# Patient Record
Sex: MALE | Race: BLACK OR AFRICAN AMERICAN | Employment: UNEMPLOYED
[De-identification: names, ages, dates, MRNs, and addresses within clinical notes are randomized per-mention and may not be internally consistent; named-entity substitution may affect disease eponyms.]

---

## 2017-01-06 DIAGNOSIS — Z76.0 MEDICATION REFILL: ICD-10-CM

## 2017-01-09 RX ORDER — DOCUSATE SODIUM 100 MG/1
CAPSULE ORAL
Qty: 60 CAPSULE | Refills: 0 | Status: SHIPPED | OUTPATIENT
Start: 2017-01-09 | End: 2017-01-27

## 2017-01-09 RX ORDER — DULOXETIN HYDROCHLORIDE 20 MG/1
CAPSULE, DELAYED RELEASE ORAL
Qty: 30 CAPSULE | Refills: 0 | Status: SHIPPED | OUTPATIENT
Start: 2017-01-09 | End: 2017-05-25 | Stop reason: SDUPTHER

## 2017-01-30 ENCOUNTER — TELEPHONE (OUTPATIENT)
Dept: FAMILY MEDICINE CLINIC | Facility: CLINIC | Age: 62
End: 2017-01-30

## 2017-01-31 ENCOUNTER — CARE COORDINATION (OUTPATIENT)
Dept: CARE COORDINATION | Facility: CLINIC | Age: 62
End: 2017-01-31

## 2017-02-22 ENCOUNTER — APPOINTMENT (OUTPATIENT)
Dept: CT IMAGING | Age: 62
DRG: 981 | End: 2017-02-22
Payer: MEDICARE

## 2017-02-22 ENCOUNTER — APPOINTMENT (OUTPATIENT)
Dept: GENERAL RADIOLOGY | Age: 62
DRG: 981 | End: 2017-02-22
Payer: MEDICARE

## 2017-02-22 ENCOUNTER — HOSPITAL ENCOUNTER (INPATIENT)
Age: 62
LOS: 6 days | Discharge: NURSING, REHAB OR LONG-TERM CARE | DRG: 981 | End: 2017-03-01
Attending: EMERGENCY MEDICINE | Admitting: FAMILY MEDICINE
Payer: MEDICARE

## 2017-02-22 DIAGNOSIS — I26.99 OTHER ACUTE PULMONARY EMBOLISM WITHOUT ACUTE COR PULMONALE (HCC): Primary | ICD-10-CM

## 2017-02-22 LAB
ABSOLUTE EOS #: 0.1 K/UL (ref 0–0.4)
ABSOLUTE LYMPH #: 1.2 K/UL (ref 1–4.8)
ABSOLUTE MONO #: 1.1 K/UL (ref 0.1–1.2)
ANION GAP SERPL CALCULATED.3IONS-SCNC: 10 MMOL/L (ref 9–17)
BASOPHILS # BLD: 0 % (ref 0–2)
BASOPHILS ABSOLUTE: 0 K/UL (ref 0–0.2)
BNP INTERPRETATION: ABNORMAL
BUN BLDV-MCNC: 18 MG/DL (ref 8–23)
BUN/CREAT BLD: ABNORMAL (ref 9–20)
CALCIUM SERPL-MCNC: 8.6 MG/DL (ref 8.6–10.4)
CHLORIDE BLD-SCNC: 105 MMOL/L (ref 98–107)
CO2: 27 MMOL/L (ref 20–31)
CREAT SERPL-MCNC: 0.92 MG/DL (ref 0.7–1.2)
D-DIMER QUANTITATIVE: 1.27 MG/L FEU
DIFFERENTIAL TYPE: ABNORMAL
EOSINOPHILS RELATIVE PERCENT: 1 % (ref 1–4)
GFR AFRICAN AMERICAN: >60 ML/MIN
GFR NON-AFRICAN AMERICAN: >60 ML/MIN
GFR SERPL CREATININE-BSD FRML MDRD: ABNORMAL ML/MIN/{1.73_M2}
GFR SERPL CREATININE-BSD FRML MDRD: ABNORMAL ML/MIN/{1.73_M2}
GLUCOSE BLD-MCNC: 114 MG/DL (ref 70–99)
HCT VFR BLD CALC: 42.4 % (ref 41–53)
HEMOGLOBIN: 14.1 G/DL (ref 13.5–17.5)
LYMPHOCYTES # BLD: 13 % (ref 24–44)
MCH RBC QN AUTO: 33.6 PG (ref 26–34)
MCHC RBC AUTO-ENTMCNC: 33.1 G/DL (ref 31–37)
MCV RBC AUTO: 101.5 FL (ref 80–100)
MONOCYTES # BLD: 11 % (ref 2–11)
PDW BLD-RTO: 16.4 % (ref 12.5–15.4)
PLATELET # BLD: 185 K/UL (ref 140–450)
PLATELET ESTIMATE: ABNORMAL
PMV BLD AUTO: 9.9 FL (ref 6–12)
POC TROPONIN I: 0.05 NG/ML (ref 0–0.1)
POC TROPONIN INTERP: NORMAL
POTASSIUM SERPL-SCNC: 5 MMOL/L (ref 3.7–5.3)
PRO-BNP: 4845 PG/ML
RBC # BLD: 4.18 M/UL (ref 4.5–5.9)
RBC # BLD: ABNORMAL 10*6/UL
SEG NEUTROPHILS: 75 % (ref 36–66)
SEGMENTED NEUTROPHILS ABSOLUTE COUNT: 6.9 K/UL (ref 1.8–7.7)
SODIUM BLD-SCNC: 142 MMOL/L (ref 135–144)
WBC # BLD: 9.4 K/UL (ref 3.5–11)
WBC # BLD: ABNORMAL 10*3/UL

## 2017-02-22 PROCEDURE — 84484 ASSAY OF TROPONIN QUANT: CPT

## 2017-02-22 PROCEDURE — 71260 CT THORAX DX C+: CPT

## 2017-02-22 PROCEDURE — 83880 ASSAY OF NATRIURETIC PEPTIDE: CPT

## 2017-02-22 PROCEDURE — 96372 THER/PROPH/DIAG INJ SC/IM: CPT

## 2017-02-22 PROCEDURE — 93005 ELECTROCARDIOGRAM TRACING: CPT

## 2017-02-22 PROCEDURE — 85025 COMPLETE CBC W/AUTO DIFF WBC: CPT

## 2017-02-22 PROCEDURE — 71010 XR CHEST PORTABLE: CPT

## 2017-02-22 PROCEDURE — 85379 FIBRIN DEGRADATION QUANT: CPT

## 2017-02-22 PROCEDURE — 99285 EMERGENCY DEPT VISIT HI MDM: CPT

## 2017-02-22 PROCEDURE — 80048 BASIC METABOLIC PNL TOTAL CA: CPT

## 2017-02-22 PROCEDURE — 6360000004 HC RX CONTRAST MEDICATION: Performed by: EMERGENCY MEDICINE

## 2017-02-22 PROCEDURE — 6370000000 HC RX 637 (ALT 250 FOR IP): Performed by: EMERGENCY MEDICINE

## 2017-02-22 RX ORDER — ASPIRIN 81 MG/1
324 TABLET, CHEWABLE ORAL ONCE
Status: COMPLETED | OUTPATIENT
Start: 2017-02-22 | End: 2017-02-22

## 2017-02-22 RX ORDER — NITROGLYCERIN 0.4 MG/1
0.4 TABLET SUBLINGUAL EVERY 5 MIN PRN
Status: DISCONTINUED | OUTPATIENT
Start: 2017-02-22 | End: 2017-03-01 | Stop reason: HOSPADM

## 2017-02-22 RX ADMIN — ASPIRIN 81 MG 324 MG: 81 TABLET ORAL at 21:50

## 2017-02-22 RX ADMIN — NITROGLYCERIN 0.4 MG: 0.4 TABLET SUBLINGUAL at 21:52

## 2017-02-22 RX ADMIN — IOHEXOL 85 ML: 350 INJECTION, SOLUTION INTRAVENOUS at 23:09

## 2017-02-22 ASSESSMENT — PAIN SCALES - GENERAL: PAINLEVEL_OUTOF10: 3

## 2017-02-23 ENCOUNTER — APPOINTMENT (OUTPATIENT)
Dept: CT IMAGING | Age: 62
DRG: 981 | End: 2017-02-23
Payer: MEDICARE

## 2017-02-23 PROBLEM — I26.99 ACUTE PULMONARY EMBOLISM (HCC): Status: ACTIVE | Noted: 2017-02-23

## 2017-02-23 LAB
LV EF: 23 %
LVEF MODALITY: NORMAL
POC TROPONIN I: 0.07 NG/ML (ref 0–0.1)
POC TROPONIN INTERP: NORMAL
TROPONIN INTERP: NORMAL
TROPONIN T: <0.03 NG/ML
TSH SERPL DL<=0.05 MIU/L-ACNC: 0.6 MIU/L (ref 0.3–5)

## 2017-02-23 PROCEDURE — 6360000002 HC RX W HCPCS: Performed by: FAMILY MEDICINE

## 2017-02-23 PROCEDURE — 36415 COLL VENOUS BLD VENIPUNCTURE: CPT

## 2017-02-23 PROCEDURE — 84484 ASSAY OF TROPONIN QUANT: CPT

## 2017-02-23 PROCEDURE — 93306 TTE W/DOPPLER COMPLETE: CPT

## 2017-02-23 PROCEDURE — 6370000000 HC RX 637 (ALT 250 FOR IP): Performed by: FAMILY MEDICINE

## 2017-02-23 PROCEDURE — 2580000003 HC RX 258: Performed by: FAMILY MEDICINE

## 2017-02-23 PROCEDURE — 74177 CT ABD & PELVIS W/CONTRAST: CPT

## 2017-02-23 PROCEDURE — 6360000002 HC RX W HCPCS: Performed by: EMERGENCY MEDICINE

## 2017-02-23 PROCEDURE — 93970 EXTREMITY STUDY: CPT

## 2017-02-23 PROCEDURE — 84443 ASSAY THYROID STIM HORMONE: CPT

## 2017-02-23 PROCEDURE — 99223 1ST HOSP IP/OBS HIGH 75: CPT | Performed by: INTERNAL MEDICINE

## 2017-02-23 PROCEDURE — 6360000004 HC RX CONTRAST MEDICATION: Performed by: FAMILY MEDICINE

## 2017-02-23 PROCEDURE — 2500000003 HC RX 250 WO HCPCS

## 2017-02-23 PROCEDURE — 2060000002 HC BURN ICU INTERMEDIATE R&B

## 2017-02-23 PROCEDURE — 99221 1ST HOSP IP/OBS SF/LOW 40: CPT | Performed by: FAMILY MEDICINE

## 2017-02-23 PROCEDURE — 6370000000 HC RX 637 (ALT 250 FOR IP): Performed by: EMERGENCY MEDICINE

## 2017-02-23 RX ORDER — FUROSEMIDE 40 MG/1
40 TABLET ORAL DAILY
Status: DISCONTINUED | OUTPATIENT
Start: 2017-02-23 | End: 2017-03-01 | Stop reason: HOSPADM

## 2017-02-23 RX ORDER — OXYCODONE HYDROCHLORIDE AND ACETAMINOPHEN 5; 325 MG/1; MG/1
2 TABLET ORAL EVERY 4 HOURS PRN
Status: DISCONTINUED | OUTPATIENT
Start: 2017-02-23 | End: 2017-02-23

## 2017-02-23 RX ORDER — LORAZEPAM 0.5 MG/1
0.5 TABLET ORAL NIGHTLY PRN
Status: DISCONTINUED | OUTPATIENT
Start: 2017-02-23 | End: 2017-03-01 | Stop reason: HOSPADM

## 2017-02-23 RX ORDER — FUROSEMIDE 10 MG/ML
20 INJECTION INTRAMUSCULAR; INTRAVENOUS ONCE
Status: COMPLETED | OUTPATIENT
Start: 2017-02-23 | End: 2017-02-23

## 2017-02-23 RX ORDER — CARVEDILOL 12.5 MG/1
12.5 TABLET ORAL 2 TIMES DAILY
Status: DISCONTINUED | OUTPATIENT
Start: 2017-02-23 | End: 2017-02-24

## 2017-02-23 RX ORDER — GUAIFENESIN 600 MG/1
600 TABLET, EXTENDED RELEASE ORAL 2 TIMES DAILY PRN
Status: DISCONTINUED | OUTPATIENT
Start: 2017-02-23 | End: 2017-03-01 | Stop reason: HOSPADM

## 2017-02-23 RX ORDER — CLONIDINE HYDROCHLORIDE 0.2 MG/1
0.2 TABLET ORAL 2 TIMES DAILY
Status: DISCONTINUED | OUTPATIENT
Start: 2017-02-23 | End: 2017-02-23

## 2017-02-23 RX ORDER — PREGABALIN 50 MG/1
50 CAPSULE ORAL 2 TIMES DAILY
Status: DISCONTINUED | OUTPATIENT
Start: 2017-02-23 | End: 2017-03-01 | Stop reason: HOSPADM

## 2017-02-23 RX ORDER — ATORVASTATIN CALCIUM 20 MG/1
20 TABLET, FILM COATED ORAL DAILY
Status: DISCONTINUED | OUTPATIENT
Start: 2017-02-23 | End: 2017-03-01 | Stop reason: HOSPADM

## 2017-02-23 RX ORDER — DOCUSATE SODIUM 100 MG/1
100 CAPSULE, LIQUID FILLED ORAL 2 TIMES DAILY PRN
Status: DISCONTINUED | OUTPATIENT
Start: 2017-02-23 | End: 2017-03-01 | Stop reason: HOSPADM

## 2017-02-23 RX ORDER — FAMOTIDINE 20 MG/1
20 TABLET, FILM COATED ORAL 2 TIMES DAILY
Status: DISCONTINUED | OUTPATIENT
Start: 2017-02-23 | End: 2017-03-01 | Stop reason: HOSPADM

## 2017-02-23 RX ORDER — OXYCODONE HYDROCHLORIDE AND ACETAMINOPHEN 5; 325 MG/1; MG/1
1 TABLET ORAL EVERY 4 HOURS PRN
Status: DISCONTINUED | OUTPATIENT
Start: 2017-02-23 | End: 2017-02-23

## 2017-02-23 RX ORDER — SODIUM CHLORIDE 0.9 % (FLUSH) 0.9 %
10 SYRINGE (ML) INJECTION PRN
Status: DISCONTINUED | OUTPATIENT
Start: 2017-02-23 | End: 2017-03-01 | Stop reason: HOSPADM

## 2017-02-23 RX ORDER — SPIRONOLACTONE 25 MG/1
25 TABLET ORAL DAILY
Status: DISCONTINUED | OUTPATIENT
Start: 2017-02-23 | End: 2017-02-23

## 2017-02-23 RX ORDER — LISINOPRIL 20 MG/1
40 TABLET ORAL DAILY
Status: DISCONTINUED | OUTPATIENT
Start: 2017-02-23 | End: 2017-02-23

## 2017-02-23 RX ORDER — SERTRALINE HYDROCHLORIDE 25 MG/1
25 TABLET, FILM COATED ORAL DAILY
Status: DISCONTINUED | OUTPATIENT
Start: 2017-02-23 | End: 2017-02-23

## 2017-02-23 RX ORDER — LORAZEPAM 0.5 MG/1
0.5 TABLET ORAL NIGHTLY PRN
Status: DISCONTINUED | OUTPATIENT
Start: 2017-02-24 | End: 2017-02-23

## 2017-02-23 RX ORDER — SODIUM CHLORIDE 0.9 % (FLUSH) 0.9 %
10 SYRINGE (ML) INJECTION EVERY 12 HOURS SCHEDULED
Status: DISCONTINUED | OUTPATIENT
Start: 2017-02-23 | End: 2017-03-01 | Stop reason: HOSPADM

## 2017-02-23 RX ORDER — DULOXETIN HYDROCHLORIDE 20 MG/1
20 CAPSULE, DELAYED RELEASE ORAL DAILY
Status: DISCONTINUED | OUTPATIENT
Start: 2017-02-23 | End: 2017-03-01 | Stop reason: HOSPADM

## 2017-02-23 RX ORDER — GABAPENTIN 400 MG/1
400 CAPSULE ORAL 3 TIMES DAILY
Status: DISCONTINUED | OUTPATIENT
Start: 2017-02-23 | End: 2017-02-23

## 2017-02-23 RX ADMIN — PREGABALIN 50 MG: 50 CAPSULE ORAL at 21:08

## 2017-02-23 RX ADMIN — Medication 10 ML: at 07:55

## 2017-02-23 RX ADMIN — GUAIFENESIN 600 MG: 600 TABLET, EXTENDED RELEASE ORAL at 21:10

## 2017-02-23 RX ADMIN — CARVEDILOL 12.5 MG: 12.5 TABLET, FILM COATED ORAL at 09:47

## 2017-02-23 RX ADMIN — GUAIFENESIN 600 MG: 600 TABLET, EXTENDED RELEASE ORAL at 16:37

## 2017-02-23 RX ADMIN — LISINOPRIL 40 MG: 20 TABLET ORAL at 09:47

## 2017-02-23 RX ADMIN — SPIRONOLACTONE 25 MG: 25 TABLET ORAL at 09:47

## 2017-02-23 RX ADMIN — FUROSEMIDE 20 MG: 10 INJECTION, SOLUTION INTRAMUSCULAR; INTRAVENOUS at 18:35

## 2017-02-23 RX ADMIN — ENOXAPARIN SODIUM 120 MG: 120 INJECTION SUBCUTANEOUS at 00:11

## 2017-02-23 RX ADMIN — IOHEXOL 130 ML: 350 INJECTION, SOLUTION INTRAVENOUS at 13:30

## 2017-02-23 RX ADMIN — METOPROLOL TARTRATE 5 MG: 5 INJECTION INTRAVENOUS at 05:20

## 2017-02-23 RX ADMIN — ATORVASTATIN CALCIUM 20 MG: 20 TABLET, FILM COATED ORAL at 09:47

## 2017-02-23 RX ADMIN — FAMOTIDINE 20 MG: 20 TABLET, FILM COATED ORAL at 21:08

## 2017-02-23 RX ADMIN — NITROGLYCERIN 0.4 MG: 0.4 TABLET SUBLINGUAL at 17:57

## 2017-02-23 RX ADMIN — FAMOTIDINE 20 MG: 20 TABLET, FILM COATED ORAL at 14:06

## 2017-02-23 RX ADMIN — CARVEDILOL 12.5 MG: 12.5 TABLET, FILM COATED ORAL at 21:09

## 2017-02-23 RX ADMIN — DULOXETINE 20 MG: 20 CAPSULE, DELAYED RELEASE ORAL at 14:06

## 2017-02-23 RX ADMIN — ENOXAPARIN SODIUM 120 MG: 120 INJECTION SUBCUTANEOUS at 14:06

## 2017-02-23 RX ADMIN — PREGABALIN 50 MG: 50 CAPSULE ORAL at 14:06

## 2017-02-23 RX ADMIN — Medication 10 ML: at 21:08

## 2017-02-23 RX ADMIN — FUROSEMIDE 40 MG: 40 TABLET ORAL at 09:47

## 2017-02-23 RX ADMIN — BENZOCAINE AND MENTHOL 1 LOZENGE: 15; 3.6 LOZENGE ORAL at 16:37

## 2017-02-23 ASSESSMENT — ENCOUNTER SYMPTOMS
NAUSEA: 1
HEMOPTYSIS: 0
DIARRHEA: 0
CHEST TIGHTNESS: 1
ABDOMINAL PAIN: 0
SHORTNESS OF BREATH: 1
VOMITING: 1
COUGH: 1
CONSTIPATION: 0
SPUTUM PRODUCTION: 0
BACK PAIN: 0
COLOR CHANGE: 0
BLOOD IN STOOL: 0
COUGH: 0

## 2017-02-23 ASSESSMENT — PAIN SCALES - GENERAL
PAINLEVEL_OUTOF10: 0

## 2017-02-24 LAB
ABSOLUTE EOS #: 0.2 K/UL (ref 0–0.4)
ABSOLUTE LYMPH #: 1.2 K/UL (ref 1–4.8)
ABSOLUTE MONO #: 1 K/UL (ref 0.1–1.2)
ANION GAP SERPL CALCULATED.3IONS-SCNC: 10 MMOL/L (ref 9–17)
BASOPHILS # BLD: 0 % (ref 0–2)
BASOPHILS ABSOLUTE: 0 K/UL (ref 0–0.2)
BUN BLDV-MCNC: 19 MG/DL (ref 8–23)
BUN/CREAT BLD: ABNORMAL (ref 9–20)
CALCIUM SERPL-MCNC: 8.4 MG/DL (ref 8.6–10.4)
CHLORIDE BLD-SCNC: 103 MMOL/L (ref 98–107)
CO2: 27 MMOL/L (ref 20–31)
CREAT SERPL-MCNC: 0.85 MG/DL (ref 0.7–1.2)
DIFFERENTIAL TYPE: ABNORMAL
EOSINOPHILS RELATIVE PERCENT: 2 % (ref 1–4)
GFR AFRICAN AMERICAN: >60 ML/MIN
GFR NON-AFRICAN AMERICAN: >60 ML/MIN
GFR SERPL CREATININE-BSD FRML MDRD: ABNORMAL ML/MIN/{1.73_M2}
GFR SERPL CREATININE-BSD FRML MDRD: ABNORMAL ML/MIN/{1.73_M2}
GLUCOSE BLD-MCNC: 110 MG/DL (ref 70–99)
HCT VFR BLD CALC: 41.2 % (ref 41–53)
HEMOGLOBIN: 13.5 G/DL (ref 13.5–17.5)
LYMPHOCYTES # BLD: 14 % (ref 24–44)
MCH RBC QN AUTO: 33.5 PG (ref 26–34)
MCHC RBC AUTO-ENTMCNC: 32.9 G/DL (ref 31–37)
MCV RBC AUTO: 101.9 FL (ref 80–100)
MONOCYTES # BLD: 11 % (ref 2–11)
PDW BLD-RTO: 15.9 % (ref 12.5–15.4)
PLATELET # BLD: 170 K/UL (ref 140–450)
PLATELET ESTIMATE: ABNORMAL
PMV BLD AUTO: 9.6 FL (ref 6–12)
POTASSIUM SERPL-SCNC: 4.5 MMOL/L (ref 3.7–5.3)
RBC # BLD: 4.04 M/UL (ref 4.5–5.9)
RBC # BLD: ABNORMAL 10*6/UL
SEG NEUTROPHILS: 73 % (ref 36–66)
SEGMENTED NEUTROPHILS ABSOLUTE COUNT: 6.6 K/UL (ref 1.8–7.7)
SODIUM BLD-SCNC: 140 MMOL/L (ref 135–144)
VITAMIN D 25-HYDROXY: 11.4 NG/ML (ref 30–100)
WBC # BLD: 8.9 K/UL (ref 3.5–11)
WBC # BLD: ABNORMAL 10*3/UL

## 2017-02-24 PROCEDURE — 80048 BASIC METABOLIC PNL TOTAL CA: CPT

## 2017-02-24 PROCEDURE — 82306 VITAMIN D 25 HYDROXY: CPT

## 2017-02-24 PROCEDURE — 6370000000 HC RX 637 (ALT 250 FOR IP): Performed by: FAMILY MEDICINE

## 2017-02-24 PROCEDURE — 36415 COLL VENOUS BLD VENIPUNCTURE: CPT

## 2017-02-24 PROCEDURE — G8988 SELF CARE GOAL STATUS: HCPCS | Performed by: OCCUPATIONAL THERAPIST

## 2017-02-24 PROCEDURE — 99232 SBSQ HOSP IP/OBS MODERATE 35: CPT | Performed by: FAMILY MEDICINE

## 2017-02-24 PROCEDURE — 6360000002 HC RX W HCPCS: Performed by: FAMILY MEDICINE

## 2017-02-24 PROCEDURE — 2060000002 HC BURN ICU INTERMEDIATE R&B

## 2017-02-24 PROCEDURE — 97165 OT EVAL LOW COMPLEX 30 MIN: CPT | Performed by: OCCUPATIONAL THERAPIST

## 2017-02-24 PROCEDURE — 97535 SELF CARE MNGMENT TRAINING: CPT | Performed by: OCCUPATIONAL THERAPIST

## 2017-02-24 PROCEDURE — 2580000003 HC RX 258: Performed by: FAMILY MEDICINE

## 2017-02-24 PROCEDURE — G8987 SELF CARE CURRENT STATUS: HCPCS | Performed by: OCCUPATIONAL THERAPIST

## 2017-02-24 PROCEDURE — 99232 SBSQ HOSP IP/OBS MODERATE 35: CPT | Performed by: INTERNAL MEDICINE

## 2017-02-24 PROCEDURE — 99222 1ST HOSP IP/OBS MODERATE 55: CPT | Performed by: INTERNAL MEDICINE

## 2017-02-24 PROCEDURE — 85025 COMPLETE CBC W/AUTO DIFF WBC: CPT

## 2017-02-24 RX ORDER — LISINOPRIL 10 MG/1
10 TABLET ORAL DAILY
Status: DISCONTINUED | OUTPATIENT
Start: 2017-02-24 | End: 2017-03-01 | Stop reason: HOSPADM

## 2017-02-24 RX ORDER — CARVEDILOL 12.5 MG/1
12.5 TABLET ORAL 2 TIMES DAILY
Status: DISCONTINUED | OUTPATIENT
Start: 2017-02-24 | End: 2017-03-01

## 2017-02-24 RX ORDER — LISINOPRIL 20 MG/1
20 TABLET ORAL DAILY
Status: DISCONTINUED | OUTPATIENT
Start: 2017-02-24 | End: 2017-02-24

## 2017-02-24 RX ORDER — CALCIUM CARBONATE 200(500)MG
1000 TABLET,CHEWABLE ORAL EVERY 4 HOURS PRN
Status: DISCONTINUED | OUTPATIENT
Start: 2017-02-24 | End: 2017-03-01 | Stop reason: HOSPADM

## 2017-02-24 RX ORDER — CARVEDILOL 25 MG/1
25 TABLET ORAL 2 TIMES DAILY
Status: DISCONTINUED | OUTPATIENT
Start: 2017-02-24 | End: 2017-02-24

## 2017-02-24 RX ADMIN — GUAIFENESIN 600 MG: 600 TABLET, EXTENDED RELEASE ORAL at 20:23

## 2017-02-24 RX ADMIN — Medication 1000 MG: at 03:01

## 2017-02-24 RX ADMIN — CARVEDILOL 12.5 MG: 12.5 TABLET, FILM COATED ORAL at 09:02

## 2017-02-24 RX ADMIN — FAMOTIDINE 20 MG: 20 TABLET, FILM COATED ORAL at 12:07

## 2017-02-24 RX ADMIN — ATORVASTATIN CALCIUM 20 MG: 20 TABLET, FILM COATED ORAL at 12:07

## 2017-02-24 RX ADMIN — LISINOPRIL 10 MG: 10 TABLET ORAL at 20:23

## 2017-02-24 RX ADMIN — PREGABALIN 50 MG: 50 CAPSULE ORAL at 20:24

## 2017-02-24 RX ADMIN — LORAZEPAM 0.5 MG: 0.5 TABLET ORAL at 23:10

## 2017-02-24 RX ADMIN — LORAZEPAM 0.5 MG: 0.5 TABLET ORAL at 00:01

## 2017-02-24 RX ADMIN — Medication 10 ML: at 09:02

## 2017-02-24 RX ADMIN — ENOXAPARIN SODIUM 120 MG: 120 INJECTION SUBCUTANEOUS at 23:10

## 2017-02-24 RX ADMIN — Medication 1000 MG: at 23:10

## 2017-02-24 RX ADMIN — Medication 10 ML: at 20:24

## 2017-02-24 RX ADMIN — FUROSEMIDE 40 MG: 40 TABLET ORAL at 09:02

## 2017-02-24 RX ADMIN — PREGABALIN 50 MG: 50 CAPSULE ORAL at 12:07

## 2017-02-24 RX ADMIN — DULOXETINE 20 MG: 20 CAPSULE, DELAYED RELEASE ORAL at 12:07

## 2017-02-24 RX ADMIN — CARVEDILOL 12.5 MG: 12.5 TABLET, FILM COATED ORAL at 20:23

## 2017-02-24 RX ADMIN — FAMOTIDINE 20 MG: 20 TABLET, FILM COATED ORAL at 20:23

## 2017-02-24 RX ADMIN — BENZOCAINE AND MENTHOL 1 LOZENGE: 15; 3.6 LOZENGE ORAL at 20:26

## 2017-02-24 RX ADMIN — ENOXAPARIN SODIUM 120 MG: 120 INJECTION SUBCUTANEOUS at 12:07

## 2017-02-24 ASSESSMENT — PAIN SCALES - GENERAL
PAINLEVEL_OUTOF10: 0
PAINLEVEL_OUTOF10: 1
PAINLEVEL_OUTOF10: 0

## 2017-02-25 PROBLEM — F32.9 MAJOR DEPRESSIVE DISORDER WITH CURRENT ACTIVE EPISODE: Status: ACTIVE | Noted: 2017-02-25

## 2017-02-25 LAB
ABSOLUTE EOS #: 0.1 K/UL (ref 0–0.4)
ABSOLUTE LYMPH #: 0.9 K/UL (ref 1–4.8)
ABSOLUTE MONO #: 0.9 K/UL (ref 0.1–1.2)
ANION GAP SERPL CALCULATED.3IONS-SCNC: 9 MMOL/L (ref 9–17)
BASOPHILS # BLD: 0 % (ref 0–2)
BASOPHILS ABSOLUTE: 0 K/UL (ref 0–0.2)
BUN BLDV-MCNC: 21 MG/DL (ref 8–23)
BUN/CREAT BLD: ABNORMAL (ref 9–20)
CALCIUM SERPL-MCNC: 8.6 MG/DL (ref 8.6–10.4)
CHLORIDE BLD-SCNC: 102 MMOL/L (ref 98–107)
CO2: 31 MMOL/L (ref 20–31)
CREAT SERPL-MCNC: 0.88 MG/DL (ref 0.7–1.2)
DIFFERENTIAL TYPE: ABNORMAL
EOSINOPHILS RELATIVE PERCENT: 1 % (ref 1–4)
GFR AFRICAN AMERICAN: >60 ML/MIN
GFR NON-AFRICAN AMERICAN: >60 ML/MIN
GFR SERPL CREATININE-BSD FRML MDRD: ABNORMAL ML/MIN/{1.73_M2}
GFR SERPL CREATININE-BSD FRML MDRD: ABNORMAL ML/MIN/{1.73_M2}
GLUCOSE BLD-MCNC: 103 MG/DL (ref 70–99)
HCT VFR BLD CALC: 40.1 % (ref 41–53)
HEMOGLOBIN: 13.1 G/DL (ref 13.5–17.5)
LYMPHOCYTES # BLD: 10 % (ref 24–44)
MCH RBC QN AUTO: 33.4 PG (ref 26–34)
MCHC RBC AUTO-ENTMCNC: 32.6 G/DL (ref 31–37)
MCV RBC AUTO: 102.7 FL (ref 80–100)
MONOCYTES # BLD: 10 % (ref 2–11)
PDW BLD-RTO: 15.6 % (ref 12.5–15.4)
PLATELET # BLD: 161 K/UL (ref 140–450)
PLATELET ESTIMATE: ABNORMAL
PMV BLD AUTO: 9.4 FL (ref 6–12)
POTASSIUM SERPL-SCNC: 4.3 MMOL/L (ref 3.7–5.3)
RBC # BLD: 3.9 M/UL (ref 4.5–5.9)
RBC # BLD: ABNORMAL 10*6/UL
SEG NEUTROPHILS: 79 % (ref 36–66)
SEGMENTED NEUTROPHILS ABSOLUTE COUNT: 6.7 K/UL (ref 1.8–7.7)
SODIUM BLD-SCNC: 142 MMOL/L (ref 135–144)
WBC # BLD: 8.5 K/UL (ref 3.5–11)
WBC # BLD: ABNORMAL 10*3/UL

## 2017-02-25 PROCEDURE — 6370000000 HC RX 637 (ALT 250 FOR IP): Performed by: FAMILY MEDICINE

## 2017-02-25 PROCEDURE — 85025 COMPLETE CBC W/AUTO DIFF WBC: CPT

## 2017-02-25 PROCEDURE — 2060000002 HC BURN ICU INTERMEDIATE R&B

## 2017-02-25 PROCEDURE — 80048 BASIC METABOLIC PNL TOTAL CA: CPT

## 2017-02-25 PROCEDURE — 99232 SBSQ HOSP IP/OBS MODERATE 35: CPT | Performed by: INTERNAL MEDICINE

## 2017-02-25 PROCEDURE — 36415 COLL VENOUS BLD VENIPUNCTURE: CPT

## 2017-02-25 PROCEDURE — 6360000002 HC RX W HCPCS: Performed by: FAMILY MEDICINE

## 2017-02-25 PROCEDURE — 2580000003 HC RX 258: Performed by: FAMILY MEDICINE

## 2017-02-25 RX ORDER — QUETIAPINE FUMARATE 25 MG/1
25 TABLET, FILM COATED ORAL 2 TIMES DAILY
Status: DISCONTINUED | OUTPATIENT
Start: 2017-02-25 | End: 2017-03-01 | Stop reason: HOSPADM

## 2017-02-25 RX ADMIN — DULOXETINE 20 MG: 20 CAPSULE, DELAYED RELEASE ORAL at 08:07

## 2017-02-25 RX ADMIN — FAMOTIDINE 20 MG: 20 TABLET, FILM COATED ORAL at 08:07

## 2017-02-25 RX ADMIN — QUETIAPINE FUMARATE 25 MG: 25 TABLET ORAL at 10:51

## 2017-02-25 RX ADMIN — FAMOTIDINE 20 MG: 20 TABLET, FILM COATED ORAL at 20:37

## 2017-02-25 RX ADMIN — ATORVASTATIN CALCIUM 20 MG: 20 TABLET, FILM COATED ORAL at 08:07

## 2017-02-25 RX ADMIN — ENOXAPARIN SODIUM 120 MG: 120 INJECTION SUBCUTANEOUS at 12:40

## 2017-02-25 RX ADMIN — CARVEDILOL 12.5 MG: 12.5 TABLET, FILM COATED ORAL at 08:07

## 2017-02-25 RX ADMIN — Medication 10 ML: at 20:37

## 2017-02-25 RX ADMIN — LISINOPRIL 10 MG: 10 TABLET ORAL at 08:07

## 2017-02-25 RX ADMIN — PREGABALIN 50 MG: 50 CAPSULE ORAL at 08:07

## 2017-02-25 RX ADMIN — FUROSEMIDE 40 MG: 40 TABLET ORAL at 08:07

## 2017-02-25 RX ADMIN — CARVEDILOL 12.5 MG: 12.5 TABLET, FILM COATED ORAL at 20:37

## 2017-02-25 RX ADMIN — Medication 10 ML: at 08:07

## 2017-02-25 RX ADMIN — QUETIAPINE FUMARATE 25 MG: 25 TABLET ORAL at 20:37

## 2017-02-25 RX ADMIN — PREGABALIN 50 MG: 50 CAPSULE ORAL at 20:37

## 2017-02-25 RX ADMIN — VITAMIN D, TAB 1000IU (100/BT) 1000 UNITS: 25 TAB at 10:51

## 2017-02-25 ASSESSMENT — PAIN SCALES - GENERAL
PAINLEVEL_OUTOF10: 0
PAINLEVEL_OUTOF10: 0

## 2017-02-26 LAB
ABSOLUTE EOS #: 0.1 K/UL (ref 0–0.4)
ABSOLUTE LYMPH #: 1.1 K/UL (ref 1–4.8)
ABSOLUTE MONO #: 0.8 K/UL (ref 0.1–1.2)
ANION GAP SERPL CALCULATED.3IONS-SCNC: 7 MMOL/L (ref 9–17)
BASOPHILS # BLD: 0 % (ref 0–2)
BASOPHILS ABSOLUTE: 0 K/UL (ref 0–0.2)
BUN BLDV-MCNC: 20 MG/DL (ref 8–23)
BUN/CREAT BLD: ABNORMAL (ref 9–20)
CALCIUM SERPL-MCNC: 8.5 MG/DL (ref 8.6–10.4)
CHLORIDE BLD-SCNC: 97 MMOL/L (ref 98–107)
CO2: 30 MMOL/L (ref 20–31)
CREAT SERPL-MCNC: 0.82 MG/DL (ref 0.7–1.2)
DIFFERENTIAL TYPE: ABNORMAL
EOSINOPHILS RELATIVE PERCENT: 2 % (ref 1–4)
GFR AFRICAN AMERICAN: >60 ML/MIN
GFR NON-AFRICAN AMERICAN: >60 ML/MIN
GFR SERPL CREATININE-BSD FRML MDRD: ABNORMAL ML/MIN/{1.73_M2}
GFR SERPL CREATININE-BSD FRML MDRD: ABNORMAL ML/MIN/{1.73_M2}
GLUCOSE BLD-MCNC: 89 MG/DL (ref 70–99)
HCT VFR BLD CALC: 40.4 % (ref 41–53)
HEMOGLOBIN: 13.1 G/DL (ref 13.5–17.5)
LYMPHOCYTES # BLD: 15 % (ref 24–44)
MCH RBC QN AUTO: 33.5 PG (ref 26–34)
MCHC RBC AUTO-ENTMCNC: 32.5 G/DL (ref 31–37)
MCV RBC AUTO: 103 FL (ref 80–100)
MONOCYTES # BLD: 12 % (ref 2–11)
PDW BLD-RTO: 16 % (ref 12.5–15.4)
PLATELET # BLD: 171 K/UL (ref 140–450)
PLATELET ESTIMATE: ABNORMAL
PMV BLD AUTO: 9.5 FL (ref 6–12)
POTASSIUM SERPL-SCNC: 4.8 MMOL/L (ref 3.7–5.3)
RBC # BLD: 3.92 M/UL (ref 4.5–5.9)
RBC # BLD: ABNORMAL 10*6/UL
SEG NEUTROPHILS: 71 % (ref 36–66)
SEGMENTED NEUTROPHILS ABSOLUTE COUNT: 4.9 K/UL (ref 1.8–7.7)
SODIUM BLD-SCNC: 134 MMOL/L (ref 135–144)
WBC # BLD: 6.9 K/UL (ref 3.5–11)
WBC # BLD: ABNORMAL 10*3/UL

## 2017-02-26 PROCEDURE — 85025 COMPLETE CBC W/AUTO DIFF WBC: CPT

## 2017-02-26 PROCEDURE — 36415 COLL VENOUS BLD VENIPUNCTURE: CPT

## 2017-02-26 PROCEDURE — 2060000002 HC BURN ICU INTERMEDIATE R&B

## 2017-02-26 PROCEDURE — 99232 SBSQ HOSP IP/OBS MODERATE 35: CPT | Performed by: FAMILY MEDICINE

## 2017-02-26 PROCEDURE — 6370000000 HC RX 637 (ALT 250 FOR IP): Performed by: FAMILY MEDICINE

## 2017-02-26 PROCEDURE — 2580000003 HC RX 258: Performed by: FAMILY MEDICINE

## 2017-02-26 PROCEDURE — 6360000002 HC RX W HCPCS: Performed by: FAMILY MEDICINE

## 2017-02-26 PROCEDURE — 99232 SBSQ HOSP IP/OBS MODERATE 35: CPT | Performed by: INTERNAL MEDICINE

## 2017-02-26 PROCEDURE — 80048 BASIC METABOLIC PNL TOTAL CA: CPT

## 2017-02-26 RX ADMIN — DULOXETINE 20 MG: 20 CAPSULE, DELAYED RELEASE ORAL at 08:00

## 2017-02-26 RX ADMIN — VITAMIN D, TAB 1000IU (100/BT) 1000 UNITS: 25 TAB at 08:00

## 2017-02-26 RX ADMIN — QUETIAPINE FUMARATE 25 MG: 25 TABLET ORAL at 20:56

## 2017-02-26 RX ADMIN — ENOXAPARIN SODIUM 120 MG: 120 INJECTION SUBCUTANEOUS at 12:33

## 2017-02-26 RX ADMIN — ENOXAPARIN SODIUM 120 MG: 120 INJECTION SUBCUTANEOUS at 00:03

## 2017-02-26 RX ADMIN — Medication 10 ML: at 08:01

## 2017-02-26 RX ADMIN — LISINOPRIL 10 MG: 10 TABLET ORAL at 08:00

## 2017-02-26 RX ADMIN — FUROSEMIDE 40 MG: 40 TABLET ORAL at 08:01

## 2017-02-26 RX ADMIN — Medication 10 ML: at 20:57

## 2017-02-26 RX ADMIN — FAMOTIDINE 20 MG: 20 TABLET, FILM COATED ORAL at 08:01

## 2017-02-26 RX ADMIN — QUETIAPINE FUMARATE 25 MG: 25 TABLET ORAL at 08:00

## 2017-02-26 RX ADMIN — PREGABALIN 50 MG: 50 CAPSULE ORAL at 20:56

## 2017-02-26 RX ADMIN — ATORVASTATIN CALCIUM 20 MG: 20 TABLET, FILM COATED ORAL at 08:00

## 2017-02-26 RX ADMIN — CARVEDILOL 12.5 MG: 12.5 TABLET, FILM COATED ORAL at 20:56

## 2017-02-26 RX ADMIN — FAMOTIDINE 20 MG: 20 TABLET, FILM COATED ORAL at 20:56

## 2017-02-26 RX ADMIN — CARVEDILOL 12.5 MG: 12.5 TABLET, FILM COATED ORAL at 08:01

## 2017-02-26 RX ADMIN — PREGABALIN 50 MG: 50 CAPSULE ORAL at 08:00

## 2017-02-26 ASSESSMENT — PAIN SCALES - GENERAL
PAINLEVEL_OUTOF10: 0
PAINLEVEL_OUTOF10: 0

## 2017-02-27 ENCOUNTER — APPOINTMENT (OUTPATIENT)
Dept: GENERAL RADIOLOGY | Age: 62
DRG: 981 | End: 2017-02-27
Payer: MEDICARE

## 2017-02-27 LAB
ABSOLUTE EOS #: 0.2 K/UL (ref 0–0.4)
ABSOLUTE LYMPH #: 1 K/UL (ref 1–4.8)
ABSOLUTE MONO #: 0.9 K/UL (ref 0.1–1.2)
ANION GAP SERPL CALCULATED.3IONS-SCNC: 9 MMOL/L (ref 9–17)
BASOPHILS # BLD: 0 % (ref 0–2)
BASOPHILS ABSOLUTE: 0 K/UL (ref 0–0.2)
BUN BLDV-MCNC: 16 MG/DL (ref 8–23)
BUN/CREAT BLD: ABNORMAL (ref 9–20)
CALCIUM SERPL-MCNC: 8.5 MG/DL (ref 8.6–10.4)
CHLORIDE BLD-SCNC: 102 MMOL/L (ref 98–107)
CO2: 31 MMOL/L (ref 20–31)
CREAT SERPL-MCNC: 0.88 MG/DL (ref 0.7–1.2)
DIFFERENTIAL TYPE: ABNORMAL
EOSINOPHILS RELATIVE PERCENT: 3 % (ref 1–4)
GFR AFRICAN AMERICAN: >60 ML/MIN
GFR NON-AFRICAN AMERICAN: >60 ML/MIN
GFR SERPL CREATININE-BSD FRML MDRD: ABNORMAL ML/MIN/{1.73_M2}
GFR SERPL CREATININE-BSD FRML MDRD: ABNORMAL ML/MIN/{1.73_M2}
GLUCOSE BLD-MCNC: 96 MG/DL (ref 70–99)
HCT VFR BLD CALC: 40.2 % (ref 41–53)
HEMOGLOBIN: 13.2 G/DL (ref 13.5–17.5)
LYMPHOCYTES # BLD: 13 % (ref 24–44)
MCH RBC QN AUTO: 33.5 PG (ref 26–34)
MCHC RBC AUTO-ENTMCNC: 32.8 G/DL (ref 31–37)
MCV RBC AUTO: 101.9 FL (ref 80–100)
MONOCYTES # BLD: 12 % (ref 2–11)
PDW BLD-RTO: 15.7 % (ref 12.5–15.4)
PLATELET # BLD: 161 K/UL (ref 140–450)
PLATELET ESTIMATE: ABNORMAL
PMV BLD AUTO: 9.4 FL (ref 6–12)
POTASSIUM SERPL-SCNC: 4.4 MMOL/L (ref 3.7–5.3)
RBC # BLD: 3.95 M/UL (ref 4.5–5.9)
RBC # BLD: ABNORMAL 10*6/UL
SEG NEUTROPHILS: 72 % (ref 36–66)
SEGMENTED NEUTROPHILS ABSOLUTE COUNT: 5.5 K/UL (ref 1.8–7.7)
SODIUM BLD-SCNC: 142 MMOL/L (ref 135–144)
WBC # BLD: 7.6 K/UL (ref 3.5–11)
WBC # BLD: ABNORMAL 10*3/UL

## 2017-02-27 PROCEDURE — 2580000003 HC RX 258: Performed by: FAMILY MEDICINE

## 2017-02-27 PROCEDURE — 71010 XR CHEST PORTABLE: CPT

## 2017-02-27 PROCEDURE — 02HK3KZ INSERTION OF DEFIBRILLATOR LEAD INTO RIGHT VENTRICLE, PERCUTANEOUS APPROACH: ICD-10-PCS | Performed by: INTERNAL MEDICINE

## 2017-02-27 PROCEDURE — C1769 GUIDE WIRE: HCPCS

## 2017-02-27 PROCEDURE — 6370000000 HC RX 637 (ALT 250 FOR IP): Performed by: FAMILY MEDICINE

## 2017-02-27 PROCEDURE — 99232 SBSQ HOSP IP/OBS MODERATE 35: CPT | Performed by: FAMILY MEDICINE

## 2017-02-27 PROCEDURE — C1882 AICD, OTHER THAN SING/DUAL: HCPCS

## 2017-02-27 PROCEDURE — 80048 BASIC METABOLIC PNL TOTAL CA: CPT

## 2017-02-27 PROCEDURE — 85025 COMPLETE CBC W/AUTO DIFF WBC: CPT

## 2017-02-27 PROCEDURE — 6360000002 HC RX W HCPCS: Performed by: FAMILY MEDICINE

## 2017-02-27 PROCEDURE — 36415 COLL VENOUS BLD VENIPUNCTURE: CPT

## 2017-02-27 PROCEDURE — C1777 LEAD, AICD, ENDO SINGLE COIL: HCPCS

## 2017-02-27 PROCEDURE — C1894 INTRO/SHEATH, NON-LASER: HCPCS

## 2017-02-27 PROCEDURE — 6360000002 HC RX W HCPCS

## 2017-02-27 PROCEDURE — 2500000003 HC RX 250 WO HCPCS: Performed by: FAMILY MEDICINE

## 2017-02-27 PROCEDURE — 33249 INSJ/RPLCMT DEFIB W/LEAD(S): CPT | Performed by: INTERNAL MEDICINE

## 2017-02-27 PROCEDURE — 0JH638Z INSERTION OF DEFIBRILLATOR GENERATOR INTO CHEST SUBCUTANEOUS TISSUE AND FASCIA, PERCUTANEOUS APPROACH: ICD-10-PCS | Performed by: INTERNAL MEDICINE

## 2017-02-27 PROCEDURE — 2060000000 HC ICU INTERMEDIATE R&B

## 2017-02-27 RX ORDER — LORAZEPAM 2 MG/ML
0.25 INJECTION INTRAMUSCULAR ONCE
Status: COMPLETED | OUTPATIENT
Start: 2017-02-27 | End: 2017-02-27

## 2017-02-27 RX ORDER — SODIUM CHLORIDE 0.9 % (FLUSH) 0.9 %
10 SYRINGE (ML) INJECTION EVERY 12 HOURS SCHEDULED
Status: DISCONTINUED | OUTPATIENT
Start: 2017-02-27 | End: 2017-03-01 | Stop reason: HOSPADM

## 2017-02-27 RX ORDER — SODIUM CHLORIDE 0.9 % (FLUSH) 0.9 %
10 SYRINGE (ML) INJECTION PRN
Status: DISCONTINUED | OUTPATIENT
Start: 2017-02-27 | End: 2017-03-01 | Stop reason: HOSPADM

## 2017-02-27 RX ADMIN — Medication 10 ML: at 09:00

## 2017-02-27 RX ADMIN — METOPROLOL TARTRATE 5 MG: 5 INJECTION INTRAVENOUS at 18:05

## 2017-02-27 RX ADMIN — CARVEDILOL 12.5 MG: 12.5 TABLET, FILM COATED ORAL at 10:07

## 2017-02-27 RX ADMIN — SODIUM CHLORIDE: 9 INJECTION, SOLUTION INTRAVENOUS at 14:45

## 2017-02-27 RX ADMIN — FAMOTIDINE 20 MG: 20 TABLET, FILM COATED ORAL at 10:08

## 2017-02-27 RX ADMIN — LISINOPRIL 10 MG: 10 TABLET ORAL at 10:07

## 2017-02-27 RX ADMIN — FUROSEMIDE 40 MG: 40 TABLET ORAL at 10:07

## 2017-02-27 RX ADMIN — LORAZEPAM 0.25 MG: 2 INJECTION INTRAMUSCULAR; INTRAVENOUS at 18:04

## 2017-02-27 ASSESSMENT — PAIN SCALES - GENERAL: PAINLEVEL_OUTOF10: 0

## 2017-02-28 ENCOUNTER — APPOINTMENT (OUTPATIENT)
Dept: CARDIAC CATH/INVASIVE PROCEDURES | Age: 62
DRG: 981 | End: 2017-02-28
Payer: MEDICARE

## 2017-02-28 LAB
ABSOLUTE EOS #: 0 K/UL (ref 0–0.4)
ABSOLUTE LYMPH #: 1.13 K/UL (ref 1–4.8)
ABSOLUTE MONO #: 0.75 K/UL (ref 0.1–0.8)
ANION GAP SERPL CALCULATED.3IONS-SCNC: 12 MMOL/L (ref 9–17)
BASOPHILS # BLD: 0 % (ref 0–2)
BASOPHILS ABSOLUTE: 0 K/UL (ref 0–0.2)
BUN BLDV-MCNC: 16 MG/DL (ref 8–23)
BUN/CREAT BLD: NORMAL (ref 9–20)
CALCIUM SERPL-MCNC: 8.7 MG/DL (ref 8.6–10.4)
CHLORIDE BLD-SCNC: 101 MMOL/L (ref 98–107)
CO2: 31 MMOL/L (ref 20–31)
CREAT SERPL-MCNC: 0.83 MG/DL (ref 0.7–1.2)
DIFFERENTIAL TYPE: ABNORMAL
EOSINOPHILS RELATIVE PERCENT: 0 % (ref 1–4)
GFR AFRICAN AMERICAN: >60 ML/MIN
GFR NON-AFRICAN AMERICAN: >60 ML/MIN
GFR SERPL CREATININE-BSD FRML MDRD: NORMAL ML/MIN/{1.73_M2}
GFR SERPL CREATININE-BSD FRML MDRD: NORMAL ML/MIN/{1.73_M2}
GLUCOSE BLD-MCNC: 76 MG/DL (ref 70–99)
HCT VFR BLD CALC: 41.3 % (ref 41–53)
HEMOGLOBIN: 13.6 G/DL (ref 13.5–17.5)
LYMPHOCYTES # BLD: 9 % (ref 24–44)
MCH RBC QN AUTO: 33.4 PG (ref 26–34)
MCHC RBC AUTO-ENTMCNC: 32.9 G/DL (ref 31–37)
MCV RBC AUTO: 101.4 FL (ref 80–100)
MONOCYTES # BLD: 6 % (ref 1–7)
MORPHOLOGY: ABNORMAL
PARTIAL THROMBOPLASTIN TIME: 25.6 SEC (ref 21.3–31.3)
PDW BLD-RTO: 15.6 % (ref 12.5–15.4)
PLATELET # BLD: 167 K/UL (ref 140–450)
PLATELET ESTIMATE: ABNORMAL
PMV BLD AUTO: 10.2 FL (ref 6–12)
POTASSIUM SERPL-SCNC: 4.3 MMOL/L (ref 3.7–5.3)
RBC # BLD: 4.08 M/UL (ref 4.5–5.9)
RBC # BLD: ABNORMAL 10*6/UL
SEG NEUTROPHILS: 85 % (ref 36–66)
SEGMENTED NEUTROPHILS ABSOLUTE COUNT: 10.62 K/UL (ref 1.8–7.7)
SODIUM BLD-SCNC: 144 MMOL/L (ref 135–144)
WBC # BLD: 12.5 K/UL (ref 3.5–11)
WBC # BLD: ABNORMAL 10*3/UL

## 2017-02-28 PROCEDURE — 6360000002 HC RX W HCPCS

## 2017-02-28 PROCEDURE — 6370000000 HC RX 637 (ALT 250 FOR IP): Performed by: FAMILY MEDICINE

## 2017-02-28 PROCEDURE — 36415 COLL VENOUS BLD VENIPUNCTURE: CPT

## 2017-02-28 PROCEDURE — 85730 THROMBOPLASTIN TIME PARTIAL: CPT

## 2017-02-28 PROCEDURE — 10140 I&D HMTMA SEROMA/FLUID COLLJ: CPT | Performed by: INTERNAL MEDICINE

## 2017-02-28 PROCEDURE — 2060000000 HC ICU INTERMEDIATE R&B

## 2017-02-28 PROCEDURE — 2580000003 HC RX 258

## 2017-02-28 PROCEDURE — 85025 COMPLETE CBC W/AUTO DIFF WBC: CPT

## 2017-02-28 PROCEDURE — 33215 REPOSITION PACING-DEFIB LEAD: CPT | Performed by: INTERNAL MEDICINE

## 2017-02-28 PROCEDURE — 80048 BASIC METABOLIC PNL TOTAL CA: CPT

## 2017-02-28 PROCEDURE — 2500000003 HC RX 250 WO HCPCS

## 2017-02-28 PROCEDURE — 76937 US GUIDE VASCULAR ACCESS: CPT

## 2017-02-28 PROCEDURE — 2580000003 HC RX 258: Performed by: INTERNAL MEDICINE

## 2017-02-28 PROCEDURE — 99231 SBSQ HOSP IP/OBS SF/LOW 25: CPT | Performed by: INTERNAL MEDICINE

## 2017-02-28 PROCEDURE — 6360000002 HC RX W HCPCS: Performed by: NURSE PRACTITIONER

## 2017-02-28 RX ORDER — HYDROCODONE BITARTRATE AND ACETAMINOPHEN 5; 325 MG/1; MG/1
1 TABLET ORAL EVERY 6 HOURS PRN
Status: DISCONTINUED | OUTPATIENT
Start: 2017-02-28 | End: 2017-03-01 | Stop reason: HOSPADM

## 2017-02-28 RX ORDER — HEPARIN SODIUM 10000 [USP'U]/100ML
18 INJECTION, SOLUTION INTRAVENOUS CONTINUOUS
Status: DISCONTINUED | OUTPATIENT
Start: 2017-02-28 | End: 2017-02-28 | Stop reason: SDUPTHER

## 2017-02-28 RX ORDER — HEPARIN SODIUM 1000 [USP'U]/ML
40 INJECTION, SOLUTION INTRAVENOUS; SUBCUTANEOUS PRN
Status: DISCONTINUED | OUTPATIENT
Start: 2017-02-28 | End: 2017-03-01

## 2017-02-28 RX ORDER — HEPARIN SODIUM 10000 [USP'U]/100ML
18 INJECTION, SOLUTION INTRAVENOUS CONTINUOUS
Status: DISCONTINUED | OUTPATIENT
Start: 2017-02-28 | End: 2017-03-01

## 2017-02-28 RX ORDER — HEPARIN SODIUM 1000 [USP'U]/ML
80 INJECTION, SOLUTION INTRAVENOUS; SUBCUTANEOUS PRN
Status: DISCONTINUED | OUTPATIENT
Start: 2017-02-28 | End: 2017-03-01

## 2017-02-28 RX ORDER — TRAMADOL HYDROCHLORIDE 50 MG/1
50 TABLET ORAL EVERY 6 HOURS PRN
Status: DISCONTINUED | OUTPATIENT
Start: 2017-02-28 | End: 2017-02-28

## 2017-02-28 RX ADMIN — LISINOPRIL 10 MG: 10 TABLET ORAL at 08:39

## 2017-02-28 RX ADMIN — PREGABALIN 50 MG: 50 CAPSULE ORAL at 20:22

## 2017-02-28 RX ADMIN — CARVEDILOL 12.5 MG: 12.5 TABLET, FILM COATED ORAL at 20:22

## 2017-02-28 RX ADMIN — CARVEDILOL 12.5 MG: 12.5 TABLET, FILM COATED ORAL at 00:02

## 2017-02-28 RX ADMIN — FUROSEMIDE 40 MG: 40 TABLET ORAL at 08:39

## 2017-02-28 RX ADMIN — ATORVASTATIN CALCIUM 20 MG: 20 TABLET, FILM COATED ORAL at 20:22

## 2017-02-28 RX ADMIN — FAMOTIDINE 20 MG: 20 TABLET, FILM COATED ORAL at 20:22

## 2017-02-28 RX ADMIN — HYDROCODONE BITARTRATE AND ACETAMINOPHEN 1 TABLET: 5; 325 TABLET ORAL at 20:23

## 2017-02-28 RX ADMIN — FAMOTIDINE 20 MG: 20 TABLET, FILM COATED ORAL at 08:40

## 2017-02-28 RX ADMIN — QUETIAPINE FUMARATE 25 MG: 25 TABLET ORAL at 20:22

## 2017-02-28 RX ADMIN — PREGABALIN 50 MG: 50 CAPSULE ORAL at 00:02

## 2017-02-28 RX ADMIN — VITAMIN D, TAB 1000IU (100/BT) 1000 UNITS: 25 TAB at 08:39

## 2017-02-28 RX ADMIN — DULOXETINE 20 MG: 20 CAPSULE, DELAYED RELEASE ORAL at 08:41

## 2017-02-28 RX ADMIN — HYDROCODONE BITARTRATE AND ACETAMINOPHEN 1 TABLET: 5; 325 TABLET ORAL at 08:38

## 2017-02-28 RX ADMIN — QUETIAPINE FUMARATE 25 MG: 25 TABLET ORAL at 08:39

## 2017-02-28 RX ADMIN — HEPARIN SODIUM AND DEXTROSE 18 UNITS/KG/HR: 10000; 5 INJECTION INTRAVENOUS at 13:15

## 2017-02-28 RX ADMIN — CARVEDILOL 12.5 MG: 12.5 TABLET, FILM COATED ORAL at 08:39

## 2017-02-28 RX ADMIN — PREGABALIN 50 MG: 50 CAPSULE ORAL at 08:39

## 2017-02-28 ASSESSMENT — PAIN DESCRIPTION - ORIENTATION: ORIENTATION: LEFT;UPPER

## 2017-02-28 ASSESSMENT — PAIN SCALES - GENERAL
PAINLEVEL_OUTOF10: 0
PAINLEVEL_OUTOF10: 9
PAINLEVEL_OUTOF10: 0
PAINLEVEL_OUTOF10: 8

## 2017-02-28 ASSESSMENT — PAIN DESCRIPTION - PAIN TYPE: TYPE: SURGICAL PAIN

## 2017-02-28 ASSESSMENT — PAIN DESCRIPTION - LOCATION: LOCATION: CHEST

## 2017-03-01 ENCOUNTER — CARE COORDINATOR VISIT (OUTPATIENT)
Dept: CASE MANAGEMENT | Age: 62
End: 2017-03-01

## 2017-03-01 ENCOUNTER — APPOINTMENT (OUTPATIENT)
Dept: GENERAL RADIOLOGY | Age: 62
DRG: 981 | End: 2017-03-01
Payer: MEDICARE

## 2017-03-01 VITALS
HEART RATE: 98 BPM | TEMPERATURE: 98 F | HEIGHT: 69 IN | SYSTOLIC BLOOD PRESSURE: 132 MMHG | BODY MASS INDEX: 38.04 KG/M2 | WEIGHT: 256.84 LBS | RESPIRATION RATE: 18 BRPM | DIASTOLIC BLOOD PRESSURE: 96 MMHG | OXYGEN SATURATION: 99 %

## 2017-03-01 LAB
ABSOLUTE EOS #: 0 K/UL (ref 0–0.4)
ABSOLUTE LYMPH #: 2.34 K/UL (ref 1–4.8)
ABSOLUTE MONO #: 2.34 K/UL (ref 0.1–0.8)
ANION GAP SERPL CALCULATED.3IONS-SCNC: 12 MMOL/L (ref 9–17)
BASOPHILS # BLD: 0 % (ref 0–2)
BASOPHILS ABSOLUTE: 0 K/UL (ref 0–0.2)
BUN BLDV-MCNC: 13 MG/DL (ref 8–23)
BUN/CREAT BLD: ABNORMAL (ref 9–20)
CALCIUM SERPL-MCNC: 8.5 MG/DL (ref 8.6–10.4)
CHLORIDE BLD-SCNC: 100 MMOL/L (ref 98–107)
CO2: 27 MMOL/L (ref 20–31)
CREAT SERPL-MCNC: 0.77 MG/DL (ref 0.7–1.2)
DIFFERENTIAL TYPE: ABNORMAL
EOSINOPHILS RELATIVE PERCENT: 0 % (ref 1–4)
GFR AFRICAN AMERICAN: >60 ML/MIN
GFR NON-AFRICAN AMERICAN: >60 ML/MIN
GFR SERPL CREATININE-BSD FRML MDRD: ABNORMAL ML/MIN/{1.73_M2}
GFR SERPL CREATININE-BSD FRML MDRD: ABNORMAL ML/MIN/{1.73_M2}
GLUCOSE BLD-MCNC: 103 MG/DL (ref 70–99)
HCT VFR BLD CALC: 40 % (ref 41–53)
HEMOGLOBIN: 13.3 G/DL (ref 13.5–17.5)
LYMPHOCYTES # BLD: 13 % (ref 24–44)
MCH RBC QN AUTO: 33.5 PG (ref 26–34)
MCHC RBC AUTO-ENTMCNC: 33.3 G/DL (ref 31–37)
MCV RBC AUTO: 100.5 FL (ref 80–100)
MONOCYTES # BLD: 13 % (ref 1–7)
MORPHOLOGY: ABNORMAL
PARTIAL THROMBOPLASTIN TIME: 79.5 SEC (ref 21.3–31.3)
PARTIAL THROMBOPLASTIN TIME: 89.4 SEC (ref 21.3–31.3)
PDW BLD-RTO: 15.4 % (ref 12.5–15.4)
PLATELET # BLD: 154 K/UL (ref 140–450)
PLATELET ESTIMATE: ABNORMAL
PMV BLD AUTO: 9.8 FL (ref 6–12)
POTASSIUM SERPL-SCNC: 4 MMOL/L (ref 3.7–5.3)
RBC # BLD: 3.98 M/UL (ref 4.5–5.9)
RBC # BLD: ABNORMAL 10*6/UL
SEG NEUTROPHILS: 74 % (ref 36–66)
SEGMENTED NEUTROPHILS ABSOLUTE COUNT: 13.32 K/UL (ref 1.8–7.7)
SODIUM BLD-SCNC: 139 MMOL/L (ref 135–144)
WBC # BLD: 18 K/UL (ref 3.5–11)
WBC # BLD: ABNORMAL 10*3/UL

## 2017-03-01 PROCEDURE — 6370000000 HC RX 637 (ALT 250 FOR IP): Performed by: FAMILY MEDICINE

## 2017-03-01 PROCEDURE — 99233 SBSQ HOSP IP/OBS HIGH 50: CPT | Performed by: INTERNAL MEDICINE

## 2017-03-01 PROCEDURE — 85025 COMPLETE CBC W/AUTO DIFF WBC: CPT

## 2017-03-01 PROCEDURE — 71020 XR CHEST STANDARD TWO VW: CPT

## 2017-03-01 PROCEDURE — 80048 BASIC METABOLIC PNL TOTAL CA: CPT

## 2017-03-01 PROCEDURE — 99239 HOSP IP/OBS DSCHRG MGMT >30: CPT | Performed by: FAMILY MEDICINE

## 2017-03-01 PROCEDURE — 36415 COLL VENOUS BLD VENIPUNCTURE: CPT

## 2017-03-01 PROCEDURE — 85730 THROMBOPLASTIN TIME PARTIAL: CPT

## 2017-03-01 RX ORDER — LISINOPRIL 10 MG/1
10 TABLET ORAL DAILY
Qty: 30 TABLET | Refills: 3 | Status: SHIPPED | OUTPATIENT
Start: 2017-03-01 | End: 2018-03-26

## 2017-03-01 RX ORDER — NITROGLYCERIN 0.4 MG/1
TABLET SUBLINGUAL
Qty: 25 TABLET | Refills: 3 | Status: SHIPPED | OUTPATIENT
Start: 2017-03-01

## 2017-03-01 RX ORDER — DABIGATRAN ETEXILATE 150 MG/1
150 CAPSULE, COATED PELLETS ORAL 2 TIMES DAILY
Status: DISCONTINUED | OUTPATIENT
Start: 2017-03-01 | End: 2017-03-01 | Stop reason: HOSPADM

## 2017-03-01 RX ORDER — CARVEDILOL 25 MG/1
25 TABLET ORAL 2 TIMES DAILY
Qty: 60 TABLET | Refills: 3 | Status: ON HOLD | OUTPATIENT
Start: 2017-03-01 | End: 2017-05-06 | Stop reason: HOSPADM

## 2017-03-01 RX ORDER — HYDROCODONE BITARTRATE AND ACETAMINOPHEN 5; 325 MG/1; MG/1
1 TABLET ORAL EVERY 6 HOURS PRN
Qty: 15 TABLET | Refills: 0 | Status: SHIPPED | OUTPATIENT
Start: 2017-03-01 | End: 2017-03-08

## 2017-03-01 RX ORDER — QUETIAPINE FUMARATE 25 MG/1
25 TABLET, FILM COATED ORAL 2 TIMES DAILY
Qty: 60 TABLET | Refills: 0 | Status: SHIPPED | OUTPATIENT
Start: 2017-03-01 | End: 2017-05-10 | Stop reason: SDUPTHER

## 2017-03-01 RX ORDER — CARVEDILOL 25 MG/1
25 TABLET ORAL 2 TIMES DAILY
Status: DISCONTINUED | OUTPATIENT
Start: 2017-03-01 | End: 2017-03-01 | Stop reason: HOSPADM

## 2017-03-01 RX ADMIN — DABIGATRAN ETEXILATE MESYLATE 150 MG: 150 CAPSULE ORAL at 16:01

## 2017-03-01 RX ADMIN — LISINOPRIL 10 MG: 10 TABLET ORAL at 09:07

## 2017-03-01 RX ADMIN — DULOXETINE 20 MG: 20 CAPSULE, DELAYED RELEASE ORAL at 09:07

## 2017-03-01 RX ADMIN — CARVEDILOL 12.5 MG: 12.5 TABLET, FILM COATED ORAL at 09:07

## 2017-03-01 RX ADMIN — FAMOTIDINE 20 MG: 20 TABLET, FILM COATED ORAL at 09:06

## 2017-03-01 RX ADMIN — PREGABALIN 50 MG: 50 CAPSULE ORAL at 09:07

## 2017-03-01 RX ADMIN — FUROSEMIDE 40 MG: 40 TABLET ORAL at 09:07

## 2017-03-01 RX ADMIN — LORAZEPAM 0.5 MG: 0.5 TABLET ORAL at 01:39

## 2017-03-01 RX ADMIN — QUETIAPINE FUMARATE 25 MG: 25 TABLET ORAL at 09:08

## 2017-03-01 RX ADMIN — HYDROCODONE BITARTRATE AND ACETAMINOPHEN 1 TABLET: 5; 325 TABLET ORAL at 09:24

## 2017-03-01 RX ADMIN — HYDROCODONE BITARTRATE AND ACETAMINOPHEN 1 TABLET: 5; 325 TABLET ORAL at 03:23

## 2017-03-01 RX ADMIN — VITAMIN D, TAB 1000IU (100/BT) 1000 UNITS: 25 TAB at 09:07

## 2017-03-01 ASSESSMENT — PAIN SCALES - GENERAL
PAINLEVEL_OUTOF10: 4
PAINLEVEL_OUTOF10: 8

## 2017-03-02 LAB
EKG ATRIAL RATE: 120 BPM
EKG Q-T INTERVAL: 350 MS
EKG QRS DURATION: 146 MS
EKG QTC CALCULATION (BAZETT): 494 MS
EKG R AXIS: -93 DEGREES
EKG T AXIS: -16 DEGREES
EKG VENTRICULAR RATE: 120 BPM

## 2017-03-14 ENCOUNTER — CARE COORDINATION (OUTPATIENT)
Dept: CASE MANAGEMENT | Age: 62
End: 2017-03-14

## 2017-03-17 ENCOUNTER — CARE COORDINATION (OUTPATIENT)
Dept: CASE MANAGEMENT | Age: 62
End: 2017-03-17

## 2017-03-23 ENCOUNTER — CARE COORDINATION (OUTPATIENT)
Dept: CASE MANAGEMENT | Age: 62
End: 2017-03-23

## 2017-03-24 ENCOUNTER — CARE COORDINATION (OUTPATIENT)
Dept: CARE COORDINATION | Age: 62
End: 2017-03-24

## 2017-03-28 ENCOUNTER — CARE COORDINATION (OUTPATIENT)
Dept: CASE MANAGEMENT | Age: 62
End: 2017-03-28

## 2017-04-04 ENCOUNTER — CARE COORDINATION (OUTPATIENT)
Dept: CASE MANAGEMENT | Age: 62
End: 2017-04-04

## 2017-04-10 ENCOUNTER — HOSPITAL ENCOUNTER (OUTPATIENT)
Age: 62
Setting detail: SPECIMEN
Discharge: HOME | End: 2017-04-10
Payer: MEDICARE

## 2017-04-10 LAB
ANION GAP SERPL CALCULATED.3IONS-SCNC: 10 MMOL/L (ref 9–17)
BUN BLDV-MCNC: 14 MG/DL (ref 8–23)
BUN/CREAT BLD: 15 (ref 9–20)
CALCIUM SERPL-MCNC: 9.3 MG/DL (ref 8.6–10.4)
CHLORIDE BLD-SCNC: 104 MMOL/L (ref 98–107)
CO2: 27 MMOL/L (ref 20–31)
CREAT SERPL-MCNC: 0.96 MG/DL (ref 0.7–1.2)
GFR AFRICAN AMERICAN: >60 ML/MIN
GFR NON-AFRICAN AMERICAN: >60 ML/MIN
GFR SERPL CREATININE-BSD FRML MDRD: NORMAL ML/MIN/{1.73_M2}
GFR SERPL CREATININE-BSD FRML MDRD: NORMAL ML/MIN/{1.73_M2}
GLUCOSE BLD-MCNC: 87 MG/DL (ref 70–99)
HCT VFR BLD CALC: 44.9 % (ref 41–53)
HEMOGLOBIN: 14.6 G/DL (ref 13.5–17.5)
MCH RBC QN AUTO: 32.6 PG (ref 26–34)
MCHC RBC AUTO-ENTMCNC: 32.4 G/DL (ref 31–37)
MCV RBC AUTO: 100.7 FL (ref 80–100)
PDW BLD-RTO: 14.3 % (ref 11.5–14.5)
PLATELET # BLD: 132 K/UL (ref 130–400)
PMV BLD AUTO: 10.6 FL (ref 6–12)
POTASSIUM SERPL-SCNC: 4.1 MMOL/L (ref 3.7–5.3)
RBC # BLD: 4.46 M/UL (ref 4.5–5.9)
SODIUM BLD-SCNC: 141 MMOL/L (ref 135–144)
WBC # BLD: 7.4 K/UL (ref 3.5–11)

## 2017-04-10 PROCEDURE — 85027 COMPLETE CBC AUTOMATED: CPT

## 2017-04-10 PROCEDURE — 80048 BASIC METABOLIC PNL TOTAL CA: CPT

## 2017-04-10 PROCEDURE — 36415 COLL VENOUS BLD VENIPUNCTURE: CPT

## 2017-04-10 PROCEDURE — P9603 ONE-WAY ALLOW PRORATED MILES: HCPCS

## 2017-04-11 ENCOUNTER — CARE COORDINATION (OUTPATIENT)
Dept: CASE MANAGEMENT | Age: 62
End: 2017-04-11

## 2017-04-14 ENCOUNTER — CARE COORDINATION (OUTPATIENT)
Dept: CASE MANAGEMENT | Age: 62
End: 2017-04-14

## 2017-04-18 ENCOUNTER — CARE COORDINATION (OUTPATIENT)
Dept: CASE MANAGEMENT | Age: 62
End: 2017-04-18

## 2017-04-19 ENCOUNTER — TELEPHONE (OUTPATIENT)
Dept: ADMINISTRATIVE | Age: 62
End: 2017-04-19

## 2017-04-25 ENCOUNTER — CARE COORDINATION (OUTPATIENT)
Dept: CASE MANAGEMENT | Age: 62
End: 2017-04-25

## 2017-04-28 ENCOUNTER — CARE COORDINATION (OUTPATIENT)
Dept: CASE MANAGEMENT | Age: 62
End: 2017-04-28

## 2017-05-02 ENCOUNTER — CARE COORDINATION (OUTPATIENT)
Dept: CASE MANAGEMENT | Age: 62
End: 2017-05-02

## 2017-05-03 ENCOUNTER — CARE COORDINATION (OUTPATIENT)
Dept: CASE MANAGEMENT | Age: 62
End: 2017-05-03

## 2017-05-03 ENCOUNTER — EPISODE CHANGES (OUTPATIENT)
Dept: CASE MANAGEMENT | Age: 62
End: 2017-05-03

## 2017-05-04 ENCOUNTER — CARE COORDINATION (OUTPATIENT)
Dept: CASE MANAGEMENT | Age: 62
End: 2017-05-04

## 2017-05-05 ENCOUNTER — HOSPITAL ENCOUNTER (INPATIENT)
Age: 62
LOS: 1 days | Discharge: HOME HEALTH | DRG: 309 | End: 2017-05-06
Attending: EMERGENCY MEDICINE | Admitting: FAMILY MEDICINE
Payer: MEDICARE

## 2017-05-05 ENCOUNTER — APPOINTMENT (OUTPATIENT)
Dept: GENERAL RADIOLOGY | Age: 62
DRG: 309 | End: 2017-05-05
Payer: MEDICARE

## 2017-05-05 DIAGNOSIS — I48.20 CHRONIC ATRIAL FIBRILLATION (HCC): Primary | ICD-10-CM

## 2017-05-05 PROBLEM — I10 ESSENTIAL HYPERTENSION: Status: ACTIVE | Noted: 2017-05-05

## 2017-05-05 PROBLEM — G62.9 NEUROPATHY: Status: ACTIVE | Noted: 2017-05-05

## 2017-05-05 PROBLEM — Z45.02 AICD DISCHARGE: Status: ACTIVE | Noted: 2017-05-05

## 2017-05-05 PROBLEM — I50.22 CHRONIC SYSTOLIC CONGESTIVE HEART FAILURE (HCC): Status: ACTIVE | Noted: 2017-05-05

## 2017-05-05 PROBLEM — I48.91 A-FIB (HCC): Status: ACTIVE | Noted: 2017-05-05

## 2017-05-05 PROBLEM — Z86.711 HISTORY OF PULMONARY EMBOLISM: Status: ACTIVE | Noted: 2017-05-05

## 2017-05-05 LAB
ABSOLUTE EOS #: 0.4 K/UL (ref 0–0.4)
ABSOLUTE LYMPH #: 1.1 K/UL (ref 1–4.8)
ABSOLUTE MONO #: 0.8 K/UL (ref 0.1–1.2)
ANION GAP SERPL CALCULATED.3IONS-SCNC: 14 MMOL/L (ref 9–17)
BASOPHILS # BLD: 1 %
BASOPHILS ABSOLUTE: 0 K/UL (ref 0–0.2)
BNP INTERPRETATION: ABNORMAL
BUN BLDV-MCNC: 14 MG/DL (ref 8–23)
BUN/CREAT BLD: ABNORMAL (ref 9–20)
CALCIUM SERPL-MCNC: 9 MG/DL (ref 8.6–10.4)
CHLORIDE BLD-SCNC: 107 MMOL/L (ref 98–107)
CO2: 24 MMOL/L (ref 20–31)
CREAT SERPL-MCNC: 0.79 MG/DL (ref 0.7–1.2)
DIFFERENTIAL TYPE: NORMAL
EOSINOPHILS RELATIVE PERCENT: 6 %
ESTIMATED AVERAGE GLUCOSE: 128 MG/DL
GFR AFRICAN AMERICAN: >60 ML/MIN
GFR NON-AFRICAN AMERICAN: >60 ML/MIN
GFR SERPL CREATININE-BSD FRML MDRD: ABNORMAL ML/MIN/{1.73_M2}
GFR SERPL CREATININE-BSD FRML MDRD: ABNORMAL ML/MIN/{1.73_M2}
GLUCOSE BLD-MCNC: 106 MG/DL (ref 70–99)
HBA1C MFR BLD: 6.1 % (ref 4–6)
HCT VFR BLD CALC: 41 % (ref 41–53)
HCT VFR BLD CALC: 44.5 % (ref 41–53)
HEMOGLOBIN: 13.4 G/DL (ref 13.5–17.5)
HEMOGLOBIN: 14.8 G/DL (ref 13.5–17.5)
LYMPHOCYTES # BLD: 16 %
MAGNESIUM: 1.8 MG/DL (ref 1.6–2.6)
MCH RBC QN AUTO: 32.3 PG (ref 26–34)
MCH RBC QN AUTO: 32.5 PG (ref 26–34)
MCHC RBC AUTO-ENTMCNC: 32.8 G/DL (ref 31–37)
MCHC RBC AUTO-ENTMCNC: 33.3 G/DL (ref 31–37)
MCV RBC AUTO: 97.6 FL (ref 80–100)
MCV RBC AUTO: 98.5 FL (ref 80–100)
MONOCYTES # BLD: 12 %
PARTIAL THROMBOPLASTIN TIME: 27.7 SEC (ref 21.3–31.3)
PARTIAL THROMBOPLASTIN TIME: 80.4 SEC (ref 21.3–31.3)
PDW BLD-RTO: 14.9 % (ref 12.5–15.4)
PDW BLD-RTO: 15.1 % (ref 12.5–15.4)
PHOSPHORUS: 2.1 MG/DL (ref 2.5–4.5)
PLATELET # BLD: 123 K/UL (ref 140–450)
PLATELET # BLD: 145 K/UL (ref 140–450)
PLATELET ESTIMATE: NORMAL
PMV BLD AUTO: 10.8 FL (ref 6–12)
PMV BLD AUTO: 11 FL (ref 6–12)
POC TROPONIN I: 0.01 NG/ML (ref 0–0.1)
POC TROPONIN I: 0.09 NG/ML (ref 0–0.1)
POC TROPONIN INTERP: NORMAL
POC TROPONIN INTERP: NORMAL
POTASSIUM SERPL-SCNC: 3.7 MMOL/L (ref 3.7–5.3)
PRO-BNP: 2052 PG/ML
RBC # BLD: 4.17 M/UL (ref 4.5–5.9)
RBC # BLD: 4.56 M/UL (ref 4.5–5.9)
RBC # BLD: NORMAL 10*6/UL
SEG NEUTROPHILS: 65 %
SEGMENTED NEUTROPHILS ABSOLUTE COUNT: 4.4 K/UL (ref 1.8–7.7)
SODIUM BLD-SCNC: 145 MMOL/L (ref 135–144)
TROPONIN INTERP: NORMAL
TROPONIN T: <0.03 NG/ML
TSH SERPL DL<=0.05 MIU/L-ACNC: 0.81 MIU/L (ref 0.3–5)
WBC # BLD: 6.7 K/UL (ref 3.5–11)
WBC # BLD: 7.4 K/UL (ref 3.5–11)
WBC # BLD: NORMAL 10*3/UL

## 2017-05-05 PROCEDURE — 94762 N-INVAS EAR/PLS OXIMTRY CONT: CPT

## 2017-05-05 PROCEDURE — 6360000002 HC RX W HCPCS: Performed by: INTERNAL MEDICINE

## 2017-05-05 PROCEDURE — 6370000000 HC RX 637 (ALT 250 FOR IP): Performed by: INTERNAL MEDICINE

## 2017-05-05 PROCEDURE — 2580000003 HC RX 258: Performed by: EMERGENCY MEDICINE

## 2017-05-05 PROCEDURE — 83735 ASSAY OF MAGNESIUM: CPT

## 2017-05-05 PROCEDURE — 36415 COLL VENOUS BLD VENIPUNCTURE: CPT

## 2017-05-05 PROCEDURE — 85025 COMPLETE CBC W/AUTO DIFF WBC: CPT

## 2017-05-05 PROCEDURE — 71020 XR CHEST STANDARD TWO VW: CPT

## 2017-05-05 PROCEDURE — 2580000003 HC RX 258: Performed by: INTERNAL MEDICINE

## 2017-05-05 PROCEDURE — 83036 HEMOGLOBIN GLYCOSYLATED A1C: CPT

## 2017-05-05 PROCEDURE — S0028 INJECTION, FAMOTIDINE, 20 MG: HCPCS | Performed by: FAMILY MEDICINE

## 2017-05-05 PROCEDURE — 6370000000 HC RX 637 (ALT 250 FOR IP): Performed by: FAMILY MEDICINE

## 2017-05-05 PROCEDURE — 83880 ASSAY OF NATRIURETIC PEPTIDE: CPT

## 2017-05-05 PROCEDURE — 96365 THER/PROPH/DIAG IV INF INIT: CPT

## 2017-05-05 PROCEDURE — 85730 THROMBOPLASTIN TIME PARTIAL: CPT

## 2017-05-05 PROCEDURE — 96376 TX/PRO/DX INJ SAME DRUG ADON: CPT

## 2017-05-05 PROCEDURE — 99223 1ST HOSP IP/OBS HIGH 75: CPT | Performed by: FAMILY MEDICINE

## 2017-05-05 PROCEDURE — 2060000000 HC ICU INTERMEDIATE R&B

## 2017-05-05 PROCEDURE — 99285 EMERGENCY DEPT VISIT HI MDM: CPT

## 2017-05-05 PROCEDURE — 84100 ASSAY OF PHOSPHORUS: CPT

## 2017-05-05 PROCEDURE — 80048 BASIC METABOLIC PNL TOTAL CA: CPT

## 2017-05-05 PROCEDURE — 85027 COMPLETE CBC AUTOMATED: CPT

## 2017-05-05 PROCEDURE — 2580000003 HC RX 258: Performed by: FAMILY MEDICINE

## 2017-05-05 PROCEDURE — 84484 ASSAY OF TROPONIN QUANT: CPT

## 2017-05-05 PROCEDURE — 2500000003 HC RX 250 WO HCPCS: Performed by: EMERGENCY MEDICINE

## 2017-05-05 PROCEDURE — 84443 ASSAY THYROID STIM HORMONE: CPT

## 2017-05-05 PROCEDURE — 93005 ELECTROCARDIOGRAM TRACING: CPT

## 2017-05-05 PROCEDURE — 2500000003 HC RX 250 WO HCPCS: Performed by: FAMILY MEDICINE

## 2017-05-05 RX ORDER — DIGOXIN 0.25 MG/ML
250 INJECTION INTRAMUSCULAR; INTRAVENOUS
Status: COMPLETED | OUTPATIENT
Start: 2017-05-05 | End: 2017-05-05

## 2017-05-05 RX ORDER — CARVEDILOL 25 MG/1
25 TABLET ORAL 2 TIMES DAILY
Status: DISCONTINUED | OUTPATIENT
Start: 2017-05-05 | End: 2017-05-05

## 2017-05-05 RX ORDER — DULOXETIN HYDROCHLORIDE 20 MG/1
20 CAPSULE, DELAYED RELEASE ORAL DAILY
Status: DISCONTINUED | OUTPATIENT
Start: 2017-05-05 | End: 2017-05-06 | Stop reason: HOSPADM

## 2017-05-05 RX ORDER — ASPIRIN 81 MG/1
81 TABLET, CHEWABLE ORAL DAILY
Status: DISCONTINUED | OUTPATIENT
Start: 2017-05-05 | End: 2017-05-06 | Stop reason: HOSPADM

## 2017-05-05 RX ORDER — ONDANSETRON 2 MG/ML
4 INJECTION INTRAMUSCULAR; INTRAVENOUS EVERY 6 HOURS PRN
Status: DISCONTINUED | OUTPATIENT
Start: 2017-05-05 | End: 2017-05-06 | Stop reason: HOSPADM

## 2017-05-05 RX ORDER — SODIUM CHLORIDE 0.9 % (FLUSH) 0.9 %
10 SYRINGE (ML) INJECTION PRN
Status: DISCONTINUED | OUTPATIENT
Start: 2017-05-05 | End: 2017-05-06 | Stop reason: HOSPADM

## 2017-05-05 RX ORDER — DABIGATRAN ETEXILATE 150 MG/1
150 CAPSULE, COATED PELLETS ORAL 2 TIMES DAILY
Status: DISCONTINUED | OUTPATIENT
Start: 2017-05-05 | End: 2017-05-05

## 2017-05-05 RX ORDER — HEPARIN SODIUM 1000 [USP'U]/ML
80 INJECTION, SOLUTION INTRAVENOUS; SUBCUTANEOUS ONCE
Status: DISCONTINUED | OUTPATIENT
Start: 2017-05-05 | End: 2017-05-05

## 2017-05-05 RX ORDER — HEPARIN SODIUM 1000 [USP'U]/ML
40 INJECTION, SOLUTION INTRAVENOUS; SUBCUTANEOUS PRN
Status: DISCONTINUED | OUTPATIENT
Start: 2017-05-05 | End: 2017-05-05

## 2017-05-05 RX ORDER — DIGOXIN 250 MCG
250 TABLET ORAL DAILY
Status: DISCONTINUED | OUTPATIENT
Start: 2017-05-06 | End: 2017-05-06 | Stop reason: HOSPADM

## 2017-05-05 RX ORDER — LISINOPRIL 10 MG/1
10 TABLET ORAL DAILY
Status: DISCONTINUED | OUTPATIENT
Start: 2017-05-05 | End: 2017-05-06 | Stop reason: HOSPADM

## 2017-05-05 RX ORDER — SODIUM CHLORIDE 0.9 % (FLUSH) 0.9 %
10 SYRINGE (ML) INJECTION EVERY 12 HOURS SCHEDULED
Status: DISCONTINUED | OUTPATIENT
Start: 2017-05-05 | End: 2017-05-06 | Stop reason: HOSPADM

## 2017-05-05 RX ORDER — SODIUM CHLORIDE 450 MG/100ML
INJECTION, SOLUTION INTRAVENOUS CONTINUOUS
Status: DISCONTINUED | OUTPATIENT
Start: 2017-05-05 | End: 2017-05-06 | Stop reason: HOSPADM

## 2017-05-05 RX ORDER — HEPARIN SODIUM 1000 [USP'U]/ML
80 INJECTION, SOLUTION INTRAVENOUS; SUBCUTANEOUS PRN
Status: DISCONTINUED | OUTPATIENT
Start: 2017-05-05 | End: 2017-05-05

## 2017-05-05 RX ORDER — DABIGATRAN ETEXILATE 150 MG/1
150 CAPSULE, COATED PELLETS ORAL 2 TIMES DAILY
Status: DISCONTINUED | OUTPATIENT
Start: 2017-05-05 | End: 2017-05-06 | Stop reason: HOSPADM

## 2017-05-05 RX ORDER — QUETIAPINE FUMARATE 25 MG/1
25 TABLET, FILM COATED ORAL 2 TIMES DAILY
Status: DISCONTINUED | OUTPATIENT
Start: 2017-05-05 | End: 2017-05-06 | Stop reason: HOSPADM

## 2017-05-05 RX ORDER — NITROGLYCERIN 0.4 MG/1
0.4 TABLET SUBLINGUAL EVERY 5 MIN PRN
Status: DISCONTINUED | OUTPATIENT
Start: 2017-05-05 | End: 2017-05-06 | Stop reason: HOSPADM

## 2017-05-05 RX ORDER — METOPROLOL TARTRATE 5 MG/5ML
5 INJECTION INTRAVENOUS EVERY 6 HOURS PRN
Status: DISCONTINUED | OUTPATIENT
Start: 2017-05-05 | End: 2017-05-06 | Stop reason: HOSPADM

## 2017-05-05 RX ORDER — ATORVASTATIN CALCIUM 20 MG/1
20 TABLET, FILM COATED ORAL NIGHTLY
Status: DISCONTINUED | OUTPATIENT
Start: 2017-05-05 | End: 2017-05-06

## 2017-05-05 RX ORDER — HEPARIN SODIUM 10000 [USP'U]/100ML
18 INJECTION, SOLUTION INTRAVENOUS CONTINUOUS
Status: DISCONTINUED | OUTPATIENT
Start: 2017-05-05 | End: 2017-05-05

## 2017-05-05 RX ORDER — METOPROLOL SUCCINATE 50 MG/1
50 TABLET, EXTENDED RELEASE ORAL DAILY
Status: DISCONTINUED | OUTPATIENT
Start: 2017-05-05 | End: 2017-05-06 | Stop reason: HOSPADM

## 2017-05-05 RX ORDER — DILTIAZEM HYDROCHLORIDE 5 MG/ML
10 INJECTION INTRAVENOUS ONCE
Status: COMPLETED | OUTPATIENT
Start: 2017-05-05 | End: 2017-05-05

## 2017-05-05 RX ORDER — SPIRONOLACTONE 25 MG/1
25 TABLET ORAL DAILY
Status: CANCELLED | OUTPATIENT
Start: 2017-05-05

## 2017-05-05 RX ORDER — GABAPENTIN 400 MG/1
400 CAPSULE ORAL 2 TIMES DAILY
Status: DISCONTINUED | OUTPATIENT
Start: 2017-05-05 | End: 2017-05-06 | Stop reason: HOSPADM

## 2017-05-05 RX ORDER — ACETAMINOPHEN 325 MG/1
650 TABLET ORAL EVERY 4 HOURS PRN
Status: DISCONTINUED | OUTPATIENT
Start: 2017-05-05 | End: 2017-05-06 | Stop reason: HOSPADM

## 2017-05-05 RX ADMIN — DEXTROSE 1 MG/MIN: 5 SOLUTION INTRAVENOUS at 10:08

## 2017-05-05 RX ADMIN — GABAPENTIN 400 MG: 400 CAPSULE ORAL at 20:26

## 2017-05-05 RX ADMIN — ATORVASTATIN CALCIUM 20 MG: 20 TABLET, FILM COATED ORAL at 20:26

## 2017-05-05 RX ADMIN — DIGOXIN 250 MCG: 0.25 INJECTION INTRAMUSCULAR; INTRAVENOUS at 16:18

## 2017-05-05 RX ADMIN — DIGOXIN 250 MCG: 0.25 INJECTION INTRAMUSCULAR; INTRAVENOUS at 20:27

## 2017-05-05 RX ADMIN — FAMOTIDINE 20 MG: 10 INJECTION, SOLUTION INTRAVENOUS at 13:44

## 2017-05-05 RX ADMIN — DILTIAZEM HYDROCHLORIDE 10 MG: 5 INJECTION INTRAVENOUS at 08:42

## 2017-05-05 RX ADMIN — DABIGATRAN ETEXILATE MESYLATE 150 MG: 150 CAPSULE ORAL at 20:27

## 2017-05-05 RX ADMIN — LISINOPRIL 10 MG: 10 TABLET ORAL at 13:48

## 2017-05-05 RX ADMIN — DIGOXIN 250 MCG: 0.25 INJECTION INTRAMUSCULAR; INTRAVENOUS at 18:08

## 2017-05-05 RX ADMIN — DABIGATRAN ETEXILATE MESYLATE 150 MG: 150 CAPSULE ORAL at 13:43

## 2017-05-05 RX ADMIN — QUETIAPINE FUMARATE 25 MG: 25 TABLET ORAL at 20:27

## 2017-05-05 RX ADMIN — ASPIRIN 81 MG 81 MG: 81 TABLET ORAL at 13:01

## 2017-05-05 RX ADMIN — DIGOXIN 250 MCG: 0.25 INJECTION INTRAMUSCULAR; INTRAVENOUS at 13:47

## 2017-05-05 RX ADMIN — METOPROLOL SUCCINATE 50 MG: 50 TABLET, EXTENDED RELEASE ORAL at 13:01

## 2017-05-05 RX ADMIN — DILTIAZEM HYDROCHLORIDE 5 MG/HR: 5 INJECTION INTRAVENOUS at 09:05

## 2017-05-05 RX ADMIN — GABAPENTIN 400 MG: 400 CAPSULE ORAL at 13:48

## 2017-05-05 RX ADMIN — AMIODARONE HYDROCHLORIDE 150 MG: 50 INJECTION, SOLUTION INTRAVENOUS at 09:50

## 2017-05-05 RX ADMIN — SODIUM CHLORIDE: 4.5 INJECTION, SOLUTION INTRAVENOUS at 11:36

## 2017-05-05 RX ADMIN — DULOXETINE 20 MG: 20 CAPSULE, DELAYED RELEASE ORAL at 13:45

## 2017-05-05 RX ADMIN — QUETIAPINE FUMARATE 25 MG: 25 TABLET ORAL at 13:49

## 2017-05-05 RX ADMIN — FAMOTIDINE 20 MG: 10 INJECTION, SOLUTION INTRAVENOUS at 20:33

## 2017-05-05 RX ADMIN — Medication 10 ML: at 20:36

## 2017-05-05 ASSESSMENT — ENCOUNTER SYMPTOMS
ABDOMINAL PAIN: 0
NAUSEA: 0
SHORTNESS OF BREATH: 1
DIARRHEA: 0
PHOTOPHOBIA: 0
SINUS PRESSURE: 0
RHINORRHEA: 0
STRIDOR: 0
COUGH: 0
CONSTIPATION: 0
VOMITING: 0
WHEEZING: 0

## 2017-05-06 VITALS
HEART RATE: 77 BPM | DIASTOLIC BLOOD PRESSURE: 87 MMHG | SYSTOLIC BLOOD PRESSURE: 148 MMHG | RESPIRATION RATE: 15 BRPM | TEMPERATURE: 98.1 F | WEIGHT: 246.4 LBS | OXYGEN SATURATION: 97 % | BODY MASS INDEX: 36.5 KG/M2 | HEIGHT: 69 IN

## 2017-05-06 LAB
ABSOLUTE EOS #: 0.4 K/UL (ref 0–0.4)
ABSOLUTE LYMPH #: 1.3 K/UL (ref 1–4.8)
ABSOLUTE MONO #: 0.9 K/UL (ref 0.1–1.2)
ANION GAP SERPL CALCULATED.3IONS-SCNC: 12 MMOL/L (ref 9–17)
BASOPHILS # BLD: 0 %
BASOPHILS ABSOLUTE: 0 K/UL (ref 0–0.2)
BUN BLDV-MCNC: 15 MG/DL (ref 8–23)
BUN/CREAT BLD: ABNORMAL (ref 9–20)
CALCIUM SERPL-MCNC: 8.5 MG/DL (ref 8.6–10.4)
CHLORIDE BLD-SCNC: 105 MMOL/L (ref 98–107)
CO2: 25 MMOL/L (ref 20–31)
CREAT SERPL-MCNC: 0.82 MG/DL (ref 0.7–1.2)
DIFFERENTIAL TYPE: ABNORMAL
EOSINOPHILS RELATIVE PERCENT: 4 %
GFR AFRICAN AMERICAN: >60 ML/MIN
GFR NON-AFRICAN AMERICAN: >60 ML/MIN
GFR SERPL CREATININE-BSD FRML MDRD: ABNORMAL ML/MIN/{1.73_M2}
GFR SERPL CREATININE-BSD FRML MDRD: ABNORMAL ML/MIN/{1.73_M2}
GLUCOSE BLD-MCNC: 90 MG/DL (ref 70–99)
HCT VFR BLD CALC: 41.6 % (ref 41–53)
HEMOGLOBIN: 13.5 G/DL (ref 13.5–17.5)
LYMPHOCYTES # BLD: 16 %
MCH RBC QN AUTO: 32.3 PG (ref 26–34)
MCHC RBC AUTO-ENTMCNC: 32.4 G/DL (ref 31–37)
MCV RBC AUTO: 99.9 FL (ref 80–100)
MONOCYTES # BLD: 11 %
PARTIAL THROMBOPLASTIN TIME: 24.7 SEC (ref 21.3–31.3)
PARTIAL THROMBOPLASTIN TIME: 26 SEC (ref 21.3–31.3)
PARTIAL THROMBOPLASTIN TIME: 27.3 SEC (ref 21.3–31.3)
PDW BLD-RTO: 14.6 % (ref 12.5–15.4)
PLATELET # BLD: 120 K/UL (ref 140–450)
PLATELET ESTIMATE: ABNORMAL
PMV BLD AUTO: 10.9 FL (ref 6–12)
POTASSIUM SERPL-SCNC: 3.7 MMOL/L (ref 3.7–5.3)
RBC # BLD: 4.16 M/UL (ref 4.5–5.9)
RBC # BLD: ABNORMAL 10*6/UL
SEG NEUTROPHILS: 69 %
SEGMENTED NEUTROPHILS ABSOLUTE COUNT: 5.7 K/UL (ref 1.8–7.7)
SODIUM BLD-SCNC: 142 MMOL/L (ref 135–144)
WBC # BLD: 8.4 K/UL (ref 3.5–11)
WBC # BLD: ABNORMAL 10*3/UL

## 2017-05-06 PROCEDURE — G8978 MOBILITY CURRENT STATUS: HCPCS

## 2017-05-06 PROCEDURE — 36415 COLL VENOUS BLD VENIPUNCTURE: CPT

## 2017-05-06 PROCEDURE — 6370000000 HC RX 637 (ALT 250 FOR IP): Performed by: FAMILY MEDICINE

## 2017-05-06 PROCEDURE — S0028 INJECTION, FAMOTIDINE, 20 MG: HCPCS | Performed by: FAMILY MEDICINE

## 2017-05-06 PROCEDURE — 85730 THROMBOPLASTIN TIME PARTIAL: CPT

## 2017-05-06 PROCEDURE — 2500000003 HC RX 250 WO HCPCS: Performed by: FAMILY MEDICINE

## 2017-05-06 PROCEDURE — 99239 HOSP IP/OBS DSCHRG MGMT >30: CPT | Performed by: FAMILY MEDICINE

## 2017-05-06 PROCEDURE — 97530 THERAPEUTIC ACTIVITIES: CPT

## 2017-05-06 PROCEDURE — 97162 PT EVAL MOD COMPLEX 30 MIN: CPT

## 2017-05-06 PROCEDURE — 6370000000 HC RX 637 (ALT 250 FOR IP): Performed by: INTERNAL MEDICINE

## 2017-05-06 PROCEDURE — 94762 N-INVAS EAR/PLS OXIMTRY CONT: CPT

## 2017-05-06 PROCEDURE — G8987 SELF CARE CURRENT STATUS: HCPCS

## 2017-05-06 PROCEDURE — 85025 COMPLETE CBC W/AUTO DIFF WBC: CPT

## 2017-05-06 PROCEDURE — G8979 MOBILITY GOAL STATUS: HCPCS

## 2017-05-06 PROCEDURE — G8988 SELF CARE GOAL STATUS: HCPCS

## 2017-05-06 PROCEDURE — 80048 BASIC METABOLIC PNL TOTAL CA: CPT

## 2017-05-06 PROCEDURE — 2580000003 HC RX 258: Performed by: FAMILY MEDICINE

## 2017-05-06 PROCEDURE — 97166 OT EVAL MOD COMPLEX 45 MIN: CPT

## 2017-05-06 RX ORDER — METOPROLOL SUCCINATE 50 MG/1
50 TABLET, EXTENDED RELEASE ORAL DAILY
Qty: 30 TABLET | Refills: 3 | Status: ON HOLD | OUTPATIENT
Start: 2017-05-06 | End: 2017-06-08 | Stop reason: HOSPADM

## 2017-05-06 RX ORDER — ATORVASTATIN CALCIUM 40 MG/1
40 TABLET, FILM COATED ORAL NIGHTLY
Status: DISCONTINUED | OUTPATIENT
Start: 2017-05-06 | End: 2017-05-06 | Stop reason: HOSPADM

## 2017-05-06 RX ORDER — DIGOXIN 250 MCG
250 TABLET ORAL DAILY
Qty: 30 TABLET | Refills: 3 | Status: SHIPPED | OUTPATIENT
Start: 2017-05-06 | End: 2017-09-11 | Stop reason: SDUPTHER

## 2017-05-06 RX ORDER — ATORVASTATIN CALCIUM 40 MG/1
40 TABLET, FILM COATED ORAL NIGHTLY
Qty: 30 TABLET | Refills: 3 | Status: SHIPPED | OUTPATIENT
Start: 2017-05-06 | End: 2017-09-11 | Stop reason: SDUPTHER

## 2017-05-06 RX ADMIN — LISINOPRIL 10 MG: 10 TABLET ORAL at 08:08

## 2017-05-06 RX ADMIN — ASPIRIN 81 MG 81 MG: 81 TABLET ORAL at 08:09

## 2017-05-06 RX ADMIN — QUETIAPINE FUMARATE 25 MG: 25 TABLET ORAL at 08:08

## 2017-05-06 RX ADMIN — DULOXETINE 20 MG: 20 CAPSULE, DELAYED RELEASE ORAL at 08:09

## 2017-05-06 RX ADMIN — DABIGATRAN ETEXILATE MESYLATE 150 MG: 150 CAPSULE ORAL at 08:09

## 2017-05-06 RX ADMIN — SODIUM CHLORIDE: 4.5 INJECTION, SOLUTION INTRAVENOUS at 02:00

## 2017-05-06 RX ADMIN — FAMOTIDINE 20 MG: 10 INJECTION, SOLUTION INTRAVENOUS at 08:09

## 2017-05-06 RX ADMIN — DIGOXIN 250 MCG: 0.25 TABLET ORAL at 08:09

## 2017-05-06 RX ADMIN — METOPROLOL SUCCINATE 50 MG: 50 TABLET, EXTENDED RELEASE ORAL at 08:08

## 2017-05-06 RX ADMIN — GABAPENTIN 400 MG: 400 CAPSULE ORAL at 08:08

## 2017-05-06 RX ADMIN — ACETAMINOPHEN 650 MG: 325 TABLET ORAL at 02:15

## 2017-05-06 ASSESSMENT — PAIN SCALES - GENERAL: PAINLEVEL_OUTOF10: 4

## 2017-05-07 ENCOUNTER — CARE COORDINATION (OUTPATIENT)
Dept: CASE MANAGEMENT | Age: 62
End: 2017-05-07

## 2017-05-08 ENCOUNTER — CARE COORDINATION (OUTPATIENT)
Dept: FAMILY MEDICINE CLINIC | Facility: CLINIC | Age: 62
End: 2017-05-08

## 2017-05-08 ENCOUNTER — CARE COORDINATION (OUTPATIENT)
Dept: CASE MANAGEMENT | Age: 62
End: 2017-05-08

## 2017-05-08 VITALS
DIASTOLIC BLOOD PRESSURE: 106 MMHG | RESPIRATION RATE: 18 BRPM | HEART RATE: 72 BPM | SYSTOLIC BLOOD PRESSURE: 190 MMHG | OXYGEN SATURATION: 96 %

## 2017-05-08 LAB
EKG ATRIAL RATE: 102 BPM
EKG Q-T INTERVAL: 378 MS
EKG QRS DURATION: 156 MS
EKG QTC CALCULATION (BAZETT): 531 MS
EKG R AXIS: -69 DEGREES
EKG T AXIS: -26 DEGREES
EKG VENTRICULAR RATE: 119 BPM

## 2017-05-10 ENCOUNTER — OFFICE VISIT (OUTPATIENT)
Dept: FAMILY MEDICINE CLINIC | Age: 62
End: 2017-05-10
Payer: MEDICARE

## 2017-05-10 VITALS
WEIGHT: 243 LBS | BODY MASS INDEX: 35.88 KG/M2 | SYSTOLIC BLOOD PRESSURE: 146 MMHG | DIASTOLIC BLOOD PRESSURE: 90 MMHG | TEMPERATURE: 96.2 F | HEART RATE: 80 BPM

## 2017-05-10 DIAGNOSIS — M79.2 PERIPHERAL NEUROPATHIC PAIN: ICD-10-CM

## 2017-05-10 DIAGNOSIS — Z45.02 AICD DISCHARGE: ICD-10-CM

## 2017-05-10 DIAGNOSIS — I50.22 CHRONIC SYSTOLIC HEART FAILURE (HCC): ICD-10-CM

## 2017-05-10 DIAGNOSIS — I50.43 ACUTE ON CHRONIC COMBINED SYSTOLIC AND DIASTOLIC CONGESTIVE HEART FAILURE (HCC): ICD-10-CM

## 2017-05-10 DIAGNOSIS — I48.20 CHRONIC ATRIAL FIBRILLATION (HCC): Primary | ICD-10-CM

## 2017-05-10 PROCEDURE — 99213 OFFICE O/P EST LOW 20 MIN: CPT

## 2017-05-10 PROCEDURE — 99496 TRANSJ CARE MGMT HIGH F2F 7D: CPT | Performed by: HOSPITALIST

## 2017-05-10 RX ORDER — SENNA PLUS 8.6 MG/1
TABLET ORAL
Qty: 30 TABLET | Refills: 11 | Status: SHIPPED | OUTPATIENT
Start: 2017-05-10 | End: 2018-04-20 | Stop reason: SDUPTHER

## 2017-05-10 RX ORDER — QUETIAPINE FUMARATE 25 MG/1
25 TABLET, FILM COATED ORAL 2 TIMES DAILY
Qty: 60 TABLET | Refills: 3 | Status: SHIPPED | OUTPATIENT
Start: 2017-05-10 | End: 2017-09-11 | Stop reason: SDUPTHER

## 2017-05-10 RX ORDER — ASPIRIN 81 MG/1
TABLET, CHEWABLE ORAL
Qty: 30 TABLET | Refills: 11 | Status: SHIPPED | OUTPATIENT
Start: 2017-05-10 | End: 2018-04-20 | Stop reason: SDUPTHER

## 2017-05-11 ENCOUNTER — CARE COORDINATION (OUTPATIENT)
Dept: FAMILY MEDICINE CLINIC | Facility: CLINIC | Age: 62
End: 2017-05-11

## 2017-05-11 VITALS — SYSTOLIC BLOOD PRESSURE: 137 MMHG | RESPIRATION RATE: 18 BRPM | HEART RATE: 76 BPM | DIASTOLIC BLOOD PRESSURE: 94 MMHG

## 2017-05-16 ENCOUNTER — CARE COORDINATION (OUTPATIENT)
Dept: CASE MANAGEMENT | Age: 62
End: 2017-05-16

## 2017-05-24 ENCOUNTER — TELEPHONE (OUTPATIENT)
Dept: FAMILY MEDICINE CLINIC | Age: 62
End: 2017-05-24

## 2017-05-25 ENCOUNTER — TELEPHONE (OUTPATIENT)
Dept: FAMILY MEDICINE CLINIC | Age: 62
End: 2017-05-25

## 2017-05-26 RX ORDER — DULOXETIN HYDROCHLORIDE 20 MG/1
CAPSULE, DELAYED RELEASE ORAL
Qty: 30 CAPSULE | Refills: 0 | Status: SHIPPED | OUTPATIENT
Start: 2017-05-26 | End: 2017-07-17 | Stop reason: SDUPTHER

## 2017-05-26 RX ORDER — GABAPENTIN 400 MG/1
CAPSULE ORAL
Qty: 180 CAPSULE | Refills: 0 | Status: SHIPPED | OUTPATIENT
Start: 2017-05-26 | End: 2017-07-17 | Stop reason: SDUPTHER

## 2017-05-26 RX ORDER — FUROSEMIDE 40 MG/1
TABLET ORAL
Qty: 30 TABLET | Refills: 0 | Status: SHIPPED | OUTPATIENT
Start: 2017-05-26 | End: 2017-07-17 | Stop reason: SDUPTHER

## 2017-06-04 ENCOUNTER — APPOINTMENT (OUTPATIENT)
Dept: GENERAL RADIOLOGY | Age: 62
DRG: 309 | End: 2017-06-04
Payer: MEDICARE

## 2017-06-04 ENCOUNTER — HOSPITAL ENCOUNTER (INPATIENT)
Age: 62
LOS: 3 days | Discharge: NURSING, REHAB OR LONG-TERM CARE | DRG: 309 | End: 2017-06-08
Attending: EMERGENCY MEDICINE | Admitting: EMERGENCY MEDICINE
Payer: MEDICARE

## 2017-06-04 DIAGNOSIS — Z45.02 AICD DISCHARGE: Primary | ICD-10-CM

## 2017-06-04 LAB
ABSOLUTE EOS #: 0.2 K/UL (ref 0–0.4)
ABSOLUTE LYMPH #: 1.8 K/UL (ref 1–4.8)
ABSOLUTE MONO #: 1 K/UL (ref 0.1–1.2)
ANION GAP SERPL CALCULATED.3IONS-SCNC: 16 MMOL/L (ref 9–17)
BASOPHILS # BLD: 0 %
BASOPHILS ABSOLUTE: 0 K/UL (ref 0–0.2)
BNP INTERPRETATION: ABNORMAL
BUN BLDV-MCNC: 11 MG/DL (ref 8–23)
BUN/CREAT BLD: ABNORMAL (ref 9–20)
CALCIUM SERPL-MCNC: 9.6 MG/DL (ref 8.6–10.4)
CHLORIDE BLD-SCNC: 103 MMOL/L (ref 98–107)
CO2: 24 MMOL/L (ref 20–31)
CREAT SERPL-MCNC: 0.84 MG/DL (ref 0.7–1.2)
DIFFERENTIAL TYPE: ABNORMAL
DIGOXIN DATE LAST DOSE: ABNORMAL
DIGOXIN DOSE AMOUNT: ABNORMAL
DIGOXIN DOSE TIME: ABNORMAL
DIGOXIN LEVEL: 0.4 NG/ML (ref 0.5–2)
EOSINOPHILS RELATIVE PERCENT: 2 %
GFR AFRICAN AMERICAN: >60 ML/MIN
GFR NON-AFRICAN AMERICAN: >60 ML/MIN
GFR SERPL CREATININE-BSD FRML MDRD: ABNORMAL ML/MIN/{1.73_M2}
GFR SERPL CREATININE-BSD FRML MDRD: ABNORMAL ML/MIN/{1.73_M2}
GLUCOSE BLD-MCNC: 137 MG/DL (ref 70–99)
HCT VFR BLD CALC: 47.8 % (ref 41–53)
HEMOGLOBIN: 15.6 G/DL (ref 13.5–17.5)
LYMPHOCYTES # BLD: 22 %
MAGNESIUM: 1.9 MG/DL (ref 1.6–2.6)
MCH RBC QN AUTO: 32.1 PG (ref 26–34)
MCHC RBC AUTO-ENTMCNC: 32.6 G/DL (ref 31–37)
MCV RBC AUTO: 98.2 FL (ref 80–100)
MONOCYTES # BLD: 13 %
PDW BLD-RTO: 15.7 % (ref 12.5–15.4)
PHOSPHORUS: 2.4 MG/DL (ref 2.5–4.5)
PLATELET # BLD: 133 K/UL (ref 140–450)
PLATELET ESTIMATE: ABNORMAL
PMV BLD AUTO: 11.1 FL (ref 6–12)
POC TROPONIN I: 0.01 NG/ML (ref 0–0.1)
POC TROPONIN I: 0.04 NG/ML (ref 0–0.1)
POC TROPONIN INTERP: NORMAL
POC TROPONIN INTERP: NORMAL
POTASSIUM SERPL-SCNC: 3.6 MMOL/L (ref 3.7–5.3)
PRO-BNP: 2801 PG/ML
RBC # BLD: 4.86 M/UL (ref 4.5–5.9)
RBC # BLD: ABNORMAL 10*6/UL
SEG NEUTROPHILS: 63 %
SEGMENTED NEUTROPHILS ABSOLUTE COUNT: 5.1 K/UL (ref 1.8–7.7)
SODIUM BLD-SCNC: 143 MMOL/L (ref 135–144)
WBC # BLD: 8.1 K/UL (ref 3.5–11)
WBC # BLD: ABNORMAL 10*3/UL

## 2017-06-04 PROCEDURE — 6370000000 HC RX 637 (ALT 250 FOR IP): Performed by: EMERGENCY MEDICINE

## 2017-06-04 PROCEDURE — 80162 ASSAY OF DIGOXIN TOTAL: CPT

## 2017-06-04 PROCEDURE — 2580000003 HC RX 258: Performed by: EMERGENCY MEDICINE

## 2017-06-04 PROCEDURE — 6360000002 HC RX W HCPCS: Performed by: INTERNAL MEDICINE

## 2017-06-04 PROCEDURE — 85025 COMPLETE CBC W/AUTO DIFF WBC: CPT

## 2017-06-04 PROCEDURE — 99285 EMERGENCY DEPT VISIT HI MDM: CPT

## 2017-06-04 PROCEDURE — G0378 HOSPITAL OBSERVATION PER HR: HCPCS

## 2017-06-04 PROCEDURE — 83735 ASSAY OF MAGNESIUM: CPT

## 2017-06-04 PROCEDURE — 84100 ASSAY OF PHOSPHORUS: CPT

## 2017-06-04 PROCEDURE — 71020 XR CHEST STANDARD TWO VW: CPT

## 2017-06-04 PROCEDURE — 93005 ELECTROCARDIOGRAM TRACING: CPT

## 2017-06-04 PROCEDURE — 84484 ASSAY OF TROPONIN QUANT: CPT

## 2017-06-04 PROCEDURE — 83880 ASSAY OF NATRIURETIC PEPTIDE: CPT

## 2017-06-04 PROCEDURE — 6370000000 HC RX 637 (ALT 250 FOR IP): Performed by: INTERNAL MEDICINE

## 2017-06-04 PROCEDURE — 80048 BASIC METABOLIC PNL TOTAL CA: CPT

## 2017-06-04 RX ORDER — PANTOPRAZOLE SODIUM 40 MG/1
40 TABLET, DELAYED RELEASE ORAL
Status: DISCONTINUED | OUTPATIENT
Start: 2017-06-04 | End: 2017-06-08 | Stop reason: HOSPADM

## 2017-06-04 RX ORDER — QUETIAPINE FUMARATE 25 MG/1
25 TABLET, FILM COATED ORAL 2 TIMES DAILY
Status: DISCONTINUED | OUTPATIENT
Start: 2017-06-04 | End: 2017-06-08 | Stop reason: HOSPADM

## 2017-06-04 RX ORDER — LABETALOL HYDROCHLORIDE 5 MG/ML
10 INJECTION, SOLUTION INTRAVENOUS ONCE
Status: DISCONTINUED | OUTPATIENT
Start: 2017-06-04 | End: 2017-06-04

## 2017-06-04 RX ORDER — METOPROLOL SUCCINATE 50 MG/1
50 TABLET, EXTENDED RELEASE ORAL DAILY
Status: DISCONTINUED | OUTPATIENT
Start: 2017-06-04 | End: 2017-06-07

## 2017-06-04 RX ORDER — POTASSIUM CHLORIDE 20 MEQ/1
20 TABLET, EXTENDED RELEASE ORAL ONCE
Status: COMPLETED | OUTPATIENT
Start: 2017-06-04 | End: 2017-06-04

## 2017-06-04 RX ORDER — GABAPENTIN 400 MG/1
800 CAPSULE ORAL 3 TIMES DAILY
Status: DISCONTINUED | OUTPATIENT
Start: 2017-06-04 | End: 2017-06-08 | Stop reason: HOSPADM

## 2017-06-04 RX ORDER — MAGNESIUM SULFATE 1 G/100ML
1 INJECTION INTRAVENOUS ONCE
Status: COMPLETED | OUTPATIENT
Start: 2017-06-04 | End: 2017-06-04

## 2017-06-04 RX ORDER — OMEPRAZOLE 20 MG/1
20 CAPSULE, DELAYED RELEASE ORAL DAILY
Status: DISCONTINUED | OUTPATIENT
Start: 2017-06-04 | End: 2017-06-04 | Stop reason: CLARIF

## 2017-06-04 RX ORDER — FUROSEMIDE 40 MG/1
40 TABLET ORAL DAILY
Status: DISCONTINUED | OUTPATIENT
Start: 2017-06-04 | End: 2017-06-08 | Stop reason: HOSPADM

## 2017-06-04 RX ORDER — SODIUM CHLORIDE 0.9 % (FLUSH) 0.9 %
10 SYRINGE (ML) INJECTION PRN
Status: DISCONTINUED | OUTPATIENT
Start: 2017-06-04 | End: 2017-06-08 | Stop reason: HOSPADM

## 2017-06-04 RX ORDER — DIGOXIN 250 MCG
250 TABLET ORAL DAILY
Status: DISCONTINUED | OUTPATIENT
Start: 2017-06-04 | End: 2017-06-08 | Stop reason: HOSPADM

## 2017-06-04 RX ORDER — DABIGATRAN ETEXILATE 150 MG/1
150 CAPSULE, COATED PELLETS ORAL 2 TIMES DAILY
Status: DISCONTINUED | OUTPATIENT
Start: 2017-06-04 | End: 2017-06-08 | Stop reason: HOSPADM

## 2017-06-04 RX ORDER — LISINOPRIL 10 MG/1
10 TABLET ORAL DAILY
Status: DISCONTINUED | OUTPATIENT
Start: 2017-06-04 | End: 2017-06-08 | Stop reason: HOSPADM

## 2017-06-04 RX ORDER — ASPIRIN 81 MG/1
81 TABLET, CHEWABLE ORAL DAILY
Status: DISCONTINUED | OUTPATIENT
Start: 2017-06-04 | End: 2017-06-08 | Stop reason: HOSPADM

## 2017-06-04 RX ORDER — SODIUM CHLORIDE 0.9 % (FLUSH) 0.9 %
10 SYRINGE (ML) INJECTION EVERY 12 HOURS SCHEDULED
Status: DISCONTINUED | OUTPATIENT
Start: 2017-06-04 | End: 2017-06-08 | Stop reason: HOSPADM

## 2017-06-04 RX ORDER — SPIRONOLACTONE 25 MG/1
25 TABLET ORAL DAILY
Status: DISCONTINUED | OUTPATIENT
Start: 2017-06-04 | End: 2017-06-08 | Stop reason: HOSPADM

## 2017-06-04 RX ORDER — DULOXETIN HYDROCHLORIDE 20 MG/1
20 CAPSULE, DELAYED RELEASE ORAL DAILY
Status: DISCONTINUED | OUTPATIENT
Start: 2017-06-04 | End: 2017-06-08 | Stop reason: HOSPADM

## 2017-06-04 RX ORDER — ATORVASTATIN CALCIUM 40 MG/1
40 TABLET, FILM COATED ORAL NIGHTLY
Status: DISCONTINUED | OUTPATIENT
Start: 2017-06-04 | End: 2017-06-08 | Stop reason: HOSPADM

## 2017-06-04 RX ORDER — ACETAMINOPHEN 325 MG/1
650 TABLET ORAL EVERY 4 HOURS PRN
Status: DISCONTINUED | OUTPATIENT
Start: 2017-06-04 | End: 2017-06-08 | Stop reason: HOSPADM

## 2017-06-04 RX ADMIN — Medication 10 ML: at 20:09

## 2017-06-04 RX ADMIN — ATORVASTATIN CALCIUM 40 MG: 40 TABLET, FILM COATED ORAL at 20:09

## 2017-06-04 RX ADMIN — POTASSIUM CHLORIDE 20 MEQ: 20 TABLET, EXTENDED RELEASE ORAL at 17:10

## 2017-06-04 RX ADMIN — DABIGATRAN ETEXILATE MESYLATE 150 MG: 150 CAPSULE ORAL at 20:09

## 2017-06-04 RX ADMIN — ACETAMINOPHEN 650 MG: 325 TABLET ORAL at 19:27

## 2017-06-04 RX ADMIN — GABAPENTIN 800 MG: 400 CAPSULE ORAL at 23:14

## 2017-06-04 RX ADMIN — MAGNESIUM SULFATE IN DEXTROSE 1 G: 10 INJECTION, SOLUTION INTRAVENOUS at 17:10

## 2017-06-04 RX ADMIN — QUETIAPINE FUMARATE 25 MG: 25 TABLET ORAL at 20:09

## 2017-06-04 RX ADMIN — GABAPENTIN 800 MG: 400 CAPSULE ORAL at 17:17

## 2017-06-04 ASSESSMENT — ENCOUNTER SYMPTOMS
SORE THROAT: 0
NAUSEA: 0
ABDOMINAL PAIN: 0
CHEST TIGHTNESS: 0
SHORTNESS OF BREATH: 0
VOMITING: 0

## 2017-06-04 ASSESSMENT — PAIN DESCRIPTION - ORIENTATION: ORIENTATION: RIGHT;LEFT

## 2017-06-04 ASSESSMENT — PAIN DESCRIPTION - LOCATION: LOCATION: HAND

## 2017-06-04 ASSESSMENT — PAIN SCALES - GENERAL
PAINLEVEL_OUTOF10: 2
PAINLEVEL_OUTOF10: 1

## 2017-06-04 ASSESSMENT — PAIN DESCRIPTION - PAIN TYPE: TYPE: NEUROPATHIC PAIN

## 2017-06-05 LAB
DIGOXIN DATE LAST DOSE: NORMAL
DIGOXIN DOSE AMOUNT: NORMAL
DIGOXIN DOSE TIME: NORMAL
DIGOXIN LEVEL: 0.7 NG/ML (ref 0.5–2)

## 2017-06-05 PROCEDURE — 1200000000 HC SEMI PRIVATE

## 2017-06-05 PROCEDURE — 80162 ASSAY OF DIGOXIN TOTAL: CPT

## 2017-06-05 PROCEDURE — 6370000000 HC RX 637 (ALT 250 FOR IP): Performed by: EMERGENCY MEDICINE

## 2017-06-05 PROCEDURE — 6370000000 HC RX 637 (ALT 250 FOR IP): Performed by: INTERNAL MEDICINE

## 2017-06-05 PROCEDURE — 36415 COLL VENOUS BLD VENIPUNCTURE: CPT

## 2017-06-05 RX ORDER — AMIODARONE HYDROCHLORIDE 200 MG/1
400 TABLET ORAL 2 TIMES DAILY
Status: DISCONTINUED | OUTPATIENT
Start: 2017-06-05 | End: 2017-06-08 | Stop reason: HOSPADM

## 2017-06-05 RX ORDER — AMIODARONE HYDROCHLORIDE 200 MG/1
200 TABLET ORAL 2 TIMES DAILY
Status: DISCONTINUED | OUTPATIENT
Start: 2017-06-05 | End: 2017-06-05

## 2017-06-05 RX ADMIN — DABIGATRAN ETEXILATE MESYLATE 150 MG: 150 CAPSULE ORAL at 08:35

## 2017-06-05 RX ADMIN — DULOXETINE 20 MG: 20 CAPSULE, DELAYED RELEASE ORAL at 08:35

## 2017-06-05 RX ADMIN — GABAPENTIN 800 MG: 400 CAPSULE ORAL at 08:34

## 2017-06-05 RX ADMIN — QUETIAPINE FUMARATE 25 MG: 25 TABLET ORAL at 22:41

## 2017-06-05 RX ADMIN — AMIODARONE HYDROCHLORIDE 200 MG: 200 TABLET ORAL at 13:43

## 2017-06-05 RX ADMIN — GABAPENTIN 800 MG: 400 CAPSULE ORAL at 13:43

## 2017-06-05 RX ADMIN — ATORVASTATIN CALCIUM 40 MG: 40 TABLET, FILM COATED ORAL at 22:41

## 2017-06-05 RX ADMIN — SPIRONOLACTONE 25 MG: 25 TABLET ORAL at 08:34

## 2017-06-05 RX ADMIN — LISINOPRIL 10 MG: 10 TABLET ORAL at 08:34

## 2017-06-05 RX ADMIN — PANTOPRAZOLE SODIUM 40 MG: 40 TABLET, DELAYED RELEASE ORAL at 08:35

## 2017-06-05 RX ADMIN — ASPIRIN 81 MG: 81 TABLET, CHEWABLE ORAL at 08:34

## 2017-06-05 RX ADMIN — FUROSEMIDE 40 MG: 40 TABLET ORAL at 08:34

## 2017-06-05 RX ADMIN — QUETIAPINE FUMARATE 25 MG: 25 TABLET ORAL at 08:34

## 2017-06-05 RX ADMIN — GABAPENTIN 800 MG: 400 CAPSULE ORAL at 22:41

## 2017-06-05 RX ADMIN — DABIGATRAN ETEXILATE MESYLATE 150 MG: 150 CAPSULE ORAL at 22:41

## 2017-06-06 PROCEDURE — 6370000000 HC RX 637 (ALT 250 FOR IP): Performed by: INTERNAL MEDICINE

## 2017-06-06 PROCEDURE — G8988 SELF CARE GOAL STATUS: HCPCS

## 2017-06-06 PROCEDURE — 97535 SELF CARE MNGMENT TRAINING: CPT

## 2017-06-06 PROCEDURE — 2580000003 HC RX 258: Performed by: EMERGENCY MEDICINE

## 2017-06-06 PROCEDURE — 97161 PT EVAL LOW COMPLEX 20 MIN: CPT

## 2017-06-06 PROCEDURE — 6370000000 HC RX 637 (ALT 250 FOR IP): Performed by: EMERGENCY MEDICINE

## 2017-06-06 PROCEDURE — 1200000000 HC SEMI PRIVATE

## 2017-06-06 PROCEDURE — 97166 OT EVAL MOD COMPLEX 45 MIN: CPT

## 2017-06-06 PROCEDURE — G8987 SELF CARE CURRENT STATUS: HCPCS

## 2017-06-06 PROCEDURE — G8978 MOBILITY CURRENT STATUS: HCPCS

## 2017-06-06 RX ADMIN — QUETIAPINE FUMARATE 25 MG: 25 TABLET ORAL at 10:04

## 2017-06-06 RX ADMIN — AMIODARONE HYDROCHLORIDE 400 MG: 200 TABLET ORAL at 20:39

## 2017-06-06 RX ADMIN — DIGOXIN 250 MCG: 0.25 TABLET ORAL at 08:33

## 2017-06-06 RX ADMIN — Medication 10 ML: at 00:06

## 2017-06-06 RX ADMIN — ASPIRIN 81 MG: 81 TABLET, CHEWABLE ORAL at 10:04

## 2017-06-06 RX ADMIN — Medication 10 ML: at 20:39

## 2017-06-06 RX ADMIN — GABAPENTIN 800 MG: 400 CAPSULE ORAL at 20:38

## 2017-06-06 RX ADMIN — QUETIAPINE FUMARATE 25 MG: 25 TABLET ORAL at 21:50

## 2017-06-06 RX ADMIN — ATORVASTATIN CALCIUM 40 MG: 40 TABLET, FILM COATED ORAL at 20:39

## 2017-06-06 RX ADMIN — SPIRONOLACTONE 25 MG: 25 TABLET ORAL at 10:05

## 2017-06-06 RX ADMIN — PANTOPRAZOLE SODIUM 40 MG: 40 TABLET, DELAYED RELEASE ORAL at 10:04

## 2017-06-06 RX ADMIN — METOPROLOL SUCCINATE 50 MG: 50 TABLET, FILM COATED, EXTENDED RELEASE ORAL at 08:33

## 2017-06-06 RX ADMIN — ACETAMINOPHEN 650 MG: 325 TABLET ORAL at 01:22

## 2017-06-06 RX ADMIN — GABAPENTIN 800 MG: 400 CAPSULE ORAL at 08:33

## 2017-06-06 RX ADMIN — LISINOPRIL 10 MG: 10 TABLET ORAL at 10:05

## 2017-06-06 RX ADMIN — GABAPENTIN 800 MG: 400 CAPSULE ORAL at 14:51

## 2017-06-06 RX ADMIN — DABIGATRAN ETEXILATE MESYLATE 150 MG: 150 CAPSULE ORAL at 10:05

## 2017-06-06 RX ADMIN — DABIGATRAN ETEXILATE MESYLATE 150 MG: 150 CAPSULE ORAL at 20:38

## 2017-06-06 RX ADMIN — AMIODARONE HYDROCHLORIDE 400 MG: 200 TABLET ORAL at 00:04

## 2017-06-06 RX ADMIN — FUROSEMIDE 40 MG: 40 TABLET ORAL at 10:05

## 2017-06-06 RX ADMIN — Medication 10 ML: at 08:37

## 2017-06-06 RX ADMIN — AMIODARONE HYDROCHLORIDE 400 MG: 200 TABLET ORAL at 08:33

## 2017-06-06 RX ADMIN — DULOXETINE 20 MG: 20 CAPSULE, DELAYED RELEASE ORAL at 10:04

## 2017-06-06 ASSESSMENT — PAIN SCALES - GENERAL: PAINLEVEL_OUTOF10: 6

## 2017-06-07 LAB — GLUCOSE BLD-MCNC: 75 MG/DL (ref 75–110)

## 2017-06-07 PROCEDURE — 1200000000 HC SEMI PRIVATE

## 2017-06-07 PROCEDURE — 6370000000 HC RX 637 (ALT 250 FOR IP): Performed by: EMERGENCY MEDICINE

## 2017-06-07 PROCEDURE — 82947 ASSAY GLUCOSE BLOOD QUANT: CPT

## 2017-06-07 PROCEDURE — 2580000003 HC RX 258: Performed by: EMERGENCY MEDICINE

## 2017-06-07 PROCEDURE — 6370000000 HC RX 637 (ALT 250 FOR IP): Performed by: INTERNAL MEDICINE

## 2017-06-07 RX ORDER — METOPROLOL SUCCINATE 25 MG/1
12.5 TABLET, EXTENDED RELEASE ORAL DAILY
Status: DISCONTINUED | OUTPATIENT
Start: 2017-06-08 | End: 2017-06-08 | Stop reason: HOSPADM

## 2017-06-07 RX ORDER — AMIODARONE HYDROCHLORIDE 400 MG/1
400 TABLET ORAL 2 TIMES DAILY
Qty: 30 TABLET | Refills: 1 | Status: ON HOLD | OUTPATIENT
Start: 2017-06-07 | End: 2019-05-09 | Stop reason: SDUPTHER

## 2017-06-07 RX ADMIN — METOPROLOL SUCCINATE 50 MG: 50 TABLET, FILM COATED, EXTENDED RELEASE ORAL at 08:41

## 2017-06-07 RX ADMIN — GABAPENTIN 800 MG: 400 CAPSULE ORAL at 20:11

## 2017-06-07 RX ADMIN — DULOXETINE 20 MG: 20 CAPSULE, DELAYED RELEASE ORAL at 08:42

## 2017-06-07 RX ADMIN — Medication 10 ML: at 20:12

## 2017-06-07 RX ADMIN — QUETIAPINE FUMARATE 25 MG: 25 TABLET ORAL at 20:10

## 2017-06-07 RX ADMIN — DIGOXIN 250 MCG: 0.25 TABLET ORAL at 08:42

## 2017-06-07 RX ADMIN — ATORVASTATIN CALCIUM 40 MG: 40 TABLET, FILM COATED ORAL at 20:10

## 2017-06-07 RX ADMIN — DABIGATRAN ETEXILATE MESYLATE 150 MG: 150 CAPSULE ORAL at 08:42

## 2017-06-07 RX ADMIN — Medication 10 ML: at 08:48

## 2017-06-07 RX ADMIN — SPIRONOLACTONE 25 MG: 25 TABLET ORAL at 08:41

## 2017-06-07 RX ADMIN — LISINOPRIL 10 MG: 10 TABLET ORAL at 08:41

## 2017-06-07 RX ADMIN — PANTOPRAZOLE SODIUM 40 MG: 40 TABLET, DELAYED RELEASE ORAL at 08:42

## 2017-06-07 RX ADMIN — ASPIRIN 81 MG: 81 TABLET, CHEWABLE ORAL at 08:41

## 2017-06-07 RX ADMIN — AMIODARONE HYDROCHLORIDE 400 MG: 200 TABLET ORAL at 20:11

## 2017-06-07 RX ADMIN — QUETIAPINE FUMARATE 25 MG: 25 TABLET ORAL at 08:42

## 2017-06-07 RX ADMIN — GABAPENTIN 800 MG: 400 CAPSULE ORAL at 15:05

## 2017-06-07 RX ADMIN — AMIODARONE HYDROCHLORIDE 400 MG: 200 TABLET ORAL at 08:40

## 2017-06-07 RX ADMIN — DABIGATRAN ETEXILATE MESYLATE 150 MG: 150 CAPSULE ORAL at 20:11

## 2017-06-07 RX ADMIN — GABAPENTIN 800 MG: 400 CAPSULE ORAL at 08:40

## 2017-06-07 RX ADMIN — FUROSEMIDE 40 MG: 40 TABLET ORAL at 08:41

## 2017-06-07 ASSESSMENT — PAIN SCALES - GENERAL: PAINLEVEL_OUTOF10: 0

## 2017-06-08 VITALS
OXYGEN SATURATION: 98 % | HEART RATE: 61 BPM | TEMPERATURE: 97.9 F | HEIGHT: 69 IN | SYSTOLIC BLOOD PRESSURE: 137 MMHG | DIASTOLIC BLOOD PRESSURE: 78 MMHG | RESPIRATION RATE: 16 BRPM | WEIGHT: 249.12 LBS | BODY MASS INDEX: 36.9 KG/M2

## 2017-06-08 PROCEDURE — 6370000000 HC RX 637 (ALT 250 FOR IP): Performed by: INTERNAL MEDICINE

## 2017-06-08 PROCEDURE — 6370000000 HC RX 637 (ALT 250 FOR IP): Performed by: EMERGENCY MEDICINE

## 2017-06-08 PROCEDURE — 6370000000 HC RX 637 (ALT 250 FOR IP): Performed by: HOSPITALIST

## 2017-06-08 RX ORDER — METOPROLOL SUCCINATE 25 MG/1
12.5 TABLET, EXTENDED RELEASE ORAL DAILY
Qty: 30 TABLET | Refills: 3 | Status: SHIPPED | OUTPATIENT
Start: 2017-06-08 | End: 2018-02-03

## 2017-06-08 RX ADMIN — DULOXETINE 20 MG: 20 CAPSULE, DELAYED RELEASE ORAL at 07:52

## 2017-06-08 RX ADMIN — ASPIRIN 81 MG: 81 TABLET, CHEWABLE ORAL at 07:51

## 2017-06-08 RX ADMIN — DABIGATRAN ETEXILATE MESYLATE 150 MG: 150 CAPSULE ORAL at 07:59

## 2017-06-08 RX ADMIN — DIGOXIN 250 MCG: 0.25 TABLET ORAL at 07:52

## 2017-06-08 RX ADMIN — PANTOPRAZOLE SODIUM 40 MG: 40 TABLET, DELAYED RELEASE ORAL at 07:51

## 2017-06-08 RX ADMIN — FUROSEMIDE 40 MG: 40 TABLET ORAL at 07:51

## 2017-06-08 RX ADMIN — GABAPENTIN 800 MG: 400 CAPSULE ORAL at 07:51

## 2017-06-08 RX ADMIN — SPIRONOLACTONE 25 MG: 25 TABLET ORAL at 07:51

## 2017-06-08 RX ADMIN — QUETIAPINE FUMARATE 25 MG: 25 TABLET ORAL at 07:51

## 2017-06-08 RX ADMIN — LISINOPRIL 10 MG: 10 TABLET ORAL at 07:51

## 2017-06-08 RX ADMIN — METOPROLOL SUCCINATE 12.5 MG: 25 TABLET, FILM COATED, EXTENDED RELEASE ORAL at 07:56

## 2017-06-08 RX ADMIN — AMIODARONE HYDROCHLORIDE 400 MG: 200 TABLET ORAL at 07:50

## 2017-06-08 ASSESSMENT — PAIN SCALES - GENERAL: PAINLEVEL_OUTOF10: 0

## 2017-06-09 ENCOUNTER — HOSPITAL ENCOUNTER (OUTPATIENT)
Age: 62
Setting detail: SPECIMEN
Discharge: HOME | End: 2017-06-09
Payer: MEDICARE

## 2017-06-09 LAB
ANION GAP SERPL CALCULATED.3IONS-SCNC: 13 MMOL/L (ref 9–17)
BUN BLDV-MCNC: 12 MG/DL (ref 8–23)
BUN/CREAT BLD: NORMAL (ref 9–20)
CALCIUM SERPL-MCNC: 8.9 MG/DL (ref 8.6–10.4)
CHLORIDE BLD-SCNC: 101 MMOL/L (ref 98–107)
CO2: 26 MMOL/L (ref 20–31)
CREAT SERPL-MCNC: 0.8 MG/DL (ref 0.7–1.2)
EKG ATRIAL RATE: 122 BPM
EKG Q-T INTERVAL: 356 MS
EKG QRS DURATION: 154 MS
EKG QTC CALCULATION (BAZETT): 496 MS
EKG R AXIS: -76 DEGREES
EKG T AXIS: 0 DEGREES
EKG VENTRICULAR RATE: 117 BPM
GFR AFRICAN AMERICAN: >60 ML/MIN
GFR NON-AFRICAN AMERICAN: >60 ML/MIN
GFR SERPL CREATININE-BSD FRML MDRD: NORMAL ML/MIN/{1.73_M2}
GFR SERPL CREATININE-BSD FRML MDRD: NORMAL ML/MIN/{1.73_M2}
GLUCOSE BLD-MCNC: 81 MG/DL (ref 70–99)
HCT VFR BLD CALC: 43.6 % (ref 41–53)
HEMOGLOBIN: 14.3 G/DL (ref 13.5–17.5)
MCH RBC QN AUTO: 32.2 PG (ref 26–34)
MCHC RBC AUTO-ENTMCNC: 32.8 G/DL (ref 31–37)
MCV RBC AUTO: 98.4 FL (ref 80–100)
PDW BLD-RTO: 15.8 % (ref 12.5–15.4)
PLATELET # BLD: 105 K/UL (ref 140–450)
PMV BLD AUTO: 11.4 FL (ref 6–12)
POTASSIUM SERPL-SCNC: 4.3 MMOL/L (ref 3.7–5.3)
RBC # BLD: 4.43 M/UL (ref 4.5–5.9)
SODIUM BLD-SCNC: 140 MMOL/L (ref 135–144)
WBC # BLD: 6.3 K/UL (ref 3.5–11)

## 2017-06-09 PROCEDURE — 85027 COMPLETE CBC AUTOMATED: CPT

## 2017-06-09 PROCEDURE — 36415 COLL VENOUS BLD VENIPUNCTURE: CPT

## 2017-06-09 PROCEDURE — 80048 BASIC METABOLIC PNL TOTAL CA: CPT

## 2017-06-15 ENCOUNTER — CARE COORDINATION (OUTPATIENT)
Dept: CASE MANAGEMENT | Age: 62
End: 2017-06-15

## 2017-06-16 ENCOUNTER — CARE COORDINATION (OUTPATIENT)
Dept: CASE MANAGEMENT | Age: 62
End: 2017-06-16

## 2017-06-20 RX ORDER — SPIRONOLACTONE 25 MG/1
TABLET ORAL
Qty: 30 TABLET | Refills: 0 | Status: SHIPPED | OUTPATIENT
Start: 2017-06-20 | End: 2017-07-07 | Stop reason: SDUPTHER

## 2017-06-21 ENCOUNTER — CARE COORDINATION (OUTPATIENT)
Dept: CASE MANAGEMENT | Age: 62
End: 2017-06-21

## 2017-06-22 ENCOUNTER — OFFICE VISIT (OUTPATIENT)
Dept: FAMILY MEDICINE CLINIC | Age: 62
End: 2017-06-22
Payer: MEDICARE

## 2017-06-22 VITALS
BODY MASS INDEX: 35.16 KG/M2 | DIASTOLIC BLOOD PRESSURE: 92 MMHG | HEART RATE: 61 BPM | SYSTOLIC BLOOD PRESSURE: 158 MMHG | TEMPERATURE: 97.7 F | HEIGHT: 69 IN | WEIGHT: 237.4 LBS

## 2017-06-22 DIAGNOSIS — I50.42 CHRONIC COMBINED SYSTOLIC AND DIASTOLIC CONGESTIVE HEART FAILURE (HCC): Primary | ICD-10-CM

## 2017-06-22 DIAGNOSIS — Z00.00 HEALTHCARE MAINTENANCE: ICD-10-CM

## 2017-06-22 DIAGNOSIS — I10 ESSENTIAL HYPERTENSION: ICD-10-CM

## 2017-06-22 PROCEDURE — 99495 TRANSJ CARE MGMT MOD F2F 14D: CPT | Performed by: HOSPITALIST

## 2017-06-22 PROCEDURE — 99214 OFFICE O/P EST MOD 30 MIN: CPT

## 2017-06-22 PROCEDURE — 90715 TDAP VACCINE 7 YRS/> IM: CPT | Performed by: HOSPITALIST

## 2017-06-22 RX ORDER — OMEPRAZOLE 20 MG/1
CAPSULE, DELAYED RELEASE ORAL
Qty: 30 CAPSULE | Refills: 0 | Status: SHIPPED | OUTPATIENT
Start: 2017-06-22 | End: 2017-07-17 | Stop reason: SDUPTHER

## 2017-06-22 ASSESSMENT — ENCOUNTER SYMPTOMS: COUGH: 0

## 2017-06-23 ENCOUNTER — CARE COORDINATION (OUTPATIENT)
Dept: CASE MANAGEMENT | Age: 62
End: 2017-06-23

## 2017-06-27 ENCOUNTER — CARE COORDINATION (OUTPATIENT)
Dept: CASE MANAGEMENT | Age: 62
End: 2017-06-27

## 2017-06-28 ENCOUNTER — TELEPHONE (OUTPATIENT)
Dept: FAMILY MEDICINE CLINIC | Age: 62
End: 2017-06-28

## 2017-06-29 ENCOUNTER — TELEPHONE (OUTPATIENT)
Dept: FAMILY MEDICINE CLINIC | Age: 62
End: 2017-06-29

## 2017-07-07 ENCOUNTER — OFFICE VISIT (OUTPATIENT)
Dept: FAMILY MEDICINE CLINIC | Age: 62
End: 2017-07-07
Payer: MEDICARE

## 2017-07-07 ENCOUNTER — HOSPITAL ENCOUNTER (EMERGENCY)
Age: 62
Discharge: HOME | End: 2017-07-07
Attending: EMERGENCY MEDICINE
Payer: MEDICARE

## 2017-07-07 ENCOUNTER — TELEPHONE (OUTPATIENT)
Dept: FAMILY MEDICINE CLINIC | Age: 62
End: 2017-07-07

## 2017-07-07 ENCOUNTER — APPOINTMENT (OUTPATIENT)
Dept: GENERAL RADIOLOGY | Age: 62
End: 2017-07-07
Payer: MEDICARE

## 2017-07-07 VITALS
SYSTOLIC BLOOD PRESSURE: 137 MMHG | OXYGEN SATURATION: 94 % | TEMPERATURE: 97.2 F | RESPIRATION RATE: 17 BRPM | DIASTOLIC BLOOD PRESSURE: 68 MMHG | HEART RATE: 42 BPM

## 2017-07-07 VITALS
HEART RATE: 41 BPM | BODY MASS INDEX: 36.46 KG/M2 | WEIGHT: 246.2 LBS | TEMPERATURE: 96.3 F | HEIGHT: 69 IN | DIASTOLIC BLOOD PRESSURE: 75 MMHG | SYSTOLIC BLOOD PRESSURE: 154 MMHG

## 2017-07-07 DIAGNOSIS — R00.1 BRADYCARDIA: ICD-10-CM

## 2017-07-07 DIAGNOSIS — Z71.1 FEARED COMPLAINT WITHOUT DIAGNOSIS: Primary | ICD-10-CM

## 2017-07-07 DIAGNOSIS — I50.42 CHRONIC COMBINED SYSTOLIC AND DIASTOLIC CHF (CONGESTIVE HEART FAILURE) (HCC): Primary | ICD-10-CM

## 2017-07-07 DIAGNOSIS — Z23 ENCOUNTER FOR IMMUNIZATION: ICD-10-CM

## 2017-07-07 DIAGNOSIS — Z00.00 HEALTHCARE MAINTENANCE: ICD-10-CM

## 2017-07-07 LAB
ABSOLUTE EOS #: 0.4 K/UL (ref 0–0.4)
ABSOLUTE LYMPH #: 1.4 K/UL (ref 1–4.8)
ABSOLUTE MONO #: 0.9 K/UL (ref 0.1–1.2)
ANION GAP SERPL CALCULATED.3IONS-SCNC: 12 MMOL/L (ref 9–17)
BASOPHILS # BLD: 0 %
BASOPHILS ABSOLUTE: 0 K/UL (ref 0–0.2)
BUN BLDV-MCNC: 18 MG/DL (ref 8–23)
BUN/CREAT BLD: NORMAL (ref 9–20)
CALCIUM SERPL-MCNC: 9.2 MG/DL (ref 8.6–10.4)
CHLORIDE BLD-SCNC: 105 MMOL/L (ref 98–107)
CO2: 27 MMOL/L (ref 20–31)
CREAT SERPL-MCNC: 0.91 MG/DL (ref 0.7–1.2)
DIFFERENTIAL TYPE: ABNORMAL
EOSINOPHILS RELATIVE PERCENT: 4 %
GFR AFRICAN AMERICAN: >60 ML/MIN
GFR NON-AFRICAN AMERICAN: >60 ML/MIN
GFR SERPL CREATININE-BSD FRML MDRD: NORMAL ML/MIN/{1.73_M2}
GFR SERPL CREATININE-BSD FRML MDRD: NORMAL ML/MIN/{1.73_M2}
GLUCOSE BLD-MCNC: 86 MG/DL (ref 70–99)
HCT VFR BLD CALC: 44.4 % (ref 41–53)
HEMOGLOBIN: 14.7 G/DL (ref 13.5–17.5)
LYMPHOCYTES # BLD: 17 %
MAGNESIUM: 2.1 MG/DL (ref 1.6–2.6)
MCH RBC QN AUTO: 32.4 PG (ref 26–34)
MCHC RBC AUTO-ENTMCNC: 33.1 G/DL (ref 31–37)
MCV RBC AUTO: 97.9 FL (ref 80–100)
MONOCYTES # BLD: 11 %
PDW BLD-RTO: 16 % (ref 12.5–15.4)
PHOSPHORUS: 3.5 MG/DL (ref 2.5–4.5)
PLATELET # BLD: 122 K/UL (ref 140–450)
PLATELET ESTIMATE: ABNORMAL
PMV BLD AUTO: 11.3 FL (ref 6–12)
POC TROPONIN I: 0 NG/ML (ref 0–0.1)
POC TROPONIN I: 0 NG/ML (ref 0–0.1)
POC TROPONIN INTERP: NORMAL
POC TROPONIN INTERP: NORMAL
POTASSIUM SERPL-SCNC: 4 MMOL/L (ref 3.7–5.3)
RBC # BLD: 4.54 M/UL (ref 4.5–5.9)
RBC # BLD: ABNORMAL 10*6/UL
SEG NEUTROPHILS: 68 %
SEGMENTED NEUTROPHILS ABSOLUTE COUNT: 5.4 K/UL (ref 1.8–7.7)
SODIUM BLD-SCNC: 144 MMOL/L (ref 135–144)
WBC # BLD: 8.1 K/UL (ref 3.5–11)
WBC # BLD: ABNORMAL 10*3/UL

## 2017-07-07 PROCEDURE — 80048 BASIC METABOLIC PNL TOTAL CA: CPT

## 2017-07-07 PROCEDURE — G8427 DOCREV CUR MEDS BY ELIG CLIN: HCPCS | Performed by: FAMILY MEDICINE

## 2017-07-07 PROCEDURE — 84484 ASSAY OF TROPONIN QUANT: CPT

## 2017-07-07 PROCEDURE — 84100 ASSAY OF PHOSPHORUS: CPT

## 2017-07-07 PROCEDURE — 1036F TOBACCO NON-USER: CPT | Performed by: FAMILY MEDICINE

## 2017-07-07 PROCEDURE — 3017F COLORECTAL CA SCREEN DOC REV: CPT | Performed by: FAMILY MEDICINE

## 2017-07-07 PROCEDURE — 83735 ASSAY OF MAGNESIUM: CPT

## 2017-07-07 PROCEDURE — 85025 COMPLETE CBC W/AUTO DIFF WBC: CPT

## 2017-07-07 PROCEDURE — 71020 XR CHEST STANDARD TWO VW: CPT

## 2017-07-07 PROCEDURE — 93005 ELECTROCARDIOGRAM TRACING: CPT

## 2017-07-07 PROCEDURE — G0009 ADMIN PNEUMOCOCCAL VACCINE: HCPCS

## 2017-07-07 PROCEDURE — 99284 EMERGENCY DEPT VISIT MOD MDM: CPT

## 2017-07-07 PROCEDURE — 1111F DSCHRG MED/CURRENT MED MERGE: CPT | Performed by: FAMILY MEDICINE

## 2017-07-07 PROCEDURE — 99213 OFFICE O/P EST LOW 20 MIN: CPT | Performed by: FAMILY MEDICINE

## 2017-07-07 PROCEDURE — G8417 CALC BMI ABV UP PARAM F/U: HCPCS | Performed by: FAMILY MEDICINE

## 2017-07-07 PROCEDURE — 90732 PPSV23 VACC 2 YRS+ SUBQ/IM: CPT | Performed by: FAMILY MEDICINE

## 2017-07-07 PROCEDURE — 93000 ELECTROCARDIOGRAM COMPLETE: CPT | Performed by: FAMILY MEDICINE

## 2017-07-07 ASSESSMENT — PATIENT HEALTH QUESTIONNAIRE - PHQ9
2. FEELING DOWN, DEPRESSED OR HOPELESS: 1
1. LITTLE INTEREST OR PLEASURE IN DOING THINGS: 0
SUM OF ALL RESPONSES TO PHQ QUESTIONS 1-9: 1
SUM OF ALL RESPONSES TO PHQ9 QUESTIONS 1 & 2: 1

## 2017-07-07 ASSESSMENT — ENCOUNTER SYMPTOMS
COUGH: 0
WHEEZING: 0
ABDOMINAL PAIN: 0
SHORTNESS OF BREATH: 0
VOMITING: 0
NAUSEA: 0
BACK PAIN: 0

## 2017-07-08 ASSESSMENT — ENCOUNTER SYMPTOMS
COUGH: 0
COLOR CHANGE: 0
CONSTIPATION: 0
SHORTNESS OF BREATH: 0
VOMITING: 0
BACK PAIN: 0
ABDOMINAL PAIN: 0
DIARRHEA: 0
NAUSEA: 0

## 2017-07-10 LAB
EKG ATRIAL RATE: 0 BPM
EKG Q-T INTERVAL: 594 MS
EKG QRS DURATION: 192 MS
EKG QTC CALCULATION (BAZETT): 484 MS
EKG R AXIS: -86 DEGREES
EKG T AXIS: 101 DEGREES
EKG VENTRICULAR RATE: 40 BPM

## 2017-07-11 ENCOUNTER — CARE COORDINATION (OUTPATIENT)
Dept: FAMILY MEDICINE CLINIC | Age: 62
End: 2017-07-11

## 2017-07-17 RX ORDER — OMEPRAZOLE 20 MG/1
CAPSULE, DELAYED RELEASE ORAL
Qty: 30 CAPSULE | Refills: 0 | Status: SHIPPED | OUTPATIENT
Start: 2017-07-17 | End: 2017-10-06 | Stop reason: SDUPTHER

## 2017-07-17 RX ORDER — FUROSEMIDE 40 MG/1
TABLET ORAL
Qty: 30 TABLET | Refills: 0 | Status: SHIPPED | OUTPATIENT
Start: 2017-07-17 | End: 2017-08-09 | Stop reason: SDUPTHER

## 2017-07-17 RX ORDER — GABAPENTIN 400 MG/1
CAPSULE ORAL
Qty: 180 CAPSULE | Refills: 0 | Status: SHIPPED | OUTPATIENT
Start: 2017-07-17 | End: 2017-08-09 | Stop reason: SDUPTHER

## 2017-07-17 RX ORDER — SPIRONOLACTONE 25 MG/1
TABLET ORAL
Qty: 30 TABLET | Refills: 0 | Status: SHIPPED | OUTPATIENT
Start: 2017-07-17 | End: 2017-08-09 | Stop reason: SDUPTHER

## 2017-07-17 RX ORDER — DULOXETIN HYDROCHLORIDE 20 MG/1
CAPSULE, DELAYED RELEASE ORAL
Qty: 30 CAPSULE | Refills: 0 | Status: SHIPPED | OUTPATIENT
Start: 2017-07-17 | End: 2017-08-09 | Stop reason: SDUPTHER

## 2017-07-21 RX ORDER — OMEPRAZOLE 20 MG/1
CAPSULE, DELAYED RELEASE ORAL
Qty: 30 CAPSULE | Refills: 0 | Status: SHIPPED | OUTPATIENT
Start: 2017-07-21 | End: 2017-08-09 | Stop reason: SDUPTHER

## 2017-07-27 ENCOUNTER — TELEPHONE (OUTPATIENT)
Dept: FAMILY MEDICINE CLINIC | Facility: CLINIC | Age: 62
End: 2017-07-27

## 2017-08-10 RX ORDER — FUROSEMIDE 40 MG/1
TABLET ORAL
Qty: 30 TABLET | Refills: 0 | Status: SHIPPED | OUTPATIENT
Start: 2017-08-10 | End: 2017-09-11 | Stop reason: SDUPTHER

## 2017-08-10 RX ORDER — DULOXETIN HYDROCHLORIDE 20 MG/1
CAPSULE, DELAYED RELEASE ORAL
Qty: 28 CAPSULE | Refills: 0 | Status: SHIPPED | OUTPATIENT
Start: 2017-08-10 | End: 2017-09-11 | Stop reason: SDUPTHER

## 2017-08-10 RX ORDER — SPIRONOLACTONE 25 MG/1
TABLET ORAL
Qty: 30 TABLET | Refills: 0 | Status: SHIPPED | OUTPATIENT
Start: 2017-08-10 | End: 2017-09-11 | Stop reason: SDUPTHER

## 2017-08-10 RX ORDER — GABAPENTIN 400 MG/1
CAPSULE ORAL
Qty: 180 CAPSULE | Refills: 0 | Status: SHIPPED | OUTPATIENT
Start: 2017-08-10 | End: 2017-09-11 | Stop reason: SDUPTHER

## 2017-08-10 RX ORDER — OMEPRAZOLE 20 MG/1
CAPSULE, DELAYED RELEASE ORAL
Qty: 30 CAPSULE | Refills: 0 | Status: SHIPPED | OUTPATIENT
Start: 2017-08-10 | End: 2017-09-11 | Stop reason: SDUPTHER

## 2017-08-22 ENCOUNTER — OFFICE VISIT (OUTPATIENT)
Dept: FAMILY MEDICINE CLINIC | Age: 62
End: 2017-08-22
Payer: MEDICARE

## 2017-08-22 ENCOUNTER — TELEPHONE (OUTPATIENT)
Dept: FAMILY MEDICINE CLINIC | Facility: CLINIC | Age: 62
End: 2017-08-22

## 2017-08-22 VITALS
BODY MASS INDEX: 37.68 KG/M2 | DIASTOLIC BLOOD PRESSURE: 70 MMHG | HEART RATE: 61 BPM | WEIGHT: 254.4 LBS | HEIGHT: 69 IN | TEMPERATURE: 97.4 F | SYSTOLIC BLOOD PRESSURE: 122 MMHG

## 2017-08-22 DIAGNOSIS — I50.42 CHRONIC COMBINED SYSTOLIC AND DIASTOLIC CONGESTIVE HEART FAILURE (HCC): ICD-10-CM

## 2017-08-22 DIAGNOSIS — F33.42 RECURRENT MAJOR DEPRESSIVE DISORDER, IN FULL REMISSION (HCC): ICD-10-CM

## 2017-08-22 DIAGNOSIS — E55.9 VITAMIN D DEFICIENCY: ICD-10-CM

## 2017-08-22 DIAGNOSIS — R20.0 NUMBNESS IN FEET: ICD-10-CM

## 2017-08-22 DIAGNOSIS — I10 ESSENTIAL HYPERTENSION: Primary | ICD-10-CM

## 2017-08-22 PROCEDURE — 99213 OFFICE O/P EST LOW 20 MIN: CPT

## 2017-08-22 PROCEDURE — G8417 CALC BMI ABV UP PARAM F/U: HCPCS | Performed by: HOSPITALIST

## 2017-08-22 PROCEDURE — 1036F TOBACCO NON-USER: CPT | Performed by: HOSPITALIST

## 2017-08-22 PROCEDURE — 3017F COLORECTAL CA SCREEN DOC REV: CPT | Performed by: HOSPITALIST

## 2017-08-22 PROCEDURE — 99213 OFFICE O/P EST LOW 20 MIN: CPT | Performed by: HOSPITALIST

## 2017-08-22 PROCEDURE — G8427 DOCREV CUR MEDS BY ELIG CLIN: HCPCS | Performed by: HOSPITALIST

## 2017-08-22 ASSESSMENT — ENCOUNTER SYMPTOMS
APNEA: 0
WHEEZING: 0
ABDOMINAL DISTENTION: 0
COUGH: 0
SHORTNESS OF BREATH: 0

## 2017-09-12 RX ORDER — SPIRONOLACTONE 25 MG/1
TABLET ORAL
Qty: 30 TABLET | Refills: 0 | Status: SHIPPED | OUTPATIENT
Start: 2017-09-12 | End: 2017-10-06 | Stop reason: SDUPTHER

## 2017-09-12 RX ORDER — ATORVASTATIN CALCIUM 40 MG/1
TABLET, FILM COATED ORAL
Qty: 30 TABLET | Refills: 0 | Status: SHIPPED | OUTPATIENT
Start: 2017-09-12 | End: 2017-10-06 | Stop reason: SDUPTHER

## 2017-09-12 RX ORDER — DIGOXIN 250 MCG
TABLET ORAL
Qty: 30 TABLET | Refills: 0 | Status: ON HOLD | OUTPATIENT
Start: 2017-09-12 | End: 2019-04-27

## 2017-09-12 RX ORDER — DULOXETIN HYDROCHLORIDE 20 MG/1
CAPSULE, DELAYED RELEASE ORAL
Qty: 28 CAPSULE | Refills: 0 | Status: SHIPPED | OUTPATIENT
Start: 2017-09-12 | End: 2017-10-06 | Stop reason: SDUPTHER

## 2017-09-12 RX ORDER — GABAPENTIN 400 MG/1
CAPSULE ORAL
Qty: 180 CAPSULE | Refills: 0 | Status: SHIPPED | OUTPATIENT
Start: 2017-09-12 | End: 2017-10-06 | Stop reason: SDUPTHER

## 2017-09-12 RX ORDER — FUROSEMIDE 40 MG/1
TABLET ORAL
Qty: 30 TABLET | Refills: 0 | Status: SHIPPED | OUTPATIENT
Start: 2017-09-12 | End: 2017-10-06 | Stop reason: SDUPTHER

## 2017-09-12 RX ORDER — OMEPRAZOLE 20 MG/1
CAPSULE, DELAYED RELEASE ORAL
Qty: 30 CAPSULE | Refills: 0 | Status: SHIPPED | OUTPATIENT
Start: 2017-09-12 | End: 2017-10-06 | Stop reason: SDUPTHER

## 2017-09-12 RX ORDER — QUETIAPINE FUMARATE 25 MG/1
TABLET, FILM COATED ORAL
Qty: 60 TABLET | Refills: 0 | Status: SHIPPED | OUTPATIENT
Start: 2017-09-12 | End: 2017-10-06 | Stop reason: SDUPTHER

## 2017-09-28 ENCOUNTER — TELEPHONE (OUTPATIENT)
Dept: FAMILY MEDICINE CLINIC | Age: 62
End: 2017-09-28

## 2017-10-03 ENCOUNTER — TELEPHONE (OUTPATIENT)
Dept: FAMILY MEDICINE CLINIC | Age: 62
End: 2017-10-03

## 2017-10-06 RX ORDER — SPIRONOLACTONE 25 MG/1
TABLET ORAL
Qty: 30 TABLET | Refills: 0 | Status: SHIPPED | OUTPATIENT
Start: 2017-10-06 | End: 2017-11-03 | Stop reason: SDUPTHER

## 2017-10-06 RX ORDER — GABAPENTIN 400 MG/1
CAPSULE ORAL
Qty: 180 CAPSULE | Refills: 0 | Status: SHIPPED | OUTPATIENT
Start: 2017-10-06 | End: 2017-11-03 | Stop reason: SDUPTHER

## 2017-10-06 RX ORDER — OMEPRAZOLE 20 MG/1
CAPSULE, DELAYED RELEASE ORAL
Qty: 30 CAPSULE | Refills: 0 | Status: SHIPPED | OUTPATIENT
Start: 2017-10-06 | End: 2017-11-03 | Stop reason: SDUPTHER

## 2017-10-06 RX ORDER — DULOXETIN HYDROCHLORIDE 20 MG/1
CAPSULE, DELAYED RELEASE ORAL
Qty: 28 CAPSULE | Refills: 0 | Status: SHIPPED | OUTPATIENT
Start: 2017-10-06 | End: 2017-11-03 | Stop reason: SDUPTHER

## 2017-10-06 RX ORDER — ATORVASTATIN CALCIUM 40 MG/1
TABLET, FILM COATED ORAL
Qty: 30 TABLET | Refills: 0 | Status: SHIPPED | OUTPATIENT
Start: 2017-10-06 | End: 2017-11-03 | Stop reason: SDUPTHER

## 2017-10-06 RX ORDER — QUETIAPINE FUMARATE 25 MG/1
TABLET, FILM COATED ORAL
Qty: 60 TABLET | Refills: 0 | Status: SHIPPED | OUTPATIENT
Start: 2017-10-06 | End: 2017-11-03 | Stop reason: SDUPTHER

## 2017-10-06 RX ORDER — FUROSEMIDE 40 MG/1
TABLET ORAL
Qty: 30 TABLET | Refills: 0 | Status: SHIPPED | OUTPATIENT
Start: 2017-10-06 | End: 2017-11-03 | Stop reason: SDUPTHER

## 2017-10-11 ENCOUNTER — TELEPHONE (OUTPATIENT)
Dept: FAMILY MEDICINE CLINIC | Age: 62
End: 2017-10-11

## 2017-10-11 NOTE — TELEPHONE ENCOUNTER
Attempt made to visit patient at scheduled time. Knocked on patient's door and checked in the community areas of the complex with no success. Several attempts made to contact patient via phone without any success. Will continue to make attempts.

## 2017-10-11 NOTE — TELEPHONE ENCOUNTER
Several attempts made to contact patient via phone and in person visit. Will attempt to meet with patient early tomorrow.

## 2017-10-16 ENCOUNTER — TELEPHONE (OUTPATIENT)
Dept: FAMILY MEDICINE CLINIC | Age: 62
End: 2017-10-16

## 2017-11-03 RX ORDER — QUETIAPINE FUMARATE 25 MG/1
TABLET, FILM COATED ORAL
Qty: 60 TABLET | Refills: 0 | Status: SHIPPED | OUTPATIENT
Start: 2017-11-03 | End: 2017-12-02 | Stop reason: SDUPTHER

## 2017-11-03 RX ORDER — GABAPENTIN 400 MG/1
CAPSULE ORAL
Qty: 180 CAPSULE | Refills: 0 | Status: SHIPPED | OUTPATIENT
Start: 2017-11-03 | End: 2017-12-02 | Stop reason: SDUPTHER

## 2017-11-03 RX ORDER — FUROSEMIDE 40 MG/1
TABLET ORAL
Qty: 30 TABLET | Refills: 0 | Status: SHIPPED | OUTPATIENT
Start: 2017-11-03 | End: 2017-12-02 | Stop reason: SDUPTHER

## 2017-11-03 RX ORDER — ATORVASTATIN CALCIUM 40 MG/1
TABLET, FILM COATED ORAL
Qty: 30 TABLET | Refills: 0 | Status: SHIPPED | OUTPATIENT
Start: 2017-11-03 | End: 2017-12-02 | Stop reason: SDUPTHER

## 2017-11-03 RX ORDER — DULOXETIN HYDROCHLORIDE 20 MG/1
CAPSULE, DELAYED RELEASE ORAL
Qty: 28 CAPSULE | Refills: 0 | Status: SHIPPED | OUTPATIENT
Start: 2017-11-03 | End: 2017-12-02 | Stop reason: SDUPTHER

## 2017-11-03 RX ORDER — OMEPRAZOLE 20 MG/1
CAPSULE, DELAYED RELEASE ORAL
Qty: 30 CAPSULE | Refills: 0 | Status: SHIPPED | OUTPATIENT
Start: 2017-11-03 | End: 2017-12-02 | Stop reason: SDUPTHER

## 2017-11-03 RX ORDER — SPIRONOLACTONE 25 MG/1
TABLET ORAL
Qty: 30 TABLET | Refills: 0 | Status: SHIPPED | OUTPATIENT
Start: 2017-11-03 | End: 2017-12-02 | Stop reason: SDUPTHER

## 2017-12-05 RX ORDER — FUROSEMIDE 40 MG/1
TABLET ORAL
Qty: 30 TABLET | Refills: 0 | Status: SHIPPED | OUTPATIENT
Start: 2017-12-05 | End: 2017-12-28 | Stop reason: SDUPTHER

## 2017-12-05 RX ORDER — ATORVASTATIN CALCIUM 40 MG/1
TABLET, FILM COATED ORAL
Qty: 30 TABLET | Refills: 0 | Status: SHIPPED | OUTPATIENT
Start: 2017-12-05 | End: 2017-12-28 | Stop reason: SDUPTHER

## 2017-12-05 RX ORDER — GABAPENTIN 400 MG/1
CAPSULE ORAL
Qty: 180 CAPSULE | Refills: 0 | Status: SHIPPED | OUTPATIENT
Start: 2017-12-05 | End: 2017-12-28 | Stop reason: SDUPTHER

## 2017-12-05 RX ORDER — SPIRONOLACTONE 25 MG/1
TABLET ORAL
Qty: 30 TABLET | Refills: 0 | Status: SHIPPED | OUTPATIENT
Start: 2017-12-05 | End: 2017-12-28 | Stop reason: SDUPTHER

## 2017-12-05 RX ORDER — DULOXETIN HYDROCHLORIDE 20 MG/1
CAPSULE, DELAYED RELEASE ORAL
Qty: 28 CAPSULE | Refills: 0 | Status: SHIPPED | OUTPATIENT
Start: 2017-12-05 | End: 2017-12-28 | Stop reason: SDUPTHER

## 2017-12-05 RX ORDER — QUETIAPINE FUMARATE 25 MG/1
TABLET, FILM COATED ORAL
Qty: 60 TABLET | Refills: 0 | Status: SHIPPED | OUTPATIENT
Start: 2017-12-05 | End: 2017-12-28 | Stop reason: SDUPTHER

## 2017-12-05 RX ORDER — OMEPRAZOLE 20 MG/1
CAPSULE, DELAYED RELEASE ORAL
Qty: 30 CAPSULE | Refills: 0 | Status: SHIPPED | OUTPATIENT
Start: 2017-12-05 | End: 2017-12-28 | Stop reason: SDUPTHER

## 2017-12-21 ENCOUNTER — TELEPHONE (OUTPATIENT)
Dept: FAMILY MEDICINE CLINIC | Age: 62
End: 2017-12-21

## 2017-12-29 RX ORDER — SPIRONOLACTONE 25 MG/1
TABLET ORAL
Qty: 30 TABLET | Refills: 0 | Status: SHIPPED | OUTPATIENT
Start: 2017-12-29 | End: 2018-01-25 | Stop reason: SDUPTHER

## 2017-12-29 RX ORDER — GABAPENTIN 400 MG/1
CAPSULE ORAL
Qty: 180 CAPSULE | Refills: 0 | Status: SHIPPED | OUTPATIENT
Start: 2017-12-29 | End: 2018-02-01 | Stop reason: SDUPTHER

## 2017-12-29 RX ORDER — OMEPRAZOLE 20 MG/1
CAPSULE, DELAYED RELEASE ORAL
Qty: 30 CAPSULE | Refills: 0 | Status: SHIPPED | OUTPATIENT
Start: 2017-12-29 | End: 2018-01-25 | Stop reason: SDUPTHER

## 2017-12-29 RX ORDER — DULOXETIN HYDROCHLORIDE 20 MG/1
CAPSULE, DELAYED RELEASE ORAL
Qty: 28 CAPSULE | Refills: 0 | Status: SHIPPED | OUTPATIENT
Start: 2017-12-29 | End: 2018-01-25 | Stop reason: SDUPTHER

## 2017-12-29 RX ORDER — FUROSEMIDE 40 MG/1
TABLET ORAL
Qty: 30 TABLET | Refills: 0 | Status: SHIPPED | OUTPATIENT
Start: 2017-12-29 | End: 2018-01-25 | Stop reason: SDUPTHER

## 2017-12-29 RX ORDER — QUETIAPINE FUMARATE 25 MG/1
TABLET, FILM COATED ORAL
Qty: 60 TABLET | Refills: 0 | Status: SHIPPED | OUTPATIENT
Start: 2017-12-29 | End: 2018-01-25 | Stop reason: SDUPTHER

## 2017-12-29 RX ORDER — ATORVASTATIN CALCIUM 40 MG/1
TABLET, FILM COATED ORAL
Qty: 30 TABLET | Refills: 0 | Status: SHIPPED | OUTPATIENT
Start: 2017-12-29 | End: 2018-01-25 | Stop reason: SDUPTHER

## 2018-01-04 ENCOUNTER — TELEPHONE (OUTPATIENT)
Dept: FAMILY MEDICINE CLINIC | Age: 63
End: 2018-01-04

## 2018-01-18 ENCOUNTER — CARE COORDINATION (OUTPATIENT)
Dept: CARE COORDINATION | Age: 63
End: 2018-01-18

## 2018-01-20 ENCOUNTER — HOSPITAL ENCOUNTER (EMERGENCY)
Age: 63
Discharge: HOME | End: 2018-01-20
Attending: EMERGENCY MEDICINE
Payer: MEDICARE

## 2018-01-20 VITALS
OXYGEN SATURATION: 96 % | TEMPERATURE: 98.4 F | BODY MASS INDEX: 37.62 KG/M2 | RESPIRATION RATE: 20 BRPM | DIASTOLIC BLOOD PRESSURE: 104 MMHG | SYSTOLIC BLOOD PRESSURE: 175 MMHG | HEART RATE: 70 BPM | WEIGHT: 254 LBS

## 2018-01-20 DIAGNOSIS — K08.89 PAIN, DENTAL: Primary | ICD-10-CM

## 2018-01-20 DIAGNOSIS — K02.9 DENTAL CARIES: ICD-10-CM

## 2018-01-20 PROCEDURE — 64400 NJX AA&/STRD TRIGEMINAL NRV: CPT

## 2018-01-20 PROCEDURE — 6370000000 HC RX 637 (ALT 250 FOR IP): Performed by: STUDENT IN AN ORGANIZED HEALTH CARE EDUCATION/TRAINING PROGRAM

## 2018-01-20 PROCEDURE — 99283 EMERGENCY DEPT VISIT LOW MDM: CPT

## 2018-01-20 RX ORDER — PENICILLIN V POTASSIUM 250 MG/1
500 TABLET ORAL ONCE
Status: COMPLETED | OUTPATIENT
Start: 2018-01-20 | End: 2018-01-20

## 2018-01-20 RX ORDER — PENICILLIN V POTASSIUM 500 MG/1
500 TABLET ORAL 4 TIMES DAILY
Qty: 28 TABLET | Refills: 0 | Status: SHIPPED | OUTPATIENT
Start: 2018-01-20 | End: 2018-01-27

## 2018-01-20 RX ORDER — HYDROCODONE BITARTRATE AND ACETAMINOPHEN 5; 325 MG/1; MG/1
2 TABLET ORAL ONCE
Status: COMPLETED | OUTPATIENT
Start: 2018-01-20 | End: 2018-01-20

## 2018-01-20 RX ORDER — HYDROCODONE BITARTRATE AND ACETAMINOPHEN 5; 325 MG/1; MG/1
1 TABLET ORAL EVERY 6 HOURS PRN
Qty: 10 TABLET | Refills: 0 | Status: SHIPPED | OUTPATIENT
Start: 2018-01-20 | End: 2018-01-27

## 2018-01-20 RX ADMIN — HYDROCODONE BITARTRATE AND ACETAMINOPHEN 2 TABLET: 5; 325 TABLET ORAL at 04:33

## 2018-01-20 RX ADMIN — PENICILLIN V POTASSIUM 500 MG: 250 TABLET ORAL at 04:50

## 2018-01-20 ASSESSMENT — ENCOUNTER SYMPTOMS
WHEEZING: 0
ABDOMINAL DISTENTION: 0
DIARRHEA: 0
SHORTNESS OF BREATH: 0
COLOR CHANGE: 0
PHOTOPHOBIA: 0
CONSTIPATION: 0
CHEST TIGHTNESS: 0
CHOKING: 0
TROUBLE SWALLOWING: 0
VOICE CHANGE: 0
ABDOMINAL PAIN: 0
BLOOD IN STOOL: 0
SINUS PRESSURE: 0
VOMITING: 0
SINUS PAIN: 0
NAUSEA: 0
COUGH: 0
SORE THROAT: 0

## 2018-01-20 ASSESSMENT — PAIN DESCRIPTION - DESCRIPTORS: DESCRIPTORS: ACHING

## 2018-01-20 ASSESSMENT — PAIN SCALES - GENERAL
PAINLEVEL_OUTOF10: 7
PAINLEVEL_OUTOF10: 7

## 2018-01-20 ASSESSMENT — PAIN DESCRIPTION - ORIENTATION: ORIENTATION: RIGHT;LOWER

## 2018-01-20 ASSESSMENT — PAIN DESCRIPTION - PAIN TYPE: TYPE: ACUTE PAIN

## 2018-01-20 ASSESSMENT — PAIN DESCRIPTION - LOCATION: LOCATION: TEETH

## 2018-01-20 ASSESSMENT — PAIN DESCRIPTION - FREQUENCY: FREQUENCY: CONTINUOUS

## 2018-01-20 NOTE — ED PROVIDER NOTES
[facility] ED  Emergency Department Encounter  Emergency Medicine Resident     Pt Name: Byron Camara  MRN: [id number]  [place] 1955  Date of evaluation: 1/20/18  PCP:  Myrtle Yao MD    Chief Complaint     Chief Complaint   Patient presents with    Dental Pain       History of present illness (HPI)  (Location/Symptom, Timing/Onset, Context/Setting, Quality, Duration, Modifying Factors, Severity.)      Byron Camara is a 58 y.o. male who presented to the emergency department For dental pain. Patient has numerous missing teeth with dental caries remaining. There is visible swelling to the right lower mandible but no evidence of Alvarez's or lymphadenopathy. The patient is afebrile and shows no signs of systemic infection. .    Past medical / surgical/ social/ family history      Past Medical Hx:    has a past medical history of Asthma; Cancer ([address] CHF (congestive heart failure) ([address] COPD exacerbation ([address] GERD (gastroesophageal reflux disease); History of blood transfusion; Hyperlipidemia; Hypertension; Neuropathy ([address] Obesity (BMI 30-39.9); Psychiatric problem; SBO (small bowel obstruction); Severe mitral regurgitation; and Severe tricuspid regurgitation. Past Surgical Hx:   has a past surgical history that includes other surgical history (11/2/12); Mitral valve surgery (10/31/12); Tricuspid valve surgery (10/31/12); Upper gastrointestinal endoscopy (3-3-15); Abdomen surgery; Tunneled venous port placement (Left, 6/18/2015); and pacemaker placement (Left, 02/2017). Social Hx:  Social History     Social History    Marital status: Single     Spouse name: N/A    Number of children: N/A    Years of education: N/A     Occupational History    Not on file.      Social History Main Topics    Smoking status: Never Smoker    Smokeless tobacco: Never Used    Alcohol use No    Drug use: No    Sexual activity: Not on file     Other Topics Concern    Not on file

## 2018-01-22 ENCOUNTER — CARE COORDINATION (OUTPATIENT)
Dept: CARE COORDINATION | Age: 63
End: 2018-01-22

## 2018-01-30 RX ORDER — OMEPRAZOLE 20 MG/1
CAPSULE, DELAYED RELEASE ORAL
Qty: 30 CAPSULE | Refills: 0 | Status: SHIPPED | OUTPATIENT
Start: 2018-01-30 | End: 2018-02-22 | Stop reason: SDUPTHER

## 2018-01-30 RX ORDER — ATORVASTATIN CALCIUM 40 MG/1
TABLET, FILM COATED ORAL
Qty: 30 TABLET | Refills: 0 | Status: SHIPPED | OUTPATIENT
Start: 2018-01-30 | End: 2018-02-22 | Stop reason: SDUPTHER

## 2018-01-30 RX ORDER — QUETIAPINE FUMARATE 25 MG/1
TABLET, FILM COATED ORAL
Qty: 60 TABLET | Refills: 0 | Status: SHIPPED | OUTPATIENT
Start: 2018-01-30 | End: 2018-02-22 | Stop reason: SDUPTHER

## 2018-01-30 RX ORDER — DULOXETIN HYDROCHLORIDE 20 MG/1
CAPSULE, DELAYED RELEASE ORAL
Qty: 28 CAPSULE | Refills: 0 | Status: SHIPPED | OUTPATIENT
Start: 2018-01-30 | End: 2018-02-22 | Stop reason: SDUPTHER

## 2018-01-30 RX ORDER — SPIRONOLACTONE 25 MG/1
TABLET ORAL
Qty: 30 TABLET | Refills: 0 | Status: SHIPPED | OUTPATIENT
Start: 2018-01-30 | End: 2018-02-26 | Stop reason: SDUPTHER

## 2018-01-30 RX ORDER — FUROSEMIDE 40 MG/1
TABLET ORAL
Qty: 30 TABLET | Refills: 0 | Status: SHIPPED | OUTPATIENT
Start: 2018-01-30 | End: 2018-02-22 | Stop reason: SDUPTHER

## 2018-02-01 NOTE — TELEPHONE ENCOUNTER
Please address the medication refill and close the encounter. If I can be of assistance, please route to the applicable pool. Thank you. Next Visit Date:  No future appointments. Health Maintenance   Topic Date Due    HIV screen  09/16/1970    Colon Cancer Screen FIT/FOBT  11/18/2013    Flu vaccine (1) 09/01/2017    A1C test (Diabetic or Prediabetic)  05/05/2018    TSH testing  05/05/2018    Potassium monitoring  07/07/2018    Creatinine monitoring  07/07/2018    Lipid screen  09/28/2021    DTaP/Tdap/Td vaccine (2 - Td) 06/22/2027    Zostavax vaccine  Addressed    Pneumococcal med risk  Completed    Hepatitis C screen  Completed       Hemoglobin A1C (%)   Date Value   05/05/2017 6.1 (H)   09/28/2016 5.6   03/20/2015 5.1             ( goal A1C is < 7)   No results found for: LABMICR  LDL Cholesterol (mg/dL)   Date Value   09/28/2016 92       (goal LDL is <100)   AST (U/L)   Date Value   01/27/2017 25     ALT (U/L)   Date Value   01/27/2017 30     BUN (mg/dL)   Date Value   07/07/2017 18     BP Readings from Last 3 Encounters:   01/20/18 (!) 175/104   08/22/17 122/70   07/07/17 137/68          (goal 120/80)    All Future Testing planned in [facility]  Lab Frequency Next Occurrence               Patient Active Problem List:     Acute on chronic combined systolic and diastolic heart failure (HCC)     Severe mitral regurgitation     Severe tricuspid regurgitation     Anemia due to GI blood loss     Emesis     Abdominal pain     Piles (hemorrhoids)     Chest pain     HTN (hypertension)     HLD (hyperlipidemia)     Anemia     Transfusion of blood during current hospitalization     Dyspnea     Small bowel cancer ([address]     Neuropathic Pain Left leg     Essential hypertension     Obesity (BMI 30-39. 9)     Peripheral neuropathic pain     Atrial fibrillation with rapid ventricular response (HCC)     CHF exacerbation ([address]     S/P cardiac cath - 10/3/16 - Dr. Jolly Gil     Encounter for cardioversion procedure

## 2018-02-03 RX ORDER — GABAPENTIN 400 MG/1
CAPSULE ORAL
Qty: 180 CAPSULE | Refills: 0 | Status: SHIPPED | OUTPATIENT
Start: 2018-02-03 | End: 2018-02-22 | Stop reason: SDUPTHER

## 2018-02-03 RX ORDER — METOPROLOL SUCCINATE 100 MG/1
TABLET, EXTENDED RELEASE ORAL
Qty: 30 TABLET | Refills: 0 | Status: ON HOLD | OUTPATIENT
Start: 2018-02-03 | End: 2019-04-27

## 2018-02-23 RX ORDER — GABAPENTIN 400 MG/1
CAPSULE ORAL
Qty: 180 CAPSULE | Refills: 0 | Status: SHIPPED | OUTPATIENT
Start: 2018-02-23 | End: 2018-03-22 | Stop reason: SDUPTHER

## 2018-02-23 RX ORDER — ATORVASTATIN CALCIUM 40 MG/1
TABLET, FILM COATED ORAL
Qty: 30 TABLET | Refills: 0 | Status: SHIPPED | OUTPATIENT
Start: 2018-02-23 | End: 2018-03-22 | Stop reason: SDUPTHER

## 2018-02-23 RX ORDER — QUETIAPINE FUMARATE 25 MG/1
TABLET, FILM COATED ORAL
Qty: 60 TABLET | Refills: 0 | Status: SHIPPED | OUTPATIENT
Start: 2018-02-23 | End: 2018-03-22 | Stop reason: SDUPTHER

## 2018-02-23 RX ORDER — DULOXETIN HYDROCHLORIDE 20 MG/1
CAPSULE, DELAYED RELEASE ORAL
Qty: 28 CAPSULE | Refills: 0 | Status: SHIPPED | OUTPATIENT
Start: 2018-02-23 | End: 2018-03-22 | Stop reason: SDUPTHER

## 2018-02-23 RX ORDER — OMEPRAZOLE 20 MG/1
CAPSULE, DELAYED RELEASE ORAL
Qty: 30 CAPSULE | Refills: 0 | Status: SHIPPED | OUTPATIENT
Start: 2018-02-23 | End: 2018-03-22 | Stop reason: SDUPTHER

## 2018-02-23 RX ORDER — FUROSEMIDE 40 MG/1
TABLET ORAL
Qty: 30 TABLET | Refills: 0 | Status: SHIPPED | OUTPATIENT
Start: 2018-02-23 | End: 2018-03-22 | Stop reason: SDUPTHER

## 2018-03-01 RX ORDER — SPIRONOLACTONE 25 MG/1
TABLET ORAL
Qty: 30 TABLET | Refills: 0 | Status: SHIPPED | OUTPATIENT
Start: 2018-03-01 | End: 2018-03-22 | Stop reason: SDUPTHER

## 2018-03-19 ENCOUNTER — TELEPHONE (OUTPATIENT)
Dept: ADMINISTRATIVE | Age: 63
End: 2018-03-19

## 2018-03-26 RX ORDER — GABAPENTIN 400 MG/1
CAPSULE ORAL
Qty: 180 CAPSULE | Refills: 0 | Status: SHIPPED | OUTPATIENT
Start: 2018-03-26 | End: 2018-04-20 | Stop reason: SDUPTHER

## 2018-03-26 RX ORDER — DULOXETIN HYDROCHLORIDE 20 MG/1
CAPSULE, DELAYED RELEASE ORAL
Qty: 28 CAPSULE | Refills: 0 | Status: SHIPPED | OUTPATIENT
Start: 2018-03-26 | End: 2018-04-20 | Stop reason: SDUPTHER

## 2018-03-26 RX ORDER — FUROSEMIDE 40 MG/1
TABLET ORAL
Qty: 30 TABLET | Refills: 0 | Status: SHIPPED | OUTPATIENT
Start: 2018-03-26 | End: 2018-04-20 | Stop reason: SDUPTHER

## 2018-03-26 RX ORDER — QUETIAPINE FUMARATE 25 MG/1
TABLET, FILM COATED ORAL
Qty: 60 TABLET | Refills: 0 | Status: SHIPPED | OUTPATIENT
Start: 2018-03-26 | End: 2018-04-20 | Stop reason: SDUPTHER

## 2018-03-26 RX ORDER — SPIRONOLACTONE 25 MG/1
TABLET ORAL
Qty: 30 TABLET | Refills: 0 | Status: SHIPPED | OUTPATIENT
Start: 2018-03-26 | End: 2018-04-20 | Stop reason: SDUPTHER

## 2018-03-26 RX ORDER — OMEPRAZOLE 20 MG/1
CAPSULE, DELAYED RELEASE ORAL
Qty: 30 CAPSULE | Refills: 0 | Status: SHIPPED | OUTPATIENT
Start: 2018-03-26 | End: 2018-04-20 | Stop reason: SDUPTHER

## 2018-03-26 RX ORDER — ATORVASTATIN CALCIUM 40 MG/1
TABLET, FILM COATED ORAL
Qty: 30 TABLET | Refills: 0 | Status: SHIPPED | OUTPATIENT
Start: 2018-03-26 | End: 2018-04-20 | Stop reason: SDUPTHER

## 2018-04-20 RX ORDER — FUROSEMIDE 40 MG/1
TABLET ORAL
Qty: 30 TABLET | Refills: 0 | Status: SHIPPED | OUTPATIENT
Start: 2018-04-20 | End: 2018-05-22 | Stop reason: SDUPTHER

## 2018-04-20 RX ORDER — ATORVASTATIN CALCIUM 40 MG/1
TABLET, FILM COATED ORAL
Qty: 30 TABLET | Refills: 0 | Status: SHIPPED | OUTPATIENT
Start: 2018-04-20 | End: 2018-05-22 | Stop reason: SDUPTHER

## 2018-04-20 RX ORDER — DULOXETIN HYDROCHLORIDE 20 MG/1
CAPSULE, DELAYED RELEASE ORAL
Qty: 28 CAPSULE | Refills: 0 | Status: SHIPPED | OUTPATIENT
Start: 2018-04-20 | End: 2018-05-22 | Stop reason: SDUPTHER

## 2018-04-20 RX ORDER — OMEPRAZOLE 20 MG/1
CAPSULE, DELAYED RELEASE ORAL
Qty: 30 CAPSULE | Refills: 0 | Status: SHIPPED | OUTPATIENT
Start: 2018-04-20 | End: 2018-05-22 | Stop reason: SDUPTHER

## 2018-04-20 RX ORDER — GABAPENTIN 400 MG/1
CAPSULE ORAL
Qty: 180 CAPSULE | Refills: 0 | Status: SHIPPED | OUTPATIENT
Start: 2018-04-20 | End: 2018-05-22 | Stop reason: SDUPTHER

## 2018-04-20 RX ORDER — QUETIAPINE FUMARATE 25 MG/1
TABLET, FILM COATED ORAL
Qty: 60 TABLET | Refills: 0 | Status: SHIPPED | OUTPATIENT
Start: 2018-04-20 | End: 2018-05-22 | Stop reason: SDUPTHER

## 2018-04-20 RX ORDER — SPIRONOLACTONE 25 MG/1
TABLET ORAL
Qty: 30 TABLET | Refills: 0 | Status: SHIPPED | OUTPATIENT
Start: 2018-04-20 | End: 2018-05-22 | Stop reason: SDUPTHER

## 2018-04-23 RX ORDER — ASPIRIN 81 MG/1
TABLET, DELAYED RELEASE ORAL
Qty: 30 TABLET | Refills: 0 | Status: SHIPPED | OUTPATIENT
Start: 2018-04-23 | End: 2018-05-22 | Stop reason: SDUPTHER

## 2018-04-23 RX ORDER — SENNOSIDES 8.6 MG
TABLET ORAL
Qty: 30 TABLET | Refills: 0 | Status: SHIPPED | OUTPATIENT
Start: 2018-04-23 | End: 2018-05-22 | Stop reason: SDUPTHER

## 2018-05-10 ENCOUNTER — TELEPHONE (OUTPATIENT)
Dept: ADMINISTRATIVE | Age: 63
End: 2018-05-10

## 2018-05-23 RX ORDER — OMEPRAZOLE 20 MG/1
CAPSULE, DELAYED RELEASE ORAL
Qty: 30 CAPSULE | Refills: 0 | Status: SHIPPED | OUTPATIENT
Start: 2018-05-23 | End: 2018-06-12 | Stop reason: SDUPTHER

## 2018-05-23 RX ORDER — ATORVASTATIN CALCIUM 40 MG/1
TABLET, FILM COATED ORAL
Qty: 30 TABLET | Refills: 0 | Status: SHIPPED | OUTPATIENT
Start: 2018-05-23 | End: 2018-06-12 | Stop reason: SDUPTHER

## 2018-05-23 RX ORDER — QUETIAPINE FUMARATE 25 MG/1
TABLET, FILM COATED ORAL
Qty: 60 TABLET | Refills: 0 | Status: SHIPPED | OUTPATIENT
Start: 2018-05-23 | End: 2018-06-12 | Stop reason: SDUPTHER

## 2018-05-23 RX ORDER — SPIRONOLACTONE 25 MG/1
TABLET ORAL
Qty: 30 TABLET | Refills: 0 | Status: SHIPPED | OUTPATIENT
Start: 2018-05-23 | End: 2018-06-12 | Stop reason: SDUPTHER

## 2018-05-23 RX ORDER — ASPIRIN 81 MG/1
TABLET ORAL
Qty: 30 TABLET | Refills: 0 | Status: SHIPPED | OUTPATIENT
Start: 2018-05-23 | End: 2018-06-12 | Stop reason: SDUPTHER

## 2018-05-23 RX ORDER — GABAPENTIN 400 MG/1
CAPSULE ORAL
Qty: 180 CAPSULE | Refills: 0 | Status: SHIPPED | OUTPATIENT
Start: 2018-05-23 | End: 2018-06-12 | Stop reason: SDUPTHER

## 2018-05-23 RX ORDER — FUROSEMIDE 40 MG/1
TABLET ORAL
Qty: 30 TABLET | Refills: 0 | Status: SHIPPED | OUTPATIENT
Start: 2018-05-23 | End: 2018-06-12 | Stop reason: SDUPTHER

## 2018-05-23 RX ORDER — SENNOSIDES 8.6 MG
TABLET ORAL
Qty: 30 TABLET | Refills: 0 | Status: SHIPPED | OUTPATIENT
Start: 2018-05-23 | End: 2018-06-12 | Stop reason: SDUPTHER

## 2018-05-23 RX ORDER — DULOXETIN HYDROCHLORIDE 20 MG/1
CAPSULE, DELAYED RELEASE ORAL
Qty: 28 CAPSULE | Refills: 0 | Status: SHIPPED | OUTPATIENT
Start: 2018-05-23 | End: 2018-06-19 | Stop reason: SDUPTHER

## 2018-05-25 PROBLEM — Z86.711 HISTORY OF PULMONARY EMBOLISM: Chronic | Status: ACTIVE | Noted: 2017-05-05

## 2018-06-05 ENCOUNTER — TELEPHONE (OUTPATIENT)
Dept: ADMINISTRATIVE | Age: 63
End: 2018-06-05

## 2018-06-15 RX ORDER — QUETIAPINE FUMARATE 25 MG/1
TABLET, FILM COATED ORAL
Qty: 60 TABLET | Refills: 0 | Status: ON HOLD | OUTPATIENT
Start: 2018-06-15 | End: 2019-04-27 | Stop reason: SDUPTHER

## 2018-06-15 RX ORDER — SPIRONOLACTONE 25 MG/1
TABLET ORAL
Qty: 30 TABLET | Refills: 0 | Status: ON HOLD | OUTPATIENT
Start: 2018-06-15 | End: 2019-04-27 | Stop reason: SDUPTHER

## 2018-06-15 RX ORDER — OMEPRAZOLE 20 MG/1
CAPSULE, DELAYED RELEASE ORAL
Qty: 30 CAPSULE | Refills: 0 | Status: ON HOLD | OUTPATIENT
Start: 2018-06-15 | End: 2019-04-27 | Stop reason: ALTCHOICE

## 2018-06-15 RX ORDER — SENNOSIDES 8.6 MG
TABLET ORAL
Qty: 30 TABLET | Refills: 0 | Status: ON HOLD | OUTPATIENT
Start: 2018-06-15 | End: 2019-04-27

## 2018-06-15 RX ORDER — FUROSEMIDE 40 MG/1
TABLET ORAL
Qty: 30 TABLET | Refills: 0 | Status: ON HOLD | OUTPATIENT
Start: 2018-06-15 | End: 2019-04-27 | Stop reason: SDUPTHER

## 2018-06-15 RX ORDER — ASPIRIN 81 MG/1
TABLET ORAL
Qty: 30 TABLET | Refills: 0 | Status: ON HOLD | OUTPATIENT
Start: 2018-06-15 | End: 2019-04-27 | Stop reason: SDUPTHER

## 2018-06-15 RX ORDER — ATORVASTATIN CALCIUM 40 MG/1
TABLET, FILM COATED ORAL
Qty: 30 TABLET | Refills: 0 | Status: ON HOLD | OUTPATIENT
Start: 2018-06-15 | End: 2019-04-27 | Stop reason: SDUPTHER

## 2018-06-15 RX ORDER — GABAPENTIN 400 MG/1
CAPSULE ORAL
Qty: 180 CAPSULE | Refills: 0 | Status: SHIPPED | OUTPATIENT
Start: 2018-06-15 | End: 2018-10-04 | Stop reason: ALTCHOICE

## 2018-06-21 RX ORDER — DULOXETIN HYDROCHLORIDE 20 MG/1
CAPSULE, DELAYED RELEASE ORAL
Qty: 28 CAPSULE | Refills: 0 | Status: ON HOLD | OUTPATIENT
Start: 2018-06-21 | End: 2019-04-27 | Stop reason: SDUPTHER

## 2018-08-30 ENCOUNTER — TELEPHONE (OUTPATIENT)
Dept: FAMILY MEDICINE CLINIC | Age: 63
End: 2018-08-30

## 2018-08-30 ENCOUNTER — OFFICE VISIT (OUTPATIENT)
Dept: FAMILY MEDICINE CLINIC | Age: 63
End: 2018-08-30
Payer: MEDICARE

## 2018-08-30 VITALS
BODY MASS INDEX: 37.89 KG/M2 | TEMPERATURE: 98 F | WEIGHT: 250 LBS | SYSTOLIC BLOOD PRESSURE: 140 MMHG | DIASTOLIC BLOOD PRESSURE: 85 MMHG | HEIGHT: 68 IN | HEART RATE: 129 BPM

## 2018-08-30 DIAGNOSIS — I48.91 ATRIAL FIBRILLATION WITH RAPID VENTRICULAR RESPONSE (HCC): ICD-10-CM

## 2018-08-30 DIAGNOSIS — F33.42 MAJOR DEPRESSIVE DISORDER, RECURRENT, IN FULL REMISSION (HCC): ICD-10-CM

## 2018-08-30 DIAGNOSIS — G57.92 NEUROPATHY OF LEFT LOWER EXTREMITY: ICD-10-CM

## 2018-08-30 DIAGNOSIS — R73.03 PREDIABETES: ICD-10-CM

## 2018-08-30 DIAGNOSIS — I50.42 CHRONIC COMBINED SYSTOLIC AND DIASTOLIC CONGESTIVE HEART FAILURE (HCC): ICD-10-CM

## 2018-08-30 DIAGNOSIS — I48.20 CHRONIC ATRIAL FIBRILLATION (HCC): ICD-10-CM

## 2018-08-30 DIAGNOSIS — K59.00 CONSTIPATION, UNSPECIFIED CONSTIPATION TYPE: Primary | ICD-10-CM

## 2018-08-30 LAB — HBA1C MFR BLD: 6.1 %

## 2018-08-30 PROCEDURE — 83036 HEMOGLOBIN GLYCOSYLATED A1C: CPT | Performed by: HOSPITALIST

## 2018-08-30 PROCEDURE — 1036F TOBACCO NON-USER: CPT | Performed by: HOSPITALIST

## 2018-08-30 PROCEDURE — G8417 CALC BMI ABV UP PARAM F/U: HCPCS | Performed by: HOSPITALIST

## 2018-08-30 PROCEDURE — G8427 DOCREV CUR MEDS BY ELIG CLIN: HCPCS | Performed by: HOSPITALIST

## 2018-08-30 PROCEDURE — 99213 OFFICE O/P EST LOW 20 MIN: CPT | Performed by: HOSPITALIST

## 2018-08-30 PROCEDURE — 3017F COLORECTAL CA SCREEN DOC REV: CPT | Performed by: HOSPITALIST

## 2018-08-30 RX ORDER — DOCUSATE SODIUM 100 MG/1
100 CAPSULE, LIQUID FILLED ORAL PRN
Qty: 30 CAPSULE | Refills: 2 | Status: ON HOLD | OUTPATIENT
Start: 2018-08-30 | End: 2019-04-27 | Stop reason: SDUPTHER

## 2018-08-30 ASSESSMENT — ENCOUNTER SYMPTOMS
WHEEZING: 0
SHORTNESS OF BREATH: 0
DIARRHEA: 0
COUGH: 0
CONSTIPATION: 1
BACK PAIN: 0
VOMITING: 0
NAUSEA: 0
ABDOMINAL PAIN: 0

## 2018-08-30 ASSESSMENT — PATIENT HEALTH QUESTIONNAIRE - PHQ9
SUM OF ALL RESPONSES TO PHQ QUESTIONS 1-9: 1
SUM OF ALL RESPONSES TO PHQ9 QUESTIONS 1 & 2: 1
1. LITTLE INTEREST OR PLEASURE IN DOING THINGS: 0
2. FEELING DOWN, DEPRESSED OR HOPELESS: 1
SUM OF ALL RESPONSES TO PHQ QUESTIONS 1-9: 1

## 2018-08-30 NOTE — PROGRESS NOTES
Attending Physician Statement  I  have discussed the care of Render Blind including pertinent history and exam findings with the resident. I agree with the assessment, plan and orders as documented by the resident. BP (!) 140/85 (Site: Left Arm, Position: Sitting, Cuff Size: Medium Adult) Comment: machine  Pulse 129   Temp 98 °F (36.7 °C) (Temporal)   Ht 5' 8\" (1.727 m)   Wt 250 lb (113.4 kg)   BMI 38.01 kg/m²    BP Readings from Last 3 Encounters:   08/30/18 (!) 140/85   01/20/18 (!) 175/104   08/22/17 122/70     Wt Readings from Last 3 Encounters:   08/30/18 250 lb (113.4 kg)   01/20/18 254 lb (115.2 kg)   08/22/17 254 lb 6.4 oz (115.4 kg)          Diagnosis Orders   1. Constipation, unspecified constipation type  docusate sodium (COLACE) 100 MG capsule   2. Prediabetes  POCT glycosylated hemoglobin (Hb A1C)   3. Neuropathy of left lower extremity     4. Chronic combined systolic and diastolic congestive heart failure ([address]     5. Chronic atrial fibrillation (HCC)     6. Atrial fibrillation with rapid ventricular response (HCC)     7. Major depressive disorder, recurrent, in full remission ([address]         See orders.     Cecilio Polanco DO 8/30/2018 11:00 AM

## 2018-08-30 NOTE — PROGRESS NOTES
Subjective:    Leatha Guzmán is a 58 y.o. male with  has a past medical history of Asthma; Cancer ([address] CHF (congestive heart failure) ([address] COPD exacerbation ([address] GERD (gastroesophageal reflux disease); History of blood transfusion; Hyperlipidemia; Hypertension; Neuropathy; Obesity (BMI 30-39.9); Psychiatric problem; SBO (small bowel obstruction) ([address] Severe mitral regurgitation; and Severe tricuspid regurgitation. Family History   Problem Relation Age of Onset    Heart Disease Father        Presented to the office today for:  Chief Complaint   Patient presents with    Extremity Weakness     both legs Pt states it started this morning along with stiffness    Medication Refill     Pt has not been seen in office since 08/22/2017    Hemorrhoids       HPI patient here for follow-up on hypertension and hemorrhoids  States he was supposed to follow up with the clinic as he is on considerable last year  Patient denies any worsening of his symptoms apart from pain upon defecation and blood upon wiping  Stated hemorrhoid cream helped in the past  Patient does endorse to constipation, has generally an unhealthy diet and not on any stool softeners  Patient has a history of AICD implant, following up with cardiology  Patient also has history of colon cancer status post resection and chemotherapy done, has an appointment with heme onc later this year    Review of Systems   Constitutional: Negative for activity change, appetite change, fatigue and unexpected weight change. Respiratory: Negative for cough, shortness of breath and wheezing. Cardiovascular: Negative for chest pain, palpitations and leg swelling. Gastrointestinal: Positive for constipation. Negative for abdominal pain, diarrhea, nausea and vomiting. Genitourinary: Negative for difficulty urinating, dysuria, flank pain and frequency. Musculoskeletal: Negative for arthralgias, back pain and neck pain.    Neurological: Negative for dizziness, syncope, weakness, numbness and headaches. Objective:    BP (!) 140/85 (Site: Left Arm, Position: Sitting, Cuff Size: Medium Adult) Comment: machine  Pulse 129   Temp 98 °F (36.7 °C) (Temporal)   Ht 5' 8\" (1.727 m)   Wt 250 lb (113.4 kg)   BMI 38.01 kg/m²    BP Readings from Last 3 Encounters:   08/30/18 (!) 140/85   01/20/18 (!) 175/104   08/22/17 122/70     Physical Exam   Constitutional: He is oriented to person, place, and time. He appears well-developed and well-nourished. Cardiovascular: Normal rate and regular rhythm. Pulmonary/Chest: Effort normal and breath sounds normal. No respiratory distress. Abdominal: Soft. Bowel sounds are normal. There is no tenderness. Neurological: He is alert and oriented to person, place, and time. Nursing note and vitals reviewed. Lab Results   Component Value Date    WBC 8.1 07/07/2017    HGB 14.7 07/07/2017    HCT 44.4 07/07/2017     (L) 07/07/2017    CHOL 147 09/28/2016    TRIG 72 09/28/2016    HDL 41 09/28/2016    ALT 30 01/27/2017    AST 25 01/27/2017     07/07/2017    K 4.0 07/07/2017     07/07/2017    CREATININE 0.91 07/07/2017    BUN 18 07/07/2017    CO2 27 07/07/2017    TSH 0.81 05/05/2017    INR 1.0 09/28/2016    LABA1C 6.1 08/30/2018     Lab Results   Component Value Date    CALCIUM 9.2 07/07/2017    PHOS 3.5 07/07/2017     Lab Results   Component Value Date    LDLCHOLESTEROL 92 09/28/2016       Assessment:     1. Constipation, unspecified constipation type    2. Prediabetes    3. Neuropathy of left lower extremity    4. Chronic combined systolic and diastolic congestive heart failure ([address]    5. Chronic atrial fibrillation (HCC)    6. Atrial fibrillation with rapid ventricular response (HCC)    7. Major depressive disorder, recurrent, in full remission ([facility]        Plan:    1.  Constipation, unspecified constipation type  -Counseled on dietary change along with colace and anusol cream  -Continue to monitor next

## 2018-10-04 ENCOUNTER — OFFICE VISIT (OUTPATIENT)
Dept: FAMILY MEDICINE CLINIC | Age: 63
End: 2018-10-04
Payer: MEDICARE

## 2018-10-04 VITALS
HEIGHT: 69 IN | BODY MASS INDEX: 41.56 KG/M2 | DIASTOLIC BLOOD PRESSURE: 90 MMHG | TEMPERATURE: 98.7 F | SYSTOLIC BLOOD PRESSURE: 136 MMHG | HEART RATE: 124 BPM | WEIGHT: 280.6 LBS

## 2018-10-04 DIAGNOSIS — R20.0 NUMBNESS AND TINGLING: Primary | ICD-10-CM

## 2018-10-04 DIAGNOSIS — I10 ESSENTIAL HYPERTENSION: ICD-10-CM

## 2018-10-04 DIAGNOSIS — R20.2 NUMBNESS AND TINGLING: Primary | ICD-10-CM

## 2018-10-04 DIAGNOSIS — R73.03 PREDIABETES: ICD-10-CM

## 2018-10-04 PROCEDURE — G8484 FLU IMMUNIZE NO ADMIN: HCPCS | Performed by: STUDENT IN AN ORGANIZED HEALTH CARE EDUCATION/TRAINING PROGRAM

## 2018-10-04 PROCEDURE — 1036F TOBACCO NON-USER: CPT | Performed by: STUDENT IN AN ORGANIZED HEALTH CARE EDUCATION/TRAINING PROGRAM

## 2018-10-04 PROCEDURE — G8427 DOCREV CUR MEDS BY ELIG CLIN: HCPCS | Performed by: STUDENT IN AN ORGANIZED HEALTH CARE EDUCATION/TRAINING PROGRAM

## 2018-10-04 PROCEDURE — 99213 OFFICE O/P EST LOW 20 MIN: CPT | Performed by: STUDENT IN AN ORGANIZED HEALTH CARE EDUCATION/TRAINING PROGRAM

## 2018-10-04 PROCEDURE — G8417 CALC BMI ABV UP PARAM F/U: HCPCS | Performed by: STUDENT IN AN ORGANIZED HEALTH CARE EDUCATION/TRAINING PROGRAM

## 2018-10-04 PROCEDURE — 3017F COLORECTAL CA SCREEN DOC REV: CPT | Performed by: STUDENT IN AN ORGANIZED HEALTH CARE EDUCATION/TRAINING PROGRAM

## 2018-10-04 RX ORDER — PREGABALIN 50 MG/1
50 CAPSULE ORAL 3 TIMES DAILY
Qty: 90 CAPSULE | Refills: 0 | Status: ON HOLD | OUTPATIENT
Start: 2018-10-04 | End: 2018-11-05 | Stop reason: HOSPADM

## 2018-10-04 RX ORDER — PYRIDOXINE HCL (VITAMIN B6) 50 MG
25 TABLET ORAL DAILY
Qty: 30 TABLET | Refills: 3 | Status: ON HOLD | OUTPATIENT
Start: 2018-10-04 | End: 2019-04-27 | Stop reason: SDUPTHER

## 2018-10-04 ASSESSMENT — ENCOUNTER SYMPTOMS
ABDOMINAL PAIN: 0
SHORTNESS OF BREATH: 0
COUGH: 0

## 2018-10-04 NOTE — PROGRESS NOTES
Subjective:      Dalia Cardozo is a 61 y.o. male with Hx of  has a past medical history of Asthma; Cancer ([address] CHF (congestive heart failure) ([facility] COPD exacerbation ([facility] GERD (gastroesophageal reflux disease); History of blood transfusion; Hyperlipidemia; Hypertension; Neuropathy; Obesity (BMI 30-39.9); Psychiatric problem; SBO (small bowel obstruction) ([facility] [facility] Severe mitral regurgitation; and Severe tricuspid regurgitation. HPI    Mr. Mabel Guerrero is a 60 yo male presenting to the office. Complaining of chronic pain/numbess in hands and feet. Patient has history of colon cancer with resection and chemotherapy. States his numbness has been since the chemotherapy. Currently on Max Neurontin but does not provide relief. No other complaints today     Review of Systems   Constitutional: Negative for activity change, appetite change and fatigue. Respiratory: Negative for cough and shortness of breath. Cardiovascular: Negative for chest pain. Gastrointestinal: Negative for abdominal pain. Neurological: Positive for numbness. Negative for tremors, syncope, speech difficulty, light-headedness and headaches. Family History   Problem Relation Age of Onset    Heart Disease Father        Social History     Social History    Marital status: Single     Spouse name: N/A    Number of children: N/A    Years of education: N/A     Occupational History    Not on file.      Social History Main Topics    Smoking status: Never Smoker    Smokeless tobacco: Never Used    Alcohol use No    Drug use: No    Sexual activity: Not on file     Other Topics Concern    Not on file     Social History Narrative    No narrative on file       Objective:      BP (!) 136/90 (Site: Left Upper Arm, Position: Sitting, Cuff Size: Large Adult)   Pulse 124   Temp 98.7 °F (37.1 °C) (Temporal)   Ht 5' 9\" (1.753 m)   Wt 280 lb 9.6 oz (127.3 kg)   BMI 41.44 kg/m²    BP Readings from Last 3 Encounters:   10/04/18 (!) 136/90

## 2018-10-04 NOTE — PROGRESS NOTES
HYPERTENSION visit     BP Readings from Last 3 Encounters:   10/04/18 (!) 145/100   08/30/18 (!) 140/85   01/20/18 (!) 175/104       LDL Cholesterol (mg/dL)   Date Value   09/28/2016 92     HDL (mg/dL)   Date Value   09/28/2016 41     BUN (mg/dL)   Date Value   07/07/2017 18     CREATININE (mg/dL)   Date Value   07/07/2017 0.91     Glucose (mg/dL)   Date Value   07/07/2017 86              Have you changed or started any medications since your last visit including any over-the-counter medicines, vitamins, or herbal medicines? no   Have you stopped taking any of your medications? Is so, why? -  no  Are you having any side effects from any of your medications? - no  How often do you miss doses of your medication? frequent      Have you seen any other physician or provider since your last visit?  no   Have you had any other diagnostic tests since your last visit?  no   Have you been seen in the emergency room and/or had an admission in a hospital since we last saw you?  no   Have you had your routine dental cleaning in the past 6 months?  no     Do you have an active MyChart account? If no, what is the barrier?   Yes    Patient Care Team:  Sanaz Robles MD as PCP - General (Family Medicine)  Bj Babin MD as PCP - S Attributed Provider  Rachel Wharton MD as Referring Physician (Internal Medicine)  Randi Dhaliwal MD as Consulting Physician (Gastroenterology)  Justyna Segovia MD as Consulting Physician (Hematology and Oncology)    Medical History Review  Past Medical, Family, and Social History reviewed and does contribute to the patient presenting condition    Health Maintenance   Topic Date Due    HIV screen  09/16/1970    Shingles Vaccine (1 of 2 - 2 Dose Series) 09/16/2005    Colon Cancer Screen FIT/FOBT  11/18/2013    Potassium monitoring  07/07/2018    Creatinine monitoring  07/07/2018    Flu vaccine (1) 09/01/2018    A1C test (Diabetic or Prediabetic)  08/30/2019    Lipid screen  09/28/2021    DTaP/Tdap/Td vaccine (2 - Td) 06/22/2027    Pneumococcal med risk  Completed    Hepatitis C screen  Completed

## 2018-10-04 NOTE — PROGRESS NOTES
MG capsule     I agree with  orders as documented by the resident. Recommendations:   Patient was counseled regarding optimizing Gabapentin and possibly switching to Lyrica. Work up updated and follow up planned. Patient advised to add Vitamin B6 and follow up to discuss further course of care. Return in about 4 weeks (around 11/1/2018).    (Carolann Bence ) Dr. Owen Bauman MD

## 2018-11-02 ENCOUNTER — APPOINTMENT (OUTPATIENT)
Dept: GENERAL RADIOLOGY | Age: 63
DRG: 175 | End: 2018-11-02
Payer: MEDICARE

## 2018-11-02 ENCOUNTER — APPOINTMENT (OUTPATIENT)
Dept: CT IMAGING | Age: 63
DRG: 175 | End: 2018-11-02
Payer: MEDICARE

## 2018-11-02 ENCOUNTER — HOSPITAL ENCOUNTER (INPATIENT)
Age: 63
LOS: 3 days | Discharge: HOME | DRG: 175 | End: 2018-11-05
Attending: EMERGENCY MEDICINE | Admitting: FAMILY MEDICINE
Payer: MEDICARE

## 2018-11-02 DIAGNOSIS — G57.92 NEUROPATHY OF LEFT LOWER EXTREMITY: ICD-10-CM

## 2018-11-02 DIAGNOSIS — R20.0 NUMBNESS AND TINGLING: ICD-10-CM

## 2018-11-02 DIAGNOSIS — R20.2 NUMBNESS AND TINGLING: ICD-10-CM

## 2018-11-02 DIAGNOSIS — R73.03 PREDIABETES: ICD-10-CM

## 2018-11-02 DIAGNOSIS — I50.9 ACUTE ON CHRONIC CONGESTIVE HEART FAILURE, UNSPECIFIED HEART FAILURE TYPE (HCC): ICD-10-CM

## 2018-11-02 DIAGNOSIS — I26.99 OTHER ACUTE PULMONARY EMBOLISM WITHOUT ACUTE COR PULMONALE (HCC): Primary | ICD-10-CM

## 2018-11-02 PROBLEM — I50.21 ACUTE SYSTOLIC CHF (CONGESTIVE HEART FAILURE) (HCC): Status: ACTIVE | Noted: 2018-11-02

## 2018-11-02 LAB
ABSOLUTE EOS #: 0.07 K/UL (ref 0–0.44)
ABSOLUTE IMMATURE GRANULOCYTE: <0.03 K/UL (ref 0–0.3)
ABSOLUTE LYMPH #: 0.94 K/UL (ref 1.1–3.7)
ABSOLUTE MONO #: 1.13 K/UL (ref 0.1–1.2)
ALBUMIN SERPL-MCNC: 4 G/DL (ref 3.5–5.2)
ALBUMIN/GLOBULIN RATIO: 1.5 (ref 1–2.5)
ALP BLD-CCNC: 45 U/L (ref 40–129)
ALT SERPL-CCNC: 30 U/L (ref 5–41)
ANION GAP SERPL CALCULATED.3IONS-SCNC: 12 MMOL/L (ref 9–17)
AST SERPL-CCNC: 34 U/L
BASOPHILS # BLD: 0 % (ref 0–2)
BASOPHILS ABSOLUTE: 0.03 K/UL (ref 0–0.2)
BILIRUB SERPL-MCNC: 0.63 MG/DL (ref 0.3–1.2)
BNP INTERPRETATION: ABNORMAL
BUN BLDV-MCNC: 15 MG/DL (ref 8–23)
BUN/CREAT BLD: ABNORMAL (ref 9–20)
CALCIUM SERPL-MCNC: 9.1 MG/DL (ref 8.6–10.4)
CHLORIDE BLD-SCNC: 105 MMOL/L (ref 98–107)
CO2: 28 MMOL/L (ref 20–31)
CREAT SERPL-MCNC: 0.81 MG/DL (ref 0.7–1.2)
DIFFERENTIAL TYPE: ABNORMAL
EKG ATRIAL RATE: 117 BPM
EKG P-R INTERVAL: 204 MS
EKG Q-T INTERVAL: 312 MS
EKG QRS DURATION: 154 MS
EKG QTC CALCULATION (BAZETT): 435 MS
EKG R AXIS: -19 DEGREES
EKG T AXIS: -60 DEGREES
EKG VENTRICULAR RATE: 117 BPM
EOSINOPHILS RELATIVE PERCENT: 1 % (ref 1–4)
GFR AFRICAN AMERICAN: >60 ML/MIN
GFR NON-AFRICAN AMERICAN: >60 ML/MIN
GFR SERPL CREATININE-BSD FRML MDRD: ABNORMAL ML/MIN/{1.73_M2}
GFR SERPL CREATININE-BSD FRML MDRD: ABNORMAL ML/MIN/{1.73_M2}
GLUCOSE BLD-MCNC: 106 MG/DL (ref 70–99)
HCT VFR BLD CALC: 39.4 % (ref 40.7–50.3)
HEMOGLOBIN: 12.2 G/DL (ref 13–17)
IMMATURE GRANULOCYTES: 0 %
LYMPHOCYTES # BLD: 10 % (ref 24–43)
MCH RBC QN AUTO: 31.4 PG (ref 25.2–33.5)
MCHC RBC AUTO-ENTMCNC: 31 G/DL (ref 28.4–34.8)
MCV RBC AUTO: 101.5 FL (ref 82.6–102.9)
MONOCYTES # BLD: 13 % (ref 3–12)
NRBC AUTOMATED: 0 PER 100 WBC
PARTIAL THROMBOPLASTIN TIME: 24 SEC (ref 20.5–30.5)
PDW BLD-RTO: 15.5 % (ref 11.8–14.4)
PLATELET # BLD: 184 K/UL (ref 138–453)
PLATELET ESTIMATE: ABNORMAL
PMV BLD AUTO: 11.5 FL (ref 8.1–13.5)
POTASSIUM SERPL-SCNC: 4.7 MMOL/L (ref 3.7–5.3)
PRO-BNP: 2868 PG/ML
RBC # BLD: 3.88 M/UL (ref 4.21–5.77)
RBC # BLD: ABNORMAL 10*6/UL
SEG NEUTROPHILS: 76 % (ref 36–65)
SEGMENTED NEUTROPHILS ABSOLUTE COUNT: 6.85 K/UL (ref 1.5–8.1)
SODIUM BLD-SCNC: 145 MMOL/L (ref 135–144)
TOTAL PROTEIN: 6.6 G/DL (ref 6.4–8.3)
TROPONIN INTERP: NORMAL
TROPONIN INTERP: NORMAL
TROPONIN T: <0.03 NG/ML
TROPONIN T: <0.03 NG/ML
WBC # BLD: 9 K/UL (ref 3.5–11.3)
WBC # BLD: ABNORMAL 10*3/UL

## 2018-11-02 PROCEDURE — 93970 EXTREMITY STUDY: CPT

## 2018-11-02 PROCEDURE — 80053 COMPREHEN METABOLIC PANEL: CPT

## 2018-11-02 PROCEDURE — 84484 ASSAY OF TROPONIN QUANT: CPT

## 2018-11-02 PROCEDURE — 6370000000 HC RX 637 (ALT 250 FOR IP): Performed by: STUDENT IN AN ORGANIZED HEALTH CARE EDUCATION/TRAINING PROGRAM

## 2018-11-02 PROCEDURE — 93005 ELECTROCARDIOGRAM TRACING: CPT

## 2018-11-02 PROCEDURE — 99222 1ST HOSP IP/OBS MODERATE 55: CPT | Performed by: INTERNAL MEDICINE

## 2018-11-02 PROCEDURE — 6360000002 HC RX W HCPCS: Performed by: STUDENT IN AN ORGANIZED HEALTH CARE EDUCATION/TRAINING PROGRAM

## 2018-11-02 PROCEDURE — 2060000000 HC ICU INTERMEDIATE R&B

## 2018-11-02 PROCEDURE — 6370000000 HC RX 637 (ALT 250 FOR IP): Performed by: HOSPITALIST

## 2018-11-02 PROCEDURE — 99285 EMERGENCY DEPT VISIT HI MDM: CPT

## 2018-11-02 PROCEDURE — 71046 X-RAY EXAM CHEST 2 VIEWS: CPT

## 2018-11-02 PROCEDURE — 85025 COMPLETE CBC W/AUTO DIFF WBC: CPT

## 2018-11-02 PROCEDURE — 96374 THER/PROPH/DIAG INJ IV PUSH: CPT

## 2018-11-02 PROCEDURE — 83880 ASSAY OF NATRIURETIC PEPTIDE: CPT

## 2018-11-02 PROCEDURE — 6360000004 HC RX CONTRAST MEDICATION: Performed by: STUDENT IN AN ORGANIZED HEALTH CARE EDUCATION/TRAINING PROGRAM

## 2018-11-02 PROCEDURE — 71260 CT THORAX DX C+: CPT

## 2018-11-02 PROCEDURE — 2700000000 HC OXYGEN THERAPY PER DAY

## 2018-11-02 PROCEDURE — 94762 N-INVAS EAR/PLS OXIMTRY CONT: CPT

## 2018-11-02 PROCEDURE — 2580000003 HC RX 258: Performed by: HOSPITALIST

## 2018-11-02 PROCEDURE — 85730 THROMBOPLASTIN TIME PARTIAL: CPT

## 2018-11-02 RX ORDER — FUROSEMIDE 10 MG/ML
40 INJECTION INTRAMUSCULAR; INTRAVENOUS DAILY
Status: DISCONTINUED | OUTPATIENT
Start: 2018-11-03 | End: 2018-11-02 | Stop reason: SDUPTHER

## 2018-11-02 RX ORDER — HEPARIN SODIUM 10000 [USP'U]/100ML
2100 INJECTION, SOLUTION INTRAVENOUS CONTINUOUS
Status: DISCONTINUED | OUTPATIENT
Start: 2018-11-02 | End: 2018-11-04

## 2018-11-02 RX ORDER — ALBUTEROL SULFATE 2.5 MG/3ML
5 SOLUTION RESPIRATORY (INHALATION)
Status: DISCONTINUED | OUTPATIENT
Start: 2018-11-02 | End: 2018-11-03

## 2018-11-02 RX ORDER — PANTOPRAZOLE SODIUM 40 MG/1
40 TABLET, DELAYED RELEASE ORAL
Status: DISCONTINUED | OUTPATIENT
Start: 2018-11-03 | End: 2018-11-05 | Stop reason: HOSPADM

## 2018-11-02 RX ORDER — QUETIAPINE FUMARATE 25 MG/1
25 TABLET, FILM COATED ORAL 2 TIMES DAILY
Status: DISCONTINUED | OUTPATIENT
Start: 2018-11-02 | End: 2018-11-05 | Stop reason: HOSPADM

## 2018-11-02 RX ORDER — DIGOXIN 250 MCG
250 TABLET ORAL DAILY
Status: DISCONTINUED | OUTPATIENT
Start: 2018-11-02 | End: 2018-11-05 | Stop reason: HOSPADM

## 2018-11-02 RX ORDER — SODIUM CHLORIDE 0.9 % (FLUSH) 0.9 %
10 SYRINGE (ML) INJECTION EVERY 12 HOURS SCHEDULED
Status: DISCONTINUED | OUTPATIENT
Start: 2018-11-02 | End: 2018-11-05 | Stop reason: HOSPADM

## 2018-11-02 RX ORDER — ACETAMINOPHEN 325 MG/1
650 TABLET ORAL EVERY 4 HOURS PRN
Status: DISCONTINUED | OUTPATIENT
Start: 2018-11-02 | End: 2018-11-05 | Stop reason: HOSPADM

## 2018-11-02 RX ORDER — FUROSEMIDE 10 MG/ML
40 INJECTION INTRAMUSCULAR; INTRAVENOUS ONCE
Status: COMPLETED | OUTPATIENT
Start: 2018-11-02 | End: 2018-11-02

## 2018-11-02 RX ORDER — MAGNESIUM SULFATE 1 G/100ML
1 INJECTION INTRAVENOUS PRN
Status: DISCONTINUED | OUTPATIENT
Start: 2018-11-02 | End: 2018-11-05 | Stop reason: HOSPADM

## 2018-11-02 RX ORDER — HEPARIN SODIUM 1000 [USP'U]/ML
10000 INJECTION, SOLUTION INTRAVENOUS; SUBCUTANEOUS PRN
Status: DISCONTINUED | OUTPATIENT
Start: 2018-11-02 | End: 2018-11-04

## 2018-11-02 RX ORDER — DOCUSATE SODIUM 100 MG/1
100 CAPSULE, LIQUID FILLED ORAL PRN
Status: DISCONTINUED | OUTPATIENT
Start: 2018-11-02 | End: 2018-11-05 | Stop reason: HOSPADM

## 2018-11-02 RX ORDER — AMIODARONE HYDROCHLORIDE 200 MG/1
400 TABLET ORAL 2 TIMES DAILY
Status: DISCONTINUED | OUTPATIENT
Start: 2018-11-02 | End: 2018-11-02

## 2018-11-02 RX ORDER — BISACODYL 10 MG
10 SUPPOSITORY, RECTAL RECTAL DAILY PRN
Status: DISCONTINUED | OUTPATIENT
Start: 2018-11-02 | End: 2018-11-05 | Stop reason: HOSPADM

## 2018-11-02 RX ORDER — HEPARIN SODIUM 1000 [USP'U]/ML
5000 INJECTION, SOLUTION INTRAVENOUS; SUBCUTANEOUS PRN
Status: DISCONTINUED | OUTPATIENT
Start: 2018-11-02 | End: 2018-11-04

## 2018-11-02 RX ORDER — DOCUSATE SODIUM 100 MG/1
100 CAPSULE, LIQUID FILLED ORAL 2 TIMES DAILY
Status: DISCONTINUED | OUTPATIENT
Start: 2018-11-02 | End: 2018-11-05 | Stop reason: HOSPADM

## 2018-11-02 RX ORDER — HEPARIN SODIUM 1000 [USP'U]/ML
10000 INJECTION, SOLUTION INTRAVENOUS; SUBCUTANEOUS ONCE
Status: COMPLETED | OUTPATIENT
Start: 2018-11-02 | End: 2018-11-02

## 2018-11-02 RX ORDER — DULOXETIN HYDROCHLORIDE 20 MG/1
20 CAPSULE, DELAYED RELEASE ORAL DAILY
Status: DISCONTINUED | OUTPATIENT
Start: 2018-11-02 | End: 2018-11-05 | Stop reason: HOSPADM

## 2018-11-02 RX ORDER — SPIRONOLACTONE 25 MG/1
25 TABLET ORAL DAILY
Status: DISCONTINUED | OUTPATIENT
Start: 2018-11-02 | End: 2018-11-05 | Stop reason: HOSPADM

## 2018-11-02 RX ORDER — DABIGATRAN ETEXILATE 150 MG/1
150 CAPSULE, COATED PELLETS ORAL 2 TIMES DAILY
Status: CANCELLED | OUTPATIENT
Start: 2018-11-02

## 2018-11-02 RX ORDER — ALBUTEROL SULFATE 90 UG/1
2 AEROSOL, METERED RESPIRATORY (INHALATION)
Status: DISCONTINUED | OUTPATIENT
Start: 2018-11-02 | End: 2018-11-03

## 2018-11-02 RX ORDER — ONDANSETRON 2 MG/ML
4 INJECTION INTRAMUSCULAR; INTRAVENOUS EVERY 6 HOURS PRN
Status: DISCONTINUED | OUTPATIENT
Start: 2018-11-02 | End: 2018-11-05 | Stop reason: HOSPADM

## 2018-11-02 RX ORDER — ATORVASTATIN CALCIUM 40 MG/1
40 TABLET, FILM COATED ORAL NIGHTLY
Status: DISCONTINUED | OUTPATIENT
Start: 2018-11-02 | End: 2018-11-05 | Stop reason: HOSPADM

## 2018-11-02 RX ORDER — POTASSIUM CHLORIDE 7.45 MG/ML
10 INJECTION INTRAVENOUS PRN
Status: DISCONTINUED | OUTPATIENT
Start: 2018-11-02 | End: 2018-11-05 | Stop reason: HOSPADM

## 2018-11-02 RX ORDER — ASPIRIN 81 MG/1
81 TABLET ORAL DAILY
Status: DISCONTINUED | OUTPATIENT
Start: 2018-11-02 | End: 2018-11-05 | Stop reason: HOSPADM

## 2018-11-02 RX ORDER — POTASSIUM CHLORIDE 20 MEQ/1
40 TABLET, EXTENDED RELEASE ORAL PRN
Status: DISCONTINUED | OUTPATIENT
Start: 2018-11-02 | End: 2018-11-05 | Stop reason: HOSPADM

## 2018-11-02 RX ORDER — AMIODARONE HYDROCHLORIDE 200 MG/1
200 TABLET ORAL 2 TIMES DAILY
Status: DISCONTINUED | OUTPATIENT
Start: 2018-11-03 | End: 2018-11-05 | Stop reason: HOSPADM

## 2018-11-02 RX ORDER — PREGABALIN 50 MG/1
50 CAPSULE ORAL 3 TIMES DAILY
Status: DISCONTINUED | OUTPATIENT
Start: 2018-11-02 | End: 2018-11-03

## 2018-11-02 RX ORDER — IPRATROPIUM BROMIDE AND ALBUTEROL SULFATE 2.5; .5 MG/3ML; MG/3ML
1 SOLUTION RESPIRATORY (INHALATION)
Status: DISCONTINUED | OUTPATIENT
Start: 2018-11-02 | End: 2018-11-03

## 2018-11-02 RX ORDER — METOPROLOL SUCCINATE 100 MG/1
100 TABLET, EXTENDED RELEASE ORAL DAILY
Status: DISCONTINUED | OUTPATIENT
Start: 2018-11-02 | End: 2018-11-05 | Stop reason: HOSPADM

## 2018-11-02 RX ORDER — NITROGLYCERIN 0.4 MG/1
0.4 TABLET SUBLINGUAL EVERY 5 MIN PRN
Status: DISCONTINUED | OUTPATIENT
Start: 2018-11-02 | End: 2018-11-05 | Stop reason: HOSPADM

## 2018-11-02 RX ORDER — SODIUM CHLORIDE 0.9 % (FLUSH) 0.9 %
10 SYRINGE (ML) INJECTION PRN
Status: DISCONTINUED | OUTPATIENT
Start: 2018-11-02 | End: 2018-11-05 | Stop reason: HOSPADM

## 2018-11-02 RX ORDER — FUROSEMIDE 10 MG/ML
40 INJECTION INTRAMUSCULAR; INTRAVENOUS DAILY
Status: DISCONTINUED | OUTPATIENT
Start: 2018-11-03 | End: 2018-11-03

## 2018-11-02 RX ORDER — SENNA PLUS 8.6 MG/1
1 TABLET ORAL DAILY
Status: DISCONTINUED | OUTPATIENT
Start: 2018-11-02 | End: 2018-11-05 | Stop reason: HOSPADM

## 2018-11-02 RX ORDER — POTASSIUM CHLORIDE 20MEQ/15ML
40 LIQUID (ML) ORAL PRN
Status: DISCONTINUED | OUTPATIENT
Start: 2018-11-02 | End: 2018-11-05 | Stop reason: HOSPADM

## 2018-11-02 RX ADMIN — HEPARIN SODIUM 10000 UNITS: 1000 INJECTION, SOLUTION INTRAVENOUS; SUBCUTANEOUS at 18:56

## 2018-11-02 RX ADMIN — FUROSEMIDE 40 MG: 10 INJECTION, SOLUTION INTRAMUSCULAR; INTRAVENOUS at 12:43

## 2018-11-02 RX ADMIN — QUETIAPINE FUMARATE 25 MG: 25 TABLET ORAL at 22:05

## 2018-11-02 RX ADMIN — SENNOSIDES 8.6 MG: 8.6 TABLET, FILM COATED ORAL at 22:05

## 2018-11-02 RX ADMIN — DULOXETINE 20 MG: 20 CAPSULE, DELAYED RELEASE ORAL at 22:06

## 2018-11-02 RX ADMIN — ASPIRIN 81 MG: 81 TABLET ORAL at 22:06

## 2018-11-02 RX ADMIN — DIGOXIN 250 MCG: 250 TABLET ORAL at 22:06

## 2018-11-02 RX ADMIN — SPIRONOLACTONE 25 MG: 25 TABLET ORAL at 22:05

## 2018-11-02 RX ADMIN — PREGABALIN 50 MG: 50 CAPSULE ORAL at 22:05

## 2018-11-02 RX ADMIN — IOPAMIDOL 75 ML: 755 INJECTION, SOLUTION INTRAVENOUS at 12:37

## 2018-11-02 RX ADMIN — HEPARIN SODIUM AND DEXTROSE 13.22 UNITS/KG/HR: 10000; 5 INJECTION INTRAVENOUS at 18:56

## 2018-11-02 RX ADMIN — DESMOPRESSIN ACETATE 40 MG: 0.2 TABLET ORAL at 22:06

## 2018-11-02 RX ADMIN — DOCUSATE SODIUM 100 MG: 100 CAPSULE, LIQUID FILLED ORAL at 22:06

## 2018-11-02 RX ADMIN — Medication 10 ML: at 22:08

## 2018-11-02 RX ADMIN — METOPROLOL SUCCINATE 100 MG: 100 TABLET, FILM COATED, EXTENDED RELEASE ORAL at 22:05

## 2018-11-02 ASSESSMENT — ENCOUNTER SYMPTOMS
APNEA: 0
SINUS PAIN: 0
VOMITING: 0
SHORTNESS OF BREATH: 1
RHINORRHEA: 0
ABDOMINAL PAIN: 0
COUGH: 0
NAUSEA: 0
BLOOD IN STOOL: 0
EYE REDNESS: 0
WHEEZING: 0
DIARRHEA: 0
EYE PAIN: 0

## 2018-11-02 ASSESSMENT — PAIN SCALES - GENERAL: PAINLEVEL_OUTOF10: 0

## 2018-11-02 ASSESSMENT — HEART SCORE: ECG: 1

## 2018-11-03 ENCOUNTER — APPOINTMENT (OUTPATIENT)
Dept: GENERAL RADIOLOGY | Age: 63
DRG: 175 | End: 2018-11-03
Payer: MEDICARE

## 2018-11-03 LAB
ANION GAP SERPL CALCULATED.3IONS-SCNC: 11 MMOL/L (ref 9–17)
BUN BLDV-MCNC: 18 MG/DL (ref 8–23)
BUN/CREAT BLD: ABNORMAL (ref 9–20)
CALCIUM SERPL-MCNC: 9.2 MG/DL (ref 8.6–10.4)
CHLORIDE BLD-SCNC: 103 MMOL/L (ref 98–107)
CO2: 27 MMOL/L (ref 20–31)
CREAT SERPL-MCNC: 0.88 MG/DL (ref 0.7–1.2)
GFR AFRICAN AMERICAN: >60 ML/MIN
GFR NON-AFRICAN AMERICAN: >60 ML/MIN
GFR SERPL CREATININE-BSD FRML MDRD: ABNORMAL ML/MIN/{1.73_M2}
GFR SERPL CREATININE-BSD FRML MDRD: ABNORMAL ML/MIN/{1.73_M2}
GLUCOSE BLD-MCNC: 125 MG/DL (ref 70–99)
HCT VFR BLD CALC: 41.2 % (ref 40.7–50.3)
HEMOGLOBIN: 12.5 G/DL (ref 13–17)
LV EF: 28 %
LVEF MODALITY: NORMAL
MAGNESIUM: 2.1 MG/DL (ref 1.6–2.6)
MCH RBC QN AUTO: 31.6 PG (ref 25.2–33.5)
MCHC RBC AUTO-ENTMCNC: 30.3 G/DL (ref 28.4–34.8)
MCV RBC AUTO: 104 FL (ref 82.6–102.9)
NRBC AUTOMATED: 0 PER 100 WBC
PARTIAL THROMBOPLASTIN TIME: 42.7 SEC (ref 20.5–30.5)
PARTIAL THROMBOPLASTIN TIME: 65 SEC (ref 20.5–30.5)
PARTIAL THROMBOPLASTIN TIME: 93.3 SEC (ref 20.5–30.5)
PARTIAL THROMBOPLASTIN TIME: >120 SEC (ref 20.5–30.5)
PDW BLD-RTO: 15.7 % (ref 11.8–14.4)
PLATELET # BLD: 200 K/UL (ref 138–453)
PMV BLD AUTO: 11.3 FL (ref 8.1–13.5)
POTASSIUM SERPL-SCNC: 4.5 MMOL/L (ref 3.7–5.3)
RBC # BLD: 3.96 M/UL (ref 4.21–5.77)
SODIUM BLD-SCNC: 141 MMOL/L (ref 135–144)
WBC # BLD: 8.7 K/UL (ref 3.5–11.3)

## 2018-11-03 PROCEDURE — 2060000000 HC ICU INTERMEDIATE R&B

## 2018-11-03 PROCEDURE — 36415 COLL VENOUS BLD VENIPUNCTURE: CPT

## 2018-11-03 PROCEDURE — 2700000000 HC OXYGEN THERAPY PER DAY

## 2018-11-03 PROCEDURE — 6370000000 HC RX 637 (ALT 250 FOR IP): Performed by: HOSPITALIST

## 2018-11-03 PROCEDURE — C8929 TTE W OR WO FOL WCON,DOPPLER: HCPCS

## 2018-11-03 PROCEDURE — 6360000002 HC RX W HCPCS: Performed by: STUDENT IN AN ORGANIZED HEALTH CARE EDUCATION/TRAINING PROGRAM

## 2018-11-03 PROCEDURE — 6370000000 HC RX 637 (ALT 250 FOR IP): Performed by: STUDENT IN AN ORGANIZED HEALTH CARE EDUCATION/TRAINING PROGRAM

## 2018-11-03 PROCEDURE — G0008 ADMIN INFLUENZA VIRUS VAC: HCPCS | Performed by: FAMILY MEDICINE

## 2018-11-03 PROCEDURE — 80048 BASIC METABOLIC PNL TOTAL CA: CPT

## 2018-11-03 PROCEDURE — 99223 1ST HOSP IP/OBS HIGH 75: CPT | Performed by: INTERNAL MEDICINE

## 2018-11-03 PROCEDURE — 90686 IIV4 VACC NO PRSV 0.5 ML IM: CPT | Performed by: FAMILY MEDICINE

## 2018-11-03 PROCEDURE — 85730 THROMBOPLASTIN TIME PARTIAL: CPT

## 2018-11-03 PROCEDURE — 2580000003 HC RX 258: Performed by: HOSPITALIST

## 2018-11-03 PROCEDURE — 94761 N-INVAS EAR/PLS OXIMETRY MLT: CPT

## 2018-11-03 PROCEDURE — 85027 COMPLETE CBC AUTOMATED: CPT

## 2018-11-03 PROCEDURE — 83735 ASSAY OF MAGNESIUM: CPT

## 2018-11-03 PROCEDURE — 6360000002 HC RX W HCPCS: Performed by: FAMILY MEDICINE

## 2018-11-03 PROCEDURE — 71046 X-RAY EXAM CHEST 2 VIEWS: CPT

## 2018-11-03 RX ORDER — FUROSEMIDE 40 MG/1
40 TABLET ORAL DAILY
Status: DISCONTINUED | OUTPATIENT
Start: 2018-11-04 | End: 2018-11-05 | Stop reason: HOSPADM

## 2018-11-03 RX ORDER — CALCIUM CARBONATE 200(500)MG
500 TABLET,CHEWABLE ORAL 3 TIMES DAILY PRN
Status: DISCONTINUED | OUTPATIENT
Start: 2018-11-03 | End: 2018-11-05 | Stop reason: HOSPADM

## 2018-11-03 RX ADMIN — AMIODARONE HYDROCHLORIDE 200 MG: 200 TABLET ORAL at 11:52

## 2018-11-03 RX ADMIN — PANTOPRAZOLE SODIUM 40 MG: 40 TABLET, DELAYED RELEASE ORAL at 06:06

## 2018-11-03 RX ADMIN — FUROSEMIDE 40 MG: 10 INJECTION, SOLUTION INTRAMUSCULAR; INTRAVENOUS at 11:53

## 2018-11-03 RX ADMIN — DOCUSATE SODIUM 100 MG: 100 CAPSULE, LIQUID FILLED ORAL at 11:51

## 2018-11-03 RX ADMIN — QUETIAPINE FUMARATE 25 MG: 25 TABLET ORAL at 11:52

## 2018-11-03 RX ADMIN — SENNOSIDES 8.6 MG: 8.6 TABLET, FILM COATED ORAL at 11:52

## 2018-11-03 RX ADMIN — DOCUSATE SODIUM 100 MG: 100 CAPSULE, LIQUID FILLED ORAL at 20:46

## 2018-11-03 RX ADMIN — AMIODARONE HYDROCHLORIDE 200 MG: 200 TABLET ORAL at 20:45

## 2018-11-03 RX ADMIN — DIGOXIN 250 MCG: 250 TABLET ORAL at 11:51

## 2018-11-03 RX ADMIN — ACETAMINOPHEN 650 MG: 325 TABLET ORAL at 06:05

## 2018-11-03 RX ADMIN — ASPIRIN 81 MG: 81 TABLET ORAL at 11:52

## 2018-11-03 RX ADMIN — HEPARIN SODIUM AND DEXTROSE 15.22 UNITS/KG/HR: 10000; 5 INJECTION INTRAVENOUS at 09:04

## 2018-11-03 RX ADMIN — DULOXETINE 20 MG: 20 CAPSULE, DELAYED RELEASE ORAL at 11:52

## 2018-11-03 RX ADMIN — INFLUENZA A VIRUS A/MICHIGAN/45/2015 X-275 (H1N1) ANTIGEN (FORMALDEHYDE INACTIVATED), INFLUENZA A VIRUS A/SINGAPORE/INFIMH-16-0019/2016 IVR-186 (H3N2) ANTIGEN (FORMALDEHYDE INACTIVATED), INFLUENZA B VIRUS B/PHUKET/3073/2013 ANTIGEN (FORMALDEHYDE INACTIVATED), AND INFLUENZA B VIRUS B/MARYLAND/15/2016 BX-69A ANTIGEN (FORMALDEHYDE INACTIVATED) 0.5 ML: 15; 15; 15; 15 INJECTION, SUSPENSION INTRAMUSCULAR at 11:53

## 2018-11-03 RX ADMIN — METOPROLOL SUCCINATE 100 MG: 100 TABLET, FILM COATED, EXTENDED RELEASE ORAL at 11:52

## 2018-11-03 RX ADMIN — SPIRONOLACTONE 25 MG: 25 TABLET ORAL at 11:52

## 2018-11-03 RX ADMIN — DESMOPRESSIN ACETATE 40 MG: 0.2 TABLET ORAL at 20:45

## 2018-11-03 RX ADMIN — Medication 10 ML: at 12:01

## 2018-11-03 RX ADMIN — HEPARIN SODIUM 5000 UNITS: 1000 INJECTION, SOLUTION INTRAVENOUS; SUBCUTANEOUS at 08:26

## 2018-11-03 RX ADMIN — QUETIAPINE FUMARATE 25 MG: 25 TABLET ORAL at 20:46

## 2018-11-03 ASSESSMENT — PAIN SCALES - GENERAL
PAINLEVEL_OUTOF10: 0
PAINLEVEL_OUTOF10: 0
PAINLEVEL_OUTOF10: 3
PAINLEVEL_OUTOF10: 0

## 2018-11-04 LAB
ANION GAP SERPL CALCULATED.3IONS-SCNC: 10 MMOL/L (ref 9–17)
BUN BLDV-MCNC: 21 MG/DL (ref 8–23)
BUN/CREAT BLD: NORMAL (ref 9–20)
CALCIUM SERPL-MCNC: 8.8 MG/DL (ref 8.6–10.4)
CHLORIDE BLD-SCNC: 101 MMOL/L (ref 98–107)
CO2: 31 MMOL/L (ref 20–31)
CREAT SERPL-MCNC: 0.96 MG/DL (ref 0.7–1.2)
GFR AFRICAN AMERICAN: >60 ML/MIN
GFR NON-AFRICAN AMERICAN: >60 ML/MIN
GFR SERPL CREATININE-BSD FRML MDRD: NORMAL ML/MIN/{1.73_M2}
GFR SERPL CREATININE-BSD FRML MDRD: NORMAL ML/MIN/{1.73_M2}
GLUCOSE BLD-MCNC: 110 MG/DL (ref 75–110)
GLUCOSE BLD-MCNC: 87 MG/DL (ref 75–110)
GLUCOSE BLD-MCNC: 91 MG/DL (ref 70–99)
HCT VFR BLD CALC: 39.5 % (ref 40.7–50.3)
HEMOGLOBIN: 11.9 G/DL (ref 13–17)
MCH RBC QN AUTO: 31.2 PG (ref 25.2–33.5)
MCHC RBC AUTO-ENTMCNC: 30.1 G/DL (ref 28.4–34.8)
MCV RBC AUTO: 103.7 FL (ref 82.6–102.9)
NRBC AUTOMATED: 0 PER 100 WBC
PARTIAL THROMBOPLASTIN TIME: 104.3 SEC (ref 20.5–30.5)
PARTIAL THROMBOPLASTIN TIME: 32.5 SEC (ref 20.5–30.5)
PARTIAL THROMBOPLASTIN TIME: 60.9 SEC (ref 20.5–30.5)
PDW BLD-RTO: 15.6 % (ref 11.8–14.4)
PLATELET # BLD: 186 K/UL (ref 138–453)
PMV BLD AUTO: 11.2 FL (ref 8.1–13.5)
POTASSIUM SERPL-SCNC: 4.4 MMOL/L (ref 3.7–5.3)
RBC # BLD: 3.81 M/UL (ref 4.21–5.77)
SODIUM BLD-SCNC: 142 MMOL/L (ref 135–144)
WBC # BLD: 8.9 K/UL (ref 3.5–11.3)

## 2018-11-04 PROCEDURE — 99232 SBSQ HOSP IP/OBS MODERATE 35: CPT | Performed by: INTERNAL MEDICINE

## 2018-11-04 PROCEDURE — 2700000000 HC OXYGEN THERAPY PER DAY

## 2018-11-04 PROCEDURE — 85027 COMPLETE CBC AUTOMATED: CPT

## 2018-11-04 PROCEDURE — 6360000002 HC RX W HCPCS: Performed by: STUDENT IN AN ORGANIZED HEALTH CARE EDUCATION/TRAINING PROGRAM

## 2018-11-04 PROCEDURE — 2060000000 HC ICU INTERMEDIATE R&B

## 2018-11-04 PROCEDURE — G8979 MOBILITY GOAL STATUS: HCPCS

## 2018-11-04 PROCEDURE — 82947 ASSAY GLUCOSE BLOOD QUANT: CPT

## 2018-11-04 PROCEDURE — 99232 SBSQ HOSP IP/OBS MODERATE 35: CPT | Performed by: FAMILY MEDICINE

## 2018-11-04 PROCEDURE — 2580000003 HC RX 258: Performed by: HOSPITALIST

## 2018-11-04 PROCEDURE — 6370000000 HC RX 637 (ALT 250 FOR IP): Performed by: STUDENT IN AN ORGANIZED HEALTH CARE EDUCATION/TRAINING PROGRAM

## 2018-11-04 PROCEDURE — 85730 THROMBOPLASTIN TIME PARTIAL: CPT

## 2018-11-04 PROCEDURE — 6370000000 HC RX 637 (ALT 250 FOR IP): Performed by: INTERNAL MEDICINE

## 2018-11-04 PROCEDURE — 99231 SBSQ HOSP IP/OBS SF/LOW 25: CPT | Performed by: INTERNAL MEDICINE

## 2018-11-04 PROCEDURE — G8978 MOBILITY CURRENT STATUS: HCPCS

## 2018-11-04 PROCEDURE — 97530 THERAPEUTIC ACTIVITIES: CPT

## 2018-11-04 PROCEDURE — 6370000000 HC RX 637 (ALT 250 FOR IP): Performed by: HOSPITALIST

## 2018-11-04 PROCEDURE — 97162 PT EVAL MOD COMPLEX 30 MIN: CPT

## 2018-11-04 PROCEDURE — 36415 COLL VENOUS BLD VENIPUNCTURE: CPT

## 2018-11-04 PROCEDURE — 80048 BASIC METABOLIC PNL TOTAL CA: CPT

## 2018-11-04 RX ORDER — FUROSEMIDE 10 MG/ML
20 INJECTION INTRAMUSCULAR; INTRAVENOUS ONCE
Status: DISCONTINUED | OUTPATIENT
Start: 2018-11-04 | End: 2018-11-04

## 2018-11-04 RX ORDER — PREGABALIN 50 MG/1
50 CAPSULE ORAL 3 TIMES DAILY
Qty: 90 CAPSULE | Refills: 0 | Status: CANCELLED | OUTPATIENT
Start: 2018-11-04 | End: 2018-12-04

## 2018-11-04 RX ADMIN — HEPARIN SODIUM AND DEXTROSE 9.22 UNITS/KG/HR: 10000; 5 INJECTION INTRAVENOUS at 02:00

## 2018-11-04 RX ADMIN — QUETIAPINE FUMARATE 25 MG: 25 TABLET ORAL at 09:01

## 2018-11-04 RX ADMIN — ASPIRIN 81 MG: 81 TABLET ORAL at 09:00

## 2018-11-04 RX ADMIN — ACETAMINOPHEN 650 MG: 325 TABLET ORAL at 21:36

## 2018-11-04 RX ADMIN — DULOXETINE 20 MG: 20 CAPSULE, DELAYED RELEASE ORAL at 08:59

## 2018-11-04 RX ADMIN — DESMOPRESSIN ACETATE 40 MG: 0.2 TABLET ORAL at 20:10

## 2018-11-04 RX ADMIN — APIXABAN 5 MG: 5 TABLET, FILM COATED ORAL at 20:10

## 2018-11-04 RX ADMIN — DIGOXIN 250 MCG: 250 TABLET ORAL at 09:45

## 2018-11-04 RX ADMIN — QUETIAPINE FUMARATE 25 MG: 25 TABLET ORAL at 20:10

## 2018-11-04 RX ADMIN — SPIRONOLACTONE 25 MG: 25 TABLET ORAL at 09:00

## 2018-11-04 RX ADMIN — AMIODARONE HYDROCHLORIDE 200 MG: 200 TABLET ORAL at 20:10

## 2018-11-04 RX ADMIN — DOCUSATE SODIUM 100 MG: 100 CAPSULE, LIQUID FILLED ORAL at 09:00

## 2018-11-04 RX ADMIN — FUROSEMIDE 40 MG: 40 TABLET ORAL at 09:00

## 2018-11-04 RX ADMIN — METOPROLOL SUCCINATE 100 MG: 100 TABLET, FILM COATED, EXTENDED RELEASE ORAL at 08:59

## 2018-11-04 RX ADMIN — SENNOSIDES 8.6 MG: 8.6 TABLET, FILM COATED ORAL at 09:00

## 2018-11-04 RX ADMIN — HEPARIN SODIUM 5000 UNITS: 1000 INJECTION, SOLUTION INTRAVENOUS; SUBCUTANEOUS at 09:45

## 2018-11-04 RX ADMIN — APIXABAN 5 MG: 5 TABLET, FILM COATED ORAL at 16:16

## 2018-11-04 RX ADMIN — DOCUSATE SODIUM 100 MG: 100 CAPSULE, LIQUID FILLED ORAL at 20:10

## 2018-11-04 RX ADMIN — Medication 10 ML: at 20:10

## 2018-11-04 RX ADMIN — AMIODARONE HYDROCHLORIDE 200 MG: 200 TABLET ORAL at 09:00

## 2018-11-04 RX ADMIN — PANTOPRAZOLE SODIUM 40 MG: 40 TABLET, DELAYED RELEASE ORAL at 08:59

## 2018-11-04 ASSESSMENT — PAIN DESCRIPTION - DESCRIPTORS: DESCRIPTORS: ACHING;DULL

## 2018-11-04 ASSESSMENT — PAIN SCALES - GENERAL
PAINLEVEL_OUTOF10: 3
PAINLEVEL_OUTOF10: 0

## 2018-11-04 ASSESSMENT — PAIN SCALES - WONG BAKER: WONGBAKER_NUMERICALRESPONSE: 4

## 2018-11-04 ASSESSMENT — PAIN DESCRIPTION - PAIN TYPE: TYPE: ACUTE PAIN

## 2018-11-04 ASSESSMENT — PAIN DESCRIPTION - ONSET: ONSET: GRADUAL

## 2018-11-04 ASSESSMENT — PAIN DESCRIPTION - LOCATION: LOCATION: LEG

## 2018-11-04 ASSESSMENT — PAIN DESCRIPTION - ORIENTATION: ORIENTATION: LEFT;UPPER

## 2018-11-04 ASSESSMENT — PAIN DESCRIPTION - FREQUENCY: FREQUENCY: INTERMITTENT

## 2018-11-05 VITALS
BODY MASS INDEX: 40.82 KG/M2 | TEMPERATURE: 98.5 F | WEIGHT: 275.57 LBS | RESPIRATION RATE: 14 BRPM | SYSTOLIC BLOOD PRESSURE: 134 MMHG | DIASTOLIC BLOOD PRESSURE: 75 MMHG | HEART RATE: 92 BPM | OXYGEN SATURATION: 100 % | HEIGHT: 69 IN

## 2018-11-05 LAB
ANION GAP SERPL CALCULATED.3IONS-SCNC: 9 MMOL/L (ref 9–17)
BUN BLDV-MCNC: 17 MG/DL (ref 8–23)
BUN/CREAT BLD: ABNORMAL (ref 9–20)
CALCIUM SERPL-MCNC: 8.6 MG/DL (ref 8.6–10.4)
CHLORIDE BLD-SCNC: 97 MMOL/L (ref 98–107)
CO2: 31 MMOL/L (ref 20–31)
CREAT SERPL-MCNC: 0.98 MG/DL (ref 0.7–1.2)
GFR AFRICAN AMERICAN: >60 ML/MIN
GFR NON-AFRICAN AMERICAN: >60 ML/MIN
GFR SERPL CREATININE-BSD FRML MDRD: ABNORMAL ML/MIN/{1.73_M2}
GFR SERPL CREATININE-BSD FRML MDRD: ABNORMAL ML/MIN/{1.73_M2}
GLUCOSE BLD-MCNC: 89 MG/DL (ref 70–99)
HCT VFR BLD CALC: 40.2 % (ref 40.7–50.3)
HEMOGLOBIN: 12.2 G/DL (ref 13–17)
MCH RBC QN AUTO: 31.3 PG (ref 25.2–33.5)
MCHC RBC AUTO-ENTMCNC: 30.3 G/DL (ref 28.4–34.8)
MCV RBC AUTO: 103.1 FL (ref 82.6–102.9)
NRBC AUTOMATED: 0 PER 100 WBC
PDW BLD-RTO: 15.2 % (ref 11.8–14.4)
PLATELET # BLD: 192 K/UL (ref 138–453)
PMV BLD AUTO: 11.4 FL (ref 8.1–13.5)
POTASSIUM SERPL-SCNC: 4.5 MMOL/L (ref 3.7–5.3)
RBC # BLD: 3.9 M/UL (ref 4.21–5.77)
SODIUM BLD-SCNC: 137 MMOL/L (ref 135–144)
WBC # BLD: 7.3 K/UL (ref 3.5–11.3)

## 2018-11-05 PROCEDURE — 99233 SBSQ HOSP IP/OBS HIGH 50: CPT | Performed by: INTERNAL MEDICINE

## 2018-11-05 PROCEDURE — 6370000000 HC RX 637 (ALT 250 FOR IP): Performed by: STUDENT IN AN ORGANIZED HEALTH CARE EDUCATION/TRAINING PROGRAM

## 2018-11-05 PROCEDURE — 2580000003 HC RX 258: Performed by: HOSPITALIST

## 2018-11-05 PROCEDURE — 36415 COLL VENOUS BLD VENIPUNCTURE: CPT

## 2018-11-05 PROCEDURE — 94761 N-INVAS EAR/PLS OXIMETRY MLT: CPT

## 2018-11-05 PROCEDURE — 97530 THERAPEUTIC ACTIVITIES: CPT

## 2018-11-05 PROCEDURE — 99232 SBSQ HOSP IP/OBS MODERATE 35: CPT | Performed by: FAMILY MEDICINE

## 2018-11-05 PROCEDURE — 97166 OT EVAL MOD COMPLEX 45 MIN: CPT

## 2018-11-05 PROCEDURE — 6370000000 HC RX 637 (ALT 250 FOR IP): Performed by: INTERNAL MEDICINE

## 2018-11-05 PROCEDURE — G8988 SELF CARE GOAL STATUS: HCPCS

## 2018-11-05 PROCEDURE — 97535 SELF CARE MNGMENT TRAINING: CPT

## 2018-11-05 PROCEDURE — 6370000000 HC RX 637 (ALT 250 FOR IP): Performed by: HOSPITALIST

## 2018-11-05 PROCEDURE — 80048 BASIC METABOLIC PNL TOTAL CA: CPT

## 2018-11-05 PROCEDURE — 85027 COMPLETE CBC AUTOMATED: CPT

## 2018-11-05 PROCEDURE — G8987 SELF CARE CURRENT STATUS: HCPCS

## 2018-11-05 RX ORDER — PREGABALIN 25 MG/1
25 CAPSULE ORAL 2 TIMES DAILY
Qty: 60 CAPSULE | Refills: 3 | Status: ON HOLD | OUTPATIENT
Start: 2018-11-05 | End: 2019-04-27

## 2018-11-05 RX ORDER — CAPSAICIN 0.025 %
CREAM (GRAM) TOPICAL 2 TIMES DAILY
Status: DISCONTINUED | OUTPATIENT
Start: 2018-11-05 | End: 2018-11-05 | Stop reason: HOSPADM

## 2018-11-05 RX ORDER — PREGABALIN 25 MG/1
25 CAPSULE ORAL 2 TIMES DAILY
Status: DISCONTINUED | OUTPATIENT
Start: 2018-11-05 | End: 2018-11-05 | Stop reason: HOSPADM

## 2018-11-05 RX ADMIN — FUROSEMIDE 40 MG: 40 TABLET ORAL at 10:40

## 2018-11-05 RX ADMIN — AMIODARONE HYDROCHLORIDE 200 MG: 200 TABLET ORAL at 10:41

## 2018-11-05 RX ADMIN — DOCUSATE SODIUM 100 MG: 100 CAPSULE, LIQUID FILLED ORAL at 10:41

## 2018-11-05 RX ADMIN — QUETIAPINE FUMARATE 25 MG: 25 TABLET ORAL at 10:41

## 2018-11-05 RX ADMIN — Medication 10 ML: at 10:43

## 2018-11-05 RX ADMIN — PANTOPRAZOLE SODIUM 40 MG: 40 TABLET, DELAYED RELEASE ORAL at 05:17

## 2018-11-05 RX ADMIN — DULOXETINE 20 MG: 20 CAPSULE, DELAYED RELEASE ORAL at 10:41

## 2018-11-05 RX ADMIN — DIGOXIN 250 MCG: 250 TABLET ORAL at 10:41

## 2018-11-05 RX ADMIN — APIXABAN 5 MG: 5 TABLET, FILM COATED ORAL at 10:41

## 2018-11-05 RX ADMIN — PREGABALIN 25 MG: 25 CAPSULE ORAL at 11:35

## 2018-11-05 RX ADMIN — ASPIRIN 81 MG: 81 TABLET ORAL at 10:41

## 2018-11-05 RX ADMIN — SENNOSIDES 8.6 MG: 8.6 TABLET, FILM COATED ORAL at 10:40

## 2018-11-05 RX ADMIN — SPIRONOLACTONE 25 MG: 25 TABLET ORAL at 10:40

## 2018-11-05 RX ADMIN — METOPROLOL SUCCINATE 100 MG: 100 TABLET, FILM COATED, EXTENDED RELEASE ORAL at 10:40

## 2018-11-05 RX ADMIN — CAPSAICIN: 0.25 CREAM TOPICAL at 11:35

## 2018-11-05 ASSESSMENT — PAIN SCALES - GENERAL
PAINLEVEL_OUTOF10: 0
PAINLEVEL_OUTOF10: 3

## 2018-11-07 ENCOUNTER — HOSPITAL ENCOUNTER (OUTPATIENT)
Age: 63
Setting detail: SPECIMEN
Discharge: HOME | End: 2018-11-07
Payer: MEDICARE

## 2018-11-07 LAB
DIGOXIN DATE LAST DOSE: NORMAL
DIGOXIN DOSE AMOUNT: NORMAL
DIGOXIN DOSE TIME: NORMAL
DIGOXIN LEVEL: 0.7 NG/ML (ref 0.5–2)
T4 TOTAL: 5.4 UG/DL (ref 4.5–12)
TSH SERPL DL<=0.05 MIU/L-ACNC: 1.19 MIU/L (ref 0.3–5)

## 2018-11-07 PROCEDURE — P9603 ONE-WAY ALLOW PRORATED MILES: HCPCS

## 2018-11-07 PROCEDURE — 84436 ASSAY OF TOTAL THYROXINE: CPT

## 2018-11-07 PROCEDURE — 80162 ASSAY OF DIGOXIN TOTAL: CPT

## 2018-11-07 PROCEDURE — 36415 COLL VENOUS BLD VENIPUNCTURE: CPT

## 2018-11-07 PROCEDURE — 84443 ASSAY THYROID STIM HORMONE: CPT

## 2018-11-26 ENCOUNTER — HOSPITAL ENCOUNTER (OUTPATIENT)
Age: 63
Setting detail: SPECIMEN
Discharge: HOME | End: 2018-11-26
Payer: MEDICARE

## 2018-11-26 LAB
ANION GAP SERPL CALCULATED.3IONS-SCNC: 12 MMOL/L (ref 9–17)
BUN BLDV-MCNC: 24 MG/DL (ref 8–23)
BUN/CREAT BLD: ABNORMAL (ref 9–20)
CALCIUM SERPL-MCNC: 9.2 MG/DL (ref 8.6–10.4)
CHLORIDE BLD-SCNC: 104 MMOL/L (ref 98–107)
CO2: 28 MMOL/L (ref 20–31)
CREAT SERPL-MCNC: 1.22 MG/DL (ref 0.7–1.2)
DIGOXIN DATE LAST DOSE: NORMAL
DIGOXIN DOSE AMOUNT: NORMAL
DIGOXIN DOSE TIME: NORMAL
DIGOXIN LEVEL: 1 NG/ML (ref 0.5–2)
GFR AFRICAN AMERICAN: >60 ML/MIN
GFR NON-AFRICAN AMERICAN: 60 ML/MIN
GFR SERPL CREATININE-BSD FRML MDRD: ABNORMAL ML/MIN/{1.73_M2}
GFR SERPL CREATININE-BSD FRML MDRD: ABNORMAL ML/MIN/{1.73_M2}
GLUCOSE BLD-MCNC: 103 MG/DL (ref 70–99)
HCT VFR BLD CALC: 45.9 % (ref 40.7–50.3)
HEMOGLOBIN: 13.9 G/DL (ref 13–17)
MCH RBC QN AUTO: 30.3 PG (ref 25.2–33.5)
MCHC RBC AUTO-ENTMCNC: 30.3 G/DL (ref 28.4–34.8)
MCV RBC AUTO: 100.2 FL (ref 82.6–102.9)
NRBC AUTOMATED: 0 PER 100 WBC
PDW BLD-RTO: 14.6 % (ref 11.8–14.4)
PLATELET # BLD: ABNORMAL K/UL (ref 138–453)
PLATELET, FLUORESCENCE: 108 K/UL (ref 138–453)
PLATELET, IMMATURE FRACTION: 11.5 % (ref 1.1–10.3)
PMV BLD AUTO: ABNORMAL FL (ref 8.1–13.5)
POTASSIUM SERPL-SCNC: 4.2 MMOL/L (ref 3.7–5.3)
RBC # BLD: 4.58 M/UL (ref 4.21–5.77)
SODIUM BLD-SCNC: 144 MMOL/L (ref 135–144)
T4 TOTAL: 6.4 UG/DL (ref 4.5–12)
TSH SERPL DL<=0.05 MIU/L-ACNC: 1.86 MIU/L (ref 0.3–5)
WBC # BLD: 5.7 K/UL (ref 3.5–11.3)

## 2018-11-26 PROCEDURE — 84436 ASSAY OF TOTAL THYROXINE: CPT

## 2018-11-26 PROCEDURE — P9603 ONE-WAY ALLOW PRORATED MILES: HCPCS

## 2018-11-26 PROCEDURE — 80048 BASIC METABOLIC PNL TOTAL CA: CPT

## 2018-11-26 PROCEDURE — 85055 RETICULATED PLATELET ASSAY: CPT

## 2018-11-26 PROCEDURE — 85027 COMPLETE CBC AUTOMATED: CPT

## 2018-11-26 PROCEDURE — 80162 ASSAY OF DIGOXIN TOTAL: CPT

## 2018-11-26 PROCEDURE — 84443 ASSAY THYROID STIM HORMONE: CPT

## 2018-11-26 PROCEDURE — 36415 COLL VENOUS BLD VENIPUNCTURE: CPT

## 2019-01-08 ENCOUNTER — TELEPHONE (OUTPATIENT)
Dept: FAMILY MEDICINE CLINIC | Age: 64
End: 2019-01-08

## 2019-01-30 ENCOUNTER — CARE COORDINATION (OUTPATIENT)
Dept: CASE MANAGEMENT | Age: 64
End: 2019-01-30

## 2019-02-28 ENCOUNTER — TELEPHONE (OUTPATIENT)
Dept: INFUSION THERAPY | Facility: MEDICAL CENTER | Age: 64
End: 2019-02-28

## 2019-03-18 ENCOUNTER — TELEPHONE (OUTPATIENT)
Dept: RADIATION ONCOLOGY | Facility: MEDICAL CENTER | Age: 64
End: 2019-03-18

## 2019-03-19 ENCOUNTER — HOSPITAL ENCOUNTER (OUTPATIENT)
Dept: RADIATION ONCOLOGY | Facility: MEDICAL CENTER | Age: 64
Discharge: HOME | End: 2019-03-19
Payer: MEDICARE

## 2019-03-19 VITALS
HEART RATE: 47 BPM | BODY MASS INDEX: 35.84 KG/M2 | OXYGEN SATURATION: 98 % | DIASTOLIC BLOOD PRESSURE: 73 MMHG | WEIGHT: 242 LBS | HEIGHT: 69 IN | RESPIRATION RATE: 14 BRPM | SYSTOLIC BLOOD PRESSURE: 132 MMHG | TEMPERATURE: 98.1 F

## 2019-03-19 DIAGNOSIS — C16.0 MALIGNANT NEOPLASM OF CARDIA OF STOMACH (HCC): ICD-10-CM

## 2019-03-19 PROBLEM — C16.9 STOMACH CANCER (HCC): Status: ACTIVE | Noted: 2019-03-19

## 2019-03-19 PROCEDURE — 99213 OFFICE O/P EST LOW 20 MIN: CPT | Performed by: RADIOLOGY

## 2019-03-19 RX ORDER — DIPHENHYDRAMINE HYDROCHLORIDE 25 MG/1
25 CAPSULE ORAL DAILY
COMMUNITY

## 2019-03-19 RX ORDER — PANTOPRAZOLE SODIUM 40 MG
40 TABLET, DELAYED RELEASE (ENTERIC COATED) ORAL DAILY
Status: ON HOLD | COMMUNITY
End: 2019-04-27 | Stop reason: SDUPTHER

## 2019-03-19 RX ORDER — FERROUS SULFATE 325(65) MG
325 TABLET ORAL
Status: ON HOLD | COMMUNITY
End: 2019-04-27 | Stop reason: SDUPTHER

## 2019-03-27 ENCOUNTER — HOSPITAL ENCOUNTER (OUTPATIENT)
Dept: RADIATION ONCOLOGY | Facility: MEDICAL CENTER | Age: 64
Discharge: HOME | End: 2019-03-27
Attending: RADIOLOGY
Payer: MEDICARE

## 2019-03-27 PROCEDURE — 77290 THER RAD SIMULAJ FIELD CPLX: CPT | Performed by: RADIOLOGY

## 2019-03-27 PROCEDURE — 77334 RADIATION TREATMENT AID(S): CPT | Performed by: RADIOLOGY

## 2019-04-08 ENCOUNTER — TELEPHONE (OUTPATIENT)
Dept: SPIRITUAL SERVICES | Age: 64
End: 2019-04-08

## 2019-04-08 NOTE — TELEPHONE ENCOUNTER
received referral from Rosana Pugh , who reviewed Pt's biopsychosocial evaluation form, which indicated a level of distress greater than moderate in the area of spiritual concerns.  attempted to call both of Pt's phone numbers today. The home number ([phone]) was listed as not in service. The mobile number ([phone]) had a voicemail message of a female whose name was not on Patient's contact list. Emily Davila will alert  and the Radiation Oncology clinic about this.  will attempt to get Pt's correct phone number and will call him.  can be reached at [phone].     Electronically signed by Nacho Lew, Oncology Outpatient [place] [address], [address] [facility] Radiation Oncology  4/8/2019  9:24 AM

## 2019-04-08 NOTE — TELEPHONE ENCOUNTER
called Pt's emergency contact, Mena Yared at [phone], who informed  that Pt was in [facility] [phone].  telephoned the nursing home in attempt to speak with Pt. The person who responded informed  that Pt was at an outside appointment.  will attempt to call Pt at another time.     Electronically signed by Jeff Gupta, Oncology Outpatient [facility], [address] [facility] Radiation Oncology  4/8/2019  9:32 AM

## 2019-04-11 ENCOUNTER — TELEPHONE (OUTPATIENT)
Dept: RADIATION ONCOLOGY | Facility: MEDICAL CENTER | Age: 64
End: 2019-04-11

## 2019-04-11 NOTE — TELEPHONE ENCOUNTER
Per instructions from [employer]. Rn I faxed a request to [facility] [phone]. To get Colonoscopy report with path.

## 2019-04-17 ENCOUNTER — TELEPHONE (OUTPATIENT)
Dept: ONCOLOGY | Age: 64
End: 2019-04-17

## 2019-04-19 ENCOUNTER — TELEPHONE (OUTPATIENT)
Dept: RADIATION ONCOLOGY | Age: 64
End: 2019-04-19

## 2019-04-19 ENCOUNTER — TELEPHONE (OUTPATIENT)
Dept: RADIATION ONCOLOGY | Facility: MEDICAL CENTER | Age: 64
End: 2019-04-19

## 2019-04-19 PROBLEM — C16.5: Status: ACTIVE | Noted: 2019-04-19

## 2019-04-19 PROBLEM — C77.2 SECONDARY MALIGNANT NEOPLASM OF INTRA-ABDOMINAL LYMPH NODES (HCC): Status: ACTIVE | Noted: 2019-04-19

## 2019-04-19 NOTE — TELEPHONE ENCOUNTER
The patient was seen regarding radiation therapy for gastric cardia cancer. The patient was seen by my partner on 3/19/19. The patient had a PET/CT scan showing uptake within the gastric cardia and gastric antrum. There is also uptake in the transverse colon. The patient was admitted Dr. Violetta Paula at [facility]. He underwent a colonoscopy which showed no abnormalities. An EGD with endoscopic ultrasound staging was done which revealed a mass in the lesser curvature that measured 1.6 cm. The mass invaded into the mucosa and submucosal.  There is no involvement of the muscularis layer. There are also 2. Gastric lymph nodes noted. These nodes were biopsied on 3/22/19 number positive for metastatic carcinoma. The patient is not considered to be a surgical candidate related to his cardiac output. He is a low ejection fraction. I spoke with Dr. Suma Petersen of medical oncology. The patient will be started on chemotherapy with FLOT. He will then be reassessed for surgery. Radiation therapy will not be given as an initial treatment. Dr. Suma Petersen has discussed this plan with the patient as well.   The patient's clinical stage is IIA, T1b N1 M0.

## 2019-04-25 ENCOUNTER — APPOINTMENT (OUTPATIENT)
Dept: CT IMAGING | Age: 64
DRG: 329 | End: 2019-04-25
Payer: MEDICARE

## 2019-04-25 ENCOUNTER — HOSPITAL ENCOUNTER (INPATIENT)
Age: 64
LOS: 15 days | Discharge: NURSING, REHAB OR LONG-TERM CARE | DRG: 329 | End: 2019-05-10
Attending: EMERGENCY MEDICINE | Admitting: INTERNAL MEDICINE
Payer: MEDICARE

## 2019-04-25 ENCOUNTER — TELEPHONE (OUTPATIENT)
Dept: ONCOLOGY | Age: 64
End: 2019-04-25

## 2019-04-25 ENCOUNTER — APPOINTMENT (OUTPATIENT)
Dept: GENERAL RADIOLOGY | Age: 64
DRG: 329 | End: 2019-04-25
Payer: MEDICARE

## 2019-04-25 DIAGNOSIS — K56.609 SBO (SMALL BOWEL OBSTRUCTION) (HCC): Primary | ICD-10-CM

## 2019-04-25 DIAGNOSIS — I10 ESSENTIAL HYPERTENSION: ICD-10-CM

## 2019-04-25 DIAGNOSIS — G57.92 NEUROPATHY OF LEFT LOWER EXTREMITY: ICD-10-CM

## 2019-04-25 DIAGNOSIS — I26.99 OTHER ACUTE PULMONARY EMBOLISM WITHOUT ACUTE COR PULMONALE (HCC): ICD-10-CM

## 2019-04-25 LAB
ABSOLUTE EOS #: 0.1 K/UL (ref 0–0.4)
ABSOLUTE IMMATURE GRANULOCYTE: ABNORMAL K/UL (ref 0–0.3)
ABSOLUTE LYMPH #: 0.7 K/UL (ref 1–4.8)
ABSOLUTE MONO #: 0.9 K/UL (ref 0.2–0.8)
ALBUMIN SERPL-MCNC: 4.1 G/DL (ref 3.5–5.2)
ALBUMIN/GLOBULIN RATIO: ABNORMAL (ref 1–2.5)
ALP BLD-CCNC: 69 U/L (ref 40–129)
ALT SERPL-CCNC: 99 U/L (ref 5–41)
ANION GAP SERPL CALCULATED.3IONS-SCNC: 13 MMOL/L (ref 9–17)
AST SERPL-CCNC: 59 U/L
BASOPHILS # BLD: 0 % (ref 0–2)
BASOPHILS ABSOLUTE: 0 K/UL (ref 0–0.2)
BILIRUB SERPL-MCNC: 0.33 MG/DL (ref 0.3–1.2)
BNP INTERPRETATION: ABNORMAL
BUN BLDV-MCNC: 18 MG/DL (ref 8–23)
BUN/CREAT BLD: 13 (ref 9–20)
CALCIUM SERPL-MCNC: 9.7 MG/DL (ref 8.6–10.4)
CHLORIDE BLD-SCNC: 103 MMOL/L (ref 98–107)
CO2: 24 MMOL/L (ref 20–31)
CREAT SERPL-MCNC: 1.35 MG/DL (ref 0.7–1.2)
DIFFERENTIAL TYPE: ABNORMAL
DIGOXIN DATE LAST DOSE: ABNORMAL
DIGOXIN DOSE AMOUNT: ABNORMAL
DIGOXIN DOSE TIME: ABNORMAL
DIGOXIN LEVEL: <0.3 NG/ML (ref 0.5–2)
EOSINOPHILS RELATIVE PERCENT: 2 % (ref 1–4)
GFR AFRICAN AMERICAN: >60 ML/MIN
GFR NON-AFRICAN AMERICAN: 53 ML/MIN
GFR SERPL CREATININE-BSD FRML MDRD: ABNORMAL ML/MIN/{1.73_M2}
GFR SERPL CREATININE-BSD FRML MDRD: ABNORMAL ML/MIN/{1.73_M2}
GLUCOSE BLD-MCNC: 124 MG/DL (ref 70–99)
HCT VFR BLD CALC: 45.7 % (ref 41–53)
HEMOGLOBIN: 14.9 G/DL (ref 13.5–17.5)
IMMATURE GRANULOCYTES: ABNORMAL %
INR BLD: 1.1
LACTIC ACID: 1.3 MMOL/L (ref 0.5–2.2)
LIPASE: 31 U/L (ref 13–60)
LYMPHOCYTES # BLD: 9 % (ref 24–44)
MCH RBC QN AUTO: 31.9 PG (ref 26–34)
MCHC RBC AUTO-ENTMCNC: 32.7 G/DL (ref 31–37)
MCV RBC AUTO: 97.7 FL (ref 80–100)
MONOCYTES # BLD: 11 % (ref 1–7)
NRBC AUTOMATED: ABNORMAL PER 100 WBC
PARTIAL THROMBOPLASTIN TIME: 21.2 SEC (ref 23–31)
PDW BLD-RTO: 18.9 % (ref 11.5–14.5)
PLATELET # BLD: 164 K/UL (ref 130–400)
PLATELET ESTIMATE: ABNORMAL
PMV BLD AUTO: 10.5 FL (ref 6–12)
POTASSIUM SERPL-SCNC: 4.6 MMOL/L (ref 3.7–5.3)
PRO-BNP: 657 PG/ML
PROTHROMBIN TIME: 10.9 SEC (ref 9.7–11.6)
RBC # BLD: 4.68 M/UL (ref 4.5–5.9)
RBC # BLD: ABNORMAL 10*6/UL
SEG NEUTROPHILS: 78 % (ref 36–66)
SEGMENTED NEUTROPHILS ABSOLUTE COUNT: 6.2 K/UL (ref 1.8–7.7)
SODIUM BLD-SCNC: 140 MMOL/L (ref 135–144)
TOTAL PROTEIN: 8.1 G/DL (ref 6.4–8.3)
TROPONIN INTERP: NORMAL
TROPONIN T: NORMAL NG/ML
TROPONIN, HIGH SENSITIVITY: 9 NG/L (ref 0–22)
WBC # BLD: 7.9 K/UL (ref 3.5–11)
WBC # BLD: ABNORMAL 10*3/UL

## 2019-04-25 PROCEDURE — 96375 TX/PRO/DX INJ NEW DRUG ADDON: CPT

## 2019-04-25 PROCEDURE — 80162 ASSAY OF DIGOXIN TOTAL: CPT

## 2019-04-25 PROCEDURE — 83605 ASSAY OF LACTIC ACID: CPT

## 2019-04-25 PROCEDURE — 85610 PROTHROMBIN TIME: CPT

## 2019-04-25 PROCEDURE — 71045 X-RAY EXAM CHEST 1 VIEW: CPT

## 2019-04-25 PROCEDURE — 6370000000 HC RX 637 (ALT 250 FOR IP): Performed by: NURSE PRACTITIONER

## 2019-04-25 PROCEDURE — 74018 RADEX ABDOMEN 1 VIEW: CPT

## 2019-04-25 PROCEDURE — 74177 CT ABD & PELVIS W/CONTRAST: CPT

## 2019-04-25 PROCEDURE — 83880 ASSAY OF NATRIURETIC PEPTIDE: CPT

## 2019-04-25 PROCEDURE — 85025 COMPLETE CBC W/AUTO DIFF WBC: CPT

## 2019-04-25 PROCEDURE — 36415 COLL VENOUS BLD VENIPUNCTURE: CPT

## 2019-04-25 PROCEDURE — 6360000004 HC RX CONTRAST MEDICATION: Performed by: EMERGENCY MEDICINE

## 2019-04-25 PROCEDURE — 84484 ASSAY OF TROPONIN QUANT: CPT

## 2019-04-25 PROCEDURE — 93005 ELECTROCARDIOGRAM TRACING: CPT

## 2019-04-25 PROCEDURE — 85730 THROMBOPLASTIN TIME PARTIAL: CPT

## 2019-04-25 PROCEDURE — 96374 THER/PROPH/DIAG INJ IV PUSH: CPT

## 2019-04-25 PROCEDURE — 2060000000 HC ICU INTERMEDIATE R&B

## 2019-04-25 PROCEDURE — 2580000003 HC RX 258: Performed by: NURSE PRACTITIONER

## 2019-04-25 PROCEDURE — 83690 ASSAY OF LIPASE: CPT

## 2019-04-25 PROCEDURE — 99285 EMERGENCY DEPT VISIT HI MDM: CPT

## 2019-04-25 PROCEDURE — 6360000002 HC RX W HCPCS: Performed by: EMERGENCY MEDICINE

## 2019-04-25 PROCEDURE — 71260 CT THORAX DX C+: CPT

## 2019-04-25 PROCEDURE — 80053 COMPREHEN METABOLIC PANEL: CPT

## 2019-04-25 PROCEDURE — 2580000003 HC RX 258: Performed by: EMERGENCY MEDICINE

## 2019-04-25 PROCEDURE — 6360000002 HC RX W HCPCS: Performed by: NURSE PRACTITIONER

## 2019-04-25 RX ORDER — SPIRONOLACTONE 25 MG/1
25 TABLET ORAL DAILY
Status: DISCONTINUED | OUTPATIENT
Start: 2019-04-26 | End: 2019-05-02

## 2019-04-25 RX ORDER — SENNA PLUS 8.6 MG/1
1 TABLET ORAL DAILY
Status: DISCONTINUED | OUTPATIENT
Start: 2019-04-26 | End: 2019-05-10 | Stop reason: HOSPADM

## 2019-04-25 RX ORDER — POTASSIUM CHLORIDE 7.45 MG/ML
10 INJECTION INTRAVENOUS PRN
Status: DISCONTINUED | OUTPATIENT
Start: 2019-04-25 | End: 2019-05-10 | Stop reason: HOSPADM

## 2019-04-25 RX ORDER — METOPROLOL SUCCINATE 50 MG/1
100 TABLET, EXTENDED RELEASE ORAL DAILY
Status: DISCONTINUED | OUTPATIENT
Start: 2019-04-26 | End: 2019-04-30

## 2019-04-25 RX ORDER — NITROGLYCERIN 0.4 MG/1
0.4 TABLET SUBLINGUAL EVERY 5 MIN PRN
Status: DISCONTINUED | OUTPATIENT
Start: 2019-04-25 | End: 2019-05-10 | Stop reason: HOSPADM

## 2019-04-25 RX ORDER — DULOXETIN HYDROCHLORIDE 20 MG/1
20 CAPSULE, DELAYED RELEASE ORAL DAILY
Status: DISCONTINUED | OUTPATIENT
Start: 2019-04-26 | End: 2019-05-10 | Stop reason: HOSPADM

## 2019-04-25 RX ORDER — MAGNESIUM SULFATE 1 G/100ML
1 INJECTION INTRAVENOUS PRN
Status: DISCONTINUED | OUTPATIENT
Start: 2019-04-25 | End: 2019-05-10 | Stop reason: HOSPADM

## 2019-04-25 RX ORDER — SODIUM CHLORIDE 0.9 % (FLUSH) 0.9 %
10 SYRINGE (ML) INJECTION PRN
Status: DISCONTINUED | OUTPATIENT
Start: 2019-04-25 | End: 2019-04-28 | Stop reason: SDUPTHER

## 2019-04-25 RX ORDER — DIGOXIN 250 MCG
250 TABLET ORAL DAILY
Status: DISCONTINUED | OUTPATIENT
Start: 2019-04-26 | End: 2019-05-02

## 2019-04-25 RX ORDER — AMIODARONE HYDROCHLORIDE 200 MG/1
400 TABLET ORAL 2 TIMES DAILY
Status: DISCONTINUED | OUTPATIENT
Start: 2019-04-25 | End: 2019-04-29

## 2019-04-25 RX ORDER — LANOLIN ALCOHOL/MO/W.PET/CERES
325 CREAM (GRAM) TOPICAL
Status: DISCONTINUED | OUTPATIENT
Start: 2019-04-26 | End: 2019-05-10 | Stop reason: HOSPADM

## 2019-04-25 RX ORDER — ATORVASTATIN CALCIUM 40 MG/1
40 TABLET, FILM COATED ORAL NIGHTLY
Status: DISCONTINUED | OUTPATIENT
Start: 2019-04-25 | End: 2019-05-10 | Stop reason: HOSPADM

## 2019-04-25 RX ORDER — SODIUM CHLORIDE 0.9 % (FLUSH) 0.9 %
10 SYRINGE (ML) INJECTION EVERY 12 HOURS SCHEDULED
Status: DISCONTINUED | OUTPATIENT
Start: 2019-04-25 | End: 2019-04-28 | Stop reason: SDUPTHER

## 2019-04-25 RX ORDER — ONDANSETRON 2 MG/ML
4 INJECTION INTRAMUSCULAR; INTRAVENOUS ONCE
Status: COMPLETED | OUTPATIENT
Start: 2019-04-25 | End: 2019-04-25

## 2019-04-25 RX ORDER — FUROSEMIDE 40 MG/1
40 TABLET ORAL DAILY
Status: DISCONTINUED | OUTPATIENT
Start: 2019-04-26 | End: 2019-05-02

## 2019-04-25 RX ORDER — ONDANSETRON 2 MG/ML
4 INJECTION INTRAMUSCULAR; INTRAVENOUS EVERY 6 HOURS PRN
Status: DISCONTINUED | OUTPATIENT
Start: 2019-04-25 | End: 2019-04-25 | Stop reason: SDUPTHER

## 2019-04-25 RX ORDER — ACETAMINOPHEN 325 MG/1
650 TABLET ORAL EVERY 4 HOURS PRN
Status: DISCONTINUED | OUTPATIENT
Start: 2019-04-25 | End: 2019-05-10 | Stop reason: HOSPADM

## 2019-04-25 RX ORDER — HEPARIN SODIUM 5000 [USP'U]/ML
5000 INJECTION, SOLUTION INTRAVENOUS; SUBCUTANEOUS ONCE
Status: COMPLETED | OUTPATIENT
Start: 2019-04-25 | End: 2019-04-25

## 2019-04-25 RX ORDER — PANTOPRAZOLE SODIUM 40 MG/1
40 TABLET, DELAYED RELEASE ORAL
Status: DISCONTINUED | OUTPATIENT
Start: 2019-04-26 | End: 2019-04-30

## 2019-04-25 RX ORDER — ATROPINE SULFATE 0.1 MG/ML
1 INJECTION INTRAVENOUS ONCE
Status: COMPLETED | OUTPATIENT
Start: 2019-04-25 | End: 2019-04-25

## 2019-04-25 RX ORDER — QUETIAPINE FUMARATE 25 MG/1
25 TABLET, FILM COATED ORAL 2 TIMES DAILY
Status: DISCONTINUED | OUTPATIENT
Start: 2019-04-25 | End: 2019-05-10 | Stop reason: HOSPADM

## 2019-04-25 RX ORDER — ONDANSETRON 4 MG/1
4 TABLET, ORALLY DISINTEGRATING ORAL EVERY 6 HOURS PRN
Status: DISCONTINUED | OUTPATIENT
Start: 2019-04-25 | End: 2019-04-28 | Stop reason: SDUPTHER

## 2019-04-25 RX ORDER — FENTANYL CITRATE 50 UG/ML
25 INJECTION, SOLUTION INTRAMUSCULAR; INTRAVENOUS ONCE
Status: COMPLETED | OUTPATIENT
Start: 2019-04-25 | End: 2019-04-25

## 2019-04-25 RX ORDER — LANOLIN ALCOHOL/MO/W.PET/CERES
25 CREAM (GRAM) TOPICAL DAILY
Status: DISCONTINUED | OUTPATIENT
Start: 2019-04-26 | End: 2019-04-25 | Stop reason: SDUPTHER

## 2019-04-25 RX ORDER — POTASSIUM CHLORIDE 20 MEQ/1
40 TABLET, EXTENDED RELEASE ORAL PRN
Status: DISCONTINUED | OUTPATIENT
Start: 2019-04-25 | End: 2019-05-10 | Stop reason: HOSPADM

## 2019-04-25 RX ORDER — 0.9 % SODIUM CHLORIDE 0.9 %
80 INTRAVENOUS SOLUTION INTRAVENOUS ONCE
Status: COMPLETED | OUTPATIENT
Start: 2019-04-25 | End: 2019-04-25

## 2019-04-25 RX ORDER — LANOLIN ALCOHOL/MO/W.PET/CERES
25 CREAM (GRAM) TOPICAL DAILY
Status: DISCONTINUED | OUTPATIENT
Start: 2019-04-26 | End: 2019-05-10 | Stop reason: HOSPADM

## 2019-04-25 RX ORDER — NICOTINE 21 MG/24HR
1 PATCH, TRANSDERMAL 24 HOURS TRANSDERMAL DAILY PRN
Status: DISCONTINUED | OUTPATIENT
Start: 2019-04-25 | End: 2019-05-10 | Stop reason: HOSPADM

## 2019-04-25 RX ORDER — ASPIRIN 81 MG/1
81 TABLET ORAL DAILY
Status: DISCONTINUED | OUTPATIENT
Start: 2019-04-26 | End: 2019-05-10 | Stop reason: HOSPADM

## 2019-04-25 RX ORDER — FENTANYL CITRATE 50 UG/ML
25 INJECTION, SOLUTION INTRAMUSCULAR; INTRAVENOUS
Status: DISCONTINUED | OUTPATIENT
Start: 2019-04-25 | End: 2019-04-27

## 2019-04-25 RX ORDER — ONDANSETRON 2 MG/ML
4 INJECTION INTRAMUSCULAR; INTRAVENOUS EVERY 6 HOURS PRN
Status: DISCONTINUED | OUTPATIENT
Start: 2019-04-25 | End: 2019-04-28 | Stop reason: SDUPTHER

## 2019-04-25 RX ADMIN — SODIUM CHLORIDE 80 ML: 9 INJECTION, SOLUTION INTRAVENOUS at 18:25

## 2019-04-25 RX ADMIN — ONDANSETRON 4 MG: 2 INJECTION INTRAMUSCULAR; INTRAVENOUS at 17:31

## 2019-04-25 RX ADMIN — Medication 10 ML: at 18:25

## 2019-04-25 RX ADMIN — IOPAMIDOL 75 ML: 755 INJECTION, SOLUTION INTRAVENOUS at 18:25

## 2019-04-25 RX ADMIN — FENTANYL CITRATE 25 MCG: 50 INJECTION, SOLUTION INTRAMUSCULAR; INTRAVENOUS at 17:31

## 2019-04-25 RX ADMIN — ATROPINE SULFATE 1 MG: 0.1 INJECTION, SOLUTION ENDOTRACHEAL; INTRAMUSCULAR; INTRAVENOUS; SUBCUTANEOUS at 17:34

## 2019-04-25 RX ADMIN — HEPARIN SODIUM 5000 UNITS: 5000 INJECTION INTRAVENOUS; SUBCUTANEOUS at 20:53

## 2019-04-25 RX ADMIN — CHLORASEPTIC 1 SPRAY: 1.5 LIQUID ORAL at 22:39

## 2019-04-25 RX ADMIN — Medication 10 ML: at 22:15

## 2019-04-25 RX ADMIN — FENTANYL CITRATE 25 MCG: 50 INJECTION, SOLUTION INTRAMUSCULAR; INTRAVENOUS at 23:19

## 2019-04-25 ASSESSMENT — ENCOUNTER SYMPTOMS
SHORTNESS OF BREATH: 1
EYE DISCHARGE: 0
BACK PAIN: 0
FACIAL SWELLING: 0
CHEST TIGHTNESS: 0
ABDOMINAL PAIN: 1
ABDOMINAL DISTENTION: 0
EYE PAIN: 0

## 2019-04-25 ASSESSMENT — PAIN SCALES - GENERAL
PAINLEVEL_OUTOF10: 8
PAINLEVEL_OUTOF10: 9
PAINLEVEL_OUTOF10: 8

## 2019-04-25 ASSESSMENT — PAIN DESCRIPTION - LOCATION: LOCATION: ABDOMEN

## 2019-04-25 ASSESSMENT — PAIN DESCRIPTION - PAIN TYPE: TYPE: ACUTE PAIN

## 2019-04-25 NOTE — ED NOTES
Pt presents to ED with c/o dizziness, nausea and abdomen pain x 1 day; pt states epigastric pain started earlier today; denies injury or food triggers; went to Urgent care and sent here for abnormal HR; pt has hx of afib, chf, colon cancer and has pacemaker. pts HR upon arrival is 40s; stable BP; requesting pain meds for sharp belly pain; brought to room by wheelchair; ambulated to bed independently; a+ox4, non labored breathing, PERRLA, able to speak in full sentences.       Aftab Leong, RN  04/25/19 Andrew Jeong

## 2019-04-25 NOTE — ED PROVIDER NOTES
EMERGENCY DEPARTMENT ENCOUNTER    Pt Name: Tony Looney  MRN: [id number]  [place] 1955  Date of evaluation: 4/25/19  CHIEF COMPLAINT       Chief Complaint   Patient presents with    Abdominal Pain    Dizziness     onset today     HISTORY OF PRESENT ILLNESS   HPI   The patient is a 59-year-old male with a history of colon cancer recently diagnosed presented to emergency department secondary to abdominal pain and shortness of breath. Patient resides in a nursing home but had sudden onset of abdominal pain localized to his epigastric region tonight at 10 on the pain scale nonradiating. He has a history of small bowel obstruction, however he does not know his surgeon. He has had normal bowel movements and has positive flatulence. Patient was recently diagnosed with colon cancer scheduled to have a port placed onto the skin chemo and radiation. Patient states felt weak with shortness of breath. Patient denied nausea, vomiting, fevers or chills. REVIEW OF SYSTEMS     Review of Systems   Constitutional: Negative for chills, diaphoresis and fever. HENT: Negative for congestion, ear pain and facial swelling. Eyes: Negative for pain, discharge and visual disturbance. Respiratory: Positive for shortness of breath. Negative for chest tightness. Cardiovascular: Negative for chest pain and palpitations. Gastrointestinal: Positive for abdominal pain. Negative for abdominal distention. Genitourinary: Negative for difficulty urinating and flank pain. Musculoskeletal: Negative for back pain. Skin: Negative for wound. Neurological: Negative for dizziness, light-headedness and headaches.      PASTMEDICAL HISTORY     Past Medical History:   Diagnosis Date    Asthma     Cancer ([facility] [address]     small intestine    CHF (congestive heart failure) (HCC)     COPD exacerbation (HCC)     GERD (gastroesophageal reflux disease)     History of blood transfusion     Hyperlipidemia     Hypertension     Neuropathy     Obesity (BMI 30-39.9) 2/25/2016    Psychiatric problem     SBO (small bowel obstruction) (HCC)     Severe mitral regurgitation 10/31/12    minimally invasive complex mitral valve repair     Severe tricuspid regurgitation 10/31/12    tricuspid repair under CPB     SURGICAL HISTORY       Past Surgical History:   Procedure Laterality Date    ABDOMEN SURGERY      small bowel.  MITRAL VALVE SURGERY  10/31/12    minimally invasive complex mitral valve repair    OTHER SURGICAL HISTORY  11/2/12    successful weaning of the ECMO and decannulation    PACEMAKER PLACEMENT Left 02/2017    TRICUSPID VALVE SURGERY  10/31/12    tricuspid valve repair under CPB    TUNNELED VENOUS PORT PLACEMENT Left 6/18/2015    UPPER GASTROINTESTINAL ENDOSCOPY  3-3-15    erosive antral gastritis, sm. antral polypectomy     CURRENT MEDICATIONS       Current Discharge Medication List      CONTINUE these medications which have NOT CHANGED    Details   pantoprazole (PROTONIX) 40 MG tablet Take 40 mg by mouth daily      ferrous sulfate 325 (65 Fe) MG tablet Take 325 mg by mouth daily (with breakfast)      ! ! vitamin B-6 (PYRIDOXINE) 25 MG tablet Take 25 mg by mouth daily      ! ! pyridoxine (RA VITAMIN B-6) 50 MG tablet Take 0.5 tablets by mouth daily  Qty: 30 tablet, Refills: 3    Associated Diagnoses: Numbness and tingling      dibucaine ([employer] HEMORRHOIDAL [employer] ANALGESIC) 1 % OINT rectal ointment Place rectally 4 times daily as needed for Pain  Qty: 1 Tube, Refills: 1      docusate sodium (COLACE) 100 MG capsule Take 1 capsule by mouth as needed for Constipation  Qty: 30 capsule, Refills: 2    Associated Diagnoses: Constipation, unspecified constipation type      DULoxetine (CYMBALTA) 20 MG extended release capsule TAKE 1 CAPSULE DAILY  Qty: 28 capsule, Refills: 0      atorvastatin (LIPITOR) 40 MG tablet TAKE 1 TABLET AT BEDTIME  Qty: 30 tablet, Refills: 0      QUEtiapine (SEROQUEL) 25 MG tablet TAKE 1 TABLET TWICE A DAY  Qty: in the patient's condition he required highest level of my preparedness to intervene urgently. I provided critical care time including documentation time, medication orders and management, reevaluation, vital sign assessment, ordering and reviewing of of lab tests ordering and reviewing of x-ray studies, and admission orders. Aggregate critical care time is between 45 minutes including only time during which I was engaged in work directly related to his care and did not include time spent treating other patients simultaneously. NIH STROKE SCALE:            PROCEDURES:    Procedures    DIAGNOSTIC RESULTS   EKG:All EKG's are interpreted by the Emergency Department Physician who either signs or Co-signs this chart in the absence of a cardiologist.        RADIOLOGY:All plain film, CT, MRI, and formal ultrasound images (except ED bedside ultrasound) are read by the radiologist, see reports below, unless otherwisenoted in MDM or here. XR ABDOMEN (KUB) (SINGLE AP VIEW)   Final Result   Enteric tube as described         CT ABDOMEN PELVIS W IV CONTRAST Additional Contrast? None   Final Result   Mildly prominent fluid-filled loops of small bowel proximally and within the   mid aspect with decompressed distal and terminal ileal loops. This may   relate to a degree of obstruction versus an ileus and correlation is needed. Infiltrates in the lung bases bilaterally that may represent atelectasis   versus pneumonia. Nonobstructing renal stones bilaterally. CT CHEST PULMONARY EMBOLISM W CONTRAST   Final Result   Mild cardiomegaly with a small right basilar effusion. Filling defect involving a subsegmental branch supplying the left lower lobe   medially consistent with an embolism. Critical results were called by Dr. Trung Avery to [facility] on   9/51/7096 at 18:48. XR CHEST PORTABLE   Final Result   Cardiomegaly with small bibasilar effusions and adjacent atelectasis. LABS: All lab results were reviewed by myself, and all abnormals are listed below.   Labs Reviewed   CBC WITH AUTO DIFFERENTIAL - Abnormal; Notable for the following components:       Result Value    RDW 18.9 (*)     Seg Neutrophils 78 (*)     Lymphocytes 9 (*)     Monocytes 11 (*)     Absolute Lymph # 0.70 (*)     Absolute Mono # 0.90 (*)     All other components within normal limits   COMPREHENSIVE METABOLIC PANEL W/ REFLEX TO MG FOR LOW K - Abnormal; Notable for the following components:    Glucose 124 (*)     CREATININE 1.35 (*)     ALT 99 (*)     AST 59 (*)     GFR Non- 53 (*)     All other components within normal limits   BRAIN NATRIURETIC PEPTIDE - Abnormal; Notable for the following components:    Pro- (*)     All other components within normal limits   APTT - Abnormal; Notable for the following components:    PTT 21.2 (*)     All other components within normal limits   LIPASE   TROPONIN   PROTIME-INR   LACTIC ACID   URINALYSIS   BASIC METABOLIC PANEL W/ REFLEX TO MG FOR LOW K   CBC   PROTIME-INR   DIGOXIN LEVEL       EMERGENCY DEPARTMENTCOURSE:         Vitals:    Vitals:    04/25/19 1933 04/25/19 2018 04/25/19 2058 04/25/19 2130   BP: 113/66 107/60 106/64 123/75   Pulse: (!) 41 (!) 40 (!) 45 50   Resp: 13 15 16 20   Temp:    97.9 °F (36.6 °C)   TempSrc:    Axillary   SpO2: 96% 98% 94% 92%   Weight:       Height:           The patient was given the following medications while in the emergency department:  Orders Placed This Encounter   Medications    ondansetron (ZOFRAN) injection 4 mg    fentaNYL (SUBLIMAZE) injection 25 mcg    atropine injection 1 mg    0.9 % sodium chloride bolus    iopamidol (ISOVUE-370) 76 % injection 75 mL    sodium chloride flush 0.9 % injection 10 mL    heparin (porcine) injection 5,000 Units    amiodarone (CORDARONE) tablet 400 mg    aspirin EC tablet 81 mg    atorvastatin (LIPITOR) tablet 40 mg    dibucaine 1 % rectal ointment    digoxin (LANOXIN) tablet 250 mcg    DULoxetine (CYMBALTA) extended release capsule 20 mg    ferrous sulfate EC tablet 325 mg    furosemide (LASIX) tablet 40 mg    metoprolol succinate (TOPROL XL) extended release tablet 100 mg    nitroGLYCERIN (NITROSTAT) SL tablet 0.4 mg    pantoprazole (PROTONIX) tablet 40 mg    vitamin B-6 (PYRIDOXINE) tablet 25 mg    QUEtiapine (SEROQUEL) tablet 25 mg    senna (SENOKOT) tablet 8.6 mg    spironolactone (ALDACTONE) tablet 25 mg    DISCONTD: vitamin B-6 (PYRIDOXINE) tablet 25 mg    sodium chloride flush 0.9 % injection 10 mL    sodium chloride flush 0.9 % injection 10 mL    OR Linked Order Group     potassium chloride (KLOR-CON M) extended release tablet 40 mEq     potassium bicarb-citric acid (EFFER-K) effervescent tablet 40 mEq     potassium chloride 10 mEq/100 mL IVPB (Peripheral Line)    magnesium sulfate 1 g in dextrose 5% 100 mL IVPB    magnesium hydroxide (MILK OF MAGNESIA) 400 MG/5ML suspension 30 mL    DISCONTD: ondansetron (ZOFRAN) injection 4 mg    nicotine (NICODERM CQ) 21 MG/24HR 1 patch    acetaminophen (TYLENOL) tablet 650 mg    enoxaparin (LOVENOX) injection 30 mg    OR Linked Order Group     ondansetron (ZOFRAN-ODT) disintegrating tablet 4 mg     ondansetron (ZOFRAN) injection 4 mg     CONSULTS:  IP CONSULT TO INTERNAL MEDICINE  IP CONSULT TO GENERAL SURGERY    FINAL IMPRESSION      1. SBO (small bowel obstruction) ([facility] [address]    2. Other acute pulmonary embolism without acute cor pulmonale ([facility] [address]          DISPOSITION/PLAN   DISPOSITION Admitted 04/25/2019 08:26:27 PM      PATIENT REFERRED TO:  Kris Hammer MD  [facility] [address]  [phone]          Kathrin Evans MD  [address]  [address] [facility]  [phone]          DISCHARGE MEDICATIONS:  Current Discharge Medication List        Ravi Rich MD  Attending Emergency Physician                    Ravi Rich MD  73/34/80 [address] Pascal MD  11/76/02 8880

## 2019-04-25 NOTE — TELEPHONE ENCOUNTER
SW has attempted to meet with patient, several times, to review his psychosocial form. Patient's treatment had been on-hold pending testing. His radiation has now been deferred and patient will have chemo and surgery. Daniel Zarate.  Jose Romero

## 2019-04-26 LAB
ANION GAP SERPL CALCULATED.3IONS-SCNC: 12 MMOL/L (ref 9–17)
BUN BLDV-MCNC: 19 MG/DL (ref 8–23)
BUN/CREAT BLD: 16 (ref 9–20)
CALCIUM SERPL-MCNC: 9.4 MG/DL (ref 8.6–10.4)
CHLORIDE BLD-SCNC: 104 MMOL/L (ref 98–107)
CO2: 24 MMOL/L (ref 20–31)
CREAT SERPL-MCNC: 1.18 MG/DL (ref 0.7–1.2)
GFR AFRICAN AMERICAN: >60 ML/MIN
GFR NON-AFRICAN AMERICAN: >60 ML/MIN
GFR SERPL CREATININE-BSD FRML MDRD: ABNORMAL ML/MIN/{1.73_M2}
GFR SERPL CREATININE-BSD FRML MDRD: ABNORMAL ML/MIN/{1.73_M2}
GLUCOSE BLD-MCNC: 106 MG/DL (ref 70–99)
HCT VFR BLD CALC: 44.4 % (ref 40.7–50.3)
HCT VFR BLD CALC: 45.3 % (ref 40.7–50.3)
HCT VFR BLD CALC: 46 % (ref 40.7–50.3)
HEMOGLOBIN: 14 G/DL (ref 13–17)
HEMOGLOBIN: 14.1 G/DL (ref 13–17)
HEMOGLOBIN: 14.2 G/DL (ref 13–17)
INR BLD: 1.1
MCH RBC QN AUTO: 31.8 PG (ref 25.2–33.5)
MCH RBC QN AUTO: 32 PG (ref 25.2–33.5)
MCHC RBC AUTO-ENTMCNC: 30.9 G/DL (ref 28.4–34.8)
MCHC RBC AUTO-ENTMCNC: 31.5 G/DL (ref 28.4–34.8)
MCV RBC AUTO: 101.6 FL (ref 82.6–102.9)
MCV RBC AUTO: 102.9 FL (ref 82.6–102.9)
NRBC AUTOMATED: 0 PER 100 WBC
NRBC AUTOMATED: 0 PER 100 WBC
PARTIAL THROMBOPLASTIN TIME: 25.2 SEC (ref 23–31)
PARTIAL THROMBOPLASTIN TIME: >120 SEC (ref 23–31)
PDW BLD-RTO: 16.5 % (ref 11.8–14.4)
PDW BLD-RTO: 16.5 % (ref 11.8–14.4)
PLATELET # BLD: 151 K/UL (ref 138–453)
PLATELET # BLD: 157 K/UL (ref 138–453)
PMV BLD AUTO: 11.6 FL (ref 8.1–13.5)
PMV BLD AUTO: 12 FL (ref 8.1–13.5)
POTASSIUM SERPL-SCNC: 4.7 MMOL/L (ref 3.7–5.3)
PROTHROMBIN TIME: 10.8 SEC (ref 9.7–11.6)
RBC # BLD: 4.37 M/UL (ref 4.21–5.77)
RBC # BLD: 4.47 M/UL (ref 4.21–5.77)
SODIUM BLD-SCNC: 140 MMOL/L (ref 135–144)
WBC # BLD: 7.1 K/UL (ref 3.5–11.3)
WBC # BLD: 7.5 K/UL (ref 3.5–11.3)

## 2019-04-26 PROCEDURE — 85610 PROTHROMBIN TIME: CPT

## 2019-04-26 PROCEDURE — 85730 THROMBOPLASTIN TIME PARTIAL: CPT

## 2019-04-26 PROCEDURE — 94640 AIRWAY INHALATION TREATMENT: CPT

## 2019-04-26 PROCEDURE — 97530 THERAPEUTIC ACTIVITIES: CPT

## 2019-04-26 PROCEDURE — 36415 COLL VENOUS BLD VENIPUNCTURE: CPT

## 2019-04-26 PROCEDURE — 6360000002 HC RX W HCPCS: Performed by: INTERNAL MEDICINE

## 2019-04-26 PROCEDURE — 85018 HEMOGLOBIN: CPT

## 2019-04-26 PROCEDURE — 97163 PT EVAL HIGH COMPLEX 45 MIN: CPT

## 2019-04-26 PROCEDURE — 6360000002 HC RX W HCPCS: Performed by: NURSE PRACTITIONER

## 2019-04-26 PROCEDURE — 85014 HEMATOCRIT: CPT

## 2019-04-26 PROCEDURE — 85027 COMPLETE CBC AUTOMATED: CPT

## 2019-04-26 PROCEDURE — 80048 BASIC METABOLIC PNL TOTAL CA: CPT

## 2019-04-26 PROCEDURE — 2060000000 HC ICU INTERMEDIATE R&B

## 2019-04-26 PROCEDURE — 97166 OT EVAL MOD COMPLEX 45 MIN: CPT

## 2019-04-26 PROCEDURE — 2580000003 HC RX 258: Performed by: INTERNAL MEDICINE

## 2019-04-26 PROCEDURE — 6370000000 HC RX 637 (ALT 250 FOR IP): Performed by: INTERNAL MEDICINE

## 2019-04-26 PROCEDURE — 2580000003 HC RX 258: Performed by: NURSE PRACTITIONER

## 2019-04-26 PROCEDURE — 99223 1ST HOSP IP/OBS HIGH 75: CPT | Performed by: INTERNAL MEDICINE

## 2019-04-26 PROCEDURE — 94761 N-INVAS EAR/PLS OXIMETRY MLT: CPT

## 2019-04-26 PROCEDURE — 2700000000 HC OXYGEN THERAPY PER DAY

## 2019-04-26 PROCEDURE — 97535 SELF CARE MNGMENT TRAINING: CPT

## 2019-04-26 RX ORDER — IPRATROPIUM BROMIDE AND ALBUTEROL SULFATE 2.5; .5 MG/3ML; MG/3ML
1 SOLUTION RESPIRATORY (INHALATION)
Status: DISCONTINUED | OUTPATIENT
Start: 2019-04-26 | End: 2019-05-03

## 2019-04-26 RX ORDER — HEPARIN SODIUM 10000 [USP'U]/100ML
18 INJECTION, SOLUTION INTRAVENOUS CONTINUOUS
Status: DISCONTINUED | OUTPATIENT
Start: 2019-04-26 | End: 2019-04-28

## 2019-04-26 RX ORDER — HEPARIN SODIUM 1000 [USP'U]/ML
40 INJECTION, SOLUTION INTRAVENOUS; SUBCUTANEOUS PRN
Status: DISCONTINUED | OUTPATIENT
Start: 2019-04-26 | End: 2019-04-28 | Stop reason: ALTCHOICE

## 2019-04-26 RX ORDER — SODIUM CHLORIDE 9 MG/ML
INJECTION, SOLUTION INTRAVENOUS CONTINUOUS
Status: DISCONTINUED | OUTPATIENT
Start: 2019-04-26 | End: 2019-04-28

## 2019-04-26 RX ORDER — HEPARIN SODIUM 1000 [USP'U]/ML
80 INJECTION, SOLUTION INTRAVENOUS; SUBCUTANEOUS PRN
Status: DISCONTINUED | OUTPATIENT
Start: 2019-04-26 | End: 2019-04-28 | Stop reason: ALTCHOICE

## 2019-04-26 RX ORDER — HEPARIN SODIUM 1000 [USP'U]/ML
80 INJECTION, SOLUTION INTRAVENOUS; SUBCUTANEOUS ONCE
Status: COMPLETED | OUTPATIENT
Start: 2019-04-26 | End: 2019-04-26

## 2019-04-26 RX ADMIN — IPRATROPIUM BROMIDE AND ALBUTEROL SULFATE 1 AMPULE: .5; 3 SOLUTION RESPIRATORY (INHALATION) at 19:34

## 2019-04-26 RX ADMIN — CHLORASEPTIC 1 SPRAY: 1.5 LIQUID ORAL at 08:30

## 2019-04-26 RX ADMIN — HEPARIN SODIUM AND DEXTROSE 18 UNITS/KG/HR: 10000; 5 INJECTION INTRAVENOUS at 09:43

## 2019-04-26 RX ADMIN — CHLORASEPTIC 1 SPRAY: 1.5 LIQUID ORAL at 15:18

## 2019-04-26 RX ADMIN — Medication 10 ML: at 09:43

## 2019-04-26 RX ADMIN — CHLORASEPTIC 1 SPRAY: 1.5 LIQUID ORAL at 03:47

## 2019-04-26 RX ADMIN — FENTANYL CITRATE 25 MCG: 50 INJECTION, SOLUTION INTRAMUSCULAR; INTRAVENOUS at 17:59

## 2019-04-26 RX ADMIN — HEPARIN SODIUM 8650 UNITS: 1000 INJECTION, SOLUTION INTRAVENOUS; SUBCUTANEOUS at 09:43

## 2019-04-26 RX ADMIN — FENTANYL CITRATE 25 MCG: 50 INJECTION, SOLUTION INTRAMUSCULAR; INTRAVENOUS at 03:46

## 2019-04-26 RX ADMIN — SODIUM CHLORIDE: 9 INJECTION, SOLUTION INTRAVENOUS at 15:20

## 2019-04-26 RX ADMIN — FENTANYL CITRATE 25 MCG: 50 INJECTION, SOLUTION INTRAMUSCULAR; INTRAVENOUS at 15:18

## 2019-04-26 RX ADMIN — IPRATROPIUM BROMIDE AND ALBUTEROL SULFATE 1 AMPULE: .5; 3 SOLUTION RESPIRATORY (INHALATION) at 15:20

## 2019-04-26 ASSESSMENT — PAIN SCALES - GENERAL
PAINLEVEL_OUTOF10: 0
PAINLEVEL_OUTOF10: 4
PAINLEVEL_OUTOF10: 4
PAINLEVEL_OUTOF10: 0
PAINLEVEL_OUTOF10: 5

## 2019-04-26 NOTE — PROGRESS NOTES
times due to NG tube/RN aware     Social/Functional History  Social/Functional History  Lives With: Alone(pt states he has been at SNF for reahab approx 2 months and has his own apt however might have to give it up )  Type of Home: Apartment(pt was at SNF PTA though)  Home Layout: Multi-level(pt states he is on the 9th floor )  Home Access: Level entry, Elevator  Bathroom Shower/Tub: Tub/Shower unit  Bathroom Toilet: Standard  Bathroom Equipment: (no DME )  Home Equipment: Quad cane  Receives Help From: Family(pt states he has a supportive brother and sister )  ADL Assistance: Independent  Homemaking Assistance: Independent  Homemaking Responsibilities: Yes  Ambulation Assistance: Independent(with quad cane )  Transfer Assistance: Independent  Active : No  Patient's  Info: Public transport   Mode of Transportation: Bus  Occupation: On disability  Type of occupation: railroad work   Leisure & Hobbies: going to [employer] and reading        Objective   Vision: Within Functional Limits  Hearing: Within functional limits    Orientation  Overall Orientation Status: Within Functional Limits  Observation/Palpation  Posture: Fair(with quad cane )  Observation: NG tube, LUE IV   Edema: none   Balance  Sitting Balance: Stand by assistance  Standing Balance: Minimal assistance  Standing Balance  Time: stand kellie < 30 seconds with quad cane   Activity: functional tasks   Functional Mobility  Functional - Mobility Device: (quad cane )  Activity: (bed to bedside chair and limited due to line restrictions )  Assist Level: Minimal assistance  Functional Mobility Comments: verbal instruction needed to slow down movements, utilizing quad cane when up to increase safety(pt declined initially and then agreeable to use) and awareness/assist with all lines   Toilet Transfers  Toilet Transfers Comments: N/T and pt states he used bed pan prior to arrival   ADL  Feeding: NPO  Grooming: Setup;Stand by assistance(to wash B LUE Strength: WFL  LUE Strength Comment: BUe strength grossly 4/5   RUE Strength  Gross RUE Strength: WFL                   Plan   Plan  Times per week: 3-5x/week 1x/day as kellie   Current Treatment Recommendations: Strengthening, Functional Mobility Training, Patient/Caregiver Education & Training, Home Management Training, Endurance Training, Equipment Evaluation, Education, & procurement, Balance Training, Neuromuscular Re-education, Safety Education & Training, Self-Care / ADL    G-Code  OT G-codes  Functional Assessment Tool Used: AM-PAC   Score: 17  OutComes Score                                                  AM-PAC Score        AM-PAC Inpatient Daily Activity Raw Score: 17  AM-PAC Inpatient ADL T-Scale Score : 37.26  ADL Inpatient CMS 0-100% Score: 50.11  ADL Inpatient CMS G-Code Modifier : CK    Goals  Short term goals  Time Frame for Short term goals: by discharge, pt to demo   Short term goal 1: increase in BUE strength by a 1/2 grade to assist with self care tasks and be I with BUE HEP with hand outs as needed. Short term goal 2: bed mob tech with rail as needed to MOD I. Short term goal 3: UB ADL to set up and LB ADL to min assist with use of AD/AE as needed. Short term goal 4: toileting tasks with use of AD/grab bar as needed to min assist.  Short term goal 5: ADL transfers and functional mob with AD as needed to SBA level. Long term goals  Long term goal 1: Pt to increase balance to SUP with AD and kellie > 8 mins to reduce fall risk. Long term goal 2: Pt to be I with EC/WS and fall prevention tech with hand outs as needed. Patient Goals   Patient goals : to get well and back home if I can!        Therapy Time   Individual Concurrent Group Co-treatment   Time In 0945(plus 10 min chart review )         Time Out 1028         Minutes [address] [place] [address]

## 2019-04-26 NOTE — H&P
[facility]    HISTORY AND PHYSICAL EXAMINATION            Date:   4/26/2019  Patient name:  Lila Bedolla  Date of admission:  4/25/2019  4:57 PM  MRN:   [id number]  Account:  [de-identified]  YOB: 1955  PCP:    Meena Castellon MD  Room:   [address] [place] [address]  Code Status:    Full Code    Chief Complaint:     Chief Complaint   Patient presents with    Abdominal Pain    Dizziness     onset today       History Obtained From:     patient, electronic medical record    History of Present Illness: The patient is a 61 y.o. Non-/non  male who presents with Abdominal Pain and Dizziness (onset today)   and he is admitted to the hospital for the management of  SBO. He has the following significant co-morbidities: HTN, Chronic AFib, Chronic combined systolic/diastolic heart failure, S/P AICD Placement, Depression, GUME, Adenocarcinoma of small bowel, Colon cancer. He presented with the following:     He presented to the ED with abdominal pain and shortness of breath. The pain was sudden in onset, localized to the epigastric region. HE denied any constipation and was passing gas and having normal BMs. He had associated shortness of breath. In the ED, imaging was concerning for SBO vs ileus. NG tube was placed. CT Chest showed small subsegmental PE. Patient was admitted for further care.        Past Medical History:     Past Medical History:   Diagnosis Date    Asthma     Cancer ([address]     small intestine    CHF (congestive heart failure) (HCC)     COPD exacerbation (HCC)     GERD (gastroesophageal reflux disease)     History of blood transfusion     Hyperlipidemia     Hypertension     Neuropathy     Obesity (BMI 30-39.9) 2/25/2016    Psychiatric problem     SBO (small bowel obstruction) ([address]     Severe mitral regurgitation 10/31/12    minimally invasive complex mitral valve repair     Severe tricuspid regurgitation 10/31/12    tricuspid repair under CPB        Past Surgical History:     Past Surgical History:   Procedure Laterality Date    ABDOMEN SURGERY      small bowel.  MITRAL VALVE SURGERY  10/31/12    minimally invasive complex mitral valve repair    OTHER SURGICAL HISTORY  11/2/12    successful weaning of the ECMO and decannulation    PACEMAKER PLACEMENT Left 02/2017    TRICUSPID VALVE SURGERY  10/31/12    tricuspid valve repair under CPB    TUNNELED VENOUS PORT PLACEMENT Left 6/18/2015    UPPER GASTROINTESTINAL ENDOSCOPY  3-3-15    erosive antral gastritis, sm. antral polypectomy        Medications Prior to Admission:     Prior to Admission medications    Medication Sig Start Date End Date Taking?  Authorizing Provider   pantoprazole (PROTONIX) 40 MG tablet Take 40 mg by mouth daily   Yes Historical Provider, MD   ferrous sulfate 325 (65 Fe) MG tablet Take 325 mg by mouth daily (with breakfast)   Yes Historical Provider, MD   vitamin B-6 (PYRIDOXINE) 25 MG tablet Take 25 mg by mouth daily   Yes Historical Provider, MD   pyridoxine (RA VITAMIN B-6) 50 MG tablet Take 0.5 tablets by mouth daily 10/4/18 10/4/19 Yes Luke Anne MD   dibucaine ([employer] HEMORRHO[employer] ANALGESIC) 1 % OINT rectal ointment Place rectally 4 times daily as needed for Pain 8/30/18  Yes Abby Adair MD   docusate sodium (COLACE) 100 MG capsule Take 1 capsule by mouth as needed for Constipation 8/30/18  Yes Maribel Zeng MD   DULoxetine (CYMBALTA) 20 MG extended release capsule TAKE 1 CAPSULE DAILY 6/21/18  Yes Nicolas Conklin MD   atorvastatin (LIPITOR) 40 MG tablet TAKE 1 TABLET AT BEDTIME 6/15/18  Yes Nicolas Conklin MD   QUEtiapine (SEROQUEL) 25 MG tablet TAKE 1 TABLET TWICE A DAY 6/15/18  Yes Nicolas Conklin MD   furosemide (LASIX) 40 MG tablet TAKE 1 TABLET DAILY 6/15/18  Yes Nicolas Conklin MD   spironolactone (ALDACTONE) 25 MG tablet TAKE 1 TABLET DAILY 6/15/18  Yes Nicolas Conklin MD   omeprazole [facility]) 20 MG delayed release capsule TAKE 1 CAPSULE DAILY 6/15/18  Yes Francisca Bourne, MD   ASPIR-LOW 81 MG EC tablet TAKE 1 TABLET DAILY 6/15/18  Yes Francisca Bourne, MD [facility]) 8.6 MG TABS tablet TAKE 1 TABLET DAILY 6/15/18  Yes Francisca Bourne MD   metoprolol succinate (TOPROL XL) 100 MG extended release tablet TAKE 1 TABLET IN THE MORNING 2/3/18  Yes Francisca Bourne MD   digoxin (LANOXIN) 250 MCG tablet TAKE 1 TABLET DAILY 9/12/17  Yes Francisca Bourne MD   amiodarone (PACERONE) 400 MG tablet Take 1 tablet by mouth 2 times daily 6/7/17  Yes William Segal,    nitroGLYCERIN (NITROSTAT) 0.4 MG SL tablet up to max of 3 total doses. If no relief after 1 dose, call 911. 3/1/17  Yes Mena Laurief, APRN - CNP   pregabalin (LYRICA) 25 MG capsule Take 1 capsule by mouth 2 times daily for 30 days. . 11/5/18 3/19/19  Tab Villalba MD        Allergies:     Morphine    Social History:     Tobacco:    reports that he has never smoked. He has never used smokeless tobacco.  Alcohol:      reports that he does not drink alcohol. Drug Use:  reports that he does not use drugs. Family History:     Family History   Problem Relation Age of Onset    Heart Disease Father        Review of Systems:     Negative except for those highlighted:    CONSTITUTIONAL:  fevers, chills, sweats, fatigue, weight loss, generalized weakness  HEENT:  Vision changes, hearing changes, runny nose, throat pain  RESPIRATORY:  shortness of breath, cough, congestion, wheezing.   CARDIOVASCULAR:  chest pain, palpitations  GASTROINTESTINAL:  nausea, vomiting, diarrhea, constipation, change in bowel habits, abdominal pain   GENITOURINARY:  difficulty of urination, burning with urination, frequency   INTEGUMENT:  rash, skin lesions, easy bruising   HEMATOLOGIC/LYMPHATIC:  swelling/edema   ALLERGIC/IMMUNOLOGIC:  urticaria , itching  ENDOCRINE:  increase in drinking, increase in urination, hot or cold intolerance  MUSCULOSKELETAL:  joint pains, muscle aches, swelling of joints  NEUROLOGICAL:  headaches, dizziness, lightheadedness, numbness, pain, tingling extremities  BEHAVIOR/PSYCH:  depression, anxiety    Physical Exam:   /70   Pulse (!) 48   Temp 98.2 °F (36.8 °C) (Oral)   Resp 16   Ht 5' 9\" (1.753 m)   Wt 238 lb 6.4 oz (108.1 kg)   SpO2 98%   BMI 35.21 kg/m²   Temp (24hrs), Av.9 °F (36.6 °C), Min:97.3 °F (36.3 °C), Max:98.4 °F (36.9 °C)    No results for input(s): POCGLU in the last 72 hours. Intake/Output Summary (Last 24 hours) at 2019 1422  Last data filed at 2019 0754  Gross per 24 hour   Intake --   Output 400 ml   Net -400 ml       General Appearance:  alert, uncomfortable appearing gentleman  Mental status: oriented to person, place, and time with normal affect  Head:  normocephalic, atraumatic. Eye: no icterus, redness, pupils equal and reactive, extraocular eye movements intact, conjunctiva clear  Ear: normal external ear, no discharge, hearing intact  Nose:  no drainage noted  Mouth: mucous membranes moist  Neck: supple, no carotid bruits, thyroid not palpable  Lungs: Bilateral equal air entry, clear to ausculation, no wheezing, rales or rhonchi, normal effort  Cardiovascular: normal rate, regular rhythm, no murmur, gallop, rub.   Abdomen: Mildly distended with TTP epigastrium  Neurologic: There are no new focal motor or sensory deficits, normal muscle tone and bulk, no abnormal sensation, normal speech, cranial nerves II through XII grossly intact  Skin: No gross lesions, rashes, bruising or bleeding on exposed skin area  Extremities:  peripheral pulses palpable, no pedal edema or calf pain with palpation  Psych: normal affect    Investigations:      Laboratory Testing:  Recent Results (from the past 24 hour(s))   EKG 12 Lead    Collection Time: 19  4:58 PM   Result Value Ref Range    Ventricular Rate 48 BPM    Atrial Rate 48 BPM    P-R Interval 154 ms    QRS Duration 184 ms    Q-T Interval 544 ms    QTc Calculation ([place]oshua) 485 ms    P Axis 2 degrees    R Axis -166 degrees    T Axis 48 degrees   CBC Auto Differential    Collection Time: 04/25/19  5:22 PM   Result Value Ref Range    WBC 7.9 3.5 - 11.0 k/uL    RBC 4.68 4.5 - 5.9 m/uL    Hemoglobin 14.9 13.5 - 17.5 g/dL    Hematocrit 45.7 41 - 53 %    MCV 97.7 80 - 100 fL    MCH 31.9 26 - 34 pg    MCHC 32.7 31 - 37 g/dL    RDW 18.9 (H) 11.5 - 14.5 %    Platelets 016 805 - 947 k/uL    MPV 10.5 6.0 - 12.0 fL    NRBC Automated NOT REPORTED per 100 WBC    Differential Type NOT REPORTED     Seg Neutrophils 78 (H) 36 - 66 %    Lymphocytes 9 (L) 24 - 44 %    Monocytes 11 (H) 1 - 7 %    Eosinophils % 2 1 - 4 %    Basophils 0 0 - 2 %    Immature Granulocytes NOT REPORTED 0 %    Segs Absolute 6.20 1.8 - 7.7 k/uL    Absolute Lymph # 0.70 (L) 1.0 - 4.8 k/uL    Absolute Mono # 0.90 (H) 0.2 - 0.8 k/uL    Absolute Eos # 0.10 0.0 - 0.4 k/uL    Basophils # 0.00 0.0 - 0.2 k/uL    Absolute Immature Granulocyte NOT REPORTED 0.00 - 0.30 k/uL    WBC Morphology NOT REPORTED     RBC Morphology NOT REPORTED     Platelet Estimate NOT REPORTED    Comprehensive Metabolic Panel w/ Reflex to MG    Collection Time: 04/25/19  5:22 PM   Result Value Ref Range    Glucose 124 (H) 70 - 99 mg/dL    BUN 18 8 - 23 mg/dL    CREATININE 1.35 (H) 0.70 - 1.20 mg/dL    Bun/Cre Ratio 13 9 - 20    Calcium 9.7 8.6 - 10.4 mg/dL    Sodium 140 135 - 144 mmol/L    Potassium 4.6 3.7 - 5.3 mmol/L    Chloride 103 98 - 107 mmol/L    CO2 24 20 - 31 mmol/L    Anion Gap 13 9 - 17 mmol/L    Alkaline Phosphatase 69 40 - 129 U/L    ALT 99 (H) 5 - 41 U/L    AST 59 (H) <40 U/L    Total Bilirubin 0.33 0.3 - 1.2 mg/dL    Total Protein 8.1 6.4 - 8.3 g/dL    Alb 4.1 3.5 - 5.2 g/dL    Albumin/Globulin Ratio NOT REPORTED 1.0 - 2.5    GFR Non-African American 53 (L) >60 mL/min    GFR African American >60 >60 mL/min    GFR Comment          GFR Staging NOT REPORTED    Lipase    Collection Time: 04/25/19  5:22 PM   Result Value Ref Range Lipase 31 13 - 60 U/L   Troponin    Collection Time: 04/25/19  5:22 PM   Result Value Ref Range    Troponin, High Sensitivity 9 0 - 22 ng/L    Troponin T NOT REPORTED <0.03 ng/mL    Troponin Interp NOT REPORTED    Brain Natriuretic Peptide    Collection Time: 04/25/19  5:22 PM   Result Value Ref Range    Pro- (H) <300 pg/mL    BNP Interpretation Pro-BNP Reference Range:    Protime-INR    Collection Time: 04/25/19  5:22 PM   Result Value Ref Range    Protime 10.9 9.7 - 11.6 sec    INR 1.1    APTT    Collection Time: 04/25/19  5:22 PM   Result Value Ref Range    PTT 21.2 (L) 23 - 31 sec   Lactic Acid    Collection Time: 04/25/19  5:22 PM   Result Value Ref Range    Lactic Acid 1.3 0.5 - 2.2 mmol/L   DIGOXIN LEVEL    Collection Time: 04/25/19 10:29 PM   Result Value Ref Range    Digoxin Lvl <0.3 (L) 0.5 - 2.0 ng/mL    Digoxin Dose Amount NOT REPORTED     Digoxin Date Last Dose NOT REPORTED     Digoxin Dose Time NOT REPORTED    Basic Metabolic Panel w/ Reflex to MG    Collection Time: 04/26/19  5:38 AM   Result Value Ref Range    Glucose 106 (H) 70 - 99 mg/dL    BUN 19 8 - 23 mg/dL    CREATININE 1.18 0.70 - 1.20 mg/dL    Bun/Cre Ratio 16 9 - 20    Calcium 9.4 8.6 - 10.4 mg/dL    Sodium 140 135 - 144 mmol/L    Potassium 4.7 3.7 - 5.3 mmol/L    Chloride 104 98 - 107 mmol/L    CO2 24 20 - 31 mmol/L    Anion Gap 12 9 - 17 mmol/L    GFR Non-African American >60 >60 mL/min    GFR African American >60 >60 mL/min    GFR Comment          GFR Staging NOT REPORTED    CBC    Collection Time: 04/26/19  5:38 AM   Result Value Ref Range    WBC 7.5 3.5 - 11.3 k/uL    RBC 4.37 4.21 - 5.77 m/uL    Hemoglobin 14.0 13.0 - 17.0 g/dL    Hematocrit 44.4 40.7 - 50.3 %    .6 82.6 - 102.9 fL    MCH 32.0 25.2 - 33.5 pg    MCHC 31.5 28.4 - 34.8 g/dL    RDW 16.5 (H) 11.8 - 14.4 %    Platelets 336 533 - 485 k/uL    MPV 11.6 8.1 - 13.5 fL    NRBC Automated 0.0 0.0 per 100 WBC   Protime-INR    Collection Time: 04/26/19  5:38 AM   Result Value Ref Range    Protime 10.8 9.7 - 11.6 sec    INR 1.1    CBC    Collection Time: 04/26/19  8:44 AM   Result Value Ref Range    WBC 7.1 3.5 - 11.3 k/uL    RBC 4.47 4.21 - 5.77 m/uL    Hemoglobin 14.2 13.0 - 17.0 g/dL    Hematocrit 46.0 40.7 - 50.3 %    .9 82.6 - 102.9 fL    MCH 31.8 25.2 - 33.5 pg    MCHC 30.9 28.4 - 34.8 g/dL    RDW 16.5 (H) 11.8 - 14.4 %    Platelets 003 133 - 616 k/uL    MPV 12.0 8.1 - 13.5 fL    NRBC Automated 0.0 0.0 per 100 WBC   APTT    Collection Time: 04/26/19  8:44 AM   Result Value Ref Range    PTT 25.2 23 - 31 sec       Imaging/Diagnostics:        Assessment :      Primary Problem  SBO (small bowel obstruction) ([facility]    Active Hospital Problems    Diagnosis Date Noted    SBO (small bowel obstruction) ([facility] [K56.609] 04/25/2019    Major depressive disorder, recurrent, in full remission ([facility] [F33.42] 08/30/2018    Chronic combined systolic and diastolic congestive heart failure ([facility] [I50.42] 06/22/2017    Essential hypertension [I10] 05/05/2017    Chronic atrial fibrillation (HCC) [I48.2]     GUME (obstructive sleep apnea) [G47.33]     Pulmonary embolism without acute cor pulmonale ([facility] [I26.99] 02/23/2017    Obesity (BMI 30-39. 9) [E66.9]     Small bowel cancer ([facility] [C17.9]        Plan:     Patient status Admit as inpatient in the  Progressive Unit/Step down    Principal Problem:    SBO (small bowel obstruction) ([facility]  Active Problems:    Small bowel cancer (HCC)    Obesity (BMI 30-39. 9)    Pulmonary embolism without acute cor pulmonale (HCC)    GUME (obstructive sleep apnea)    Chronic atrial fibrillation (HCC)    Essential hypertension    Chronic combined systolic and diastolic congestive heart failure (HCC)    Major depressive disorder, recurrent, in full remission ([address]  Resolved Problems:    * No resolved hospital problems. *      1. Surgery on board. NG tube clamped. Plan for possible removal tomorrow. 2. Started on heparin gtt. Pulmonology consulted  3.  NPO for now  4. Serial abdominal exams  5. Monitor BMs  6. Pain Mx. Avoid narcotics. 7. Repeat labs in AM  8. GI PPx  9. DVT PPX  10. PT/OT as needed  11. Neb therapy. IV Fluids: ,    Nutrition:  Diet NPO Effective Now      Consultations:   IP CONSULT TO INTERNAL MEDICINE  IP CONSULT TO GENERAL SURGERY  IP CONSULT TO PULMONOLOGY    Patient is admitted as inpatient status because of co-morbidities listed above, severity of signs and symptoms as outlined, requirement for current medical therapies and most importantly because of direct risk to patient if care not provided in a hospital setting.       Jennifer Waldron MD  4/26/2019  2:22 PM    Copy sent to Dr. Hilary Pedroza MD

## 2019-04-26 NOTE — CARE COORDINATION
SW spoke with Mckenzie Oconnell with [facility] regarding pt status at the facility, pt is a long-term resident and is welcome to return at discharge.

## 2019-04-26 NOTE — CONSULTS
Pulmonary Medicine and [address] Pascal MD      Patient - Carlos Alberto Locke   MRN -  [id number]   Guerda # - [de-identified]   - 1955      Date of Admission -  2019  4:57 PM  Date of evaluation -  2019  Room - [address] [place] [address]   Demetra Valverde MD Primary Care Physician - Razia Broussard MD     Reason for Consult    Pulmonary Embolism    Assessment   · Pulmonary Embolism, recurrent   · Pulmonary infiltrates, atelectasis vs pneumonia  · Right pleural effusion  · Obesity/Obstructive sleep apnea ~ wears O2 at bedtime  · Chronic combined diastolic/systolic heart failure ~ EF 25-30%  · Pulmonary Hypertension ~ RVSP 41  · Small bowel obstruction vs Ileus  · Adenocarcinoma of small bowel, s/p bowel resection and chemo in   ·  PET 3/18/19 with increased uptake within gastric antrum & transverse colon  · HLD, AFIB, upper GI bleed s/p microcoil embolization, depression, GUME    Recommendations   · Continue Heparin gtt, closely monitor for bleeding  · X-ray chest in am  · DUO nebs QID and prn  · Watch H&H  · Continue NG tube as per surgery   · Labs: CBC and BMP in am  · 2 liters/min via nasal cannula  · Watch off of antibiotics at this time  · Incentive spirometry every hour while awake  · Will follow with you    Problem List      Patient Active Problem List   Diagnosis    Severe mitral regurgitation    Severe tricuspid regurgitation    Anemia due to GI blood loss    Abdominal pain    Piles (hemorrhoids)    Chest pain    HTN (hypertension)    HLD (hyperlipidemia)    Anemia    Transfusion of blood during current hospitalization    Dyspnea    Small bowel cancer ([address]    Neuropathic Pain Left leg    Essential hypertension    Obesity (BMI 30-39. 9)    Peripheral neuropathic pain    Atrial fibrillation with rapid ventricular response (HCC)    S/P cardiac cath - 10/3/16 - Dr. Nereyda Mera for cardioversion procedure 10/3/16-Dr. Sarah Encarnacion Pulmonary Past Surgical History        Procedure Laterality Date    ABDOMEN SURGERY      small bowel.     MITRAL VALVE SURGERY  10/31/12    minimally invasive complex mitral valve repair    OTHER SURGICAL HISTORY  11/2/12    successful weaning of the ECMO and decannulation    PACEMAKER PLACEMENT Left 02/2017    TRICUSPID VALVE SURGERY  10/31/12    tricuspid valve repair under CPB    TUNNELED VENOUS PORT PLACEMENT Left 6/18/2015    UPPER GASTROINTESTINAL ENDOSCOPY  3-3-15    erosive antral gastritis, sm. antral polypectomy       Meds    Current Medications    amiodarone  400 mg Oral BID    aspirin  81 mg Oral Daily    atorvastatin  40 mg Oral Nightly    digoxin  250 mcg Oral Daily    DULoxetine  20 mg Oral Daily    ferrous sulfate  325 mg Oral Daily with breakfast    furosemide  40 mg Oral Daily    metoprolol succinate  100 mg Oral Daily    pantoprazole  40 mg Oral QAM AC    pyridoxine  25 mg Oral Daily    QUEtiapine  25 mg Oral BID    senna  1 tablet Oral Daily    spironolactone  25 mg Oral Daily    sodium chloride flush  10 mL Intravenous 2 times per day     heparin (porcine), heparin (porcine), sodium chloride flush, dibucaine, nitroGLYCERIN, sodium chloride flush, potassium chloride **OR** potassium alternative oral replacement **OR** potassium chloride, magnesium sulfate, magnesium hydroxide, nicotine, acetaminophen, ondansetron **OR** ondansetron, phenol, fentanNYL  IV Drips/Infusions   heparin (porcine) 18 Units/kg/hr (04/26/19 0143)     Home Medications  Medications Prior to Admission: pantoprazole (PROTONIX) 40 MG tablet, Take 40 mg by mouth daily  ferrous sulfate 325 (65 Fe) MG tablet, Take 325 mg by mouth daily (with breakfast)  vitamin B-6 (PYRIDOXINE) 25 MG tablet, Take 25 mg by mouth daily  pyridoxine (RA VITAMIN B-6) 50 MG tablet, Take 0.5 tablets by mouth daily  dibucaine ([employer] HEMORRHO[employer] ANALGESIC) 1 % OINT rectal ointment, Place rectally 4 times daily as needed for Pain  docusate sodium (COLACE) 100 MG capsule, Take 1 capsule by mouth as needed for Constipation  DULoxetine (CYMBALTA) 20 MG extended release capsule, TAKE 1 CAPSULE DAILY  atorvastatin (LIPITOR) 40 MG tablet, TAKE 1 TABLET AT BEDTIME  QUEtiapine (SEROQUEL) 25 MG tablet, TAKE 1 TABLET TWICE A DAY  furosemide (LASIX) 40 MG tablet, TAKE 1 TABLET DAILY  spironolactone (ALDACTONE) 25 MG tablet, TAKE 1 TABLET DAILY  omeprazole (PRILOSEC) 20 MG delayed release capsule, TAKE 1 CAPSULE DAILY  ASPIR-LOW 81 MG EC tablet, TAKE 1 TABLET DAILY  senna (SENOKOT) 8.6 MG TABS tablet, TAKE 1 TABLET DAILY  metoprolol succinate (TOPROL XL) 100 MG extended release tablet, TAKE 1 TABLET IN THE MORNING  digoxin (LANOXIN) 250 MCG tablet, TAKE 1 TABLET DAILY  amiodarone (PACERONE) 400 MG tablet, Take 1 tablet by mouth 2 times daily  nitroGLYCERIN (NITROSTAT) 0.4 MG SL tablet, up to max of 3 total doses. If no relief after 1 dose, call 911. pregabalin (LYRICA) 25 MG capsule, Take 1 capsule by mouth 2 times daily for 30 days. .    Allergies    Morphine  Social History     Social History     Tobacco Use    Smoking status: Never Smoker    Smokeless tobacco: Never Used   Substance Use Topics    Alcohol use: No     Family History          Problem Relation Age of Onset    Heart Disease Father      ROS - 11 systems   General Denies any fever or chills  HEENT Denies any diplopia, tinnitus or vertigo  Resp Denies any shortness of breath, cough or wheezing  Cardiac Denies any chest pain, palpitations, claudication or edema  GI Denies any melena, hematochezia, hematemesis or pyrosis   Denies any frequency, urgency, hesitancy or incontinence  Heme Denies bruising or bleeding easily  Endocrine Denies any history of diabetes or thyroid disease  Neuro Denies any focal motor or sensory deficits  Psychiatric Denies anxiety, depression, suicidal ideation  Skin Denies rashes, itching, open sores    Vitals     height is 5' 9\" (1.753 m) and weight is 238 lb 6.4 oz (108.1 kg). His oral temperature is 98.4 °F (36.9 °C). His blood pressure is 112/68 and his pulse is 50. His respiration is 20 and oxygen saturation is 99%. Body mass index is 35.21 kg/m². I/O        Intake/Output Summary (Last 24 hours) at 4/26/2019 1045  Last data filed at 4/26/2019 0754  Gross per 24 hour   Intake --   Output 400 ml   Net -400 ml     I/O last 3 completed shifts:  In: -   Out: 200 [Urine:200]   Patient Vitals for the past 96 hrs (Last 3 readings):   Weight   04/26/19 0423 238 lb 6.4 oz (108.1 kg)   04/25/19 1622 245 lb (111.1 kg)     Exam   General Appearance  Awake, alert, oriented, sitting up in chair, in no acute distress  HEENT - Head is normocephalic, atraumatic. Pupil reactive to light  Neck - Supple, trachea midline and straight  Lungs - no wheezes, rales or rhonchi, moderate air exchange  Cardiovascular - Heart sounds are normal.  Regular rhythm normal rate without murmur, gallop or rub. Abdomen - Soft, nontender, nondistended, no masses or organomegaly  Neurologic - CN II-XII are grossly intact.  There are no focal motor or sensory deficits  Skin - No bruising or bleeding  Extremities - No cyanosis or clubbing, positive edema    Labs  - Old records and notes have been reviewed in [facility]   CBC     Lab Results   Component Value Date    WBC 7.1 04/26/2019    RBC 4.47 04/26/2019    HGB 14.2 04/26/2019    HCT 46.0 04/26/2019     04/26/2019    .9 04/26/2019    MCH 31.8 04/26/2019    MCHC 30.9 04/26/2019    RDW 16.5 04/26/2019    NRBC 2 11/02/2012    LYMPHOPCT 9 04/25/2019    MONOPCT 11 04/25/2019    BASOPCT 0 04/25/2019    MONOSABS 0.90 04/25/2019    LYMPHSABS 0.70 04/25/2019    EOSABS 0.10 04/25/2019    BASOSABS 0.00 04/25/2019    DIFFTYPE NOT REPORTED 04/25/2019     BMP   Lab Results   Component Value Date     04/26/2019    K 4.7 04/26/2019     04/26/2019    CO2 24 04/26/2019    BUN 19 04/26/2019    CREATININE 1.18 04/26/2019    GLUCOSE 106 04/26/2019    CALCIUM

## 2019-04-26 NOTE — PROGRESS NOTES
Physical Therapy    Facility/Department: [facility]  Initial Assessment    NAME: Nishant Jon  : 1955  MRN: [id number]    Date of Service: 2019    Discharge Recommendations:  Subacute/Skilled Nursing Facility        Assessment   Body structures, Functions, Activity limitations: Decreased balance;Decreased endurance;Decreased functional mobility   Prognosis: Good  Decision Making: High Complexity  REQUIRES PT FOLLOW UP: Yes  Activity Tolerance  Activity Tolerance: Patient limited by endurance       Patient Diagnosis(es): The primary encounter diagnosis was SBO (small bowel obstruction) ([facility] A diagnosis of Other acute pulmonary embolism without acute cor pulmonale (HCC) was also pertinent to this visit. has a past medical history of Asthma, Cancer ([address] CHF (congestive heart failure) ([address] COPD exacerbation ([facility] [address] GERD (gastroesophageal reflux disease), History of blood transfusion, Hyperlipidemia, Hypertension, Neuropathy, Obesity (BMI 30-39.9), Psychiatric problem, SBO (small bowel obstruction) ([facility] [address] Severe mitral regurgitation, and Severe tricuspid regurgitation. has a past surgical history that includes other surgical history (12); Mitral valve surgery (10/31/12); Tricuspid valve surgery (10/31/12); Upper gastrointestinal endoscopy (3-3-15); Abdomen surgery; Tunneled venous port placement (Left, 2015); and pacemaker placement (Left, 2017). Restrictions  Restrictions/Precautions  Restrictions/Precautions: Fall Risk, General Precautions  Required Braces or Orthoses?: No  Implants present? : Pacemaker(defibrillator )  Position Activity Restriction  Other position/activity restrictions: NPO, NG tube, 2 L O2 per NC, bed alarm, up as kellie, per RN pt is incontinent.  LUE IV   Vision/Hearing        Subjective  General  Chart Reviewed: Yes  Patient assessed for rehabilitation services?: Yes  Response To Previous Treatment: Not applicable  Family / Caregiver Present: No  Follows Commands: Within Functional Limits  General Comment  Comments: OK for PT per Lydia Diamond RN  Pain Screening  Patient Currently in Pain: Denies  Vital Signs  Patient Currently in Pain: Denies       Orientation  Orientation  Overall Orientation Status: Within Normal Limits  Social/Functional History  Social/Functional History  Lives With: Alone(pt states he has been at SNF for reahab approx 2 months and has his own apt however might have to give it up )  Type of Home: Apartment(pt was at SNF PTA )  Home Layout: Multi-level(pt states he is on the 9th floor )  Home Access: Level entry, Elevator  Receives Help From: Family(pt states he has a supporitve brother and sister )  ADL Assistance: Independent  Homemaking Assistance: Independent  Homemaking Responsibilities: Yes  Ambulation Assistance: Independent(with quad cane )  Transfer Assistance: Independent  Active : No  Patient's  Info: Public transport   Mode of Transportation: Bus  Occupation: On disability  Type of occupation: railroad work   Leisure & Hobbies: going to [employer] and reading   Cognition        Objective          AROM RLE (degrees)  RLE AROM: WNL  AROM LLE (degrees)  LLE AROM : WNL  AROM RUE (degrees)  RUE General AROM: See OT eval for B UE ROM  Strength RLE  Strength RLE: WFL  Comment: B LE's 5/5  Strength LLE  Strength LLE: WFL  Strength RUE  Comment: See OT eval for B UE MMT  Tone RLE  RLE Tone: Normotonic  Tone LLE  LLE Tone: Normotonic  Motor Control  Gross Motor?: WNL  Sensation  Overall Sensation Status: Impaired(B fingertips and L ankle/foot)  Bed mobility  Rolling to Left: Independent  Rolling to Right: Independent  Scooting: Stand by assistance  Transfers  Sit to Stand: Minimal Assistance  Stand to sit: Minimal Assistance  Stand Pivot Transfers: Minimal Assistance  Ambulation  Ambulation?: Yes  Ambulation 1  Surface: level tile  Other Apparatus: O2(NG tube, and 2L O2)  Assistance: Minimal assistance  Distance: 2 steps forward and back,limited by lines  Comments: BTB  Stairs/Curb  Stairs?: No     Balance  Posture: Fair  Sitting - Static: Good  Sitting - Dynamic: Good  Standing - Static: Good  Standing - Dynamic: Good;-        Plan   Plan  Times per week: 1-2x/day,6-7 days/week  Current Treatment Recommendations: Balance Training, Transfer Training, Endurance Training, Gait Training, Home Exercise Program  Safety Devices  Type of devices: Gait belt, Left in bed, Call light within reach, Bed alarm in place, Nurse notified  Restraints  Initially in place: No    G-Code       OutComes Score                                                  AM-PAC Score  AM-PAC Inpatient Mobility Raw Score : 19  AM-PAC Inpatient T-Scale Score : 45.44  Mobility Inpatient CMS 0-100% Score: 41.77  Mobility Inpatient CMS G-Code Modifier : CK          Goals  Short term goals  Time Frame for Short term goals: 12 treatments  Short term goal 1: Independent transfers  Short term goal 2:  Independent ambulation 200' X 1  Short term goal 3: Good dynamic standing balance  Short term goal 4: Tolerate 30 min ther act  Patient Goals   Patient goals : Back to SNF       Therapy Time   Individual Concurrent Group Co-treatment   Time In [address] [place] [address] [address]         Time Out 0901         Minutes [address] Abigail Putnam

## 2019-04-26 NOTE — CONSULTS
General Surgery Consult      Pt Name: Nishant Jon  MRN: [id number]  [place]: 1955  Date of evaluation: 4/26/2019  Primary Care Physician: Danyel Zamudio MD   Patient evaluated at the request of  Dr. Alondra Hi  Reason for evaluation: abd pain    SUBJECTIVE:   History of Chief Complaint:    Nishant Jon is a 61 y.o. male who presents with severe crampy abdominal pain associated with nausea patient had been vomiting for the past 2 days. Patient denies any history of similar symptomatology in the past his past history does reveal history of colon cancer. Past Medical History   has a past medical history of Asthma, Cancer ([address] CHF (congestive heart failure) ([address] COPD exacerbation ([address] GERD (gastroesophageal reflux disease), History of blood transfusion, Hyperlipidemia, Hypertension, Neuropathy, Obesity (BMI 30-39.9), Psychiatric problem, SBO (small bowel obstruction) ([address] Severe mitral regurgitation, and Severe tricuspid regurgitation. Past Surgical History   has a past surgical history that includes other surgical history (11/2/12); Mitral valve surgery (10/31/12); Tricuspid valve surgery (10/31/12); Upper gastrointestinal endoscopy (3-3-15); Abdomen surgery; Tunneled venous port placement (Left, 6/18/2015); and pacemaker placement (Left, 02/2017). Medications  Prior to Admission medications    Medication Sig Start Date End Date Taking?  Authorizing Provider   pantoprazole (PROTONIX) 40 MG tablet Take 40 mg by mouth daily   Yes Historical Provider, MD   ferrous sulfate 325 (65 Fe) MG tablet Take 325 mg by mouth daily (with breakfast)   Yes Historical Provider, MD   vitamin B-6 (PYRIDOXINE) 25 MG tablet Take 25 mg by mouth daily   Yes Historical Provider, MD   pyridoxine (RA VITAMIN B-6) 50 MG tablet Take 0.5 tablets by mouth daily 10/4/18 10/4/19 Yes Danyel Zamudio MD   dibucaine ([employer]) 1 % OINT rectal ointment Place rectally 4 times daily as needed for Pain not clinically indicated, declined by patient  NEUROLOGIC: Awake, alert, oriented to name, place and time. Cranial nerves II-XII are grossly intact. Motor is 5 out of 5 bilaterally. Cerebellar finger to nose, heel to shin intact. Sensory is intact. Babinski down going, Romberg negative, and gait is normal.  There are no focalizing motor or sensory deficits. CN II-XII are grossly intact.   EXTREMITIES: no cyanosis, no clubbing, no edema and Abdon's sign negative bilaterally    LABS:   CBC with Differential:    Lab Results   Component Value Date    WBC 7.1 04/26/2019    RBC 4.47 04/26/2019    HGB 14.2 04/26/2019    HCT 46.0 04/26/2019     04/26/2019    .9 04/26/2019    MCH 31.8 04/26/2019    MCHC 30.9 04/26/2019    RDW 16.5 04/26/2019    NRBC 2 11/02/2012    LYMPHOPCT 9 04/25/2019    MONOPCT 11 04/25/2019    BASOPCT 0 04/25/2019    MONOSABS 0.90 04/25/2019    LYMPHSABS 0.70 04/25/2019    EOSABS 0.10 04/25/2019    BASOSABS 0.00 04/25/2019    DIFFTYPE NOT REPORTED 04/25/2019     BMP:    Lab Results   Component Value Date     04/26/2019    K 4.7 04/26/2019     04/26/2019    CO2 24 04/26/2019    BUN 19 04/26/2019    LABALBU 4.1 04/25/2019    CREATININE 1.18 04/26/2019    CALCIUM 9.4 04/26/2019    GFRAA >60 04/26/2019    LABGLOM >60 04/26/2019    GLUCOSE 106 04/26/2019     Hepatic Function Panel:    Lab Results   Component Value Date    ALKPHOS 69 04/25/2019    ALT 99 04/25/2019    AST 59 04/25/2019    PROT 8.1 04/25/2019    PROT 4.4 11/03/2012    BILITOT 0.33 04/25/2019    BILIDIR <0.08 03/24/2015    IBILI CANNOT BE CALCULATED 03/24/2015    LABALBU 4.1 04/25/2019     Calcium:    Lab Results   Component Value Date    CALCIUM 9.4 04/26/2019     Magnesium:    Lab Results   Component Value Date    MG 2.1 11/03/2018     Phosphorus:    Lab Results   Component Value Date    PHOS 3.5 07/07/2017     PT/INR:    Lab Results   Component Value Date    PROTIME 10.8 04/26/2019    INR 1.1 04/26/2019     ABG: Lab Results   Component Value Date    PHART 7.44 08/30/2012    PH 7.48 11/08/2012    PKV7TKL 60 08/30/2012    PCO2 45 11/08/2012    PO2ART 73 08/30/2012    PO2 145 11/08/2012    DWO6ABS 24.1 11/01/2012    HCO3 33.6 11/08/2012    LSO3CXO 41 11/17/2012    Y1CPFJQI 100 11/02/2012    O2SAT 99 11/02/2012     Urine Culture:  No components found for: CURINE  Blood Culture:  No components found for: CBLOOD, CFUNGUSBL  Stool Culture:  No components found for: CSTOOL    RADIOLOGY:   I have personally reviewed the following films:  Xr Abdomen (kub) (single Ap View)    Result Date: 4/25/2019  EXAMINATION: SINGLE SUPINE XRAY VIEW(S) OF THE ABDOMEN 4/25/2019 8:44 pm COMPARISON: None. HISTORY: ORDERING SYSTEM PROVIDED HISTORY: NGT placement confirmation TECHNOLOGIST PROVIDED HISTORY: NGT placement confirmation Ordering Physician Provided Reason for Exam: NGT placement Acuity: Acute Type of Exam: Initial FINDINGS: 2 images of the abdomen were provided. Enteric tube is noted projecting on the left hemidiaphragm. This is likely in the stomach. Moderate multifocal bowel distention is noted. Ill-defined increased density in the left lung base is noted     Enteric tube as described     Ct Abdomen Pelvis W Iv Contrast Additional Contrast? None    Result Date: 4/25/2019  EXAMINATION: CT OF THE ABDOMEN AND PELVIS WITH CONTRAST 4/25/2019 6:10 pm TECHNIQUE: CT of the abdomen and pelvis was performed with the administration of intravenous contrast. Multiplanar reformatted images are provided for review. Dose modulation, iterative reconstruction, and/or weight based adjustment of the mA/kV was utilized to reduce the radiation dose to as low as reasonably achievable. COMPARISON: CT abdomen and pelvis performed 02/23/2017. HISTORY: ORDERING SYSTEM PROVIDED HISTORY: abdominal pain TECHNOLOGIST PROVIDED HISTORY: FINDINGS: Lower Chest: Infiltrates are seen in the lung bases bilaterally that is worse on the left compared to the right.   The heart is enlarged. Organs: The liver and spleen are normal size and overall attenuation. There are hepatic granulomas. The gallbladder, pancreas, and adrenal glands are unremarkable. There is no obstructive uropathy. There are bilateral nonobstructing renal stones. The largest stone is seen on the right measuring approximately 5 mm in diameter. There are bilateral renal cysts. The ureters are not dilated. The urinary bladder is unremarkable. GI/Bowel: The stomach is mildly distended and fluid-filled. Loops of small bowel are mildly distended and fluid-filled in the proximal and mid aspect. The distal and terminal ileum is decompressed. The colon is decompressed containing residual air and fecal contents. There is no intraperitoneal free air or free fluid. Pelvis: The prostate gland and seminal vesicles are unremarkable. Phleboliths are seen in the pelvis. Peritoneum/Retroperitoneum: The psoas muscles are symmetric. The abdominal aorta is normal in caliber. The inferior vena cava is unremarkable. There is no retroperitoneal or mesenteric adenopathy. Bones/Soft Tissues: The extra-abdominal soft tissues are unremarkable. There is no acute osseous abnormality. Mildly prominent fluid-filled loops of small bowel proximally and within the mid aspect with decompressed distal and terminal ileal loops. This may relate to a degree of obstruction versus an ileus and correlation is needed. Infiltrates in the lung bases bilaterally that may represent atelectasis versus pneumonia. Nonobstructing renal stones bilaterally. Xr Chest Portable    Result Date: 4/25/2019  EXAMINATION: SINGLE XRAY VIEW OF THE CHEST 4/25/2019 5:56 pm COMPARISON: Chest radiograph performed 11/03/2018.  HISTORY: ORDERING SYSTEM PROVIDED HISTORY: shortness of breath TECHNOLOGIST PROVIDED HISTORY: shortness of breath Ordering Physician Provided Reason for Exam: chest pain Acuity: Acute Type of Exam: Initial Additional signs and symptoms: SOB Relevant Medical/Surgical History: CHF, COPD FINDINGS: There is chronic pulmonary change. There may be small bibasilar effusions with adjacent atelectasis. There is no pneumothorax. The heart is enlarged with stable cardiac lead. The upper abdomen is unremarkable. The extrathoracic soft tissues are unremarkable. Cardiomegaly with small bibasilar effusions and adjacent atelectasis. Ct Chest Pulmonary Embolism W Contrast    Result Date: 4/25/2019  EXAMINATION: CTA OF THE CHEST 4/25/2019 6:10 pm TECHNIQUE: CTA of the chest was performed after the administration of intravenous contrast.  Multiplanar reformatted images are provided for review. MIP images are provided for review. Dose modulation, iterative reconstruction, and/or weight based adjustment of the mA/kV was utilized to reduce the radiation dose to as low as reasonably achievable. COMPARISON: None. HISTORY: ORDERING SYSTEM PROVIDED HISTORY: sob, history of CA FINDINGS: Pulmonary Arteries: Pulmonary arteries are adequately opacified for evaluation. There is an intraluminal filling defect within a subsegmental branch supplying the left lower lobe medially. The remainder of the arterial branches appear patent. Main pulmonary artery is normal in caliber. Mediastinum: No evidence of mediastinal lymphadenopathy. The heart and pericardium demonstrate no acute abnormality. The heart is enlarged. There is no acute abnormality of the thoracic aorta. Lungs/pleura: There is linear atelectasis versus scarring in the right lung posteriorly. There is dependent atelectasis in the left lung. There is a small right basilar effusion. There is no pneumothorax. The tracheobronchial tree is patent. Upper Abdomen: Limited images of the upper abdomen are unremarkable. Soft Tissues/Bones: No acute bone or soft tissue abnormality. Mild cardiomegaly with a small right basilar effusion.  Filling defect involving a subsegmental branch supplying the left lower lobe medially consistent with an embolism. Critical results were called by Dr. Anabel Reyes to [facility] on 2/73/3694 at 18:48. IMPRESSION:   1. Partial small bowel obstruction most likely related to adhesions symptomatically patient is improving    does not have any pertinent problems on file. PLAN:   1. Will leave the NG tube clamped for overnight if he tolerates then probably cc NG tube tomorrow morning      Thank you for this interesting consult and for allowing us to participate in the care of this patient. If you have any questions please don't hesitate to call.           Electronically signed by Eb Anaya MD  on 4/26/2019 at 11:00 AM

## 2019-04-26 NOTE — CARE COORDINATION
nHPredict in media. - MSSP Patient. Per message included with nHPredict:     Member with notable decline in function since he discharged from SNF in March 2019. Greater gains expected with SNF, no tentative dc plan found. Member meets criteria for daily skilled therapy. Thank you~         for unit notified. Request for nHPredict to be faxed to the unit as  is unable to print from media. Writer verbalized agreement. nHPredict faxed to [phone].

## 2019-04-26 NOTE — DISCHARGE INSTR - COC
Continuity of Care Form    Patient Name: Kennedy Maya   :  1955  MRN:  [id number]    Admit date:  2019  Discharge date:  5/10/19    Code Status Order: Full Code   Advance Directives:   [facility] Documentation     Date/Time Healthcare Directive Type of Healthcare Directive Copy in [facility] [address] Agent's Name Healthcare Agent's Phone Number    [phone]  No, patient does not have an advance directive for healthcare treatment -- -- -- -- --          Admitting Physician:  Sukhdeep Small MD  PCP: Parag Decker MD    Discharging Nurse: [facility] Unit/Room#: 1020/1020-01  Discharging Unit Phone Number: [phone]    Emergency Contact:   Extended Emergency Contact Information  Primary Emergency Contact: [address]aka [address] Phone: [phone]  Relation: Brother/Sister  Secondary Emergency Contact: Denisha Reynolds  Address: [address] Orrie Mcardle of [facility] Phone: [phone]  Relation: Other    Past Surgical History:  Past Surgical History:   Procedure Laterality Date    ABDOMEN SURGERY      small bowel.     MITRAL VALVE SURGERY  10/31/12    minimally invasive complex mitral valve repair    OTHER SURGICAL HISTORY  12    successful weaning of the ECMO and decannulation    PACEMAKER PLACEMENT Left 2017    TRICUSPID VALVE SURGERY  10/31/12    tricuspid valve repair under CPB    TUNNELED VENOUS PORT PLACEMENT Left 2015    UPPER GASTROINTESTINAL ENDOSCOPY  3-3-15    erosive antral gastritis, sm. antral polypectomy       Immunization History:   Immunization History   Administered Date(s) Administered    Influenza, Quadv, 6 mo and older, IM, PF (Flulaval, Fluarix) 2018    Pneumococcal Polysaccharide ([id number]) 2017    Tdap (Boostrix, Adacel) 2017       Active Problems:  Patient Active Problem List   Diagnosis Code    Severe mitral regurgitation I34.0    Severe tricuspid regurgitation I07.1    Anemia due to GI blood loss D50.0    Abdominal pain R10.9    Piles (hemorrhoids) K64.9    Chest pain R07.9    HTN (hypertension) I10    HLD (hyperlipidemia) E78.5    Anemia D64.9    Transfusion of blood during current hospitalization [id number]    Dyspnea R06.00    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 9) E66.9    Peripheral neuropathic pain M79.2    Atrial fibrillation with rapid ventricular response (HCC) I48.91    S/P cardiac cath - 10/3/16 - Dr. Rocha Call H64.280    Encounter for cardioversion procedure 10/3/16-Dr. Rocha Call Z01.89    Pulmonary embolism without acute cor pulmonale (HCC) I26.99    Major depressive disorder with current active episode F32.9    GUME (obstructive sleep apnea) G47.33    Chronic atrial fibrillation (HCC) I48.2    AICD discharge Z45.02    History of pulmonary embolism Z86.711    Essential hypertension I10    Neuropathy G62.9    Chronic combined systolic and diastolic congestive heart failure (HCC) I50.42    Major depressive disorder, recurrent, in full remission ([address] W08.48    Acute systolic CHF (congestive heart failure) (HCC) I50.21    Acute on chronic congestive heart failure (HCC) I50.9    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    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    SBO (small bowel obstruction) (HCC) K56.609       Isolation/Infection:   Isolation          No Isolation            Nurse Assessment:  Last Vital Signs: /68   Pulse 50   Temp 98.4 °F (36.9 °C) (Oral)   Resp 20   Ht 5' 9\" (1.753 m)   Wt 238 lb 6.4 oz (108.1 kg)   SpO2 99%   BMI 35.21 kg/m²     Last documented pain score (0-10 scale): Pain Level: 5  Last Weight:   Wt Readings from Last 1 Encounters:   04/26/19 238 lb 6.4 oz (108.1 kg)     Mental Status:  oriented and alert    IV Access:  -Peripheral IV- Left patient):  cane and clothing    RN SIGNATURE:  Electronically signed by Renee Paula RN on 5/9/19 at 7:32 PM    CASE MANAGEMENT/SOCIAL WORK SECTION    Inpatient Status Date: 4/25/19    Readmission Risk Assessment Score:  Readmission Risk              Risk of Unplanned Readmission:        24           Discharging to Facility/ Agency   · Name: [facility] ([address] [place] [address])  · [place]   · Phone:[phone]  · independenceIT:[phone]    Dialysis Facility (if applicable)   · Name:  · Address:  · Dialysis Schedule:  · Phone:  · Fax:    / signature: Electronically signed by Robin Ponce RN on 5/10/19 at 9:16 AM    PHYSICIAN SECTION    Prognosis: Fair    Condition at Discharge: Stable    Rehab Potential (if transferring to Rehab): Fair    Recommended Labs or Other Treatments After Discharge: pt/ot; accucheck q1h until consistently above 80, then q2h x 3; dc d5 drip once sugars stabilized; bmp 1 week    Physician Certification: I certify the above information and transfer of Louise Frost  is necessary for the continuing treatment of the diagnosis listed and that he requires [facility] for greater 30 days.      Update Admission H&P: No change in H&P    PHYSICIAN SIGNATURE:  Electronically signed by Nichole Feldman DO on 5/9/19 at 11:50 AM

## 2019-04-27 ENCOUNTER — APPOINTMENT (OUTPATIENT)
Dept: GENERAL RADIOLOGY | Age: 64
DRG: 329 | End: 2019-04-27
Payer: MEDICARE

## 2019-04-27 LAB
ABSOLUTE EOS #: 0.15 K/UL (ref 0–0.4)
ABSOLUTE IMMATURE GRANULOCYTE: ABNORMAL K/UL (ref 0–0.3)
ABSOLUTE LYMPH #: 0.46 K/UL (ref 1–4.8)
ABSOLUTE MONO #: 0.54 K/UL (ref 0.2–0.8)
ANION GAP SERPL CALCULATED.3IONS-SCNC: 11 MMOL/L (ref 9–17)
BASOPHILS # BLD: 0 %
BASOPHILS ABSOLUTE: 0 K/UL (ref 0–0.2)
BUN BLDV-MCNC: 20 MG/DL (ref 8–23)
BUN/CREAT BLD: 22 (ref 9–20)
CALCIUM SERPL-MCNC: 9.1 MG/DL (ref 8.6–10.4)
CARCINOEMBRYONIC ANTIGEN: 4.8 NG/ML
CHLORIDE BLD-SCNC: 104 MMOL/L (ref 98–107)
CO2: 27 MMOL/L (ref 20–31)
CREAT SERPL-MCNC: 0.9 MG/DL (ref 0.7–1.2)
DIFFERENTIAL TYPE: ABNORMAL
EOSINOPHILS RELATIVE PERCENT: 2 % (ref 1–4)
GFR AFRICAN AMERICAN: >60 ML/MIN
GFR NON-AFRICAN AMERICAN: >60 ML/MIN
GFR SERPL CREATININE-BSD FRML MDRD: ABNORMAL ML/MIN/{1.73_M2}
GFR SERPL CREATININE-BSD FRML MDRD: ABNORMAL ML/MIN/{1.73_M2}
GLUCOSE BLD-MCNC: 108 MG/DL (ref 70–99)
HCT VFR BLD CALC: 39.2 % (ref 41–53)
HCT VFR BLD CALC: 43.4 % (ref 40.7–50.3)
HCT VFR BLD CALC: 44.2 % (ref 40.7–50.3)
HEMOGLOBIN: 13.1 G/DL (ref 13.5–17.5)
HEMOGLOBIN: 13.6 G/DL (ref 13–17)
HEMOGLOBIN: 13.8 G/DL (ref 13–17)
IMMATURE GRANULOCYTES: ABNORMAL %
LYMPHOCYTES # BLD: 6 % (ref 24–44)
MCH RBC QN AUTO: 31.9 PG (ref 25.2–33.5)
MCHC RBC AUTO-ENTMCNC: 31.2 G/DL (ref 28.4–34.8)
MCV RBC AUTO: 102.1 FL (ref 82.6–102.9)
MONOCYTES # BLD: 7 % (ref 1–7)
MORPHOLOGY: ABNORMAL
NRBC AUTOMATED: 0 PER 100 WBC
PARTIAL THROMBOPLASTIN TIME: 29.5 SEC (ref 23–31)
PARTIAL THROMBOPLASTIN TIME: 41.7 SEC (ref 23–31)
PARTIAL THROMBOPLASTIN TIME: 66.8 SEC (ref 23–31)
PARTIAL THROMBOPLASTIN TIME: >120 SEC (ref 23–31)
PDW BLD-RTO: 16.1 % (ref 11.8–14.4)
PLATELET # BLD: 136 K/UL (ref 138–453)
PLATELET ESTIMATE: ABNORMAL
PMV BLD AUTO: 11.8 FL (ref 8.1–13.5)
POTASSIUM SERPL-SCNC: 4.3 MMOL/L (ref 3.7–5.3)
PROCALCITONIN: 0.15 NG/ML
RBC # BLD: 4.33 M/UL (ref 4.21–5.77)
RBC # BLD: ABNORMAL 10*6/UL
SEG NEUTROPHILS: 85 % (ref 36–66)
SEGMENTED NEUTROPHILS ABSOLUTE COUNT: 6.55 K/UL (ref 1.8–7.7)
SODIUM BLD-SCNC: 142 MMOL/L (ref 135–144)
WBC # BLD: 7.7 K/UL (ref 3.5–11.3)
WBC # BLD: ABNORMAL 10*3/UL

## 2019-04-27 PROCEDURE — 6370000000 HC RX 637 (ALT 250 FOR IP): Performed by: INTERNAL MEDICINE

## 2019-04-27 PROCEDURE — 94761 N-INVAS EAR/PLS OXIMETRY MLT: CPT

## 2019-04-27 PROCEDURE — 80048 BASIC METABOLIC PNL TOTAL CA: CPT

## 2019-04-27 PROCEDURE — 6360000002 HC RX W HCPCS: Performed by: INTERNAL MEDICINE

## 2019-04-27 PROCEDURE — 84145 PROCALCITONIN (PCT): CPT

## 2019-04-27 PROCEDURE — 6370000000 HC RX 637 (ALT 250 FOR IP): Performed by: NURSE PRACTITIONER

## 2019-04-27 PROCEDURE — 2580000003 HC RX 258: Performed by: NURSE PRACTITIONER

## 2019-04-27 PROCEDURE — 94640 AIRWAY INHALATION TREATMENT: CPT

## 2019-04-27 PROCEDURE — 36415 COLL VENOUS BLD VENIPUNCTURE: CPT

## 2019-04-27 PROCEDURE — 2580000003 HC RX 258: Performed by: INTERNAL MEDICINE

## 2019-04-27 PROCEDURE — 2060000000 HC ICU INTERMEDIATE R&B

## 2019-04-27 PROCEDURE — 85014 HEMATOCRIT: CPT

## 2019-04-27 PROCEDURE — 6360000002 HC RX W HCPCS: Performed by: SURGERY

## 2019-04-27 PROCEDURE — 99232 SBSQ HOSP IP/OBS MODERATE 35: CPT | Performed by: INTERNAL MEDICINE

## 2019-04-27 PROCEDURE — 71045 X-RAY EXAM CHEST 1 VIEW: CPT

## 2019-04-27 PROCEDURE — 2700000000 HC OXYGEN THERAPY PER DAY

## 2019-04-27 PROCEDURE — 82378 CARCINOEMBRYONIC ANTIGEN: CPT

## 2019-04-27 PROCEDURE — 85025 COMPLETE CBC W/AUTO DIFF WBC: CPT

## 2019-04-27 PROCEDURE — 6360000002 HC RX W HCPCS: Performed by: NURSE PRACTITIONER

## 2019-04-27 PROCEDURE — 85018 HEMOGLOBIN: CPT

## 2019-04-27 PROCEDURE — 85730 THROMBOPLASTIN TIME PARTIAL: CPT

## 2019-04-27 RX ORDER — ACETAMINOPHEN 325 MG/1
650 TABLET ORAL EVERY 6 HOURS PRN
COMMUNITY

## 2019-04-27 RX ORDER — FUROSEMIDE 40 MG/1
40 TABLET ORAL DAILY
COMMUNITY

## 2019-04-27 RX ORDER — DULOXETIN HYDROCHLORIDE 20 MG/1
20 CAPSULE, DELAYED RELEASE ORAL DAILY
COMMUNITY

## 2019-04-27 RX ORDER — LANOLIN ALCOHOL/MO/W.PET/CERES
325 CREAM (GRAM) TOPICAL 4 TIMES DAILY
COMMUNITY

## 2019-04-27 RX ORDER — DOCUSATE SODIUM 100 MG/1
100 CAPSULE, LIQUID FILLED ORAL 2 TIMES DAILY
COMMUNITY

## 2019-04-27 RX ORDER — ATORVASTATIN CALCIUM 40 MG/1
40 TABLET, FILM COATED ORAL NIGHTLY
COMMUNITY

## 2019-04-27 RX ORDER — PANTOPRAZOLE SODIUM 40 MG/1
40 TABLET, DELAYED RELEASE ORAL
Status: ON HOLD | COMMUNITY
End: 2019-05-09 | Stop reason: SDUPTHER

## 2019-04-27 RX ORDER — SENNA PLUS 8.6 MG/1
1 TABLET ORAL DAILY PRN
COMMUNITY

## 2019-04-27 RX ORDER — MAGNESIUM HYDROXIDE/ALUMINUM HYDROXICE/SIMETHICONE 120; 1200; 1200 MG/30ML; MG/30ML; MG/30ML
30 SUSPENSION ORAL EVERY 6 HOURS PRN
Status: DISCONTINUED | OUTPATIENT
Start: 2019-04-27 | End: 2019-05-10 | Stop reason: HOSPADM

## 2019-04-27 RX ORDER — HYDROMORPHONE HYDROCHLORIDE 1 MG/ML
1 INJECTION, SOLUTION INTRAMUSCULAR; INTRAVENOUS; SUBCUTANEOUS
Status: DISCONTINUED | OUTPATIENT
Start: 2019-04-27 | End: 2019-04-28 | Stop reason: SDUPTHER

## 2019-04-27 RX ORDER — ASPIRIN 81 MG/1
81 TABLET, CHEWABLE ORAL DAILY
COMMUNITY

## 2019-04-27 RX ORDER — SENNA AND DOCUSATE SODIUM 50; 8.6 MG/1; MG/1
1 TABLET, FILM COATED ORAL DAILY PRN
COMMUNITY

## 2019-04-27 RX ORDER — PREGABALIN 50 MG/1
50 CAPSULE ORAL 2 TIMES DAILY
Status: ON HOLD | COMMUNITY
End: 2019-05-09 | Stop reason: SDUPTHER

## 2019-04-27 RX ORDER — QUETIAPINE FUMARATE 25 MG/1
25 TABLET, FILM COATED ORAL 2 TIMES DAILY
COMMUNITY

## 2019-04-27 RX ORDER — METOPROLOL TARTRATE 50 MG/1
50 TABLET, FILM COATED ORAL
Status: ON HOLD | COMMUNITY
End: 2019-05-09 | Stop reason: HOSPADM

## 2019-04-27 RX ORDER — SPIRONOLACTONE 25 MG/1
25 TABLET ORAL DAILY
Status: ON HOLD | COMMUNITY
End: 2019-05-09 | Stop reason: HOSPADM

## 2019-04-27 RX ADMIN — IPRATROPIUM BROMIDE AND ALBUTEROL SULFATE 1 AMPULE: .5; 3 SOLUTION RESPIRATORY (INHALATION) at 15:06

## 2019-04-27 RX ADMIN — IPRATROPIUM BROMIDE AND ALBUTEROL SULFATE 1 AMPULE: .5; 3 SOLUTION RESPIRATORY (INHALATION) at 11:44

## 2019-04-27 RX ADMIN — FENTANYL CITRATE 25 MCG: 50 INJECTION, SOLUTION INTRAMUSCULAR; INTRAVENOUS at 19:28

## 2019-04-27 RX ADMIN — PANTOPRAZOLE SODIUM 40 MG: 40 TABLET, DELAYED RELEASE ORAL at 10:30

## 2019-04-27 RX ADMIN — ASPIRIN 81 MG: 81 TABLET, COATED ORAL at 10:29

## 2019-04-27 RX ADMIN — AMIODARONE HYDROCHLORIDE 400 MG: 200 TABLET ORAL at 10:29

## 2019-04-27 RX ADMIN — IPRATROPIUM BROMIDE AND ALBUTEROL SULFATE 1 AMPULE: .5; 3 SOLUTION RESPIRATORY (INHALATION) at 07:57

## 2019-04-27 RX ADMIN — DULOXETINE HYDROCHLORIDE 20 MG: 20 CAPSULE, DELAYED RELEASE ORAL at 10:29

## 2019-04-27 RX ADMIN — HEPARIN SODIUM 8650 UNITS: 1000 INJECTION INTRAVENOUS; SUBCUTANEOUS at 23:27

## 2019-04-27 RX ADMIN — HEPARIN SODIUM 4320 UNITS: 1000 INJECTION INTRAVENOUS; SUBCUTANEOUS at 15:49

## 2019-04-27 RX ADMIN — CHLORASEPTIC 1 SPRAY: 1.5 LIQUID ORAL at 06:48

## 2019-04-27 RX ADMIN — FUROSEMIDE 40 MG: 40 TABLET ORAL at 10:29

## 2019-04-27 RX ADMIN — ALUMINUM HYDROXIDE, MAGNESIUM HYDROXIDE, AND SIMETHICONE 30 ML: 200; 200; 20 SUSPENSION ORAL at 21:37

## 2019-04-27 RX ADMIN — FENTANYL CITRATE 25 MCG: 50 INJECTION, SOLUTION INTRAMUSCULAR; INTRAVENOUS at 01:51

## 2019-04-27 RX ADMIN — QUETIAPINE FUMARATE 25 MG: 25 TABLET ORAL at 10:30

## 2019-04-27 RX ADMIN — PYRIDOXINE HCL TAB 50 MG 25 MG: 50 TAB at 10:30

## 2019-04-27 RX ADMIN — HEPARIN SODIUM AND DEXTROSE 11.94 UNITS/KG/HR: 10000; 5 INJECTION INTRAVENOUS at 22:30

## 2019-04-27 RX ADMIN — METOPROLOL SUCCINATE 100 MG: 50 TABLET, FILM COATED, EXTENDED RELEASE ORAL at 10:30

## 2019-04-27 RX ADMIN — FERROUS SULFATE TAB EC 325 MG (65 MG FE EQUIVALENT) 325 MG: 325 (65 FE) TABLET DELAYED RESPONSE at 10:29

## 2019-04-27 RX ADMIN — DIGOXIN 250 MCG: 250 TABLET ORAL at 10:29

## 2019-04-27 RX ADMIN — SPIRONOLACTONE 25 MG: 25 TABLET ORAL at 10:30

## 2019-04-27 RX ADMIN — Medication 1 MG: at 22:28

## 2019-04-27 RX ADMIN — SODIUM CHLORIDE: 9 INJECTION, SOLUTION INTRAVENOUS at 11:06

## 2019-04-27 RX ADMIN — IPRATROPIUM BROMIDE AND ALBUTEROL SULFATE 1 AMPULE: .5; 3 SOLUTION RESPIRATORY (INHALATION) at 20:06

## 2019-04-27 RX ADMIN — SENNOSIDES 8.6 MG: 8.6 TABLET, FILM COATED ORAL at 10:30

## 2019-04-27 RX ADMIN — Medication 10 ML: at 10:32

## 2019-04-27 RX ADMIN — CHLORASEPTIC 1 SPRAY: 1.5 LIQUID ORAL at 07:53

## 2019-04-27 ASSESSMENT — PAIN SCALES - GENERAL
PAINLEVEL_OUTOF10: 2
PAINLEVEL_OUTOF10: 8
PAINLEVEL_OUTOF10: 8
PAINLEVEL_OUTOF10: 9
PAINLEVEL_OUTOF10: 0

## 2019-04-27 NOTE — PROGRESS NOTES
Pt called out and states he can't breathe. His 02 is on 3LNC and Sp02 is 99%. Pt states I will be fine if I can get NG out and drink some water. He reports history of anxiety. Pt informed that night RN reports that Dr. Luis Carlos Gomez wanted to keep NG in yesterday and will be rounding this Am to decide. Pt reinstructed on this and reassured that 02 was good and that some deep breathing exercises could help with his anxiety and in the meantime writer will see if there is any medications he can have for this.

## 2019-04-27 NOTE — PROGRESS NOTES
Time: 04/27/19 12:38 AM   Result Value Ref Range    PTT >120.0 (HH) 23 - 31 sec   APTT    Collection Time: 04/27/19  7:43 AM   Result Value Ref Range    PTT 66.8 (H) 23 - 31 sec   Basic Metabolic Panel    Collection Time: 04/27/19  7:43 AM   Result Value Ref Range    Glucose 108 (H) 70 - 99 mg/dL    BUN 20 8 - 23 mg/dL    CREATININE 0.90 0.70 - 1.20 mg/dL    Bun/Cre Ratio 22 (H) 9 - 20    Calcium 9.1 8.6 - 10.4 mg/dL    Sodium 142 135 - 144 mmol/L    Potassium 4.3 3.7 - 5.3 mmol/L    Chloride 104 98 - 107 mmol/L    CO2 27 20 - 31 mmol/L    Anion Gap 11 9 - 17 mmol/L    GFR Non-African American >60 >60 mL/min    GFR African American >60 >60 mL/min    GFR Comment          GFR Staging NOT REPORTED    CBC Auto Differential    Collection Time: 04/27/19  7:43 AM   Result Value Ref Range    WBC 7.7 3.5 - 11.3 k/uL    RBC 4.33 4.21 - 5.77 m/uL    Hemoglobin 13.8 13.0 - 17.0 g/dL    Hematocrit 44.2 40.7 - 50.3 %    .1 82.6 - 102.9 fL    MCH 31.9 25.2 - 33.5 pg    MCHC 31.2 28.4 - 34.8 g/dL    RDW 16.1 (H) 11.8 - 14.4 %    Platelets 162 (L) 144 - 453 k/uL    MPV 11.8 8.1 - 13.5 fL    NRBC Automated 0.0 0.0 per 100 WBC    Differential Type NOT REPORTED     Immature Granulocytes NOT REPORTED 0 %    Absolute Immature Granulocyte NOT REPORTED 0.00 - 0.30 k/uL    WBC Morphology NOT REPORTED     RBC Morphology NOT REPORTED     Platelet Estimate NOT REPORTED     Seg Neutrophils 85 (H) 36 - 66 %    Lymphocytes 6 (L) 24 - 44 %    Monocytes 7 1 - 7 %    Eosinophils % 2 1 - 4 %    Basophils 0 %    Segs Absolute 6.55 1.8 - 7.7 k/uL    Absolute Lymph # 0.46 (L) 1.0 - 4.8 k/uL    Absolute Mono # 0.54 0.2 - 0.8 k/uL    Absolute Eos # 0.15 0.0 - 0.4 k/uL    Basophils # 0.00 0.0 - 0.2 k/uL    Morphology ANISOCYTOSIS PRESENT    APTT    Collection Time: 04/27/19  1:42 PM   Result Value Ref Range    PTT 41.7 (H) 23 - 31 sec   Hemoglobin and Hematocrit, Blood    Collection Time: 04/27/19  1:42 PM   Result Value Ref Range    Hemoglobin 13.1 (L) 13.5 - 17.5 g/dL    Hematocrit 39.2 (L) 41 - 53 %       Abdominal examination still reveals distended abdomen he is passing flatus no bowel movement yet      Assessment/Plan:   DC NG tube and start him on clear liquid diet  Electronically signed by Kasia Barcenas MD  on 4/27/2019 at 2:55 PM

## 2019-04-27 NOTE — PROGRESS NOTES
Pulmonary Critical Care Progress Note    Patient seen for the follow up of pulmonary embolus    Subjective:     he has been on heparin drip. He has no increased shortness of breath. He has mild occasional cough. No chest pain. He has no active bleeding. He had been taken off anticoagulation after a significant GI bleed 2 months ago. Examination:    Vitals: /68   Pulse 61   Temp 98.8 °F (37.1 °C) (Oral)   Resp 20   Ht 5' 9\" (1.753 m)   Wt 238 lb (108 kg)   SpO2 97%   BMI 35.15 kg/m²   SpO2  Av %  Min: 97 %  Max: 99 %  General appearance: alert and cooperative with exam  Neck: No JVD  Lungs:   Mild decreased breath sounds no crackles or wheezing  Heart: regular rate and rhythm, S1, S2 normal, no gallop  Abdomen: Soft, non tender, + BS  Extremities: no cyanosis or clubbing. No significant edema    LABs:    CBC:   Recent Labs     19  0844  19  0743 19  1342   WBC 7.1  --  7.7  --    HGB 14.2   < > 13.8 13.1*   HCT 46.0   < > 44.2 39.2*     --  136*  --     < > = values in this interval not displayed.      BMP:   Recent Labs     19  0538 19  0743    142   K 4.7 4.3   CO2 24 27   BUN 19 20   CREATININE 1.18 0.90   LABGLOM >60 >60   GLUCOSE 106* 108*     PT/INR:   Recent Labs     19  1722 19  0538   PROTIME 10.9 10.8   INR 1.1 1.1     APTT:  Recent Labs     19  0743 19  1342   APTT 66.8* 41.7*     LIVER PROFILE:  Recent Labs     19  1722   AST 59*   ALT 99*   LABALBU 4.1       Radiology:     chest x-ray    persistent enlargement of the cardiac silhouette with pulmonary edema no opacity    Impression:  · Pulmonary Embolism, recurrent   · Pulmonary infiltrates, atelectasis vs less likely pneumonia  · Right pleural effusion  · Obesity/Obstructive sleep apnea ~ wears O2 at bedtime  · Chronic combined diastolic/systolic heart failure ~ EF 25-30%  · Pulmonary Hypertension ~ RVSP 41  · Small bowel obstruction vs

## 2019-04-27 NOTE — PROGRESS NOTES
Transitions of Care Pharmacy Service   Medication Review    The patient's list of current home medications has been reviewed and updated. Source(s) of information: med record from [address] [place] [address] REQUESTED  Discrepancies on current hospital orders that need to be addressed by a physician:    Medication Action Requested   Digoxin 250mcg   Reordered from home med list but not a current med at SNF--please discontinue as appropriate     Ferrous sulfate 325mg daily,   Protonix 40mg daily,     Please adjust orders to match SNF regimen as appropriate:            Ferrous sulfate 325mg QID      Protonix 40mg BID before meals         Please feel free to call me with any questions about this encounter. Thank you. Darion Guerra, [facility]   Transitions of Care Pharmacy Service  Phone:  [phone]  Fax: [phone]      Electronically signed by Darion Guerra [facility] on 4/27/2019 at 11:12 AM         Medications Prior to Admission:   aspirin 81 MG chewable tablet, Take 81 mg by mouth daily  atorvastatin (LIPITOR) 40 MG tablet, Take 40 mg by mouth nightly  DULoxetine (CYMBALTA) 20 MG extended release capsule, Take 20 mg by mouth daily  docusate sodium (COLACE) 100 MG capsule, Take 100 mg by mouth 2 times daily  ferrous sulfate 325 (65 Fe) MG EC tablet, Take 325 mg by mouth 4 times daily  furosemide (LASIX) 40 MG tablet, Take 40 mg by mouth daily  pregabalin (LYRICA) 50 MG capsule, Take 50 mg by mouth 2 times daily.   metoprolol tartrate (LOPRESSOR) 50 MG tablet, Take 50 mg by mouth 2 times daily (before meals)  pantoprazole (PROTONIX) 40 MG tablet, Take 40 mg by mouth 2 times daily (before meals)  QUEtiapine (SEROQUEL) 25 MG tablet, Take 25 mg by mouth 2 times daily  spironolactone (ALDACTONE) 25 MG tablet, Take 25 mg by mouth daily  acetaminophen (TYLENOL) 325 MG tablet, Take 650 mg by mouth every 6 hours as needed for Pain or Fever  senna (SENOKOT) 8.6 MG tablet, Take 1 tablet by mouth daily

## 2019-04-27 NOTE — PROGRESS NOTES
runny nose, throat pain  RESPIRATORY:  shortness of breath, cough, congestion, wheezing. CARDIOVASCULAR:  chest pain, palpitations  GASTROINTESTINAL:  nausea, vomiting, diarrhea, constipation, change in bowel habits, abdominal pain   GENITOURINARY:  difficulty of urination, burning with urination, frequency   INTEGUMENT:  rash, skin lesions, easy bruising   HEMATOLOGIC/LYMPHATIC:  swelling/edema   ALLERGIC/IMMUNOLOGIC:  urticaria , itching  ENDOCRINE:  increase in drinking, increase in urination, hot or cold intolerance  MUSCULOSKELETAL:  joint pains, muscle aches, swelling of joints  NEUROLOGICAL:  headaches, dizziness, lightheadedness, numbness, pain, tingling extremities  BEHAVIOR/PSYCH:  depression, anxiety    Medications: Allergies:     Allergies   Allergen Reactions    Morphine      itching       Current Meds:   Scheduled Meds:    ipratropium-albuterol  1 ampule Inhalation Q4H While awake    amiodarone  400 mg Oral BID    aspirin  81 mg Oral Daily    atorvastatin  40 mg Oral Nightly    digoxin  250 mcg Oral Daily    DULoxetine  20 mg Oral Daily    ferrous sulfate  325 mg Oral Daily with breakfast    furosemide  40 mg Oral Daily    metoprolol succinate  100 mg Oral Daily    pantoprazole  40 mg Oral QAM AC    pyridoxine  25 mg Oral Daily    QUEtiapine  25 mg Oral BID    senna  1 tablet Oral Daily    spironolactone  25 mg Oral Daily    sodium chloride flush  10 mL Intravenous 2 times per day     Continuous Infusions:    heparin (porcine) 10 Units/kg/hr (04/27/19 0225)    sodium chloride 50 mL/hr at 04/26/19 1520     PRN Meds: heparin (porcine), heparin (porcine), sodium chloride flush, dibucaine, nitroGLYCERIN, sodium chloride flush, potassium chloride **OR** potassium alternative oral replacement **OR** potassium chloride, magnesium sulfate, magnesium hydroxide, nicotine, acetaminophen, ondansetron **OR** ondansetron, phenol, fentanNYL    Data:     Past Medical History:   has a past medical history of Asthma, Cancer ([facility] CHF (congestive heart failure) ([facility] COPD exacerbation ([facility] GERD (gastroesophageal reflux disease), History of blood transfusion, Hyperlipidemia, Hypertension, Neuropathy, Obesity (BMI 30-39.9), Psychiatric problem, SBO (small bowel obstruction) ([facility] Severe mitral regurgitation, and Severe tricuspid regurgitation. Social History:   reports that he has never smoked. He has never used smokeless tobacco. He reports that he does not drink alcohol or use drugs. Family History:   Family History   Problem Relation Age of Onset    Heart Disease Father        Vitals:  /62   Pulse 55   Temp 98.1 °F (36.7 °C) (Oral)   Resp 16   Ht 5' 9\" (1.753 m)   Wt 238 lb (108 kg)   SpO2 98%   BMI 35.15 kg/m²   Temp (24hrs), Av.1 °F (36.7 °C), Min:97.5 °F (36.4 °C), Max:98.4 °F (36.9 °C)    No results for input(s): POCGLU in the last 72 hours. I/O (24Hr):     Intake/Output Summary (Last 24 hours) at 2019 0754  Last data filed at 2019 0556  Gross per 24 hour   Intake 282 ml   Output 770 ml   Net -488 ml       Labs:    Hematology:  Recent Labs     19  1722 19  0538 19  0844 19  1729 19  0038   WBC 7.9 7.5 7.1  --   --    RBC 4.68 4.37 4.47  --   --    HGB 14.9 14.0 14.2 14.1 13.6   HCT 45.7 44.4 46.0 45.3 43.4   MCV 97.7 101.6 102.9  --   --    MCH 31.9 32.0 31.8  --   --    MCHC 32.7 31.5 30.9  --   --    RDW 18.9* 16.5* 16.5*  --   --     151 157  --   --    MPV 10.5 11.6 12.0  --   --    INR 1.1 1.1  --   --   --      Chemistry:  Recent Labs     19  1722 19  2229 19  0538     --  140   K 4.6  --  4.7     --  104   CO2 24  --  24   GLUCOSE 124*  --  106*   BUN 18  --  19   CREATININE 1.35*  --  1.18   ANIONGAP 13  --  12   LABGLOM 53*  --  >60   GFRAA >60  --  >60   CALCIUM 9.7  --  9.4   PROBNP 657*  --   --    TROPHS 9  --   --    DIGOXIN  --  <0.3*  --      Recent Labs     19  1722   PROT 8.1 LABALBU 4.1   AST 59*   ALT 99*   ALKPHOS 69   BILITOT 0.33   LIPASE 31         Lab Results   Component Value Date/Time    SPECIAL NOT REPORTED 09/28/2016 05:06 PM     Lab Results   Component Value Date/Time    CULTURE NO SIGNIFICANT GROWTH 09/28/2016 05:06 PM    CULTURE  09/28/2016 05:06 PM     [employer] [id number] [facility], [address] [place] [address] [phone]       Lab Results   Component Value Date    POCPH 7.48 11/17/2012    PHART 7.44 08/30/2012    PH 7.48 11/08/2012    POCPCO2 53 11/17/2012    IJF0PLG 60 08/30/2012    PCO2 45 11/08/2012    POCPO2 86 11/17/2012    PO2ART 73 08/30/2012    PO2 145 11/08/2012    POCHCO3 39.9 11/17/2012    VUU1WUN 24.1 11/01/2012    HCO3 33.6 11/08/2012    NBEA NOT REPORTED 11/17/2012    PBEA 16 11/17/2012    BQV8WES 41 11/17/2012    PJQG3VVK 97 11/17/2012    A7WUJMGP 100 11/02/2012    O2SAT 99 11/02/2012    FIO2 35.0 11/17/2012       Radiology:  Xr Abdomen (kub) (single Ap View)    Result Date: 4/25/2019  EXAMINATION: SINGLE SUPINE XRAY VIEW(S) OF THE ABDOMEN 4/25/2019 8:44 pm COMPARISON: None. HISTORY: ORDERING SYSTEM PROVIDED HISTORY: NGT placement confirmation TECHNOLOGIST PROVIDED HISTORY: NGT placement confirmation Ordering Physician Provided Reason for Exam: NGT placement Acuity: Acute Type of Exam: Initial FINDINGS: 2 images of the abdomen were provided. Enteric tube is noted projecting on the left hemidiaphragm. This is likely in the stomach. Moderate multifocal bowel distention is noted. Ill-defined increased density in the left lung base is noted     Enteric tube as described     Ct Abdomen Pelvis W Iv Contrast Additional Contrast? None    Result Date: 4/25/2019  EXAMINATION: CT OF THE ABDOMEN AND PELVIS WITH CONTRAST 4/25/2019 6:10 pm TECHNIQUE: CT of the abdomen and pelvis was performed with the administration of intravenous contrast. Multiplanar reformatted images are provided for review.  Dose modulation, iterative reconstruction, and/or weight based adjustment of the mA/kV was utilized to reduce the radiation dose to as low as reasonably achievable. COMPARISON: CT abdomen and pelvis performed 02/23/2017. HISTORY: ORDERING SYSTEM PROVIDED HISTORY: abdominal pain TECHNOLOGIST PROVIDED HISTORY: FINDINGS: Lower Chest: Infiltrates are seen in the lung bases bilaterally that is worse on the left compared to the right. The heart is enlarged. Organs: The liver and spleen are normal size and overall attenuation. There are hepatic granulomas. The gallbladder, pancreas, and adrenal glands are unremarkable. There is no obstructive uropathy. There are bilateral nonobstructing renal stones. The largest stone is seen on the right measuring approximately 5 mm in diameter. There are bilateral renal cysts. The ureters are not dilated. The urinary bladder is unremarkable. GI/Bowel: The stomach is mildly distended and fluid-filled. Loops of small bowel are mildly distended and fluid-filled in the proximal and mid aspect. The distal and terminal ileum is decompressed. The colon is decompressed containing residual air and fecal contents. There is no intraperitoneal free air or free fluid. Pelvis: The prostate gland and seminal vesicles are unremarkable. Phleboliths are seen in the pelvis. Peritoneum/Retroperitoneum: The psoas muscles are symmetric. The abdominal aorta is normal in caliber. The inferior vena cava is unremarkable. There is no retroperitoneal or mesenteric adenopathy. Bones/Soft Tissues: The extra-abdominal soft tissues are unremarkable. There is no acute osseous abnormality. Mildly prominent fluid-filled loops of small bowel proximally and within the mid aspect with decompressed distal and terminal ileal loops. This may relate to a degree of obstruction versus an ileus and correlation is needed. Infiltrates in the lung bases bilaterally that may represent atelectasis versus pneumonia. Nonobstructing renal stones bilaterally.      Xr Chest Portable    Result normal affect  Head:  normocephalic, atraumatic. Eye: no icterus, redness, pupils equal and reactive, extraocular eye movements intact, conjunctiva clear  Ear: normal external ear, no discharge, hearing intact  Nose:  no drainage noted  Mouth: mucous membranes moist  Neck: supple, no carotid bruits, thyroid not palpable  Lungs: Bilateral equal air entry, clear to ausculation, no wheezing, rales or rhonchi, normal effort  Cardiovascular: normal rate, regular rhythm, no murmur, gallop, rub. Abdomen: Mildly distended, no TTP  Neurologic: There are no new focal motor or sensory deficits, normal muscle tone and bulk, no abnormal sensation, normal speech, cranial nerves II through XII grossly intact  Skin: No gross lesions, rashes, bruising or bleeding on exposed skin area  Extremities:  peripheral pulses palpable, no pedal edema or calf pain with palpation  Psych: normal affect      Assessment:        Principal Problem:    SBO (small bowel obstruction) (HCC)  Active Problems:    Small bowel cancer (HCC)    Obesity (BMI 30-39. 9)    Pulmonary embolism without acute cor pulmonale (HCC)    GUME (obstructive sleep apnea)    Chronic atrial fibrillation (HCC)    Essential hypertension    Chronic combined systolic and diastolic congestive heart failure (HCC)    Major depressive disorder, recurrent, in full remission ([address]  Resolved Problems:    * No resolved hospital problems. *      Plan:        Principal Problem:    SBO (small bowel obstruction) (HCC)  Active Problems:    Small bowel cancer (HCC)    Obesity (BMI 30-39. 9)    Pulmonary embolism without acute cor pulmonale (HCC)    GUME (obstructive sleep apnea)    Chronic atrial fibrillation (HCC)    Essential hypertension    Chronic combined systolic and diastolic congestive heart failure (HCC)    Major depressive disorder, recurrent, in full remission ([address]  Resolved Problems:    * No resolved hospital problems. *    1. NG tube removed  2. Advance diet as tolerated.  Currently on CLD  3. Cont heparin gtt for PE  4. GI consulted to help determine safety of longterm AC due to h/o GI bleed  5. Pain Mx  6. GI PPx  7. DVT PPx  8. PT/OT as needed  9.  Repeat labs in AM     IV Fluids: heparin (porcine) Last Rate: 10 Units/kg/hr (04/27/19 0225)    sodium chloride Last Rate: 50 mL/hr at 04/26/19 1520,    Nutrition:  Diet NPO Effective Now      Consultations:   IP CONSULT TO INTERNAL MEDICINE  IP CONSULT TO GENERAL SURGERY  IP CONSULT TO PULMONOLOGY      William Davis MD  4/27/2019  7:54 AM

## 2019-04-27 NOTE — PROGRESS NOTES
Pt keeps calling out because he has the hiccups and wants NG taken out. Writer robi served Dr. Jackie Lemons and he called and reports pt can have NG out and try clear diet now.

## 2019-04-27 NOTE — PROGRESS NOTES
Pt was off 02 on rounds and spo2 97%. 02 shut off and IS given and pt instructed on it. Patient less anxious now and is eating clear diet.

## 2019-04-28 ENCOUNTER — APPOINTMENT (OUTPATIENT)
Dept: CT IMAGING | Age: 64
DRG: 329 | End: 2019-04-28
Payer: MEDICARE

## 2019-04-28 ENCOUNTER — APPOINTMENT (OUTPATIENT)
Dept: GENERAL RADIOLOGY | Age: 64
DRG: 329 | End: 2019-04-28
Payer: MEDICARE

## 2019-04-28 ENCOUNTER — ANESTHESIA (OUTPATIENT)
Dept: OPERATING ROOM | Age: 64
DRG: 329 | End: 2019-04-28
Payer: MEDICARE

## 2019-04-28 ENCOUNTER — ANESTHESIA EVENT (OUTPATIENT)
Dept: OPERATING ROOM | Age: 64
DRG: 329 | End: 2019-04-28
Payer: MEDICARE

## 2019-04-28 VITALS
RESPIRATION RATE: 12 BRPM | OXYGEN SATURATION: 100 % | DIASTOLIC BLOOD PRESSURE: 52 MMHG | SYSTOLIC BLOOD PRESSURE: 87 MMHG | TEMPERATURE: 98.4 F

## 2019-04-28 LAB
ABSOLUTE EOS #: 0 K/UL (ref 0–0.4)
ABSOLUTE EOS #: <0.03 K/UL (ref 0–0.44)
ABSOLUTE IMMATURE GRANULOCYTE: 0.04 K/UL (ref 0–0.3)
ABSOLUTE IMMATURE GRANULOCYTE: ABNORMAL K/UL (ref 0–0.3)
ABSOLUTE LYMPH #: 0.35 K/UL (ref 1.1–3.7)
ABSOLUTE LYMPH #: 1.05 K/UL (ref 1–4.8)
ABSOLUTE MONO #: 1.18 K/UL (ref 0.1–1.2)
ABSOLUTE MONO #: 2.3 K/UL (ref 0.2–0.8)
ALBUMIN SERPL-MCNC: 3.6 G/DL (ref 3.5–5.2)
ALBUMIN/GLOBULIN RATIO: ABNORMAL (ref 1–2.5)
ALP BLD-CCNC: 69 U/L (ref 40–129)
ALT SERPL-CCNC: 139 U/L (ref 5–41)
ANION GAP SERPL CALCULATED.3IONS-SCNC: 11 MMOL/L (ref 9–17)
ANION GAP SERPL CALCULATED.3IONS-SCNC: 15 MMOL/L (ref 9–17)
AST SERPL-CCNC: 89 U/L
BASOPHILS # BLD: 0 %
BASOPHILS # BLD: 0 % (ref 0–2)
BASOPHILS ABSOLUTE: 0 K/UL (ref 0–0.2)
BASOPHILS ABSOLUTE: <0.03 K/UL (ref 0–0.2)
BILIRUB SERPL-MCNC: 0.73 MG/DL (ref 0.3–1.2)
BILIRUBIN DIRECT: 0.21 MG/DL
BILIRUBIN, INDIRECT: 0.52 MG/DL (ref 0–1)
BUN BLDV-MCNC: 18 MG/DL (ref 8–23)
BUN BLDV-MCNC: 26 MG/DL (ref 8–23)
BUN/CREAT BLD: 17 (ref 9–20)
BUN/CREAT BLD: 27 (ref 9–20)
CALCIUM SERPL-MCNC: 5.5 MG/DL (ref 8.6–10.4)
CALCIUM SERPL-MCNC: 8.7 MG/DL (ref 8.6–10.4)
CHLORIDE BLD-SCNC: 102 MMOL/L (ref 98–107)
CHLORIDE BLD-SCNC: 113 MMOL/L (ref 98–107)
CO2: 16 MMOL/L (ref 20–31)
CO2: 23 MMOL/L (ref 20–31)
CREAT SERPL-MCNC: 0.98 MG/DL (ref 0.7–1.2)
CREAT SERPL-MCNC: 1.05 MG/DL (ref 0.7–1.2)
DIFFERENTIAL TYPE: ABNORMAL
DIFFERENTIAL TYPE: ABNORMAL
EKG ATRIAL RATE: 48 BPM
EKG P AXIS: 2 DEGREES
EKG P-R INTERVAL: 154 MS
EKG Q-T INTERVAL: 544 MS
EKG QRS DURATION: 184 MS
EKG QTC CALCULATION (BAZETT): 485 MS
EKG R AXIS: -166 DEGREES
EKG T AXIS: 48 DEGREES
EKG VENTRICULAR RATE: 48 BPM
EOSINOPHILS RELATIVE PERCENT: 0 % (ref 1–4)
EOSINOPHILS RELATIVE PERCENT: 0 % (ref 1–4)
FIO2: 32
FIO2: 60
FIO2: 60
GFR AFRICAN AMERICAN: >60 ML/MIN
GFR AFRICAN AMERICAN: >60 ML/MIN
GFR NON-AFRICAN AMERICAN: >60 ML/MIN
GFR NON-AFRICAN AMERICAN: >60 ML/MIN
GFR SERPL CREATININE-BSD FRML MDRD: ABNORMAL ML/MIN/{1.73_M2}
GLOBULIN: ABNORMAL G/DL (ref 1.5–3.8)
GLUCOSE BLD-MCNC: 109 MG/DL (ref 74–106)
GLUCOSE BLD-MCNC: 116 MG/DL (ref 70–99)
GLUCOSE BLD-MCNC: 122 MG/DL (ref 75–110)
GLUCOSE BLD-MCNC: 152 MG/DL (ref 70–99)
GLUCOSE BLD-MCNC: 78 MG/DL (ref 75–110)
HCT VFR BLD CALC: 30 % (ref 40.7–50.3)
HCT VFR BLD CALC: 53.9 % (ref 40.7–50.3)
HCT VFR BLD CALC: 56.2 % (ref 40.7–50.3)
HEMOGLOBIN: 16.9 G/DL (ref 13–17)
HEMOGLOBIN: 17.9 G/DL (ref 13–17)
HEMOGLOBIN: 9.4 G/DL (ref 13–17)
IMMATURE GRANULOCYTES: 1 %
IMMATURE GRANULOCYTES: ABNORMAL %
INR BLD: 1.2
INR BLD: 1.6
LACTIC ACID: 3.6 MMOL/L (ref 0.5–2.2)
LACTIC ACID: 4.2 MMOL/L (ref 0.5–2.2)
LACTIC ACID: 6.6 MMOL/L (ref 0.5–2.2)
LIPASE: 14 U/L (ref 13–60)
LYMPHOCYTES # BLD: 4 % (ref 24–43)
LYMPHOCYTES # BLD: 5 % (ref 24–44)
MAGNESIUM: 1.1 MG/DL (ref 1.6–2.6)
MAGNESIUM: 1.8 MG/DL (ref 1.6–2.6)
MCH RBC QN AUTO: 32.1 PG (ref 25.2–33.5)
MCH RBC QN AUTO: 32.1 PG (ref 25.2–33.5)
MCHC RBC AUTO-ENTMCNC: 31.3 G/DL (ref 28.4–34.8)
MCHC RBC AUTO-ENTMCNC: 31.4 G/DL (ref 28.4–34.8)
MCV RBC AUTO: 102.4 FL (ref 82.6–102.9)
MCV RBC AUTO: 102.5 FL (ref 82.6–102.9)
MONOCYTES # BLD: 11 % (ref 1–7)
MONOCYTES # BLD: 14 % (ref 3–12)
MORPHOLOGY: ABNORMAL
NEGATIVE BASE EXCESS, ART: 6 (ref 0–2)
NEGATIVE BASE EXCESS, ART: 7 (ref 0–2)
NEGATIVE BASE EXCESS, ART: 8 (ref 0–2)
NRBC AUTOMATED: 0 PER 100 WBC
NRBC AUTOMATED: 0 PER 100 WBC
O2 DEVICE/FLOW/%: ABNORMAL
PARTIAL THROMBOPLASTIN TIME: 113.9 SEC (ref 23–31)
PARTIAL THROMBOPLASTIN TIME: 31.5 SEC (ref 23–31)
PATIENT TEMP: 37
PDW BLD-RTO: 16 % (ref 11.8–14.4)
PDW BLD-RTO: 16.3 % (ref 11.8–14.4)
PLATELET # BLD: 141 K/UL (ref 138–453)
PLATELET # BLD: 76 K/UL (ref 138–453)
PLATELET ESTIMATE: ABNORMAL
PLATELET ESTIMATE: ABNORMAL
PMV BLD AUTO: 11.3 FL (ref 8.1–13.5)
PMV BLD AUTO: 12.6 FL (ref 8.1–13.5)
POC HCO3: 18.5 MMOL/L (ref 22–27)
POC HCO3: 18.7 MMOL/L (ref 22–27)
POC HCO3: 19.7 MMOL/L (ref 22–27)
POC HEMATOCRIT: 38 % (ref 41–53)
POC HEMOGLOBIN: 12.9 G/DL (ref 13.5–17.5)
POC IONIZED CALCIUM: 1.1 MMOL/L (ref 1.13–1.33)
POC O2 SATURATION: 94 %
POC O2 SATURATION: 99 %
POC O2 SATURATION: 99 %
POC PCO2 TEMP: ABNORMAL MM HG
POC PCO2: 35 MM HG (ref 32–45)
POC PCO2: 36 MM HG (ref 32–45)
POC PCO2: 38 MM HG (ref 32–45)
POC PH TEMP: ABNORMAL
POC PH: 7.32 (ref 7.35–7.45)
POC PH: 7.32 (ref 7.35–7.45)
POC PH: 7.34 (ref 7.35–7.45)
POC PO2 TEMP: ABNORMAL MM HG
POC PO2: 135 MM HG (ref 75–95)
POC PO2: 164 MM HG (ref 75–95)
POC PO2: 78 MM HG (ref 75–95)
POC POTASSIUM: 4.8 MMOL/L (ref 3.5–5.1)
POC SODIUM: 138 MMOL/L (ref 136–145)
POSITIVE BASE EXCESS, ART: ABNORMAL (ref 0–2)
POTASSIUM SERPL-SCNC: 3.9 MMOL/L (ref 3.7–5.3)
POTASSIUM SERPL-SCNC: 4.4 MMOL/L (ref 3.7–5.3)
PROTHROMBIN TIME: 11.8 SEC (ref 9.7–11.6)
PROTHROMBIN TIME: 16.5 SEC (ref 9.7–11.6)
RBC # BLD: 2.93 M/UL (ref 4.21–5.77)
RBC # BLD: 5.26 M/UL (ref 4.21–5.77)
RBC # BLD: ABNORMAL 10*6/UL
RBC # BLD: ABNORMAL 10*6/UL
SEG NEUTROPHILS: 81 % (ref 36–65)
SEG NEUTROPHILS: 84 % (ref 36–66)
SEGMENTED NEUTROPHILS ABSOLUTE COUNT: 17.55 K/UL (ref 1.8–7.7)
SEGMENTED NEUTROPHILS ABSOLUTE COUNT: 6.65 K/UL (ref 1.5–8.1)
SODIUM BLD-SCNC: 140 MMOL/L (ref 135–144)
SODIUM BLD-SCNC: 140 MMOL/L (ref 135–144)
TCO2 (CALC), ART: 20 MMOL/L (ref 23–28)
TCO2 (CALC), ART: 20 MMOL/L (ref 23–28)
TCO2 (CALC), ART: 21 MMOL/L (ref 23–28)
TOTAL PROTEIN: 7.7 G/DL (ref 6.4–8.3)
TROPONIN INTERP: ABNORMAL
TROPONIN T: ABNORMAL NG/ML
TROPONIN, HIGH SENSITIVITY: 23 NG/L (ref 0–22)
WBC # BLD: 20.9 K/UL (ref 3.5–11.3)
WBC # BLD: 8.2 K/UL (ref 3.5–11.3)
WBC # BLD: ABNORMAL 10*3/UL
WBC # BLD: ABNORMAL 10*3/UL

## 2019-04-28 PROCEDURE — 36430 TRANSFUSION BLD/BLD COMPNT: CPT

## 2019-04-28 PROCEDURE — 36415 COLL VENOUS BLD VENIPUNCTURE: CPT

## 2019-04-28 PROCEDURE — 36620 INSERTION CATHETER ARTERY: CPT

## 2019-04-28 PROCEDURE — 82803 BLOOD GASES ANY COMBINATION: CPT

## 2019-04-28 PROCEDURE — 2709999900 HC NON-CHARGEABLE SUPPLY: Performed by: SURGERY

## 2019-04-28 PROCEDURE — 6360000002 HC RX W HCPCS: Performed by: SURGERY

## 2019-04-28 PROCEDURE — 2720000010 HC SURG SUPPLY STERILE: Performed by: SURGERY

## 2019-04-28 PROCEDURE — 6360000002 HC RX W HCPCS

## 2019-04-28 PROCEDURE — 0BH17EZ INSERTION OF ENDOTRACHEAL AIRWAY INTO TRACHEA, VIA NATURAL OR ARTIFICIAL OPENING: ICD-10-PCS | Performed by: INTERNAL MEDICINE

## 2019-04-28 PROCEDURE — 3600000002 HC SURGERY LEVEL 2 BASE: Performed by: SURGERY

## 2019-04-28 PROCEDURE — 36600 WITHDRAWAL OF ARTERIAL BLOOD: CPT

## 2019-04-28 PROCEDURE — 86901 BLOOD TYPING SEROLOGIC RH(D): CPT

## 2019-04-28 PROCEDURE — 86870 RBC ANTIBODY IDENTIFICATION: CPT

## 2019-04-28 PROCEDURE — 6370000000 HC RX 637 (ALT 250 FOR IP): Performed by: NURSE PRACTITIONER

## 2019-04-28 PROCEDURE — 87040 BLOOD CULTURE FOR BACTERIA: CPT

## 2019-04-28 PROCEDURE — 82947 ASSAY GLUCOSE BLOOD QUANT: CPT

## 2019-04-28 PROCEDURE — 83690 ASSAY OF LIPASE: CPT

## 2019-04-28 PROCEDURE — 99233 SBSQ HOSP IP/OBS HIGH 50: CPT | Performed by: INTERNAL MEDICINE

## 2019-04-28 PROCEDURE — 86850 RBC ANTIBODY SCREEN: CPT

## 2019-04-28 PROCEDURE — 71045 X-RAY EXAM CHEST 1 VIEW: CPT

## 2019-04-28 PROCEDURE — 94770 HC ETCO2 MONITOR DAILY: CPT

## 2019-04-28 PROCEDURE — 2500000003 HC RX 250 WO HCPCS: Performed by: SURGERY

## 2019-04-28 PROCEDURE — 85025 COMPLETE CBC W/AUTO DIFF WBC: CPT

## 2019-04-28 PROCEDURE — 5A1955Z RESPIRATORY VENTILATION, GREATER THAN 96 CONSECUTIVE HOURS: ICD-10-PCS | Performed by: INTERNAL MEDICINE

## 2019-04-28 PROCEDURE — P9041 ALBUMIN (HUMAN),5%, 50ML: HCPCS | Performed by: ANESTHESIOLOGY

## 2019-04-28 PROCEDURE — 2580000003 HC RX 258: Performed by: SURGERY

## 2019-04-28 PROCEDURE — 99222 1ST HOSP IP/OBS MODERATE 55: CPT | Performed by: INTERNAL MEDICINE

## 2019-04-28 PROCEDURE — 80048 BASIC METABOLIC PNL TOTAL CA: CPT

## 2019-04-28 PROCEDURE — 86920 COMPATIBILITY TEST SPIN: CPT

## 2019-04-28 PROCEDURE — 85014 HEMATOCRIT: CPT

## 2019-04-28 PROCEDURE — 85018 HEMOGLOBIN: CPT

## 2019-04-28 PROCEDURE — 2500000003 HC RX 250 WO HCPCS: Performed by: INTERNAL MEDICINE

## 2019-04-28 PROCEDURE — 82330 ASSAY OF CALCIUM: CPT

## 2019-04-28 PROCEDURE — 6370000000 HC RX 637 (ALT 250 FOR IP): Performed by: SURGERY

## 2019-04-28 PROCEDURE — 2580000003 HC RX 258: Performed by: INTERNAL MEDICINE

## 2019-04-28 PROCEDURE — 2000000000 HC ICU R&B

## 2019-04-28 PROCEDURE — 3600000012 HC SURGERY LEVEL 2 ADDTL 15MIN: Performed by: SURGERY

## 2019-04-28 PROCEDURE — 2700000000 HC OXYGEN THERAPY PER DAY

## 2019-04-28 PROCEDURE — 83735 ASSAY OF MAGNESIUM: CPT

## 2019-04-28 PROCEDURE — 80076 HEPATIC FUNCTION PANEL: CPT

## 2019-04-28 PROCEDURE — 2580000003 HC RX 258: Performed by: ANESTHESIOLOGY

## 2019-04-28 PROCEDURE — 6360000002 HC RX W HCPCS: Performed by: ANESTHESIOLOGY

## 2019-04-28 PROCEDURE — 6370000000 HC RX 637 (ALT 250 FOR IP): Performed by: INTERNAL MEDICINE

## 2019-04-28 PROCEDURE — P9016 RBC LEUKOCYTES REDUCED: HCPCS

## 2019-04-28 PROCEDURE — 85730 THROMBOPLASTIN TIME PARTIAL: CPT

## 2019-04-28 PROCEDURE — 84484 ASSAY OF TROPONIN QUANT: CPT

## 2019-04-28 PROCEDURE — 6360000002 HC RX W HCPCS: Performed by: INTERNAL MEDICINE

## 2019-04-28 PROCEDURE — 93005 ELECTROCARDIOGRAM TRACING: CPT

## 2019-04-28 PROCEDURE — 84295 ASSAY OF SERUM SODIUM: CPT

## 2019-04-28 PROCEDURE — 3700000000 HC ANESTHESIA ATTENDED CARE: Performed by: SURGERY

## 2019-04-28 PROCEDURE — 3700000001 HC ADD 15 MINUTES (ANESTHESIA): Performed by: SURGERY

## 2019-04-28 PROCEDURE — 94002 VENT MGMT INPAT INIT DAY: CPT

## 2019-04-28 PROCEDURE — 74177 CT ABD & PELVIS W/CONTRAST: CPT

## 2019-04-28 PROCEDURE — C9113 INJ PANTOPRAZOLE SODIUM, VIA: HCPCS | Performed by: SURGERY

## 2019-04-28 PROCEDURE — 2500000003 HC RX 250 WO HCPCS: Performed by: ANESTHESIOLOGY

## 2019-04-28 PROCEDURE — 85610 PROTHROMBIN TIME: CPT

## 2019-04-28 PROCEDURE — 70450 CT HEAD/BRAIN W/O DYE: CPT

## 2019-04-28 PROCEDURE — 84132 ASSAY OF SERUM POTASSIUM: CPT

## 2019-04-28 PROCEDURE — 0DB80ZZ EXCISION OF SMALL INTESTINE, OPEN APPROACH: ICD-10-PCS | Performed by: SURGERY

## 2019-04-28 PROCEDURE — 88307 TISSUE EXAM BY PATHOLOGIST: CPT

## 2019-04-28 PROCEDURE — 86900 BLOOD TYPING SEROLOGIC ABO: CPT

## 2019-04-28 PROCEDURE — 86902 BLOOD TYPE ANTIGEN DONOR EA: CPT

## 2019-04-28 PROCEDURE — 83605 ASSAY OF LACTIC ACID: CPT

## 2019-04-28 PROCEDURE — 94640 AIRWAY INHALATION TREATMENT: CPT

## 2019-04-28 PROCEDURE — 94761 N-INVAS EAR/PLS OXIMETRY MLT: CPT

## 2019-04-28 PROCEDURE — 02HV33Z INSERTION OF INFUSION DEVICE INTO SUPERIOR VENA CAVA, PERCUTANEOUS APPROACH: ICD-10-PCS | Performed by: ANESTHESIOLOGY

## 2019-04-28 PROCEDURE — 6360000004 HC RX CONTRAST MEDICATION: Performed by: SURGERY

## 2019-04-28 RX ORDER — 0.9 % SODIUM CHLORIDE 0.9 %
250 INTRAVENOUS SOLUTION INTRAVENOUS ONCE
Status: DISCONTINUED | OUTPATIENT
Start: 2019-04-28 | End: 2019-04-28

## 2019-04-28 RX ORDER — BUPIVACAINE HYDROCHLORIDE 2.5 MG/ML
INJECTION, SOLUTION EPIDURAL; INFILTRATION; INTRACAUDAL PRN
Status: DISCONTINUED | OUTPATIENT
Start: 2019-04-28 | End: 2019-04-28 | Stop reason: HOSPADM

## 2019-04-28 RX ORDER — PROPOFOL 10 MG/ML
INJECTION, EMULSION INTRAVENOUS
Status: COMPLETED
Start: 2019-04-28 | End: 2019-04-28

## 2019-04-28 RX ORDER — SODIUM CHLORIDE 0.9 % (FLUSH) 0.9 %
10 SYRINGE (ML) INJECTION
Status: COMPLETED | OUTPATIENT
Start: 2019-04-28 | End: 2019-04-28

## 2019-04-28 RX ORDER — DIPHENHYDRAMINE HYDROCHLORIDE 50 MG/ML
12.5 INJECTION INTRAMUSCULAR; INTRAVENOUS
Status: DISCONTINUED | OUTPATIENT
Start: 2019-04-28 | End: 2019-04-28 | Stop reason: HOSPADM

## 2019-04-28 RX ORDER — ONDANSETRON 4 MG/1
4 TABLET, ORALLY DISINTEGRATING ORAL EVERY 6 HOURS PRN
Status: DISCONTINUED | OUTPATIENT
Start: 2019-04-28 | End: 2019-05-10 | Stop reason: HOSPADM

## 2019-04-28 RX ORDER — MEPERIDINE HYDROCHLORIDE 50 MG/ML
12.5 INJECTION INTRAMUSCULAR; INTRAVENOUS; SUBCUTANEOUS EVERY 5 MIN PRN
Status: DISCONTINUED | OUTPATIENT
Start: 2019-04-28 | End: 2019-04-28 | Stop reason: HOSPADM

## 2019-04-28 RX ORDER — SODIUM CHLORIDE 9 MG/ML
INJECTION, SOLUTION INTRAVENOUS CONTINUOUS PRN
Status: DISCONTINUED | OUTPATIENT
Start: 2019-04-28 | End: 2019-04-28 | Stop reason: SDUPTHER

## 2019-04-28 RX ORDER — DEXTROSE AND SODIUM CHLORIDE 5; .45 G/100ML; G/100ML
INJECTION, SOLUTION INTRAVENOUS CONTINUOUS
Status: DISCONTINUED | OUTPATIENT
Start: 2019-04-28 | End: 2019-04-28

## 2019-04-28 RX ORDER — HYDRALAZINE HYDROCHLORIDE 20 MG/ML
5 INJECTION INTRAMUSCULAR; INTRAVENOUS EVERY 10 MIN PRN
Status: DISCONTINUED | OUTPATIENT
Start: 2019-04-28 | End: 2019-04-28 | Stop reason: HOSPADM

## 2019-04-28 RX ORDER — PHENYLEPHRINE HCL IN 0.9% NACL 1 MG/10 ML
SYRINGE (ML) INTRAVENOUS PRN
Status: DISCONTINUED | OUTPATIENT
Start: 2019-04-28 | End: 2019-04-28 | Stop reason: SDUPTHER

## 2019-04-28 RX ORDER — SUCCINYLCHOLINE/SOD CL,ISO/PF 100 MG/5ML
SYRINGE (ML) INTRAVENOUS PRN
Status: DISCONTINUED | OUTPATIENT
Start: 2019-04-28 | End: 2019-04-28 | Stop reason: SDUPTHER

## 2019-04-28 RX ORDER — 0.9 % SODIUM CHLORIDE 0.9 %
1000 INTRAVENOUS SOLUTION INTRAVENOUS ONCE
Status: COMPLETED | OUTPATIENT
Start: 2019-04-28 | End: 2019-04-28

## 2019-04-28 RX ORDER — ETOMIDATE 2 MG/ML
INJECTION INTRAVENOUS PRN
Status: DISCONTINUED | OUTPATIENT
Start: 2019-04-28 | End: 2019-04-28 | Stop reason: SDUPTHER

## 2019-04-28 RX ORDER — CALCIUM CHLORIDE 100 MG/ML
1 INJECTION INTRAVENOUS; INTRAVENTRICULAR ONCE
Status: COMPLETED | OUTPATIENT
Start: 2019-04-28 | End: 2019-04-28

## 2019-04-28 RX ORDER — FENTANYL CITRATE 50 UG/ML
25 INJECTION, SOLUTION INTRAMUSCULAR; INTRAVENOUS EVERY 5 MIN PRN
Status: DISCONTINUED | OUTPATIENT
Start: 2019-04-28 | End: 2019-04-28 | Stop reason: HOSPADM

## 2019-04-28 RX ORDER — OXYCODONE HYDROCHLORIDE AND ACETAMINOPHEN 5; 325 MG/1; MG/1
2 TABLET ORAL EVERY 4 HOURS PRN
Status: DISCONTINUED | OUTPATIENT
Start: 2019-04-28 | End: 2019-05-10 | Stop reason: HOSPADM

## 2019-04-28 RX ORDER — SODIUM CHLORIDE 0.9 % (FLUSH) 0.9 %
10 SYRINGE (ML) INJECTION EVERY 12 HOURS SCHEDULED
Status: DISCONTINUED | OUTPATIENT
Start: 2019-04-28 | End: 2019-05-10 | Stop reason: HOSPADM

## 2019-04-28 RX ORDER — LIDOCAINE HYDROCHLORIDE 20 MG/ML
INJECTION, SOLUTION EPIDURAL; INFILTRATION; INTRACAUDAL; PERINEURAL PRN
Status: DISCONTINUED | OUTPATIENT
Start: 2019-04-28 | End: 2019-04-28 | Stop reason: SDUPTHER

## 2019-04-28 RX ORDER — SODIUM CHLORIDE 9 MG/ML
INJECTION, SOLUTION INTRAVENOUS CONTINUOUS
Status: DISCONTINUED | OUTPATIENT
Start: 2019-04-28 | End: 2019-04-29

## 2019-04-28 RX ORDER — 0.9 % SODIUM CHLORIDE 0.9 %
80 INTRAVENOUS SOLUTION INTRAVENOUS ONCE
Status: COMPLETED | OUTPATIENT
Start: 2019-04-28 | End: 2019-04-28

## 2019-04-28 RX ORDER — FENTANYL CITRATE 50 UG/ML
INJECTION, SOLUTION INTRAMUSCULAR; INTRAVENOUS PRN
Status: DISCONTINUED | OUTPATIENT
Start: 2019-04-28 | End: 2019-04-28 | Stop reason: SDUPTHER

## 2019-04-28 RX ORDER — ONDANSETRON 2 MG/ML
4 INJECTION INTRAMUSCULAR; INTRAVENOUS EVERY 6 HOURS PRN
Status: DISCONTINUED | OUTPATIENT
Start: 2019-04-28 | End: 2019-05-10 | Stop reason: HOSPADM

## 2019-04-28 RX ORDER — PROPOFOL 10 MG/ML
10 INJECTION, EMULSION INTRAVENOUS
Status: DISCONTINUED | OUTPATIENT
Start: 2019-04-28 | End: 2019-04-28 | Stop reason: ALTCHOICE

## 2019-04-28 RX ORDER — FAMOTIDINE 20 MG/1
20 TABLET, FILM COATED ORAL 2 TIMES DAILY
Status: DISCONTINUED | OUTPATIENT
Start: 2019-04-28 | End: 2019-04-30

## 2019-04-28 RX ORDER — ONDANSETRON 2 MG/ML
4 INJECTION INTRAMUSCULAR; INTRAVENOUS
Status: DISCONTINUED | OUTPATIENT
Start: 2019-04-28 | End: 2019-04-28 | Stop reason: HOSPADM

## 2019-04-28 RX ORDER — LABETALOL HYDROCHLORIDE 5 MG/ML
5 INJECTION, SOLUTION INTRAVENOUS EVERY 10 MIN PRN
Status: DISCONTINUED | OUTPATIENT
Start: 2019-04-28 | End: 2019-04-28 | Stop reason: HOSPADM

## 2019-04-28 RX ORDER — PHENYLEPHRINE HYDROCHLORIDE 10 MG/ML
INJECTION INTRAVENOUS PRN
Status: DISCONTINUED | OUTPATIENT
Start: 2019-04-28 | End: 2019-04-28 | Stop reason: SDUPTHER

## 2019-04-28 RX ORDER — OXYCODONE HYDROCHLORIDE AND ACETAMINOPHEN 5; 325 MG/1; MG/1
1 TABLET ORAL EVERY 4 HOURS PRN
Status: DISCONTINUED | OUTPATIENT
Start: 2019-04-28 | End: 2019-05-10 | Stop reason: HOSPADM

## 2019-04-28 RX ORDER — 0.9 % SODIUM CHLORIDE 0.9 %
250 INTRAVENOUS SOLUTION INTRAVENOUS ONCE
Status: COMPLETED | OUTPATIENT
Start: 2019-04-28 | End: 2019-04-29

## 2019-04-28 RX ORDER — 0.9 % SODIUM CHLORIDE 0.9 %
500 INTRAVENOUS SOLUTION INTRAVENOUS ONCE
Status: COMPLETED | OUTPATIENT
Start: 2019-04-28 | End: 2019-04-28

## 2019-04-28 RX ORDER — DEXAMETHASONE SODIUM PHOSPHATE 10 MG/ML
INJECTION INTRAMUSCULAR; INTRAVENOUS PRN
Status: DISCONTINUED | OUTPATIENT
Start: 2019-04-28 | End: 2019-04-28 | Stop reason: SDUPTHER

## 2019-04-28 RX ORDER — SODIUM CHLORIDE 450 MG/100ML
INJECTION, SOLUTION INTRAVENOUS CONTINUOUS
Status: DISCONTINUED | OUTPATIENT
Start: 2019-04-28 | End: 2019-04-28

## 2019-04-28 RX ORDER — SODIUM CHLORIDE 0.9 % (FLUSH) 0.9 %
10 SYRINGE (ML) INJECTION PRN
Status: DISCONTINUED | OUTPATIENT
Start: 2019-04-28 | End: 2019-05-10 | Stop reason: HOSPADM

## 2019-04-28 RX ORDER — ONDANSETRON 2 MG/ML
INJECTION INTRAMUSCULAR; INTRAVENOUS PRN
Status: DISCONTINUED | OUTPATIENT
Start: 2019-04-28 | End: 2019-04-28 | Stop reason: SDUPTHER

## 2019-04-28 RX ORDER — DEXAMETHASONE SODIUM PHOSPHATE 10 MG/ML
4 INJECTION, SOLUTION INTRAMUSCULAR; INTRAVENOUS
Status: DISCONTINUED | OUTPATIENT
Start: 2019-04-28 | End: 2019-04-28 | Stop reason: HOSPADM

## 2019-04-28 RX ORDER — CEFAZOLIN SODIUM 1 G/3ML
INJECTION, POWDER, FOR SOLUTION INTRAMUSCULAR; INTRAVENOUS PRN
Status: DISCONTINUED | OUTPATIENT
Start: 2019-04-28 | End: 2019-04-28 | Stop reason: SDUPTHER

## 2019-04-28 RX ORDER — SODIUM CHLORIDE, SODIUM LACTATE, POTASSIUM CHLORIDE, CALCIUM CHLORIDE 600; 310; 30; 20 MG/100ML; MG/100ML; MG/100ML; MG/100ML
INJECTION, SOLUTION INTRAVENOUS CONTINUOUS PRN
Status: DISCONTINUED | OUTPATIENT
Start: 2019-04-28 | End: 2019-04-28 | Stop reason: SDUPTHER

## 2019-04-28 RX ORDER — ONDANSETRON 2 MG/ML
4 INJECTION INTRAMUSCULAR; INTRAVENOUS EVERY 6 HOURS PRN
Status: DISCONTINUED | OUTPATIENT
Start: 2019-04-28 | End: 2019-04-28 | Stop reason: SDUPTHER

## 2019-04-28 RX ORDER — HYDROMORPHONE HCL 110MG/55ML
0.5 PATIENT CONTROLLED ANALGESIA SYRINGE INTRAVENOUS
Status: DISCONTINUED | OUTPATIENT
Start: 2019-04-28 | End: 2019-04-28 | Stop reason: SDUPTHER

## 2019-04-28 RX ORDER — ALBUMIN, HUMAN INJ 5% 5 %
SOLUTION INTRAVENOUS PRN
Status: DISCONTINUED | OUTPATIENT
Start: 2019-04-28 | End: 2019-04-28 | Stop reason: SDUPTHER

## 2019-04-28 RX ORDER — HYDROMORPHONE HCL 110MG/55ML
0.25 PATIENT CONTROLLED ANALGESIA SYRINGE INTRAVENOUS
Status: DISCONTINUED | OUTPATIENT
Start: 2019-04-28 | End: 2019-04-28 | Stop reason: SDUPTHER

## 2019-04-28 RX ORDER — ALBUTEROL SULFATE 2.5 MG/3ML
2.5 SOLUTION RESPIRATORY (INHALATION)
Status: DISCONTINUED | OUTPATIENT
Start: 2019-04-28 | End: 2019-05-03

## 2019-04-28 RX ADMIN — METRONIDAZOLE 500 MG: 500 INJECTION, SOLUTION INTRAVENOUS at 15:10

## 2019-04-28 RX ADMIN — ONDANSETRON 4 MG: 2 INJECTION, SOLUTION INTRAMUSCULAR; INTRAVENOUS at 17:01

## 2019-04-28 RX ADMIN — PROPOFOL 25 MCG/KG/MIN: 10 INJECTION, EMULSION INTRAVENOUS at 17:45

## 2019-04-28 RX ADMIN — ETOMIDATE 20 MG: 2 INJECTION, SOLUTION INTRAVENOUS at 14:30

## 2019-04-28 RX ADMIN — SODIUM CHLORIDE 250 ML: 9 INJECTION, SOLUTION INTRAVENOUS at 19:25

## 2019-04-28 RX ADMIN — PHENYLEPHRINE HYDROCHLORIDE 100 MCG: 10 INJECTION INTRAVENOUS at 15:35

## 2019-04-28 RX ADMIN — FERROUS SULFATE TAB EC 325 MG (65 MG FE EQUIVALENT) 325 MG: 325 (65 FE) TABLET DELAYED RESPONSE at 09:49

## 2019-04-28 RX ADMIN — PANTOPRAZOLE SODIUM 40 MG: 40 TABLET, DELAYED RELEASE ORAL at 09:49

## 2019-04-28 RX ADMIN — Medication 1 MG: at 10:45

## 2019-04-28 RX ADMIN — CALCIUM CHLORIDE 1 G: 100 INJECTION, SOLUTION INTRAVENOUS at 23:24

## 2019-04-28 RX ADMIN — Medication 100 MG: at 14:29

## 2019-04-28 RX ADMIN — PYRIDOXINE HCL TAB 50 MG 25 MG: 50 TAB at 09:45

## 2019-04-28 RX ADMIN — METOPROLOL SUCCINATE 100 MG: 50 TABLET, FILM COATED, EXTENDED RELEASE ORAL at 09:45

## 2019-04-28 RX ADMIN — SPIRONOLACTONE 25 MG: 25 TABLET ORAL at 09:45

## 2019-04-28 RX ADMIN — IOPAMIDOL 75 ML: 755 INJECTION, SOLUTION INTRAVENOUS at 11:11

## 2019-04-28 RX ADMIN — SODIUM CHLORIDE, POTASSIUM CHLORIDE, SODIUM LACTATE AND CALCIUM CHLORIDE: 600; 310; 30; 20 INJECTION, SOLUTION INTRAVENOUS at 16:48

## 2019-04-28 RX ADMIN — ALBUMIN (HUMAN) 250 ML: 12.5 INJECTION, SOLUTION INTRAVENOUS at 16:51

## 2019-04-28 RX ADMIN — FENTANYL CITRATE 100 MCG: 50 INJECTION, SOLUTION INTRAMUSCULAR; INTRAVENOUS at 14:30

## 2019-04-28 RX ADMIN — PHENYLEPHRINE HYDROCHLORIDE 100 MCG: 10 INJECTION INTRAVENOUS at 15:52

## 2019-04-28 RX ADMIN — PHENYLEPHRINE HYDROCHLORIDE 100 MCG: 10 INJECTION INTRAVENOUS at 15:04

## 2019-04-28 RX ADMIN — SODIUM CHLORIDE 1000 ML: 9 INJECTION, SOLUTION INTRAVENOUS at 18:26

## 2019-04-28 RX ADMIN — FENTANYL CITRATE 50 MCG: 50 INJECTION, SOLUTION INTRAMUSCULAR; INTRAVENOUS at 17:24

## 2019-04-28 RX ADMIN — Medication 2 MG/HR: at 20:00

## 2019-04-28 RX ADMIN — Medication 1 MG: at 17:59

## 2019-04-28 RX ADMIN — MAGNESIUM SULFATE HEPTAHYDRATE 1 G: 1 INJECTION, SOLUTION INTRAVENOUS at 23:16

## 2019-04-28 RX ADMIN — SODIUM CHLORIDE: 9 INJECTION, SOLUTION INTRAVENOUS at 15:23

## 2019-04-28 RX ADMIN — PHENYLEPHRINE HYDROCHLORIDE 200 MCG: 10 INJECTION INTRAVENOUS at 15:18

## 2019-04-28 RX ADMIN — SODIUM CHLORIDE, POTASSIUM CHLORIDE, SODIUM LACTATE AND CALCIUM CHLORIDE: 600; 310; 30; 20 INJECTION, SOLUTION INTRAVENOUS at 14:23

## 2019-04-28 RX ADMIN — PHENYLEPHRINE HYDROCHLORIDE 200 MCG: 10 INJECTION INTRAVENOUS at 14:53

## 2019-04-28 RX ADMIN — IPRATROPIUM BROMIDE AND ALBUTEROL SULFATE 1 AMPULE: .5; 3 SOLUTION RESPIRATORY (INHALATION) at 19:21

## 2019-04-28 RX ADMIN — LIDOCAINE HYDROCHLORIDE 5 ML: 20 INJECTION, SOLUTION EPIDURAL; INFILTRATION; INTRACAUDAL; PERINEURAL at 14:30

## 2019-04-28 RX ADMIN — PHENYLEPHRINE HYDROCHLORIDE 100 MCG: 10 INJECTION INTRAVENOUS at 15:29

## 2019-04-28 RX ADMIN — DIGOXIN 250 MCG: 250 TABLET ORAL at 09:45

## 2019-04-28 RX ADMIN — PHENYLEPHRINE HYDROCHLORIDE 200 MCG: 10 INJECTION INTRAVENOUS at 14:59

## 2019-04-28 RX ADMIN — IPRATROPIUM BROMIDE AND ALBUTEROL SULFATE 1 AMPULE: .5; 3 SOLUTION RESPIRATORY (INHALATION) at 08:04

## 2019-04-28 RX ADMIN — FUROSEMIDE 40 MG: 40 TABLET ORAL at 09:45

## 2019-04-28 RX ADMIN — QUETIAPINE FUMARATE 25 MG: 25 TABLET ORAL at 09:45

## 2019-04-28 RX ADMIN — Medication 1 MG: at 00:25

## 2019-04-28 RX ADMIN — CALCIUM CHLORIDE 1 G: 100 INJECTION, SOLUTION INTRAVENOUS at 23:16

## 2019-04-28 RX ADMIN — Medication 10 ML: at 11:12

## 2019-04-28 RX ADMIN — DEXAMETHASONE SODIUM PHOSPHATE 10 MG: 10 INJECTION INTRAMUSCULAR; INTRAVENOUS at 16:03

## 2019-04-28 RX ADMIN — PHENYLEPHRINE HYDROCHLORIDE 100 MCG: 10 INJECTION INTRAVENOUS at 16:05

## 2019-04-28 RX ADMIN — TAZOBACTAM SODIUM AND PIPERACILLIN SODIUM 3.38 G: 375; 3 INJECTION, SOLUTION INTRAVENOUS at 11:36

## 2019-04-28 RX ADMIN — SODIUM CHLORIDE, POTASSIUM CHLORIDE, SODIUM LACTATE AND CALCIUM CHLORIDE: 600; 310; 30; 20 INJECTION, SOLUTION INTRAVENOUS at 15:57

## 2019-04-28 RX ADMIN — SODIUM CHLORIDE 80 ML: 9 INJECTION, SOLUTION INTRAVENOUS at 11:11

## 2019-04-28 RX ADMIN — BARIUM SULFATE 450 ML: 20 SUSPENSION ORAL at 11:12

## 2019-04-28 RX ADMIN — NOREPINEPHRINE BITARTRATE 2 MCG/MIN: 1 INJECTION INTRAVENOUS at 18:52

## 2019-04-28 RX ADMIN — PHENYLEPHRINE HYDROCHLORIDE 100 MCG: 10 INJECTION INTRAVENOUS at 14:55

## 2019-04-28 RX ADMIN — PHENYLEPHRINE HYDROCHLORIDE 100 MCG: 10 INJECTION INTRAVENOUS at 16:00

## 2019-04-28 RX ADMIN — FENTANYL CITRATE 100 MCG: 50 INJECTION, SOLUTION INTRAMUSCULAR; INTRAVENOUS at 15:50

## 2019-04-28 RX ADMIN — PHENYLEPHRINE HYDROCHLORIDE 100 MCG: 10 INJECTION INTRAVENOUS at 15:07

## 2019-04-28 RX ADMIN — SENNOSIDES 8.6 MG: 8.6 TABLET, FILM COATED ORAL at 09:49

## 2019-04-28 RX ADMIN — SODIUM CHLORIDE 500 ML: 9 INJECTION, SOLUTION INTRAVENOUS at 10:49

## 2019-04-28 RX ADMIN — Medication 1 MG: at 03:22

## 2019-04-28 RX ADMIN — PHENYLEPHRINE HYDROCHLORIDE 100 MCG: 10 INJECTION INTRAVENOUS at 14:47

## 2019-04-28 RX ADMIN — AMIODARONE HYDROCHLORIDE 400 MG: 200 TABLET ORAL at 09:45

## 2019-04-28 RX ADMIN — SODIUM CHLORIDE: 9 INJECTION, SOLUTION INTRAVENOUS at 19:25

## 2019-04-28 RX ADMIN — PHENYLEPHRINE HYDROCHLORIDE 100 MCG: 10 INJECTION INTRAVENOUS at 15:10

## 2019-04-28 RX ADMIN — PHENYLEPHRINE HYDROCHLORIDE 100 MCG: 10 INJECTION INTRAVENOUS at 14:49

## 2019-04-28 RX ADMIN — DULOXETINE HYDROCHLORIDE 20 MG: 20 CAPSULE, DELAYED RELEASE ORAL at 09:45

## 2019-04-28 RX ADMIN — PANTOPRAZOLE SODIUM 8 MG/HR: 40 INJECTION, POWDER, FOR SOLUTION INTRAVENOUS at 18:37

## 2019-04-28 RX ADMIN — CEFOXITIN SODIUM 2 G: 2 POWDER, FOR SOLUTION INTRAVENOUS at 18:36

## 2019-04-28 RX ADMIN — Medication 100 MCG: at 15:16

## 2019-04-28 RX ADMIN — CEFAZOLIN 2000 MG: 1 INJECTION, POWDER, FOR SOLUTION INTRAMUSCULAR; INTRAVENOUS at 15:08

## 2019-04-28 ASSESSMENT — PULMONARY FUNCTION TESTS
PIF_VALUE: 25
PIF_VALUE: 26
PIF_VALUE: 21
PIF_VALUE: 21
PIF_VALUE: 22
PIF_VALUE: 1
PIF_VALUE: 25
PIF_VALUE: 25
PIF_VALUE: 24
PIF_VALUE: 21
PIF_VALUE: 25
PIF_VALUE: 22
PIF_VALUE: 21
PIF_VALUE: 22
PIF_VALUE: 21
PIF_VALUE: 22
PIF_VALUE: 21
PIF_VALUE: 21
PIF_VALUE: 23
PIF_VALUE: 22
PIF_VALUE: 21
PIF_VALUE: 26
PIF_VALUE: 1
PIF_VALUE: 22
PIF_VALUE: 21
PIF_VALUE: 24
PIF_VALUE: 21
PIF_VALUE: 22
PIF_VALUE: 25
PIF_VALUE: 22
PIF_VALUE: 28
PIF_VALUE: 22
PIF_VALUE: 28
PIF_VALUE: 7
PIF_VALUE: 22
PIF_VALUE: 25
PIF_VALUE: 26
PIF_VALUE: 26
PIF_VALUE: 22
PIF_VALUE: 26
PIF_VALUE: 22
PIF_VALUE: 1
PIF_VALUE: 26
PIF_VALUE: 21
PIF_VALUE: 22
PIF_VALUE: 24
PIF_VALUE: 22
PIF_VALUE: 32
PIF_VALUE: 26
PIF_VALUE: 26
PIF_VALUE: 21
PIF_VALUE: 21
PIF_VALUE: 22
PIF_VALUE: 23
PIF_VALUE: 23
PIF_VALUE: 21
PIF_VALUE: 25
PIF_VALUE: 21
PIF_VALUE: 21
PIF_VALUE: 26
PIF_VALUE: 19
PIF_VALUE: 21
PIF_VALUE: 1
PIF_VALUE: 22
PIF_VALUE: 24
PIF_VALUE: 22
PIF_VALUE: 23
PIF_VALUE: 25
PIF_VALUE: 2
PIF_VALUE: 21
PIF_VALUE: 25
PIF_VALUE: 25
PIF_VALUE: 2
PIF_VALUE: 22
PIF_VALUE: 22
PIF_VALUE: 21
PIF_VALUE: 23
PIF_VALUE: 21
PIF_VALUE: 22
PIF_VALUE: 1
PIF_VALUE: 23
PIF_VALUE: 26
PIF_VALUE: 24
PIF_VALUE: 22
PIF_VALUE: 22
PIF_VALUE: 26
PIF_VALUE: 22
PIF_VALUE: 20
PIF_VALUE: 26
PIF_VALUE: 22
PIF_VALUE: 21
PIF_VALUE: 26
PIF_VALUE: 22
PIF_VALUE: 28
PIF_VALUE: 22
PIF_VALUE: 26
PIF_VALUE: 26
PIF_VALUE: 21
PIF_VALUE: 25
PIF_VALUE: 30
PIF_VALUE: 21
PIF_VALUE: 22
PIF_VALUE: 25
PIF_VALUE: 21
PIF_VALUE: 22
PIF_VALUE: 24
PIF_VALUE: 20
PIF_VALUE: 25
PIF_VALUE: 22
PIF_VALUE: 21
PIF_VALUE: 22
PIF_VALUE: 21
PIF_VALUE: 22
PIF_VALUE: 3
PIF_VALUE: 22
PIF_VALUE: 23
PIF_VALUE: 26
PIF_VALUE: 26
PIF_VALUE: 23
PIF_VALUE: 25
PIF_VALUE: 25
PIF_VALUE: 22
PIF_VALUE: 26
PIF_VALUE: 22
PIF_VALUE: 26
PIF_VALUE: 25
PIF_VALUE: 24
PIF_VALUE: 22
PIF_VALUE: 26
PIF_VALUE: 22
PIF_VALUE: 21
PIF_VALUE: 25
PIF_VALUE: 22
PIF_VALUE: 21
PIF_VALUE: 21
PIF_VALUE: 23
PIF_VALUE: 24
PIF_VALUE: 25
PIF_VALUE: 21
PIF_VALUE: 22
PIF_VALUE: 26
PIF_VALUE: 21
PIF_VALUE: 28
PIF_VALUE: 25
PIF_VALUE: 26
PIF_VALUE: 24
PIF_VALUE: 24
PIF_VALUE: 28
PIF_VALUE: 1
PIF_VALUE: 17
PIF_VALUE: 1
PIF_VALUE: 28
PIF_VALUE: 24
PIF_VALUE: 22
PIF_VALUE: 21
PIF_VALUE: 21
PIF_VALUE: 24
PIF_VALUE: 25
PIF_VALUE: 22
PIF_VALUE: 23
PIF_VALUE: 23
PIF_VALUE: 2
PIF_VALUE: 20
PIF_VALUE: 21
PIF_VALUE: 21
PIF_VALUE: 1

## 2019-04-28 ASSESSMENT — ENCOUNTER SYMPTOMS
BACK PAIN: 0
NAUSEA: 1
SORE THROAT: 0
CHOKING: 0
COUGH: 0
TROUBLE SWALLOWING: 0
APNEA: 0
VOMITING: 1
DIARRHEA: 0
BLOOD IN STOOL: 0
ABDOMINAL PAIN: 1
VOICE CHANGE: 0
SHORTNESS OF BREATH: 0
WHEEZING: 0
ABDOMINAL DISTENTION: 1

## 2019-04-28 ASSESSMENT — PAIN SCALES - GENERAL
PAINLEVEL_OUTOF10: 9
PAINLEVEL_OUTOF10: 10

## 2019-04-28 NOTE — PROGRESS NOTES
Dr. Asa Echevarria phoned unit for updated, informed md that pt slid out of chair earlier and that he will be going for ct scan soon, also informed that Dr. Jalyn Grady will be rounding soon, md wants heparin drip off    Heparin drip stopped

## 2019-04-28 NOTE — ANESTHESIA PRE PROCEDURE
Provider, MD   vitamin B-6 (PYRIDOXINE) 25 MG tablet Take 25 mg by mouth daily   Yes Historical Provider, MD   dibucaine ([facility]GES[facility]) 1 % OINT rectal ointment Place rectally 4 times daily as needed for Pain 8/30/18  Yes Abby Adair MD   amiodarone (PACERONE) 400 MG tablet Take 1 tablet by mouth 2 times daily 6/7/17  Yes Perales Bones, DO   nitroGLYCERIN (NITROSTAT) 0.4 MG SL tablet up to max of 3 total doses.  If no relief after 1 dose, call 911. 3/1/17  Yes MIQUEL Hogue CNP       Current medications:    Current Facility-Administered Medications   Medication Dose Route Frequency Provider Last Rate Last Dose    piperacillin-tazobactam (ZOSYN) 3.375 g in dextrose 50 mL IVPB extended infusion (premix)  3.375 g Intravenous Q8H Rakan Smiley MD        HYDROmorphone HCl PF (DILAUDID) injection 1 mg  1 mg Intravenous Q2H PRN Kuldeep Villanueva MD   1 mg at 04/28/19 1045    aluminum & magnesium hydroxide-simethicone (MAALOX) 200-200-20 MG/5ML suspension 30 mL  30 mL Oral Q6H PRN MIQUEL Sue CNP   30 mL at 04/27/19 2137    ipratropium-albuterol (DUONEB) nebulizer solution 1 ampule  1 ampule Inhalation Q4H While awake Jazmín Miguel MD   1 ampule at 04/28/19 0804    0.9 % sodium chloride infusion   Intravenous Continuous Rakan Smiley  mL/hr at 04/28/19 1134      sodium chloride flush 0.9 % injection 10 mL  10 mL Intravenous PRN Alec Heller MD   10 mL at 04/25/19 1825    amiodarone (CORDARONE) tablet 400 mg  400 mg Oral BID Londell Gaucher, APRN - CNP   400 mg at 04/28/19 0945    aspirin EC tablet 81 mg  81 mg Oral Daily Londell Gaucher, APRN - CNP   81 mg at 04/27/19 1029    atorvastatin (LIPITOR) tablet 40 mg  40 mg Oral Nightly Londell Gaucher, APRN - CNP        dibucaine 1 % rectal ointment   Rectal 4x Daily PRN Londell Gaucher, APRN - CNP        digoxin (LANOXIN) tablet 250 mcg  250 mcg Oral Daily Londell Gaucher, APRN - CNP Calfee, APRN - CNP        magnesium hydroxide (MILK OF MAGNESIA) 400 MG/5ML suspension 30 mL  30 mL Oral Daily PRN Carolina Band, APRN - CNP        nicotine (NICODERM CQ) 21 MG/24HR 1 patch  1 patch Transdermal Daily PRN [place] Band, APRN - CNP        acetaminophen (TYLENOL) tablet 650 mg  650 mg Oral Q4H PRN [place] Band, APRN - CNP        ondansetron (ZOFRAN-ODT) disintegrating tablet 4 mg  4 mg Oral Q6H PRN Carolina Band, APRN - CNP        Or    ondansetron [facility]) injection 4 mg  4 mg Intravenous Q6H PRN Carolina Band, APRN - CNP        phenol 1.4 % mouth spray 1 spray  1 spray Mouth/Throat Q2H PRN [place] Band, APRN - CNP   1 spray at 04/27/19 6372       Allergies: Allergies   Allergen Reactions    Morphine      itching       Problem List:    Patient Active Problem List   Diagnosis Code    Severe mitral regurgitation I34.0    Severe tricuspid regurgitation I07.1    Anemia due to GI blood loss D50.0    Abdominal pain R10.9    Piles (hemorrhoids) K64.9    Chest pain R07.9    HTN (hypertension) I10    HLD (hyperlipidemia) E78.5    Anemia D64.9    Transfusion of blood during current hospitalization [id number]    Dyspnea R06.00    Small bowel cancer ([address] C17.9    Neuropathic Pain Left leg G57.90    Essential hypertension I10    Obesity (BMI 30-39. 9) E66.9    Peripheral neuropathic pain M79.2    Atrial fibrillation with rapid ventricular response (HCC) I48.91    S/P cardiac cath - 10/3/16 - Dr. Tomy Millard L11.253    Encounter for cardioversion procedure 10/3/16-Dr. Tomy Millard Z01.89    Pulmonary embolism without acute cor pulmonale (HCC) I26.99    Major depressive disorder with current active episode F32.9    GUME (obstructive sleep apnea) G47.33    Chronic atrial fibrillation (HCC) I48.2    AICD discharge Z45.02    History of pulmonary embolism Z86.711    Essential hypertension I10    Neuropathy G62.9    Chronic combined systolic and diastolic congestive heart failure (HCC) I50.42    Major depressive disorder, recurrent, in full remission ([facility] V78.36    Acute systolic CHF (congestive heart failure) (HCC) I50.21    Acute on chronic congestive heart failure (HCC) I50.9    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    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    SBO (small bowel obstruction) ([facility] K56.609       Past Medical History:        Diagnosis Date    Asthma     Cancer ([facility]     small intestine    CHF (congestive heart failure) (HCC)     COPD exacerbation (HCC)     GERD (gastroesophageal reflux disease)     History of blood transfusion     Hyperlipidemia     Hypertension     Neuropathy     Obesity (BMI 30-39.9) 2/25/2016    Psychiatric problem     SBO (small bowel obstruction) (HCC)     Severe mitral regurgitation 10/31/12    minimally invasive complex mitral valve repair     Severe tricuspid regurgitation 10/31/12    tricuspid repair under CPB       Past Surgical History:        Procedure Laterality Date    ABDOMEN SURGERY      small bowel.  MITRAL VALVE SURGERY  10/31/12    minimally invasive complex mitral valve repair    OTHER SURGICAL HISTORY  11/2/12    successful weaning of the ECMO and decannulation    PACEMAKER PLACEMENT Left 02/2017    TRICUSPID VALVE SURGERY  10/31/12    tricuspid valve repair under CPB    TUNNELED VENOUS PORT PLACEMENT Left 6/18/2015    UPPER GASTROINTESTINAL ENDOSCOPY  3-3-15    erosive antral gastritis, sm. antral polypectomy       Social History:    Social History     Tobacco Use    Smoking status: Never Smoker    Smokeless tobacco: Never Used   Substance Use Topics    Alcohol use:  No                                Counseling given: Not Answered      Vital Signs (Current):   Vitals:    04/28/19 0615 04/28/19 0700 04/28/19 0820 04/28/19 1143   BP: 119/65  (!) 120/50 105/63   Pulse: 58  71 107   Resp: 18  16 16   Temp: 98.8 °F (37.1 °C)  97.5 °F (36.4 °C) 99.5 °F (37.5 °C)   TempSrc: Oral  Oral Oral   SpO2: 100% 100% 98% 98%   Weight:       Height:                                                  BP Readings from Last 3 Encounters:   04/28/19 105/63   03/19/19 132/73   11/05/18 134/75       NPO Status:                                                                                 BMI:   Wt Readings from Last 3 Encounters:   04/28/19 237 lb 14.4 oz (107.9 kg)   03/19/19 242 lb (109.8 kg)   11/05/18 275 lb 9.2 oz (125 kg)     Body mass index is 35.13 kg/m². CBC:   Lab Results   Component Value Date    WBC 20.9 04/28/2019    RBC 5.26 04/28/2019    HGB 17.9 04/28/2019    HCT 56.2 04/28/2019    .5 04/28/2019    RDW 16.0 04/28/2019     04/28/2019       CMP:   Lab Results   Component Value Date     04/28/2019    K 4.4 04/28/2019     04/28/2019    CO2 23 04/28/2019    BUN 18 04/28/2019    CREATININE 1.05 04/28/2019    GFRAA >60 04/28/2019    LABGLOM >60 04/28/2019    GLUCOSE 152 04/28/2019    PROT 8.1 04/25/2019    PROT 4.4 11/03/2012    CALCIUM 8.7 04/28/2019    BILITOT 0.33 04/25/2019    ALKPHOS 69 04/25/2019    AST 59 04/25/2019    ALT 99 04/25/2019       POC Tests: No results for input(s): POCGLU, POCNA, POCK, POCCL, POCBUN, POCHEMO, POCHCT in the last 72 hours.     Coags:   Lab Results   Component Value Date    PROTIME 11.8 04/28/2019    INR 1.2 04/28/2019    APTT 113.9 04/28/2019       HCG (If Applicable): No results found for: PREGTESTUR, PREGSERUM, HCG, HCGQUANT     ABGs:   Lab Results   Component Value Date    PHART 7.44 08/30/2012    PO2ART 73 08/30/2012    NIW0DKZ 60 08/30/2012    IGT1NNC 24.1 11/01/2012    Z6VEORIY 100 11/02/2012        Type & Screen (If Applicable):  No results found for: LABABO, [address]    Anesthesia Evaluation  Patient summary reviewed and Nursing notes reviewed  Airway: Mallampati: II  TM distance: >3 FB   Neck ROM: full  Mouth opening: > = 3 FB Dental: normal exam         Pulmonary:normal exam  breath sounds clear to auscultation                             Cardiovascular:  Exercise tolerance: good (>4 METS),           Rhythm: regular  Rate: normal                    Neuro/Psych:               GI/Hepatic/Renal:             Endo/Other:                     Abdominal:       Abdomen: soft. Vascular:                                        Anesthesia Plan      general     ASA 4 - emergent       Induction: intravenous. arterial line  MIPS: Postoperative opioids intended and Prophylactic antiemetics administered. Anesthetic plan and risks discussed with patient. Use of blood products discussed with patient whom consented to blood products. Plan discussed with attending and CRNA.     Attending anesthesiologist reviewed and agrees with Cam Izquierdo MD   4/28/2019

## 2019-04-28 NOTE — CONSULTS
GI Consult Note:    Name: Gabrielle Sanchez  MRN: [id number]     Acct: [de-identified]  Room: [facility] OR [place]/NONE    Admit Date: 4/25/2019  PCP: Vinay Artis MD    Physician Requesting Consult: Karyna Meyer MD     Reason for Consult: Patient seen regarding anticoagulation therapy. Chief Complaint:     Chief Complaint   Patient presents with    Abdominal Pain    Dizziness     onset today       History Obtained From:     patient, electronic medical record, the nursing staff. History of Present Illness:      Gabrielle Sanchez is a  61 y.o.  male who presents with Abdominal Pain and Dizziness (onset today)      Symptoms:  Patient seen at [facility].  He was admitted to the hospital a few days ago with abdominal pain, shortness of breath, abdominal distention. Looks like patient has 7 onset of upper abdominal pain. Also was having short of breath. Not able to take deep breaths. Was having chest discomfort as well. He had workup done in the emergency department and was found to have small bowel obstruction. Also patient had pulmonary embolism. Subsequently patient was placed on heparin therapy. Patient had NG tube, which appears to be removed at this time. On further discussion patient states that he continued to have abdominal pain. He also has nausea. Does not have colitis. Chest discomfort better. Knows of breath is better at rest.    This patient was diagnosed to have intra-abdominal cancer. The exact site is not clear to me. In the chart it was mentioned that he has small bowel cancer and also colon cancer.     Past Medical History:     Past Medical History:   Diagnosis Date    Asthma     Cancer ([address]     small intestine    CHF (congestive heart failure) (HCC)     COPD exacerbation (HCC)     GERD (gastroesophageal reflux disease)     History of blood transfusion     Hyperlipidemia     Hypertension     Neuropathy     Obesity (BMI 30-39.9) 2/25/2016    Psychiatric problem     [facility]) [facility]     Severe mitral regurgitation 10/31/12    minimally invasive complex mitral valve repair     Severe tricuspid regurgitation 10/31/12    tricuspid repair under CPB        Past Surgical History:     Past Surgical History:   Procedure Laterality Date    ABDOMEN SURGERY      small bowel.  MITRAL VALVE SURGERY  10/31/12    minimally invasive complex mitral valve repair    OTHER SURGICAL HISTORY  11/2/12    successful weaning of the ECMO and decannulation    PACEMAKER PLACEMENT Left 02/2017    TRICUSPID VALVE SURGERY  10/31/12    tricuspid valve repair under CPB    TUNNELED VENOUS PORT PLACEMENT Left 6/18/2015    UPPER GASTROINTESTINAL ENDOSCOPY  3-3-15    erosive antral gastritis, sm. antral polypectomy        Medications Prior to Admission:       Prior to Admission medications    Medication Sig Start Date End Date Taking? Authorizing Provider   aspirin 81 MG chewable tablet Take 81 mg by mouth daily   Yes Historical Provider, MD   atorvastatin (LIPITOR) 40 MG tablet Take 40 mg by mouth nightly   Yes Historical Provider, MD   DULoxetine (CYMBALTA) 20 MG extended release capsule Take 20 mg by mouth daily   Yes Historical Provider, MD   docusate sodium (COLACE) 100 MG capsule Take 100 mg by mouth 2 times daily   Yes Historical Provider, MD   ferrous sulfate 325 (65 Fe) MG EC tablet Take 325 mg by mouth 4 times daily   Yes Historical Provider, MD   furosemide (LASIX) 40 MG tablet Take 40 mg by mouth daily   Yes Historical Provider, MD   pregabalin (LYRICA) 50 MG capsule Take 50 mg by mouth 2 times daily.    Yes Historical Provider, MD   metoprolol tartrate (LOPRESSOR) 50 MG tablet Take 50 mg by mouth 2 times daily (before meals)   Yes Historical Provider, MD   pantoprazole (PROTONIX) 40 MG tablet Take 40 mg by mouth 2 times daily (before meals)   Yes Historical Provider, MD   QUEtiapine (SEROQUEL) 25 MG tablet Take 25 mg by mouth 2 times daily   Yes Historical Provider, MD   spironolactone (ALDACTONE) 25 MG tablet Take 25 mg by mouth daily   Yes Historical Provider, MD   acetaminophen (TYLENOL) 325 MG tablet Take 650 mg by mouth every 6 hours as needed for Pain or Fever   Yes Historical Provider, MD [place] (SENOKOT) 8.6 MG tablet Take 1 tablet by mouth daily as needed for Constipation   Yes Historical Provider, MD   sennosides-docusate sodium (SENOKOT-S) 8.6-50 MG tablet Take 1 tablet by mouth daily as needed for Constipation   Yes Historical Provider, MD   vitamin B-6 (PYRIDOXINE) 25 MG tablet Take 25 mg by mouth daily   Yes Historical Provider, MD   dibucaine ([employer] [facility]GES[facility]) 1 % OINT rectal ointment Place rectally 4 times daily as needed for Pain 8/30/18  Yes Abby Adair MD   amiodarone (PACERONE) 400 MG tablet Take 1 tablet by mouth 2 times daily 6/7/17  Yes Lesli Camilo,    nitroGLYCERIN (NITROSTAT) 0.4 MG SL tablet up to max of 3 total doses. If no relief after 1 dose, call 911. 3/1/17  Yes MIQUEL Art CNP        Allergies:       Morphine    Social History:     Tobacco:    reports that he has never smoked. He has never used smokeless tobacco.  Alcohol:      reports that he does not drink alcohol. Drug Use: reports that he does not use drugs. Family History:     Family History   Problem Relation Age of Onset    Heart Disease Father        Review of Systems:     Review of Systems   Constitutional: Positive for appetite change and fatigue. Negative for fever. HENT: Negative for mouth sores, sore throat, trouble swallowing and voice change. Eyes:        No ictirus   Respiratory: Negative for apnea, cough, choking, shortness of breath and wheezing. Cardiovascular: Negative for chest pain, palpitations and leg swelling. Gastrointestinal: Positive for abdominal distention, abdominal pain, nausea and vomiting. Negative for blood in stool and diarrhea.    Endocrine: Negative for polydipsia, polyphagia and polyuria. Genitourinary: Negative for frequency, hematuria and urgency. Musculoskeletal: Positive for arthralgias and gait problem. Negative for back pain. Skin: Negative for pallor and rash. Allergic/Immunologic: Negative for food allergies. Neurological: Positive for dizziness and weakness. Negative for seizures and headaches. Hematological: Negative for adenopathy. Does not bruise/bleed easily. Psychiatric/Behavioral: Positive for agitation. Negative for sleep disturbance. The patient is nervous/anxious. Code Status:  Full Code    Physical Exam:     Vitals:  /63   Pulse 107   Temp 99.5 °F (37.5 °C) (Oral)   Resp 16   Ht 5' 9\" (1.753 m)   Wt 237 lb 14.4 oz (107.9 kg)   SpO2 98%   BMI 35.13 kg/m²   Temp (24hrs), Av °F (36.7 °C), Min:86.2 °F (30.1 °C), Max:99.5 °F (37.5 °C)      Physical Exam   Patient is overweight. Abdomen appears to be distended. Bowel sounds not heard. Has a diffuse tenderness. Appears to have mild guarding. Not clear whether he has rebound tenderness. Extremities has edema. Not clear whether he has cough tenderness. Lungs expands poorly. Has rales rhonchi. No wheezing. Cardiac examination revealed   tachycardia. no evidence of GI bleeding at this time . Patient is on heparin therapy.           Data:   CBC:   Lab Results   Component Value Date    WBC 20.9 2019    RBC 5.26 2019    HGB 17.9 2019    HCT 56.2 2019    .5 2019    MCH 32.1 2019    MCHC 31.4 2019    RDW 16.0 2019     2019    MPV 11.3 2019     CBC with Differential:  Lab Results   Component Value Date    WBC 20.9 2019    RBC 5.26 2019    HGB 17.9 2019    HCT 56.2 2019     2019    .5 2019    MCH 32.1 2019    MCHC 31.4 2019    RDW 16.0 2019    NRBC 2 2012    LYMPHOPCT 5 2019    MONOPCT 11 2019    BASOPCT 0 2019 MONOSABS 2.30 04/28/2019    LYMPHSABS 1.05 04/28/2019    EOSABS 0.00 04/28/2019    BASOSABS 0.00 04/28/2019    DIFFTYPE NOT REPORTED 04/28/2019     Hemoglobin/Hematocrit:  Lab Results   Component Value Date    HGB 17.9 04/28/2019    HCT 56.2 04/28/2019     CMP:    Lab Results   Component Value Date     04/28/2019    K 4.4 04/28/2019     04/28/2019    CO2 23 04/28/2019    BUN 18 04/28/2019    CREATININE 1.05 04/28/2019    GFRAA >60 04/28/2019    LABGLOM >60 04/28/2019    GLUCOSE 152 04/28/2019    PROT 7.7 04/28/2019    PROT 4.4 11/03/2012    LABALBU 3.6 04/28/2019    CALCIUM 8.7 04/28/2019    BILITOT 0.73 04/28/2019    ALKPHOS 69 04/28/2019    AST 89 04/28/2019     04/28/2019     BMP:  Lab Results   Component Value Date     04/28/2019    K 4.4 04/28/2019     04/28/2019    CO2 23 04/28/2019    BUN 18 04/28/2019    LABALBU 3.6 04/28/2019    CREATININE 1.05 04/28/2019    CALCIUM 8.7 04/28/2019    GFRAA >60 04/28/2019    LABGLOM >60 04/28/2019    GLUCOSE 152 04/28/2019     PT/INR:    Lab Results   Component Value Date    PROTIME 11.8 04/28/2019    INR 1.2 04/28/2019     PTT:    Lab Results   Component Value Date    APTT 31.5 04/28/2019   [APTT}    Assesment:     Primary Problem  SBO (small bowel obstruction) ([facility]    Active Hospital Problems    Diagnosis Date Noted    SBO (small bowel obstruction) ([facility] [K56.609] 04/25/2019    Major depressive disorder, recurrent, in full remission ([facility] [F33.42] 08/30/2018    Chronic combined systolic and diastolic congestive [facility] failure ([facility] [I50.42] 06/22/2017    Essential hypertension [I10] 05/05/2017    Chronic atrial fibrillation (HCC) [I48.2]     GUME (obstructive sleep apnea) [G47.33]     Pulmonary embolism without acute cor pulmonale ([facility] [I26.99] 02/23/2017    Obesity (BMI 30-39. 9) [E66.9]     Small bowel cancer ([facility] [C17.9]    At present patient does not have signs of GI bleeding.   May continue on heparin therapy watching for melanotic stools, hematochezia, and monitor hemoglobin. Discussed with the nursing staff regarding abdominal distention, tenderness which appears to be worse than yesterday. Plan:     1. Nursing staff indicated that the general surgery service taking care of the bowel obstruction issue. 2. That will to be a CT scan in the next 15-20 minutes. 3. Basing on the CT results ,  surgery service will plan management of bowel obstruction. 4. He may need NG tube. 5. Will follow the patient. Thank you for allowing me to participate in the care of your patient. Please feel free to contact me with anyquestions or concerns. Electronically signed by Meghana Billings MD on 4/28/2019 at 5:08 PM     Copy sent to Dr. Sandoval Ardon MD    Note is dictated utilizing voice recognition software. Unfortunately this leads to occasional typographical errors. Please contact our office if you have any questions.

## 2019-04-28 NOTE — PROGRESS NOTES
Pt transferred to room 1109 in bed from progressive Unit. Report received and care assumed. Critical result of CT reported to writer from radiology hub in [facility] that they have been unable to reach Dr. Khurram Alexander for 15 minutes. Writer sent message to him per perfect serve and to primary  Dr. Padilla Du to report need for review of the CT. Call received from Dr. Khurram Alexander that pt would be going to emergency surgery. Pt bathed and CHG mouth wash and nares completed as per policy. Dr. Adalberto Maldonado to bedside and IV fluids increased per order of DR. Mishra.  Await surgery team for transport to OR>

## 2019-04-28 NOTE — PROGRESS NOTES
Chair alarm going off, upon entering room pt found on floor next to chair, pt says that he was trying to move his legs on the stool and he slid off of the chair, pt denies any injury, bp 116/66, pt complains of dizziness, Dr. Rosemayr Ng now in pt room and was updated, new orders received for ct head when pt has ct of abd, pt assisted back to bed, bed alarm placed, house supervisor informed, will continue to monitor closely    1039-pt back in bed and bp now 93/63, temp 97.6, pulse ox 95 3L/nc

## 2019-04-28 NOTE — ANESTHESIA PROCEDURE NOTES
Central Venous Line:    A central venous line was placed using surface landmarks, in the OR for the following indication(s): central venous access and CVP monitoring. 4/28/2019 2:30 PM4/28/2019 2:49 PM    Sterility preparation included the following: hand hygiene performed prior to procedure, maximum sterile barriers used and sterile technique used to drape from head to toe. The patient was placed in Trendelenburg position. The right internal jugular vein was prepped. The site was prepped with Chloraprep. A 8.5 Fr (size), 16 (length), triple lumen was placed. Intravenous verification was obtained by ultrasound, venous blood return and x-ray. During the procedure the patient experienced: patient tolerated procedure well with no complications. Anesthesia type: general.. No    Additional notes:  U/S 47080.  (1) US was used to identify the [place] [employer]. (2) It was assessed and patent. (3) US was used to visualize vascular needle entry into the  vessel.  (4) The selected vessel appeared anatomically normal and (5) there were no apparent abnormal findings. (6) A permanent ultrasound image was saved in the patient's record.       Staffing  Anesthesiologist: Luis Thompson MD  Preanesthetic Checklist  Completed: patient identified, site marked, surgical consent, pre-op evaluation, timeout performed, IV checked, risks and benefits discussed, monitors and equipment checked, anesthesia consent given, oxygen available and patient being monitored

## 2019-04-28 NOTE — PROGRESS NOTES
for further care. Review of Systems:     Negative except for those highlighted:    CONSTITUTIONAL:  fevers, chills, sweats, fatigue, weight loss, generalized weakness  HEENT:  Vision changes, hearing changes, runny nose, throat pain  RESPIRATORY:  shortness of breath, cough, congestion, wheezing. CARDIOVASCULAR:  chest pain, palpitations  GASTROINTESTINAL:  nausea, vomiting, diarrhea, constipation, change in bowel habits, abdominal pain   GENITOURINARY:  difficulty of urination, burning with urination, frequency   INTEGUMENT:  rash, skin lesions, easy bruising   HEMATOLOGIC/LYMPHATIC:  swelling/edema   ALLERGIC/IMMUNOLOGIC:  urticaria , itching  ENDOCRINE:  increase in drinking, increase in urination, hot or cold intolerance  MUSCULOSKELETAL:  joint pains, muscle aches, swelling of joints  NEUROLOGICAL:  headaches, dizziness, lightheadedness, numbness, pain, tingling extremities  BEHAVIOR/PSYCH:  depression, anxiety    Medications: Allergies:     Allergies   Allergen Reactions    Morphine      itching       Current Meds:   Scheduled Meds:    ipratropium-albuterol  1 ampule Inhalation Q4H While awake    amiodarone  400 mg Oral BID    aspirin  81 mg Oral Daily    atorvastatin  40 mg Oral Nightly    digoxin  250 mcg Oral Daily    DULoxetine  20 mg Oral Daily    ferrous sulfate  325 mg Oral Daily with breakfast    furosemide  40 mg Oral Daily    metoprolol succinate  100 mg Oral Daily    pantoprazole  40 mg Oral QAM AC    pyridoxine  25 mg Oral Daily    QUEtiapine  25 mg Oral BID    senna  1 tablet Oral Daily    spironolactone  25 mg Oral Daily    sodium chloride flush  10 mL Intravenous 2 times per day     Continuous Infusions:    heparin (porcine) 12.951 Units/kg/hr (04/28/19 0740)    sodium chloride 50 mL/hr at 04/27/19 1106     PRN Meds: HYDROmorphone, aluminum & magnesium hydroxide-simethicone, heparin (porcine), heparin (porcine), sodium chloride flush, dibucaine, nitroGLYCERIN, sodium chloride flush, potassium chloride **OR** potassium alternative oral replacement **OR** potassium chloride, magnesium sulfate, magnesium hydroxide, nicotine, acetaminophen, ondansetron **OR** ondansetron, phenol    Data:     Past Medical History:   has a past medical history of Asthma, Cancer ([facility] CHF (congestive heart failure) ([facility] COPD exacerbation ([facility] GERD (gastroesophageal reflux disease), History of blood transfusion, Hyperlipidemia, Hypertension, Neuropathy, Obesity (BMI 30-39.9), Psychiatric problem, SBO (small bowel obstruction) ([facility] Severe mitral regurgitation, and Severe tricuspid regurgitation. Social History:   reports that he has never smoked. He has never used smokeless tobacco. He reports that he does not drink alcohol or use drugs. Family History:   Family History   Problem Relation Age of Onset    Heart Disease Father        Vitals:  BP (!) 120/50   Pulse 71   Temp 97.5 °F (36.4 °C) (Oral)   Resp 16   Ht 5' 9\" (1.753 m)   Wt 237 lb 14.4 oz (107.9 kg)   SpO2 98%   BMI 35.13 kg/m²   Temp (24hrs), Av.1 °F (36.7 °C), Min:97.3 °F (36.3 °C), Max:98.8 °F (37.1 °C)    No results for input(s): POCGLU in the last 72 hours. I/O (24Hr):     Intake/Output Summary (Last 24 hours) at 2019 0825  Last data filed at 2019 1903  Gross per 24 hour   Intake --   Output 450 ml   Net -450 ml       Labs:    Hematology:  Recent Labs     19  1722 19  0538 19  0844  19  0743 19  1342 19  0524   WBC 7.9 7.5 7.1  --  7.7  --  20.9*   RBC 4.68 4.37 4.47  --  4.33  --  5.26   HGB 14.9 14.0 14.2   < > 13.8 13.1* 16.9   HCT 45.7 44.4 46.0   < > 44.2 39.2* 53.9*   MCV 97.7 101.6 102.9  --  102.1  --  102.5   MCH 31.9 32.0 31.8  --  31.9  --  32.1   MCHC 32.7 31.5 30.9  --  31.2  --  31.4   RDW 18.9* 16.5* 16.5*  --  16.1*  --  16.0*    151 157  --  136*  --  141   MPV 10.5 11.6 12.0  --  11.8  --  11.3   INR 1.1 1.1  --   --   --   --   --     < > = values in this interval not displayed. Chemistry:  Recent Labs     04/25/19  1722 04/25/19  2229 04/26/19  0538 04/27/19  0743 04/28/19  0524     --  140 142 140   K 4.6  --  4.7 4.3 4.4     --  104 104 102   CO2 24  --  24 27 23   GLUCOSE 124*  --  106* 108* 152*   BUN 18  --  19 20 18   CREATININE 1.35*  --  1.18 0.90 1.05   MG  --   --   --   --  1.8   ANIONGAP 13  --  12 11 15   LABGLOM 53*  --  >60 >60 >60   GFRAA >60  --  >60 >60 >60   CALCIUM 9.7  --  9.4 9.1 8.7   PROBNP 657*  --   --   --   --    TROPHS 9  --   --   --   --    DIGOXIN  --  <0.3*  --   --   --      Recent Labs     04/25/19  1722   PROT 8.1   LABALBU 4.1   AST 59*   ALT 99*   ALKPHOS 69   BILITOT 0.33   LIPASE 31         Lab Results   Component Value Date/Time    SPECIAL NOT REPORTED 09/28/2016 05:06 PM     Lab Results   Component Value Date/Time    CULTURE NO SIGNIFICANT GROWTH 09/28/2016 05:06 PM    CULTURE  09/28/2016 05:06 PM     [facility] [id number] [place] [address], [address] [place] [address] [phone]       Lab Results   Component Value Date    POCPH 7.48 11/17/2012    PHART 7.44 08/30/2012    PH 7.48 11/08/2012    POCPCO2 53 11/17/2012    UMB9FHG 60 08/30/2012    PCO2 45 11/08/2012    POCPO2 86 11/17/2012    PO2ART 73 08/30/2012    PO2 145 11/08/2012    POCHCO3 39.9 11/17/2012    SAX8UJR 24.1 11/01/2012    HCO3 33.6 11/08/2012    NBEA NOT REPORTED 11/17/2012    PBEA 16 11/17/2012    HDM1NWX 41 11/17/2012    FDIS8LSD 97 11/17/2012    U2BGAKUG 100 11/02/2012    O2SAT 99 11/02/2012    FIO2 35.0 11/17/2012       Radiology:  Xr Abdomen (kub) (single Ap View)    Result Date: 4/25/2019  EXAMINATION: SINGLE SUPINE XRAY VIEW(S) OF THE ABDOMEN 4/25/2019 8:44 pm COMPARISON: None.  HISTORY: ORDERING SYSTEM PROVIDED HISTORY: NGT placement confirmation TECHNOLOGIST PROVIDED HISTORY: NGT placement confirmation Ordering Physician Provided Reason for Exam: NGT placement Acuity: Acute Type of Exam: Initial FINDINGS: 2 images of the abdomen were provided. Enteric tube is noted projecting on the left hemidiaphragm. This is likely in the stomach. Moderate multifocal bowel distention is noted. Ill-defined increased density in the left lung base is noted     Enteric tube as described     Ct Abdomen Pelvis W Iv Contrast Additional Contrast? None    Result Date: 4/25/2019  EXAMINATION: CT OF THE ABDOMEN AND PELVIS WITH CONTRAST 4/25/2019 6:10 pm TECHNIQUE: CT of the abdomen and pelvis was performed with the administration of intravenous contrast. Multiplanar reformatted images are provided for review. Dose modulation, iterative reconstruction, and/or weight based adjustment of the mA/kV was utilized to reduce the radiation dose to as low as reasonably achievable. COMPARISON: CT abdomen and pelvis performed 02/23/2017. HISTORY: ORDERING SYSTEM PROVIDED HISTORY: abdominal pain TECHNOLOGIST PROVIDED HISTORY: FINDINGS: Lower Chest: Infiltrates are seen in the lung bases bilaterally that is worse on the left compared to the right. The heart is enlarged. Organs: The liver and spleen are normal size and overall attenuation. There are hepatic granulomas. The gallbladder, pancreas, and adrenal glands are unremarkable. There is no obstructive uropathy. There are bilateral nonobstructing renal stones. The largest stone is seen on the right measuring approximately 5 mm in diameter. There are bilateral renal cysts. The ureters are not dilated. The urinary bladder is unremarkable. GI/Bowel: The stomach is mildly distended and fluid-filled. Loops of small bowel are mildly distended and fluid-filled in the proximal and mid aspect. The distal and terminal ileum is decompressed. The colon is decompressed containing residual air and fecal contents. There is no intraperitoneal free air or free fluid. Pelvis: The prostate gland and seminal vesicles are unremarkable. Phleboliths are seen in the pelvis. Peritoneum/Retroperitoneum: The psoas muscles are symmetric. The abdominal aorta is normal in caliber. The inferior vena cava is unremarkable. There is no retroperitoneal or mesenteric adenopathy. Bones/Soft Tissues: The extra-abdominal soft tissues are unremarkable. There is no acute osseous abnormality. Mildly prominent fluid-filled loops of small bowel proximally and within the mid aspect with decompressed distal and terminal ileal loops. This may relate to a degree of obstruction versus an ileus and correlation is needed. Infiltrates in the lung bases bilaterally that may represent atelectasis versus pneumonia. Nonobstructing renal stones bilaterally. Xr Chest Portable    Result Date: 4/27/2019  EXAMINATION: SINGLE XRAY VIEW OF THE CHEST 4/27/2019 6:22 am COMPARISON: 04/25/2019. HISTORY: ORDERING SYSTEM PROVIDED HISTORY: pleural effusion, infiltrates TECHNOLOGIST PROVIDED HISTORY: pleural effusion, infiltrates Ordering Physician Provided Reason for Exam: plural effusion infiltrate Acuity: Unknown Type of Exam: Initial FINDINGS: A left cardiac device is unchanged in position. An enteric tube is seen coursing below the diaphragm. The cardiac silhouette is persistently enlarged. There is prominence of the central pulmonary vasculature. No convincing focal consolidation, pleural effusion or pneumothorax. 1. Persistent enlargement of the cardiac silhouette with prominence of the central pulmonary vasculature. 2. No convincing pleural effusion or pneumothorax. Xr Chest Portable    Result Date: 4/25/2019  EXAMINATION: SINGLE XRAY VIEW OF THE CHEST 4/25/2019 5:56 pm COMPARISON: Chest radiograph performed 11/03/2018. HISTORY: ORDERING SYSTEM PROVIDED HISTORY: shortness of breath TECHNOLOGIST PROVIDED HISTORY: shortness of breath Ordering Physician Provided Reason for Exam: chest pain Acuity: Acute Type of Exam: Initial Additional signs and symptoms: SOB Relevant Medical/Surgical History: CHF, COPD FINDINGS: There is chronic pulmonary change.   There may be small bibasilar effusions with adjacent atelectasis. There is no pneumothorax. The heart is enlarged with stable cardiac lead. The upper abdomen is unremarkable. The extrathoracic soft tissues are unremarkable. Cardiomegaly with small bibasilar effusions and adjacent atelectasis. Ct Chest Pulmonary Embolism W Contrast    Result Date: 4/25/2019  EXAMINATION: CTA OF THE CHEST 4/25/2019 6:10 pm TECHNIQUE: CTA of the chest was performed after the administration of intravenous contrast.  Multiplanar reformatted images are provided for review. MIP images are provided for review. Dose modulation, iterative reconstruction, and/or weight based adjustment of the mA/kV was utilized to reduce the radiation dose to as low as reasonably achievable. COMPARISON: None. HISTORY: ORDERING SYSTEM PROVIDED HISTORY: sob, history of CA FINDINGS: Pulmonary Arteries: Pulmonary arteries are adequately opacified for evaluation. There is an intraluminal filling defect within a subsegmental branch supplying the left lower lobe medially. The remainder of the arterial branches appear patent. Main pulmonary artery is normal in caliber. Mediastinum: No evidence of mediastinal lymphadenopathy. The heart and pericardium demonstrate no acute abnormality. The heart is enlarged. There is no acute abnormality of the thoracic aorta. Lungs/pleura: There is linear atelectasis versus scarring in the right lung posteriorly. There is dependent atelectasis in the left lung. There is a small right basilar effusion. There is no pneumothorax. The tracheobronchial tree is patent. Upper Abdomen: Limited images of the upper abdomen are unremarkable. Soft Tissues/Bones: No acute bone or soft tissue abnormality. Mild cardiomegaly with a small right basilar effusion. Filling defect involving a subsegmental branch supplying the left lower lobe medially consistent with an embolism.  Critical results were called by Dr. Kendra Padilla to [facility] on 2019 at 18:48. Physical Examination:        BP (!) 120/50   Pulse 71   Temp 97.5 °F (36.4 °C) (Oral)   Resp 16   Ht 5' 9\" (1.753 m)   Wt 237 lb 14.4 oz (107.9 kg)   SpO2 98%   BMI 35.13 kg/m²   Temp (24hrs), Av.1 °F (36.7 °C), Min:97.3 °F (36.3 °C), Max:98.8 °F (37.1 °C)    No results for input(s): POCGLU in the last 72 hours. Intake/Output Summary (Last 24 hours) at 2019 0825  Last data filed at 2019 1903  Gross per 24 hour   Intake --   Output 450 ml   Net -450 ml       General Appearance:  alert, in severe distress 2/2 abdominal pain  Mental status: oriented to person, place, and time with distressed affect  Head:  normocephalic, atraumatic. Eye: no icterus, redness, pupils equal and reactive, extraocular eye movements intact, conjunctiva clear  Ear: normal external ear, no discharge, hearing intact  Nose:  no drainage noted  Mouth: mucous membranes moist  Neck: supple, no carotid bruits, thyroid not palpable  Lungs: Bilateral equal air entry, clear to ausculation, no wheezing, rales or rhonchi, normal effort  Cardiovascular: normal rate, regular rhythm, no murmur, gallop, rub. Abdomen: Distended abdomen, diffusely and very TTP  Neurologic: Moving all 4 limbs spontaneously  Skin: No gross lesions, rashes, bruising or bleeding on exposed skin area  Extremities:  peripheral pulses palpable, no pedal edema or calf pain with palpation  Psych: distressed affect      Assessment:        Principal Problem:    SBO (small bowel obstruction) (HCC)  Active Problems:    Small bowel cancer (HCC)    Obesity (BMI 30-39. 9)    Pulmonary embolism without acute cor pulmonale (HCC)    GUME (obstructive sleep apnea)    Chronic atrial fibrillation (HCC)    Essential hypertension    Chronic combined systolic and diastolic congestive heart failure (HCC)    Major depressive disorder, recurrent, in full remission ([address]  Resolved Problems:    * No resolved hospital problems.  *      Plan: Principal Problem:    SBO (small bowel obstruction) (HCC)  Active Problems:    Small bowel cancer (HCC)    Obesity (BMI 30-39. 9)    Pulmonary embolism without acute cor pulmonale (HCC)    GUME (obstructive sleep apnea)    Chronic atrial fibrillation (HCC)    Essential hypertension    Chronic combined systolic and diastolic congestive heart failure (HCC)    Major depressive disorder, recurrent, in full remission ([facility]  Resolved Problems:    * No resolved hospital problems. *    1. Patient developed acute severe abdominal pain overnight. Currently writhing in pain. WBC count now 20K and Lactic Acid > 6. Concern for ischemic necrosis of the bowel. 2. General Surgery on board. STAT CT Abd/Pelvis ordered. OR today  3. PAtient experienced a fall while on heparin gtt. Unable to relay whether he hit his head. STAT CT Head without contrast ordered  4. Spoke with Vascular Surgeon. No need for [facility] [place] as PE small and risks of AC outweigh benefits. Okay to hold AC  5. Getting US DVT LEs to eval for DVT. Unlikely per clinical exam. If present, may be a candidate for IVC filter. 6. Trend lactate  7. IVF  8. Repeat CBC  9. Started on zosyn. 10. BCx X 2  11. Repeat labs in AM  12. NPO for now  13. GI PPx  14.  DVT PPx    IV Fluids: heparin (porcine) Last Rate: 12.951 Units/kg/hr (04/28/19 0740)    sodium chloride Last Rate: 50 mL/hr at 04/27/19 1106,    Nutrition:  DIET CLEAR LIQUID;      Consultations:   IP CONSULT TO INTERNAL MEDICINE  IP CONSULT TO GENERAL SURGERY  IP CONSULT TO PULMONOLOGY  IP CONSULT TO PULMONOLOGY  IP CONSULT TO KYLEIGH Pop MD  4/28/2019  8:25 AM

## 2019-04-28 NOTE — ANESTHESIA PROCEDURE NOTES
Arterial Line:    An arterial line was placed using ultrasound guidance, in the OR for the following indication(s): continuous blood pressure monitoring and blood sampling needed. A 20 gauge (size), 1 and 1/4 inch (length), Arrow (type) catheter was placed, Seldinger technique used, into the left radial artery, secured by Tegaderm.   Anesthesia type: General  4/28/2019 3:30 PM4/28/2019 3:38 PM  Anesthesiologist: Krystyna Barbosa MD  Preanesthetic Checklist  Completed: patient identified, site marked, surgical consent, pre-op evaluation, timeout performed, IV checked, risks and benefits discussed, monitors and equipment checked, anesthesia consent given, oxygen available and patient being monitored

## 2019-04-28 NOTE — BRIEF OP NOTE
Brief Postoperative Note  ______________________________________________________________    Patient: Marc Moreno  YOB: 1955  MRN: [id number]  Date of Procedure: 4/28/2019    Pre-Op Diagnosis: Ischemic Bowel    Post-Op Diagnosis: Same       Procedure(s):  LAPAROTOMY EXPLORATORY, LYSIS OF ADHESIONS, SMALL BOWEL RESECTION WITH SITE TO SITE ANASTAMOSIS    Anesthesia: General    Surgeon(s):  Pedrito Anand MD    Assistant:     Estimated Blood Loss (mL): 50    Complications: None    Specimens:   ID Type Source Tests Collected by Time Destination   A : Small bowel Tissue Ileum SURGICAL PATHOLOGY Pedrito Anand MD 4/28/2019 1606        Implants:  * No implants in log *      Drains:   Closed/Suction Drain Right RLQ;Abdomen Bulb 10 [place] (Active)       NG/OG/NJ/NE Tube Nasogastric 18 fr Right nostril (Active)   Surrounding Skin Dry; Intact 4/26/2019  4:00 PM   Securement device Yes 4/26/2019  4:00 PM   Status Clamped 4/26/2019  4:00 PM   Drainage Appearance Yellow;Green;Bile 4/26/2019  8:44 AM       Urethral Catheter Non-latex 16 fr (Active)       Findings: ischemic bowel    Pedrito Anand MD  Date: 4/28/2019  Time: 5:15 PM

## 2019-04-28 NOTE — PROGRESS NOTES
Am assessment and vitals complete, pt abd distended and pt complaining of abd pain, Dr. Vincent Herrera paged regarding lactic acid and pt abdomen

## 2019-04-28 NOTE — CONSULTS
Khurram Littlejohn      Name: Beatris Diaz  MRN: [id number]     Acct: [de-identified]  Room: [address] [place] [address]    Admit Date: 4/25/2019  PCP: Kris Hammer MD    Physician Requesting Consult:  Dr. Ghada Perez    Reason for Consult:  Pulmonary embolism    Chief Complaint:     Chief Complaint   Patient presents with    Abdominal Pain    Dizziness     onset today         History Obtained From:     patient    History of Present Illness:      Beatris Diaz is a  61 y.o.  male who presents with Abdominal Pain and Dizziness (onset today)      Is been in the hospital for several days and CT scan of the chest showed a subsegmental pulmonary embolism which to me appears to be chronic. He was started on heparin drip and is admitted with small bowel obstruction. Over the past several days he's become more distended with an elevated white blood cell count and rising lactic acid. His heparin drip since stopped and I didn't has to comment on this. He has a very small PE from what I can see on CT scan. Also there is no clinical evidence of DVT and thus no role for IVC filter. Lower extremity duplex should be checked for completeness. Past Medical History:     Past Medical History:   Diagnosis Date    Asthma     Cancer ([facility]     small intestine    CHF (congestive heart failure) (HCC)     COPD exacerbation (HCC)     GERD (gastroesophageal reflux disease)     History of blood transfusion     Hyperlipidemia     Hypertension     Neuropathy     Obesity (BMI 30-39.9) 2/25/2016    Psychiatric problem     SBO (small bowel obstruction) ([facility]     Severe mitral regurgitation 10/31/12    minimally invasive complex mitral valve repair     Severe tricuspid regurgitation 10/31/12    tricuspid repair under CPB        Past Surgical History:     Past Surgical History:   Procedure Laterality Date    ABDOMEN SURGERY      small bowel.     MITRAL VALVE SURGERY  10/31/12    minimally invasive complex mitral valve repair    OTHER SURGICAL HISTORY  11/2/12    successful weaning of the ECMO and decannulation    PACEMAKER PLACEMENT Left 02/2017    TRICUSPID VALVE SURGERY  10/31/12    tricuspid valve repair under CPB    TUNNELED VENOUS PORT PLACEMENT Left 6/18/2015    UPPER GASTROINTESTINAL ENDOSCOPY  3-3-15    erosive antral gastritis, sm. antral polypectomy        Medications Prior to Admission:       Prior to Admission medications    Medication Sig Start Date End Date Taking? Authorizing Provider   aspirin 81 MG chewable tablet Take 81 mg by mouth daily   Yes Historical Provider, MD   atorvastatin (LIPITOR) 40 MG tablet Take 40 mg by mouth nightly   Yes Historical Provider, MD   DULoxetine (CYMBALTA) 20 MG extended release capsule Take 20 mg by mouth daily   Yes Historical Provider, MD   docusate sodium (COLACE) 100 MG capsule Take 100 mg by mouth 2 times daily   Yes Historical Provider, MD   ferrous sulfate 325 (65 Fe) MG EC tablet Take 325 mg by mouth 4 times daily   Yes Historical Provider, MD   furosemide (LASIX) 40 MG tablet Take 40 mg by mouth daily   Yes Historical Provider, MD   pregabalin (LYRICA) 50 MG capsule Take 50 mg by mouth 2 times daily.    Yes Historical Provider, MD   metoprolol tartrate (LOPRESSOR) 50 MG tablet Take 50 mg by mouth 2 times daily (before meals)   Yes Historical Provider, MD   pantoprazole (PROTONIX) 40 MG tablet Take 40 mg by mouth 2 times daily (before meals)   Yes Historical Provider, MD   QUEtiapine (SEROQUEL) 25 MG tablet Take 25 mg by mouth 2 times daily   Yes Historical Provider, MD   spironolactone (ALDACTONE) 25 MG tablet Take 25 mg by mouth daily   Yes Historical Provider, MD   acetaminophen (TYLENOL) 325 MG tablet Take 650 mg by mouth every 6 hours as needed for Pain or Fever   Yes Historical Provider, MD   senna (SENOKOT) 8.6 MG tablet Take 1 tablet by mouth daily as needed for Constipation   Yes Historical Provider, MD   sennosides-docusate sodium (SENOKOT-S) 8.6-50 MG tablet Take 1 tablet by mouth daily as needed for Constipation   Yes Historical Provider, MD   vitamin B-6 (PYRIDOXINE) 25 MG tablet Take 25 mg by mouth daily   Yes Historical Provider, MD   dibucaine ([employer]HO[employer]) 1 % OINT rectal ointment Place rectally 4 times daily as needed for Pain 18  Yes Abby Adair MD   amiodarone (PACERONE) 400 MG tablet Take 1 tablet by mouth 2 times daily 17  Yes Latanya Mcneal DO   nitroGLYCERIN (NITROSTAT) 0.4 MG SL tablet up to max of 3 total doses. If no relief after 1 dose, call 911. 3/1/17  Yes MIQUEL Mueller CNP        Allergies:       Morphine    Social History:     Tobacco:    reports that he has never smoked. He has never used smokeless tobacco.  Alcohol:      reports that he does not drink alcohol. Drug Use:  reports that he does not use drugs.     Family History:     Family History   Problem Relation Age of Onset    Heart Disease Father        Review of Systems:     Positive and Negative as described in HPI    Constitutional:  negative for  fevers, chills, sweats, fatigue, and weight loss  HEENT:  negative for vision or hearing changes,   Respiratory:  negative for shortness of breath, cough, or congestion  Cardiovascular:  negative for  chest pain, palpitations  Gastrointestinal:  negative for nausea, vomiting, diarrhea, constipation, abdominal pain  Genitourinary:  negative for frequency, dysuria  Integument/Breast:  negative for rash, skin lesions  Musculoskeletal:  negative for muscle aches or joint pain  Neurological:  negative for headaches, dizziness, lightheadedness, numbness, pain and tingling extremities  Behavior/Psych:  negative for depression and anxiety    Code Status:  Full Code    Physical Exam:     Vitals:  /63   Pulse 107   Temp 99.5 °F (37.5 °C) (Oral)   Resp 16   Ht 5' 9\" (1.753 m)   Wt 237 lb 14.4 oz (107.9 kg)   SpO2 98%   BMI 35.13 kg/m²   Temp (24hrs), Av.2 °F (36.8 °C), Min:97.3 °F (36.3 °C), Max:99.5 °F (37.5 °C)      General appearance - alert, well appearing and in no acute distress  Mental status - oriented to person, place and time with normal affect  Head - normocephalic and atraumatic  Eyes - pupils equal and reactive, extraocular eye movements intact, conjunctiva clear  Ears - hearing appears to be intact  Nose - no drainage noted  Mouth - mucous membranes moist  Neck - supple, no carotid bruits, thyroid not palpable, no JVD  Chest - clear to auscultation, normal effort  Heart - normal rate, regular rhythm, no murmurs  Abdomen - soft, Tender, ++distended, bowel sounds present all four quadrants, no masses, hepatomegaly, splenomegaly or aortic enlargement  Neurological - normal speech, no focal findings or movement disorder noted, cranial nerves II through XII grossly intact  Extremities - no pedal edema or calf pain with palpation  Skin - no gross lesions, rashes, or induration noted        R brachial 1+ L brachial 1+   R radial 1+ L radial 1+   R femoral 1+ L femoral 1+   R popliteal 1+ L popliteal 1+   R posterior tibial 1+ L posterior tibial 1+   R dorsalis pedis 1+ L dorsalis pedis 1+         Data:     Lab Results   Component Value Date    WBC 20.9 (H) 04/28/2019    HGB 16.9 04/28/2019    HCT 53.9 (H) 04/28/2019    .5 04/28/2019     04/28/2019     Lab Results   Component Value Date     04/28/2019    K 4.4 04/28/2019     04/28/2019    CO2 23 04/28/2019    BUN 18 04/28/2019    CREATININE 1.05 04/28/2019    GLUCOSE 152 04/28/2019    CALCIUM 8.7 04/28/2019      Lab Results   Component Value Date    INR 1.2 04/28/2019    INR 1.1 04/26/2019    INR 1.1 04/25/2019    PROTIME 11.8 (H) 04/28/2019    PROTIME 10.8 04/26/2019    PROTIME 10.9 04/25/2019       Assessment:     Primary Problem  SBO (small bowel obstruction) ([facility]  1. 60 through male with small bowel obstruction, pulmonary embolism subsegmental    Active Hospital Problems    Diagnosis Date Noted  SBO (small bowel obstruction) ([facility] [K56.609] 04/25/2019    Major depressive disorder, recurrent, in full remission ([facility] [F33.42] 08/30/2018    Chronic combined systolic and diastolic congestive heart failure ([facility] [I50.42] 06/22/2017    Essential hypertension [I10] 05/05/2017    Chronic atrial fibrillation (HCC) [I48.2]     GUME (obstructive sleep apnea) [G47.33]     Pulmonary embolism without acute cor pulmonale ([facility] [I26.99] 02/23/2017    Obesity (BMI 30-39. 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 No role for IVC filter. Okay to stop anticoagulation.       Electronically signed by Daphney Vera MD on 4/28/2019 at 12:08 PM     Copy sent to Dr. Ashish Osborn MD

## 2019-04-28 NOTE — PROGRESS NOTES
Dr. Archie Anderson returned page and updated on pt condition, informed of current lab results and pt abd pain and distention, new orders received for a stat ct scan and vascular consult, ct scan with IV and oral consult, md also wants to keep NPO for now, pt given contrast and he drank it all, ct phoned and will be down to get him at 1100 for scan

## 2019-04-28 NOTE — PROGRESS NOTES
Pt uncomfortable in bed and requesting to sit up in chair, pt assisted to chair with chair alarm in place, will continue to monitor

## 2019-04-28 NOTE — ANESTHESIA POSTPROCEDURE EVALUATION
Department of Anesthesiology  Postprocedure Note    Patient: Bakari Bruce  MRN: [id number]  YOB: 1955  Date of evaluation: 4/28/2019  Time:  5:44 PM     Procedure Summary     Date:  04/28/19 Room / Location:  [facility] OR 01 / [facility] OR    Anesthesia Start:  0218 Anesthesia Stop:  6329    Procedure:  LAPAROTOMY EXPLORATORY, LYSIS OF ADHESIONS, SMALL BOWEL RESECTION WITH SITE TO SITE ANASTAMOSIS (N/A ) Diagnosis:  (Ischemic Bowel)    Surgeon:  Saba Jacobs MD Responsible Provider:  Harlin Schilder, MD    Anesthesia Type:  general ASA Status:  4 - Emergent          Anesthesia Type: general    Jonatan Phase I:      Jonatan Phase II:      Last vitals: Reviewed and per EMR flowsheets.        Anesthesia Post Evaluation    Patient location during evaluation: ICU  Patient participation: waiting for patient participation  Level of consciousness: sedated and ventilated  Pain score: 0  Complications: no  Cardiovascular status: hypotensive  Respiratory status: ventilator, acceptable and intubated  Hydration status: euvolemic

## 2019-04-28 NOTE — PROGRESS NOTES
Pulmonary Critical Care Progress Note    Patient seen for the follow up of pulmonary embolus    Subjective:    He has abdominal pain 9/10 intensity and increased distention . Dr Lucia Enrique from surgery has been notified and CT abdomen and pelvis is ordered. He was just taken off heparin drip. He has no increased shortness of breath. He has mild occasional cough. No chest pain. He has no active bleeding. Examination:    Vitals: BP (!) 120/50   Pulse 71   Temp 97.5 °F (36.4 °C) (Oral)   Resp 16   Ht 5' 9\" (1.753 m)   Wt 237 lb 14.4 oz (107.9 kg)   SpO2 98%   BMI 35.13 kg/m²   SpO2  Av.4 %  Min: 96 %  Max: 100 %  General appearance: alert and cooperative with exam   Neck: No JVD  Lungs:   Mild decreased breath sounds no crackles or wheezing  Heart: regular rate and rhythm, S1, S2 normal, no gallop  Abdomen: Soft, tympanic tender, absent BS  Extremities: no cyanosis or clubbing. No significant edema    LABs:    CBC:   Recent Labs     19  0743 19  1342 19  0524   WBC 7.7  --  20.9*   HGB 13.8 13.1* 16.9   HCT 44.2 39.2* 53.9*   *  --  141     BMP:   Recent Labs     19  0743 19  0524    140   K 4.3 4.4   CO2 27 23   BUN 20 18   CREATININE 0.90 1.05   LABGLOM >60 >60   GLUCOSE 108* 152*     PT/INR:   Recent Labs     19  1722 19  0538   PROTIME 10.9 10.8   INR 1.1 1.1     APTT:  Recent Labs     19  2154 19  0524   APTT 29.5 113.9*     LIVER PROFILE:  Recent Labs     19  1722   AST 59*   ALT 99*   LABALBU 4.1     Results for Chiquita Kanner (MRN [id number]) as of 2019 10:56   Ref. Range 2019 07:45   Lactic Acid Latest Ref Range: 0.5 - 2.2 mmol/L 4.2 (H)     Radiology:     chest x-ray   Mild pulmonary vascular congestion.       Minimal atelectasis at the left lung base.       Low lung volumes.       Stable mild cardiomegaly. Echocardiogram 11/3/18    Summary  Moderate left ventricular hypertrophy.   Estimated LV EF 25-30 %. Evidence of diastolic dysfunction. Mitral annuloplasty noted. Moderate mitral stenosis. Moderate mitral regurgitation. Ring noted in tricuspid valve position. Moderate tricuspid regurgitation. Estimated right ventricular systolic pressure is 41 mmHg    Impression:  · Sepsis from abdominal source / possible bowel perforation vs ischemia  · Pulmonary Embolism, recurrent   · Pulmonary infiltrates, atelectasis vs less likely pneumonia  · Right pleural effusion  · Lactic acidosis  · Obesity/Obstructive sleep apnea ~ wears O2 at bedtime  · Chronic combined diastolic/systolic heart failure ~ EF 25-30%  · Pulmonary Hypertension ~ RVSP 41  · Adenocarcinoma of small bowel, s/p bowel resection and chemo in 2014  ?   PET 3/18/19 with increased uptake within gastric antrum & transverse colon  · HLD, AFIB, upper GI bleed s/p microcoil embolization, depression, GUME      Recommendations:  ·  move to ICU  · oxygen by nasal cannula  ·  incentive spirometer every hour awake  ·  DuoNeb by nebulizer  ·  monitor hemoglobin  ·  off heparin drip  · PT PTT  · NPO  · IV NS   · Blood cultures x2  · Zosyn IV first dose stat  · ABG stat  · Lactate lipase LFT Hb now   · Monitor lactate  · Cardiology consult   · Stat CT abdomen and Pelvis   · General surgery on consult considering emergency exploratory laparotomy   ·   GI on consult   ·  discussed with SHEKHAR Quigley MD, [facility]  Pulmonary Critical Care and Sleep Medicine,  [facility]  Cell: [phone]  Office: [phone]                                                                            Cc 35 min

## 2019-04-29 ENCOUNTER — APPOINTMENT (OUTPATIENT)
Dept: GENERAL RADIOLOGY | Age: 64
DRG: 329 | End: 2019-04-29
Payer: MEDICARE

## 2019-04-29 LAB
ABSOLUTE EOS #: 0 K/UL (ref 0–0.4)
ABSOLUTE IMMATURE GRANULOCYTE: ABNORMAL K/UL (ref 0–0.3)
ABSOLUTE LYMPH #: 0.28 K/UL (ref 1–4.8)
ABSOLUTE MONO #: 1.69 K/UL (ref 0.2–0.8)
ALBUMIN SERPL-MCNC: 2.1 G/DL (ref 3.5–5.2)
ALBUMIN/GLOBULIN RATIO: ABNORMAL (ref 1–2.5)
ALP BLD-CCNC: 30 U/L (ref 40–129)
ALT SERPL-CCNC: 485 U/L (ref 5–41)
ANION GAP SERPL CALCULATED.3IONS-SCNC: 10 MMOL/L (ref 9–17)
AST SERPL-CCNC: 419 U/L
BASOPHILS # BLD: 0 %
BASOPHILS ABSOLUTE: 0 K/UL (ref 0–0.2)
BILIRUB SERPL-MCNC: 1.39 MG/DL (ref 0.3–1.2)
BUN BLDV-MCNC: 25 MG/DL (ref 8–23)
BUN/CREAT BLD: 30 (ref 9–20)
CALCIUM SERPL-MCNC: 6.6 MG/DL (ref 8.6–10.4)
CHLORIDE BLD-SCNC: 116 MMOL/L (ref 98–107)
CO2: 16 MMOL/L (ref 20–31)
CREAT SERPL-MCNC: 0.83 MG/DL (ref 0.7–1.2)
DIFFERENTIAL TYPE: ABNORMAL
DIGOXIN DATE LAST DOSE: ABNORMAL
DIGOXIN DOSE AMOUNT: ABNORMAL
DIGOXIN DOSE TIME: ABNORMAL
DIGOXIN LEVEL: 0.3 NG/ML (ref 0.5–2)
EKG ATRIAL RATE: 48 BPM
EKG Q-T INTERVAL: 738 MS
EKG QRS DURATION: 194 MS
EKG QTC CALCULATION (BAZETT): 601 MS
EKG R AXIS: -96 DEGREES
EKG T AXIS: 88 DEGREES
EKG VENTRICULAR RATE: 40 BPM
EOSINOPHILS RELATIVE PERCENT: 0 % (ref 1–4)
FIO2: 55
GFR AFRICAN AMERICAN: >60 ML/MIN
GFR NON-AFRICAN AMERICAN: >60 ML/MIN
GFR SERPL CREATININE-BSD FRML MDRD: ABNORMAL ML/MIN/{1.73_M2}
GFR SERPL CREATININE-BSD FRML MDRD: ABNORMAL ML/MIN/{1.73_M2}
GLUCOSE BLD-MCNC: 95 MG/DL (ref 70–99)
HCT VFR BLD CALC: 32 % (ref 41–53)
HCT VFR BLD CALC: 34.6 % (ref 41–53)
HCT VFR BLD CALC: 37.2 % (ref 41–53)
HCT VFR BLD CALC: 37.5 % (ref 40.7–50.3)
HEMOGLOBIN: 10.6 G/DL (ref 13.5–17.5)
HEMOGLOBIN: 11.3 G/DL (ref 13.5–17.5)
HEMOGLOBIN: 11.9 G/DL (ref 13–17)
HEMOGLOBIN: 12.3 G/DL (ref 13.5–17.5)
IMMATURE GRANULOCYTES: ABNORMAL %
LACTIC ACID: 1.8 MMOL/L (ref 0.5–2.2)
LACTIC ACID: 2 MMOL/L (ref 0.5–2.2)
LYMPHOCYTES # BLD: 3 % (ref 24–44)
MAGNESIUM: 2 MG/DL (ref 1.6–2.6)
MCH RBC QN AUTO: 32.5 PG (ref 26–34)
MCHC RBC AUTO-ENTMCNC: 33.1 G/DL (ref 31–37)
MCV RBC AUTO: 98.1 FL (ref 80–100)
MONOCYTES # BLD: 18 % (ref 1–7)
NEGATIVE BASE EXCESS, ART: 6 (ref 0–2)
NRBC AUTOMATED: ABNORMAL PER 100 WBC
O2 DEVICE/FLOW/%: ABNORMAL
PATIENT TEMP: 37
PDW BLD-RTO: 18.6 % (ref 11.5–14.5)
PLATELET # BLD: 83 K/UL (ref 130–400)
PLATELET ESTIMATE: ABNORMAL
PMV BLD AUTO: 11.5 FL (ref 6–12)
POC HCO3: 19.2 MMOL/L (ref 22–27)
POC O2 SATURATION: 99 %
POC PCO2 TEMP: ABNORMAL MM HG
POC PCO2: 32 MM HG (ref 32–45)
POC PH TEMP: ABNORMAL
POC PH: 7.39 (ref 7.35–7.45)
POC PO2 TEMP: ABNORMAL MM HG
POC PO2: 165 MM HG (ref 75–95)
POSITIVE BASE EXCESS, ART: ABNORMAL (ref 0–2)
POTASSIUM SERPL-SCNC: 4 MMOL/L (ref 3.7–5.3)
RBC # BLD: 3.27 M/UL (ref 4.5–5.9)
RBC # BLD: ABNORMAL 10*6/UL
SEG NEUTROPHILS: 79 % (ref 36–66)
SEGMENTED NEUTROPHILS ABSOLUTE COUNT: 7.43 K/UL (ref 1.8–7.7)
SODIUM BLD-SCNC: 142 MMOL/L (ref 135–144)
TCO2 (CALC), ART: 20 MMOL/L (ref 23–28)
TOTAL PROTEIN: 3.9 G/DL (ref 6.4–8.3)
TROPONIN INTERP: NORMAL
TROPONIN T: NORMAL NG/ML
TROPONIN, HIGH SENSITIVITY: 14 NG/L (ref 0–22)
TROPONIN, HIGH SENSITIVITY: 16 NG/L (ref 0–22)
TROPONIN, HIGH SENSITIVITY: 17 NG/L (ref 0–22)
WBC # BLD: 9.4 K/UL (ref 3.5–11)
WBC # BLD: ABNORMAL 10*3/UL

## 2019-04-29 PROCEDURE — 80162 ASSAY OF DIGOXIN TOTAL: CPT

## 2019-04-29 PROCEDURE — 94640 AIRWAY INHALATION TREATMENT: CPT

## 2019-04-29 PROCEDURE — 2000000000 HC ICU R&B

## 2019-04-29 PROCEDURE — 85014 HEMATOCRIT: CPT

## 2019-04-29 PROCEDURE — 83605 ASSAY OF LACTIC ACID: CPT

## 2019-04-29 PROCEDURE — 6360000002 HC RX W HCPCS: Performed by: INTERNAL MEDICINE

## 2019-04-29 PROCEDURE — 94770 HC ETCO2 MONITOR DAILY: CPT

## 2019-04-29 PROCEDURE — 94003 VENT MGMT INPAT SUBQ DAY: CPT

## 2019-04-29 PROCEDURE — 36600 WITHDRAWAL OF ARTERIAL BLOOD: CPT

## 2019-04-29 PROCEDURE — 37799 UNLISTED PX VASCULAR SURGERY: CPT

## 2019-04-29 PROCEDURE — 85025 COMPLETE CBC W/AUTO DIFF WBC: CPT

## 2019-04-29 PROCEDURE — 71045 X-RAY EXAM CHEST 1 VIEW: CPT

## 2019-04-29 PROCEDURE — C9113 INJ PANTOPRAZOLE SODIUM, VIA: HCPCS | Performed by: SURGERY

## 2019-04-29 PROCEDURE — 2580000003 HC RX 258: Performed by: SURGERY

## 2019-04-29 PROCEDURE — 2500000003 HC RX 250 WO HCPCS: Performed by: SURGERY

## 2019-04-29 PROCEDURE — 93970 EXTREMITY STUDY: CPT

## 2019-04-29 PROCEDURE — 82803 BLOOD GASES ANY COMBINATION: CPT

## 2019-04-29 PROCEDURE — 84484 ASSAY OF TROPONIN QUANT: CPT

## 2019-04-29 PROCEDURE — 80053 COMPREHEN METABOLIC PANEL: CPT

## 2019-04-29 PROCEDURE — 6360000002 HC RX W HCPCS: Performed by: SURGERY

## 2019-04-29 PROCEDURE — 85018 HEMOGLOBIN: CPT

## 2019-04-29 PROCEDURE — 99233 SBSQ HOSP IP/OBS HIGH 50: CPT | Performed by: INTERNAL MEDICINE

## 2019-04-29 PROCEDURE — 2580000003 HC RX 258: Performed by: INTERNAL MEDICINE

## 2019-04-29 PROCEDURE — 94761 N-INVAS EAR/PLS OXIMETRY MLT: CPT

## 2019-04-29 PROCEDURE — 83735 ASSAY OF MAGNESIUM: CPT

## 2019-04-29 PROCEDURE — 6370000000 HC RX 637 (ALT 250 FOR IP): Performed by: SURGERY

## 2019-04-29 RX ORDER — AMIODARONE HYDROCHLORIDE 200 MG/1
200 TABLET ORAL 2 TIMES DAILY
Status: DISCONTINUED | OUTPATIENT
Start: 2019-04-29 | End: 2019-04-30

## 2019-04-29 RX ORDER — FENTANYL CITRATE 50 UG/ML
50 INJECTION, SOLUTION INTRAMUSCULAR; INTRAVENOUS ONCE
Status: COMPLETED | OUTPATIENT
Start: 2019-04-29 | End: 2019-04-29

## 2019-04-29 RX ORDER — PROPOFOL 10 MG/ML
10 INJECTION, EMULSION INTRAVENOUS
Status: DISCONTINUED | OUTPATIENT
Start: 2019-04-29 | End: 2019-05-03

## 2019-04-29 RX ORDER — DEXTROSE AND SODIUM CHLORIDE 5; .45 G/100ML; G/100ML
INJECTION, SOLUTION INTRAVENOUS CONTINUOUS
Status: DISCONTINUED | OUTPATIENT
Start: 2019-04-29 | End: 2019-05-09

## 2019-04-29 RX ADMIN — TAZOBACTAM SODIUM AND PIPERACILLIN SODIUM 3.38 G: 375; 3 INJECTION, SOLUTION INTRAVENOUS at 01:47

## 2019-04-29 RX ADMIN — DEXTROSE AND SODIUM CHLORIDE: 5; 450 INJECTION, SOLUTION INTRAVENOUS at 13:27

## 2019-04-29 RX ADMIN — PROPOFOL 30 MCG/KG/MIN: 10 INJECTION, EMULSION INTRAVENOUS at 23:37

## 2019-04-29 RX ADMIN — CEFOXITIN SODIUM 2 G: 2 POWDER, FOR SOLUTION INTRAVENOUS at 05:53

## 2019-04-29 RX ADMIN — CEFOXITIN SODIUM 2 G: 2 POWDER, FOR SOLUTION INTRAVENOUS at 01:00

## 2019-04-29 RX ADMIN — MAGNESIUM SULFATE HEPTAHYDRATE 1 G: 1 INJECTION, SOLUTION INTRAVENOUS at 00:31

## 2019-04-29 RX ADMIN — Medication 10 ML: at 22:36

## 2019-04-29 RX ADMIN — PANTOPRAZOLE SODIUM 8 MG/HR: 40 INJECTION, POWDER, FOR SOLUTION INTRAVENOUS at 11:02

## 2019-04-29 RX ADMIN — TAZOBACTAM SODIUM AND PIPERACILLIN SODIUM 3.38 G: 375; 3 INJECTION, SOLUTION INTRAVENOUS at 09:45

## 2019-04-29 RX ADMIN — Medication 6 MG/HR: at 16:33

## 2019-04-29 RX ADMIN — IPRATROPIUM BROMIDE AND ALBUTEROL SULFATE 1 AMPULE: .5; 3 SOLUTION RESPIRATORY (INHALATION) at 08:04

## 2019-04-29 RX ADMIN — Medication 25 MCG/HR: at 09:30

## 2019-04-29 RX ADMIN — Medication 10 MG/HR: at 05:54

## 2019-04-29 RX ADMIN — PANTOPRAZOLE SODIUM 8 MG/HR: 40 INJECTION, POWDER, FOR SOLUTION INTRAVENOUS at 02:39

## 2019-04-29 RX ADMIN — MAGNESIUM SULFATE HEPTAHYDRATE 1 G: 1 INJECTION, SOLUTION INTRAVENOUS at 01:47

## 2019-04-29 RX ADMIN — PROPOFOL 30 MCG/KG/MIN: 10 INJECTION, EMULSION INTRAVENOUS at 19:47

## 2019-04-29 RX ADMIN — Medication 125 MCG/HR: at 17:26

## 2019-04-29 RX ADMIN — IPRATROPIUM BROMIDE AND ALBUTEROL SULFATE 1 AMPULE: .5; 3 SOLUTION RESPIRATORY (INHALATION) at 19:45

## 2019-04-29 RX ADMIN — PROPOFOL 10 MCG/KG/MIN: 10 INJECTION, EMULSION INTRAVENOUS at 13:20

## 2019-04-29 RX ADMIN — PANTOPRAZOLE SODIUM 8 MG/HR: 40 INJECTION, POWDER, FOR SOLUTION INTRAVENOUS at 21:06

## 2019-04-29 RX ADMIN — IPRATROPIUM BROMIDE AND ALBUTEROL SULFATE 1 AMPULE: .5; 3 SOLUTION RESPIRATORY (INHALATION) at 15:35

## 2019-04-29 RX ADMIN — FENTANYL CITRATE 50 MCG: 50 INJECTION, SOLUTION INTRAMUSCULAR; INTRAVENOUS at 08:40

## 2019-04-29 RX ADMIN — TAZOBACTAM SODIUM AND PIPERACILLIN SODIUM 3.38 G: 375; 3 INJECTION, SOLUTION INTRAVENOUS at 18:13

## 2019-04-29 RX ADMIN — IPRATROPIUM BROMIDE AND ALBUTEROL SULFATE 1 AMPULE: .5; 3 SOLUTION RESPIRATORY (INHALATION) at 11:48

## 2019-04-29 RX ADMIN — MAGNESIUM SULFATE HEPTAHYDRATE 1 G: 1 INJECTION, SOLUTION INTRAVENOUS at 02:50

## 2019-04-29 ASSESSMENT — PULMONARY FUNCTION TESTS
PIF_VALUE: 19
PIF_VALUE: 18
PIF_VALUE: 21
PIF_VALUE: 19
PIF_VALUE: 21
PIF_VALUE: 18

## 2019-04-29 NOTE — PROGRESS NOTES
with the above treatment  2.   3.       Electronically signed by Cliff Cartagena MD  on 4/29/2019 at 3:08 PM

## 2019-04-29 NOTE — PROGRESS NOTES
Pulmonary Critical Care Progress Note    Patient seen for the follow up of pulmonary embolus    Subjective:    He has underwent exploratory laparotomy for bowel obstruction. He was brought back to the ICU. He had hypotension I initiated Levophed drip. I repeated labs. He developed bradycardia and I had to stop the propofol drip. .  I consulted cardiology and did EKG and troponins. He had increased bloody output from NG. He received 1 unit packed RBC. Cardiology adjusted pacemaker rate lower limits from 40 to 60. He had increased respiratory rate today and I added fentanyl drip. He is still on Levophed 4 mcg drip      Examination:    Vitals: /70   Pulse 80   Temp 100.1 °F (37.8 °C) (Axillary)   Resp 20   Ht 5' 9\" (1.753 m)   Wt 237 lb 14.4 oz (107.9 kg)   SpO2 99%   BMI 35.13 kg/m²   SpO2  Av.5 %  Min: 34 %  Max: 100 %  General appearance: Intubated sedated on the ventilator  Neck: No JVD  Lungs:   Mild decreased breath sounds no crackles or wheezing  Heart: regular rate and rhythm, S1, S2 normal, no gallop  Abdomen: Soft, tympanic tender, absent BS  Extremities: no cyanosis or clubbing. No significant edema    LABs:    CBC:   Recent Labs     19  0525 19  1200   WBC 8.2  --  9.4  --    HGB 9.4*   < > 10.6* 12.3*   HCT 30.0*   < > 32.0* 37.2*   PLT 76*  --  83*  --     < > = values in this interval not displayed. BMP:   Recent Labs     19  2140 19  0525    142   K 3.9 4.0   CO2 16* 16*   BUN 26* 25*   CREATININE 0.98 0.83   LABGLOM >60 >60   GLUCOSE 116* 95     PT/INR:   Recent Labs     19  185   PROTIME 11.8* 16.5*   INR 1.2 1.6     APTT:  Recent Labs     19  0524 19  1234   APTT 113.9* 31.5*     LIVER PROFILE:  Recent Labs     19  1234 19  0525   AST 89* 419*   * 485*   LABALBU 3.6 2.1*     Results for Mejia Hernandez (MRN [id number]) as of 2019 10:56   Ref.  Range 2019 07:45 Lactic Acid Latest Ref Range: 0.5 - 2.2 mmol/L 4.2 (H)   Results for Marlene Nissen (MRN [id number]) as of 4/29/2019 15:36   Ref. Range 4/29/2019 05:25   Albumin Latest Ref Range: 3.5 - 5.2 g/dL 2.1 (L)   Albumin/Globulin Ratio Latest Ref Range: 1.0 - 2.5  NOT REPORTED   Alk Phos Latest Ref Range: 40 - 129 U/L 30 (L)   ALT Latest Ref Range: 5 - 41 U/L 485 (H)   AST Latest Ref Range: <40 U/L 419 (H)   Bilirubin Latest Ref Range: 0.3 - 1.2 mg/dL 1.39 (H)   Total Protein Latest Ref Range: 6.4 - 8.3 g/dL 3.9 (L)     Radiology:     chest x-ray 4/29  Improved vascular congestion.  Stable tubes and lines      Echocardiogram 11/3/18    Summary  Moderate left ventricular hypertrophy. Estimated LV EF 25-30 %. Evidence of diastolic dysfunction. Mitral annuloplasty noted. Moderate mitral stenosis. Moderate mitral regurgitation. Ring noted in tricuspid valve position. Moderate tricuspid regurgitation. Estimated right ventricular systolic pressure is 41 mmHg    Impression:    · Acute postoperative respiratory insufficiency   · Sepsis from abdominal source / Bowel obstruction status post exploratory lap  · shock  · Pulmonary Embolism, recurrent   · Pulmonary infiltrates, atelectasis vs less likely pneumonia  · Right pleural effusion  · Adenocarcinoma of small bowel, s/p bowel resection and chemo in 2014  ?   PET 3/18/19 with increased uptake within gastric antrum & transverse colon  · Obesity/Obstructive sleep apnea ~ wears O2 at bedtime  · Chronic combined diastolic/systolic heart failure ~ EF 25-30%  · Pulmonary Hypertension ~ RVSP 41  · HLD, AFIB, upper GI bleed s/p microcoil embolization, depression, GUME      Recommendations:    · Assist-control ventilation  · Sedation with propofol; discontinue Versed  · Fentanyl drip for pain  ·  DuoNeb by nebulizer  ·  monitor hemoglobin  ·  off heparin drip  · NPO  · IV D5 W1/2 NS at 75 an hour   · Zosyn IV  · Surgery on consult  · Cardiology on  consult   ·  GI on consult ·  discussed with RN  · EPC cuffs        Cinthia Dalton MD, [facility]  Pulmonary Critical Care and Sleep Medicine,  [facility]  Cell: [phone]  Office: [phone]                                                                            Cc 35 min

## 2019-04-29 NOTE — PROGRESS NOTES
Pt admitted from OR on vent. Left radial art line not working. Dr. Adalberto Maldonado attempted times 4 to insert new arterial line into rt radial, rt Brachial, Left brachial then successful insertion to rt femoral  Line. Pt noted to be draining large amounts of dark blood from NG tube. Orders to start transfusion of 1 Unit. Levophed started and propofol off due to blood pressures. Labs drawn from femoral art line when line secured and sent to lab. Pt beginning to wake up moving head, and shaking head to questions. Dilaudid given as documented for pain control until blood pressure stabilized.  Report given to Duc Galvan at bedside and all questions answered

## 2019-04-29 NOTE — PROGRESS NOTES
[facility]    Progress Note    4/29/2019    2:06 PM    Name:   Tegan Castillo  MRN:     [id number]     [place]:      [de-identified]   Room:   [address] [place] [address] Day:  4  Admit Date:  4/25/2019  4:57 PM    PCP:   Alicia Rose MD  Code Status:  Full Code    Subjective:     C/C:   Chief Complaint   Patient presents with    Abdominal Pain    Dizziness     onset today     Interval History Status:  Improving  Chart reviewed since admission  Noted events since yesterday  No fevers, chills reported  Continued significant NG tube drainage, dark brown colored one liter drainage overnight, no bowel movements reported  On versed and started in fentanyl drip for sedation  No skin rashes reported    Brief History:     The patient is a 61 y.o. Non-/non  male who presents with Abdominal Pain and Dizziness (onset today)   and he is admitted to the hospital for the management of  SBO.      He has the following significant co-morbidities: HTN, Chronic AFib, Chronic combined systolic/diastolic heart failure, S/P AICD Placement, Depression, GUME, Adenocarcinoma of small bowel, Colon cancer.      He presented with the following:      He presented to the ED with abdominal pain and shortness of breath. The pain was sudden in onset, localized to the epigastric region. HE denied any constipation and was passing gas and having normal BMs. He had associated shortness of breath.      In the ED, imaging was concerning for SBO vs ileus. NG tube was placed.     CT Chest showed small subsegmental PE.      Patient was admitted for further care. Review of Systems:     Sedated and ventilator placement  Un able to obtain from patient    Medications: Allergies:     Allergies   Allergen Reactions    Morphine      itching     Current Meds:   Scheduled Meds:    amiodarone  200 mg Oral BID    piperacillin-tazobactam  3.375 g Intravenous Q8H    sodium chloride flush  10  Heart Disease Father      Vitals:  /70   Pulse 80   Temp 100.1 °F (37.8 °C) (Axillary)   Resp 20   Ht 5' 9\" (1.753 m)   Wt 237 lb 14.4 oz (107.9 kg)   SpO2 99%   BMI 35.13 kg/m²   Temp (24hrs), Av °F (36.7 °C), Min:86.2 °F (30.1 °C), Max:100.1 °F (37.8 °C)    Recent Labs     19  1416 19  17419  1813   POCGLU 122* 78 109*     I/O (24Hr): Intake/Output Summary (Last 24 hours) at 2019 1406  Last data filed at 2019 0929  Gross per 24 hour   Intake 6030.71 ml   Output 1345 ml   Net 4685.71 ml     Labs:    Hematology:  Recent Labs     19  18519  1200   WBC 20.9*  --  8.2  --  9.4  --    RBC 5.26  --  2.93*  --  3.27*  --    HGB 16.9   < > 9.4* 11.3* 10.6* 12.3*   HCT 53.9*   < > 30.0* 34.6* 32.0* 37.2*   .5  --  102.4  --  98.1  --    MCH 32.1  --  32.1  --  32.5  --    MCHC 31.4  --  31.3  --  33.1  --    RDW 16.0*  --  16.3*  --  18.6*  --      --  76*  --  83*  --    MPV 11.3  --  12.6  --  11.5  --    INR 1.2  --  1.6  --   --   --     < > = values in this interval not displayed.      Chemistry:  Recent Labs     19  1330    140 142  --    K 4.4 3.9 4.0  --     113* 116*  --    CO2 23 16* 16*  --    GLUCOSE 152* 116* 95  --    BUN 18 26* 25*  --    CREATININE 1.05 0.98 0.83  --    MG 1.8 1.1* 2.0  --    ANIONGAP 15 11 10  --    LABGLOM >60 >60 >60  --    GFRAA >60 >60 >60  --    CALCIUM 8.7 5.5* 6.6*  --    TROPHS  --  23* 17 16   DIGOXIN  --   --  0.3*  --      Recent Labs     19  1234 19  1416 19  1745 19  1813 19  0525   PROT 7.7  --   --   --  3.9*   LABALBU 3.6  --   --   --  2.1*   AST 89*  --   --   --  419*   *  --   --   --  485*   ALKPHOS 69  --   --   --  30*   BILITOT 0.73  --   --   --  1.39*   BILIDIR 0.21  --   --   --   --    LIPASE 14  --   --   --   --    POCGLU  --  122* 78 109*  --      Lab Results   Component Value Date/Time    SPECIAL RIGHT AC 7ML AEROBIC 1ML ANAEROBIC 04/28/2019 12:34 PM     Lab Results   Component Value Date/Time    CULTURE NO GROWTH 22 HOURS 04/28/2019 12:34 PM     Lab Results   Component Value Date    POCPH 7.39 04/29/2019    PHART 7.44 08/30/2012    PH 7.48 11/08/2012    POCPCO2 32 04/29/2019    TYZ0PZI 60 08/30/2012    PCO2 45 11/08/2012    POCPO2 165 04/29/2019    PO2ART 73 08/30/2012    PO2 145 11/08/2012    POCHCO3 19.2 04/29/2019    ZXB7YZX 24.1 11/01/2012    HCO3 33.6 11/08/2012    NBEA 6 04/29/2019    PBEA NOT REPORTED 04/29/2019    WBX9BSR 20 04/29/2019    BLAT6KEX 99 04/29/2019    E0YPJQAD 100 11/02/2012    O2SAT 99 11/02/2012    FIO2 55.0 04/29/2019     Radiology:  Xr Abdomen (kub) (single Ap View)    Result Date: 4/25/2019  EXAMINATION: SINGLE SUPINE XRAY VIEW(S) OF THE ABDOMEN 4/25/2019 8:44 pm COMPARISON: None. HISTORY: ORDERING SYSTEM PROVIDED HISTORY: NGT placement confirmation TECHNOLOGIST PROVIDED HISTORY: NGT placement confirmation Ordering Physician Provided Reason for Exam: NGT placement Acuity: Acute Type of Exam: Initial FINDINGS: 2 images of the abdomen were provided. Enteric tube is noted projecting on the left hemidiaphragm. This is likely in the stomach. Moderate multifocal bowel distention is noted. Ill-defined increased density in the left lung base is noted     Enteric tube as described     Ct Abdomen Pelvis W Iv Contrast Additional Contrast? None    Result Date: 4/25/2019  EXAMINATION: CT OF THE ABDOMEN AND PELVIS WITH CONTRAST 4/25/2019 6:10 pm TECHNIQUE: CT of the abdomen and pelvis was performed with the administration of intravenous contrast. Multiplanar reformatted images are provided for review. Dose modulation, iterative reconstruction, and/or weight based adjustment of the mA/kV was utilized to reduce the radiation dose to as low as reasonably achievable. COMPARISON: CT abdomen and pelvis performed 02/23/2017.  HISTORY: ORDERING SYSTEM PROVIDED HISTORY: abdominal pain TECHNOLOGIST PROVIDED HISTORY: FINDINGS: Lower Chest: Infiltrates are seen in the lung bases bilaterally that is worse on the left compared to the right. The heart is enlarged. Organs: The liver and spleen are normal size and overall attenuation. There are hepatic granulomas. The gallbladder, pancreas, and adrenal glands are unremarkable. There is no obstructive uropathy. There are bilateral nonobstructing renal stones. The largest stone is seen on the right measuring approximately 5 mm in diameter. There are bilateral renal cysts. The ureters are not dilated. The urinary bladder is unremarkable. GI/Bowel: The stomach is mildly distended and fluid-filled. Loops of small bowel are mildly distended and fluid-filled in the proximal and mid aspect. The distal and terminal ileum is decompressed. The colon is decompressed containing residual air and fecal contents. There is no intraperitoneal free air or free fluid. Pelvis: The prostate gland and seminal vesicles are unremarkable. Phleboliths are seen in the pelvis. Peritoneum/Retroperitoneum: The psoas muscles are symmetric. The abdominal aorta is normal in caliber. The inferior vena cava is unremarkable. There is no retroperitoneal or mesenteric adenopathy. Bones/Soft Tissues: The extra-abdominal soft tissues are unremarkable. There is no acute osseous abnormality. Mildly prominent fluid-filled loops of small bowel proximally and within the mid aspect with decompressed distal and terminal ileal loops. This may relate to a degree of obstruction versus an ileus and correlation is needed. Infiltrates in the lung bases bilaterally that may represent atelectasis versus pneumonia. Nonobstructing renal stones bilaterally. Xr Chest Portable    Result Date: 4/27/2019  EXAMINATION: SINGLE XRAY VIEW OF THE CHEST 4/27/2019 6:22 am COMPARISON: 04/25/2019.  HISTORY: ORDERING SYSTEM PROVIDED HISTORY: pleural effusion, infiltrates TECHNOLOGIST PROVIDED HISTORY: pleural effusion, infiltrates Ordering Physician Provided Reason for Exam: plural effusion infiltrate Acuity: Unknown Type of Exam: Initial FINDINGS: A left cardiac device is unchanged in position. An enteric tube is seen coursing below the diaphragm. The cardiac silhouette is persistently enlarged. There is prominence of the central pulmonary vasculature. No convincing focal consolidation, pleural effusion or pneumothorax. 1. Persistent enlargement of the cardiac silhouette with prominence of the central pulmonary vasculature. 2. No convincing pleural effusion or pneumothorax. Xr Chest Portable    Result Date: 4/25/2019  EXAMINATION: SINGLE XRAY VIEW OF THE CHEST 4/25/2019 5:56 pm COMPARISON: Chest radiograph performed 11/03/2018. HISTORY: ORDERING SYSTEM PROVIDED HISTORY: shortness of breath TECHNOLOGIST PROVIDED HISTORY: shortness of breath Ordering Physician Provided Reason for Exam: chest pain Acuity: Acute Type of Exam: Initial Additional signs and symptoms: SOB Relevant Medical/Surgical History: CHF, COPD FINDINGS: There is chronic pulmonary change. There may be small bibasilar effusions with adjacent atelectasis. There is no pneumothorax. The heart is enlarged with stable cardiac lead. The upper abdomen is unremarkable. The extrathoracic soft tissues are unremarkable. Cardiomegaly with small bibasilar effusions and adjacent atelectasis. Ct Chest Pulmonary Embolism W Contrast    Result Date: 4/25/2019  EXAMINATION: CTA OF THE CHEST 4/25/2019 6:10 pm TECHNIQUE: CTA of the chest was performed after the administration of intravenous contrast.  Multiplanar reformatted images are provided for review. MIP images are provided for review. Dose modulation, iterative reconstruction, and/or weight based adjustment of the mA/kV was utilized to reduce the radiation dose to as low as reasonably achievable. COMPARISON: None.  HISTORY: ORDERING SYSTEM PROVIDED HISTORY: sob, history of CA FINDINGS: Pulmonary Arteries: Pulmonary arteries are adequately opacified for evaluation. There is an intraluminal filling defect within a subsegmental branch supplying the left lower lobe medially. The remainder of the arterial branches appear patent. Main pulmonary artery is normal in caliber. Mediastinum: No evidence of mediastinal lymphadenopathy. The heart and pericardium demonstrate no acute abnormality. The heart is enlarged. There is no acute abnormality of the thoracic aorta. Lungs/pleura: There is linear atelectasis versus scarring in the right lung posteriorly. There is dependent atelectasis in the left lung. There is a small right basilar effusion. There is no pneumothorax. The tracheobronchial tree is patent. Upper Abdomen: Limited images of the upper abdomen are unremarkable. Soft Tissues/Bones: No acute bone or soft tissue abnormality. Mild cardiomegaly with a small right basilar effusion. Filling defect involving a subsegmental branch supplying the left lower lobe medially consistent with an embolism. Critical results were called by Dr. Nishant Loja to [facility] on  at 18:48.      Physical Examination:        /70   Pulse 80   Temp 100.1 °F (37.8 °C) (Axillary)   Resp 20   Ht 5' 9\" (1.753 m)   Wt 237 lb 14.4 oz (107.9 kg)   SpO2 99%   BMI 35.13 kg/m²   Temp (24hrs), Av °F (36.7 °C), Min:86.2 °F (30.1 °C), Max:100.1 °F (37.8 °C)    Recent Labs     19  1416 19  1745 19  1813   POCGLU 122* 78 109*       Intake/Output Summary (Last 24 hours) at 2019 1406  Last data filed at 2019 8140  Gross per 24 hour   Intake 6030.71 ml   Output 1345 ml   Net 4685.71 ml     General Appearance: sedated, on ventilator   Mental status: un able to wake him up at this time  Head:  normocephalic, atraumatic  Eye: no icterus, redness, pupils equal and reactive  Ear: normal external ear, no discharge, hearing intact  Nose:  no drainage noted  Mouth: mucous membranes moist  Neck: supple, no carotid bruits, thyroid not palpable  Lungs: Bilateral equal air entry, clear to ausculation, no wheezing  Cardiovascular: normal rate, regular rhythm, no murmur, gallop, rub. Abdomen: continued distention of abdomen, midline incision noted  Neurologic: not following any commands  Skin: No gross lesions, rashes, bruising or bleeding on exposed skin area  Extremities:  peripheral pulses palpable, no pedal edema or calf pain with palpation  Psych: distressed affect    Assessment:        Principal Problem:    SBO (small bowel obstruction) (HCC)  Active Problems:    Small bowel cancer (HCC)    Obesity (BMI 30-39. 9)    Pulmonary embolism without acute cor pulmonale (HCC)    GUME (obstructive sleep apnea)    Chronic atrial fibrillation (HCC)    Essential hypertension    Chronic combined systolic and diastolic congestive heart failure (HCC)    Major depressive disorder, recurrent, in full remission ([facility] [address]    Plan:        - GI, primary problem. Small bowel obstruction with necrosis of bowel. Noted Op report with removal of bowel. Surgery on 4/28/2019. Continued close follow up. NG tube still present with dark colored secretions. On PPI drip. Await return of bowel function  - Pulmonary, acute respiratory insufficiency, post surgery. Continued follow up  - Oncology history: Previous small bowel cancer status post surgery and chemotherapy.  Recent diagnosis of gastric cancer from EGD biopsy at [facility]. Staging and treatment   - Hypertension, still on small dose levophed    Time spent for patient care is about 35 minutes    Consultations:   [facility] CONSULT TO PULMONOLOGY  IP CONSULT TO PULMONOLOGY  IP CONSULT TO GI  IP CONSULT TO VASCULAR SURGERY  IP CONSULT TO Adrienne Oakes MD  4/29/2019  2:06 PM

## 2019-04-29 NOTE — PROGRESS NOTES
Occupational Therapy  DATE: 2019    NAME: Luis Fishman  MRN: [id number]   : 1955    Patient not seen this date for Occupational Therapy due to:  [] Blood transfusion in progress  [x] Cancel by RN- hold per SHEKHAR Munroe as pt is intubated   [] Hemodialysis  []  Refusal by Patient   [] Spine Precautions   [] Strict Bedrest  [] Surgery  [] Testing      [] Other        [] PT being discontinued at this time. Patient independent. No further needs. [] PT being discontinued at this time as the patient has been transferred to hospice care. No further needs.     Andreia Chi, OT

## 2019-04-29 NOTE — CONSULTS
(LIPITOR) 40 MG tablet Take 40 mg by mouth nightly   Yes Historical Provider, MD   DULoxetine (CYMBALTA) 20 MG extended release capsule Take 20 mg by mouth daily   Yes Historical Provider, MD   docusate sodium (COLACE) 100 MG capsule Take 100 mg by mouth 2 times daily   Yes Historical Provider, MD   ferrous sulfate 325 (65 Fe) MG EC tablet Take 325 mg by mouth 4 times daily   Yes Historical Provider, MD   furosemide (LASIX) 40 MG tablet Take 40 mg by mouth daily   Yes Historical Provider, MD   pregabalin (LYRICA) 50 MG capsule Take 50 mg by mouth 2 times daily. Yes Historical Provider, MD   metoprolol tartrate (LOPRESSOR) 50 MG tablet Take 50 mg by mouth 2 times daily (before meals)   Yes Historical Provider, MD   pantoprazole (PROTONIX) 40 MG tablet Take 40 mg by mouth 2 times daily (before meals)   Yes Historical Provider, MD   QUEtiapine (SEROQUEL) 25 MG tablet Take 25 mg by mouth 2 times daily   Yes Historical Provider, MD   spironolactone (ALDACTONE) 25 MG tablet Take 25 mg by mouth daily   Yes Historical Provider, MD   acetaminophen (TYLENOL) 325 MG tablet Take 650 mg by mouth every 6 hours as needed for Pain or Fever   Yes Historical Provider, MD   senna (SENOKOT) 8.6 MG tablet Take 1 tablet by mouth daily as needed for Constipation   Yes Historical Provider, MD   sennosides-docusate sodium (SENOKOT-S) 8.6-50 MG tablet Take 1 tablet by mouth daily as needed for Constipation   Yes Historical Provider, MD   vitamin B-6 (PYRIDOXINE) 25 MG tablet Take 25 mg by mouth daily   Yes Historical Provider, MD   dibucaine ([employer] HEMOR[employer]HO[employer] [facility] [employer]NALGESIC) 1 % OINT rectal ointment Place rectally 4 times daily as needed for Pain 8/30/18  Yes Abby Adair MD   amiodarone (PACERONE) 400 MG tablet Take 1 tablet by mouth 2 times daily 6/7/17  Yes Sophia San DO   nitroGLYCERIN (NITROSTAT) 0.4 MG SL tablet up to max of 3 total doses.  If no relief after 1 dose, call 911. 3/1/17  Yes Deanne Veliz, APRN - CNP Allergies:  Morphine    Social History:   reports that he has never smoked. He has never used smokeless tobacco. He reports that he does not drink alcohol or use drugs. Family History: family history includes Heart Disease in his father. No h/o sudden cardiac death. No for premature CAD    REVIEW OF SYSTEMS:    Not able to obtain  PHYSICAL EXAM:      /76   Pulse 77   Temp 100.3 °F (37.9 °C) (Axillary)   Resp 21   Ht 5' 9\" (1.753 m)   Wt 237 lb 14.4 oz (107.9 kg)   SpO2 99%   BMI 35.13 kg/m²    Constitutional and General Appearance: intubated and sedated  HEENT: PERRL, no cervical lymphadenopathy. No masses palpable. Normal oral mucosa  Respiratory:  · Normal excursion and expansion without use of accessory muscles  · Resp Auscultation:On MVt. No for increased work of breathing. On auscultation: clear to auscultation bilaterally  Cardiovascular:  · The apical impulse is not displaced  · Heart tones are crisp and normal. regular S1 and S2.  · Jugular venous pulsation Normal  · The carotid upstroke is normal in amplitude and contour without delay or bruit  · Peripheral pulses are symmetrical and full  Abdomen:  · No masses or tenderness  · Bowel sounds present  Extremities:  ·  No Cyanosis or Clubbing  ·  Lower extremity edema: No  · Skin: Warm and dry  Neurological:  · Intubated and sedated    DATA:    Diagnostics:    EKG: Ventricular paced rhythm. Echo 11/2018  Summary  Moderate left ventricular hypertrophy. Estimated LV EF 25-30 %. Evidence of diastolic dysfunction. Mitral annuloplasty noted. Moderate mitral stenosis. Moderate mitral regurgitation. Ring noted in tricuspid valve position. Moderate tricuspid regurgitation. Estimated right ventricular systolic pressure is 41 mmHg.     Labs:     CBC:   Recent Labs     04/28/19  1859 04/28/19 2140 04/29/19  0525   WBC 8.2  --  9.4   HGB 9.4* 11.3* 10.6*   HCT 30.0* 34.6* 32.0*   PLT 76*  --  83*     BMP:   Recent Labs     04/28/19 2140 04/29/19  0525    142   K 3.9 4.0   CO2 16* 16*   BUN 26* 25*   CREATININE 0.98 0.83   LABGLOM >60 >60   GLUCOSE 116* 95     BNP: No results for input(s): BNP in the last 72 hours. PT/INR:   Recent Labs     04/28/19  0524 04/28/19  1859   PROTIME 11.8* 16.5*   INR 1.2 1.6     APTT:  Recent Labs     04/28/19  0524 04/28/19  1234   APTT 113.9* 31.5*     CARDIAC ENZYMES:No results for input(s): CKTOTAL, CKMB, CKMBINDEX, TROPONINI in the last 72 hours. FASTING LIPID PANEL:  Lab Results   Component Value Date    HDL 41 09/28/2016    TRIG 72 09/28/2016     LIVER PROFILE:  Recent Labs     04/28/19  1234 04/29/19  0525   AST 89* 419*   * 485*   LABALBU 3.6 2.1*       IMPRESSION/RECOMMENDATIONS:  1. Small bowel obstruction s/p Laparotomy. Possible Sepsis. Intubated and sedated. On Levophed. Good urine output. Continue gentle hydration with close follow on I/O and chart. Avoid overhydration due to history of dilated cardiomyopathy. 2. H/O dilated cardiomyopathy. S/p AICD placement. Cath done in 2016 showed mild CAD. Last EF evaluation was 30-35%. No signs of volume overload. Continue current medications. 3. PAF. Currently in NSR. Had episodes of bradycardia and his pacing threshold was increased to 60 as lower limits, currently in NSR at rate of 70-80. Will reduce Amiodarone, check Digoxin level. Off anticoagulation due to GI bleeding. Will resume Eliquis when possible. Discussed with  Nurse.     Paulo Bourne MD  [facility] Cardiology Consult           [phone]

## 2019-04-30 ENCOUNTER — APPOINTMENT (OUTPATIENT)
Dept: GENERAL RADIOLOGY | Age: 64
DRG: 329 | End: 2019-04-30
Payer: MEDICARE

## 2019-04-30 LAB
ABSOLUTE EOS #: 0 K/UL (ref 0–0.4)
ABSOLUTE IMMATURE GRANULOCYTE: ABNORMAL K/UL (ref 0–0.3)
ABSOLUTE LYMPH #: 0.62 K/UL (ref 1–4.8)
ABSOLUTE MONO #: 2.34 K/UL (ref 0.2–0.8)
ANION GAP SERPL CALCULATED.3IONS-SCNC: 11 MMOL/L (ref 9–17)
BASOPHILS # BLD: 0 %
BASOPHILS ABSOLUTE: 0 K/UL (ref 0–0.2)
BUN BLDV-MCNC: 26 MG/DL (ref 8–23)
BUN/CREAT BLD: 24 (ref 9–20)
CALCIUM SERPL-MCNC: 7.9 MG/DL (ref 8.6–10.4)
CHLORIDE BLD-SCNC: 107 MMOL/L (ref 98–107)
CO2: 20 MMOL/L (ref 20–31)
CREAT SERPL-MCNC: 1.07 MG/DL (ref 0.7–1.2)
DIFFERENTIAL TYPE: ABNORMAL
EOSINOPHILS RELATIVE PERCENT: 0 % (ref 1–4)
FIO2: 45
GFR AFRICAN AMERICAN: >60 ML/MIN
GFR NON-AFRICAN AMERICAN: >60 ML/MIN
GFR SERPL CREATININE-BSD FRML MDRD: ABNORMAL ML/MIN/{1.73_M2}
GFR SERPL CREATININE-BSD FRML MDRD: ABNORMAL ML/MIN/{1.73_M2}
GLUCOSE BLD-MCNC: 126 MG/DL (ref 70–99)
HCT VFR BLD CALC: 29.4 % (ref 40.7–50.3)
HCT VFR BLD CALC: 33 % (ref 40.7–50.3)
HCT VFR BLD CALC: 35.3 % (ref 40.7–50.3)
HEMOGLOBIN: 10.6 G/DL (ref 13–17)
HEMOGLOBIN: 11.2 G/DL (ref 13–17)
HEMOGLOBIN: 9.2 G/DL (ref 13–17)
IMMATURE GRANULOCYTES: ABNORMAL %
INR BLD: 1
LYMPHOCYTES # BLD: 5 % (ref 24–44)
MAGNESIUM: 2.3 MG/DL (ref 1.6–2.6)
MCH RBC QN AUTO: 31.5 PG (ref 25.2–33.5)
MCHC RBC AUTO-ENTMCNC: 31.7 G/DL (ref 28.4–34.8)
MCV RBC AUTO: 99.4 FL (ref 82.6–102.9)
MONOCYTES # BLD: 19 % (ref 1–7)
NEGATIVE BASE EXCESS, ART: 5 (ref 0–2)
NRBC AUTOMATED: 0 PER 100 WBC
O2 DEVICE/FLOW/%: ABNORMAL
PATIENT TEMP: 37
PDW BLD-RTO: 17.2 % (ref 11.8–14.4)
PLATELET # BLD: 108 K/UL (ref 138–453)
PLATELET ESTIMATE: ABNORMAL
PMV BLD AUTO: 12.7 FL (ref 8.1–13.5)
POC HCO3: 20.5 MMOL/L (ref 22–27)
POC O2 SATURATION: 100 %
POC PCO2 TEMP: ABNORMAL MM HG
POC PCO2: 37 MM HG (ref 32–45)
POC PH TEMP: ABNORMAL
POC PH: 7.36 (ref 7.35–7.45)
POC PO2 TEMP: ABNORMAL MM HG
POC PO2: 173 MM HG (ref 75–95)
POSITIVE BASE EXCESS, ART: ABNORMAL (ref 0–2)
POTASSIUM SERPL-SCNC: 4.6 MMOL/L (ref 3.7–5.3)
PROTHROMBIN TIME: 10.3 SEC (ref 9.7–11.6)
RBC # BLD: 3.55 M/UL (ref 4.21–5.77)
RBC # BLD: ABNORMAL 10*6/UL
SEG NEUTROPHILS: 76 % (ref 36–66)
SEGMENTED NEUTROPHILS ABSOLUTE COUNT: 9.34 K/UL (ref 1.8–7.7)
SODIUM BLD-SCNC: 138 MMOL/L (ref 135–144)
SURGICAL PATHOLOGY REPORT: NORMAL
TCO2 (CALC), ART: 22 MMOL/L (ref 23–28)
TROPONIN INTERP: NORMAL
TROPONIN INTERP: NORMAL
TROPONIN T: NORMAL NG/ML
TROPONIN T: NORMAL NG/ML
TROPONIN, HIGH SENSITIVITY: 11 NG/L (ref 0–22)
TROPONIN, HIGH SENSITIVITY: 13 NG/L (ref 0–22)
WBC # BLD: 12.3 K/UL (ref 3.5–11.3)
WBC # BLD: ABNORMAL 10*3/UL

## 2019-04-30 PROCEDURE — 85014 HEMATOCRIT: CPT

## 2019-04-30 PROCEDURE — 99233 SBSQ HOSP IP/OBS HIGH 50: CPT | Performed by: INTERNAL MEDICINE

## 2019-04-30 PROCEDURE — 83735 ASSAY OF MAGNESIUM: CPT

## 2019-04-30 PROCEDURE — 94640 AIRWAY INHALATION TREATMENT: CPT

## 2019-04-30 PROCEDURE — 85610 PROTHROMBIN TIME: CPT

## 2019-04-30 PROCEDURE — 6360000002 HC RX W HCPCS: Performed by: SURGERY

## 2019-04-30 PROCEDURE — 2580000003 HC RX 258: Performed by: INTERNAL MEDICINE

## 2019-04-30 PROCEDURE — 2000000000 HC ICU R&B

## 2019-04-30 PROCEDURE — C9113 INJ PANTOPRAZOLE SODIUM, VIA: HCPCS | Performed by: SURGERY

## 2019-04-30 PROCEDURE — 80048 BASIC METABOLIC PNL TOTAL CA: CPT

## 2019-04-30 PROCEDURE — 94761 N-INVAS EAR/PLS OXIMETRY MLT: CPT

## 2019-04-30 PROCEDURE — 37799 UNLISTED PX VASCULAR SURGERY: CPT

## 2019-04-30 PROCEDURE — 99232 SBSQ HOSP IP/OBS MODERATE 35: CPT | Performed by: INTERNAL MEDICINE

## 2019-04-30 PROCEDURE — 6360000002 HC RX W HCPCS: Performed by: INTERNAL MEDICINE

## 2019-04-30 PROCEDURE — 71045 X-RAY EXAM CHEST 1 VIEW: CPT

## 2019-04-30 PROCEDURE — 2500000003 HC RX 250 WO HCPCS: Performed by: SURGERY

## 2019-04-30 PROCEDURE — 94003 VENT MGMT INPAT SUBQ DAY: CPT

## 2019-04-30 PROCEDURE — 2580000003 HC RX 258: Performed by: SURGERY

## 2019-04-30 PROCEDURE — 94770 HC ETCO2 MONITOR DAILY: CPT

## 2019-04-30 PROCEDURE — 82803 BLOOD GASES ANY COMBINATION: CPT

## 2019-04-30 PROCEDURE — 85025 COMPLETE CBC W/AUTO DIFF WBC: CPT

## 2019-04-30 PROCEDURE — 85018 HEMOGLOBIN: CPT

## 2019-04-30 PROCEDURE — 6370000000 HC RX 637 (ALT 250 FOR IP): Performed by: SURGERY

## 2019-04-30 PROCEDURE — 84484 ASSAY OF TROPONIN QUANT: CPT

## 2019-04-30 PROCEDURE — 2500000003 HC RX 250 WO HCPCS: Performed by: INTERNAL MEDICINE

## 2019-04-30 RX ORDER — AMIODARONE HYDROCHLORIDE 200 MG/1
200 TABLET ORAL DAILY
Status: DISCONTINUED | OUTPATIENT
Start: 2019-04-30 | End: 2019-05-10 | Stop reason: HOSPADM

## 2019-04-30 RX ORDER — 0.9 % SODIUM CHLORIDE 0.9 %
500 INTRAVENOUS SOLUTION INTRAVENOUS ONCE
Status: DISCONTINUED | OUTPATIENT
Start: 2019-04-30 | End: 2019-05-10 | Stop reason: HOSPADM

## 2019-04-30 RX ORDER — METOPROLOL SUCCINATE 25 MG/1
25 TABLET, EXTENDED RELEASE ORAL DAILY
Status: DISCONTINUED | OUTPATIENT
Start: 2019-04-30 | End: 2019-05-10 | Stop reason: HOSPADM

## 2019-04-30 RX ADMIN — PHYTONADIONE 10 MG: 10 INJECTION, EMULSION INTRAMUSCULAR; INTRAVENOUS; SUBCUTANEOUS at 16:34

## 2019-04-30 RX ADMIN — IPRATROPIUM BROMIDE AND ALBUTEROL SULFATE 1 AMPULE: .5; 3 SOLUTION RESPIRATORY (INHALATION) at 18:03

## 2019-04-30 RX ADMIN — TAZOBACTAM SODIUM AND PIPERACILLIN SODIUM 3.38 G: 375; 3 INJECTION, SOLUTION INTRAVENOUS at 18:16

## 2019-04-30 RX ADMIN — Medication 100 MCG/HR: at 20:36

## 2019-04-30 RX ADMIN — PROPOFOL 30 MCG/KG/MIN: 10 INJECTION, EMULSION INTRAVENOUS at 10:06

## 2019-04-30 RX ADMIN — PANTOPRAZOLE SODIUM 8 MG/HR: 40 INJECTION, POWDER, FOR SOLUTION INTRAVENOUS at 06:16

## 2019-04-30 RX ADMIN — Medication 100 MCG/HR: at 10:36

## 2019-04-30 RX ADMIN — IPRATROPIUM BROMIDE AND ALBUTEROL SULFATE 1 AMPULE: .5; 3 SOLUTION RESPIRATORY (INHALATION) at 07:57

## 2019-04-30 RX ADMIN — DEXTROSE AND SODIUM CHLORIDE: 5; 450 INJECTION, SOLUTION INTRAVENOUS at 01:45

## 2019-04-30 RX ADMIN — Medication 125 MCG/HR: at 01:26

## 2019-04-30 RX ADMIN — IPRATROPIUM BROMIDE AND ALBUTEROL SULFATE 1 AMPULE: .5; 3 SOLUTION RESPIRATORY (INHALATION) at 11:11

## 2019-04-30 RX ADMIN — IPRATROPIUM BROMIDE AND ALBUTEROL SULFATE 1 AMPULE: .5; 3 SOLUTION RESPIRATORY (INHALATION) at 15:48

## 2019-04-30 RX ADMIN — VASOPRESSIN 0.04 UNITS/MIN: 20 INJECTION INTRAVENOUS at 21:23

## 2019-04-30 RX ADMIN — PROPOFOL 30 MCG/KG/MIN: 10 INJECTION, EMULSION INTRAVENOUS at 20:35

## 2019-04-30 RX ADMIN — PANTOPRAZOLE SODIUM 8 MG/HR: 40 INJECTION, POWDER, FOR SOLUTION INTRAVENOUS at 16:01

## 2019-04-30 RX ADMIN — VASOPRESSIN 0.04 UNITS/MIN: 20 INJECTION INTRAVENOUS at 13:00

## 2019-04-30 RX ADMIN — TAZOBACTAM SODIUM AND PIPERACILLIN SODIUM 3.38 G: 375; 3 INJECTION, SOLUTION INTRAVENOUS at 09:56

## 2019-04-30 RX ADMIN — Medication 10 ML: at 21:10

## 2019-04-30 RX ADMIN — NOREPINEPHRINE BITARTRATE 8 MCG/MIN: 1 INJECTION INTRAVENOUS at 06:16

## 2019-04-30 RX ADMIN — TAZOBACTAM SODIUM AND PIPERACILLIN SODIUM 3.38 G: 375; 3 INJECTION, SOLUTION INTRAVENOUS at 01:10

## 2019-04-30 RX ADMIN — PROPOFOL 30 MCG/KG/MIN: 10 INJECTION, EMULSION INTRAVENOUS at 15:05

## 2019-04-30 ASSESSMENT — PULMONARY FUNCTION TESTS
PIF_VALUE: 21
PIF_VALUE: 20
PIF_VALUE: 21
PIF_VALUE: 21
PIF_VALUE: 20
PIF_VALUE: 22

## 2019-04-30 NOTE — CARE COORDINATION
[address] Transitions Interview     2019    Patient: Fabiana Peñaloza Patient : 1955   MRN: [id number]    Reason for Admission: SBO, PE (19- exploratory lap with lysis of adhesions, small bowel resection)   RARS: Readmission Risk Score: 38  CMRS 5       Pt remains intubated on ventilator- no family present. Will return once extubated or family present       Readmission Risk  Patient Active Problem List   Diagnosis    Severe mitral regurgitation    Severe tricuspid regurgitation    Anemia due to GI blood loss    Abdominal pain    Piles (hemorrhoids)    Chest pain    HTN (hypertension)    HLD (hyperlipidemia)    Anemia    Transfusion of blood during current hospitalization    Dyspnea    Small bowel cancer ([address]    Neuropathic Pain Left leg    Essential hypertension    Obesity (BMI 30-39. 9)    Peripheral neuropathic pain    Atrial fibrillation with rapid ventricular response (HCC)    S/P cardiac cath - 10/3/16 - Dr. Cheryl Chapman for cardioversion procedure 10/3/16-Dr. Sean Wilder    Pulmonary embolism without acute cor pulmonale (HCC)    Major depressive disorder with current active episode    GUME (obstructive sleep apnea)    Chronic atrial fibrillation ([address]    AICD discharge    History of pulmonary embolism    Essential hypertension    Neuropathy    Chronic combined systolic and diastolic congestive heart failure (HCC)    Major depressive disorder, recurrent, in full remission ([address]    Acute systolic CHF (congestive heart failure) ([address]    Acute on chronic congestive heart failure ([address]    Malignant neoplasm of lesser curvature of stomach ([address]    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)       Inpatient Assessment  Care Transitions Summary    Care Transitions Inpatient Review  Medication Review  Are you able to afford your medications?:  No  How often do you have difficulty taking your medications?:  Sometimes I take them as prescribed. Housing Review  Who do you live with?:  Alone  Are you an active caregiver in your home?:  No  Does the person that you care for see a [facility] PCP?:  Yes  Social Support  Durable Medical Equipment  Patient DME:  Kristal Sandoval  Functional Review  Ability to seek help/take action for Emergent/Urgent situations i.e. fire, crime, inclement weather or health crisis. :  Independent  Ability handle personal hygiene needs (bathing/dressing/grooming): Independent  Ability to manage medications: Independent  Ability to prepare food:  Independent  Ability to maintain home (clean home, laundry): Independent  Ability to drive and/or has transportation:  Independent  Ability to do shopping:  Independent  Ability to manage finances: Independent  Is patient able to live independently?:  Yes  Hearing and Vision  Visual Impairment:  None  Hearing Impairment:  None  Care Transitions Interventions         Follow Up  No future appointments. Health Maintenance  There are no preventive care reminders to display for this patient.     Hoang Romero RN

## 2019-04-30 NOTE — PROGRESS NOTES
[facility]    Progress Note    4/30/2019    12:18 PM    Name:   Carlos Alberto Locke  MRN:     [id number]     [place]:      [de-identified]   Room:   [address] [place] [address] Day:  5  Admit Date:  4/25/2019  4:57 PM    PCP:   Razia Broussard MD  Code Status:  Full Code    Subjective:     C/C:   Chief Complaint   Patient presents with    Abdominal Pain    Dizziness     onset today     Interval History Status: not changed  Discussed with Stefanie Salinas RN at bedside  Continued to have small amounts of dark drainage in NG tube, no bowel movements reported  Very restless yesterday needing addition of propofol   Also tolerating vent placement  No fevers, chills reported overnight    Brief History:     The patient is a 61 y.o. Non-/non  male who presents with Abdominal Pain and Dizziness (onset today)   and he is admitted to the hospital for the management of  SBO.      He has the following significant co-morbidities: HTN, Chronic AFib, Chronic combined systolic/diastolic heart failure, S/P AICD Placement, Depression, GUME, Adenocarcinoma of small bowel, Colon cancer.      He presented with the following:      He presented to the ED with abdominal pain and shortness of breath. The pain was sudden in onset, localized to the epigastric region. HE denied any constipation and was passing gas and having normal BMs. He had associated shortness of breath.      In the ED, imaging was concerning for SBO vs ileus. NG tube was placed.     CT Chest showed small subsegmental PE.      Patient was admitted for further care. Review of Systems:     Sedated and ventilator placement  Un able to obtain from patient    Medications: Allergies:     Allergies   Allergen Reactions    Morphine      itching     Current Meds:   Scheduled Meds:    metoprolol succinate  25 mg Oral Daily    amiodarone  200 mg Oral Daily    piperacillin-tazobactam  3.375 g Intravenous Q8H    sodium chloride flush  10 mL Intravenous 2 times per day    famotidine  20 mg Oral BID    ipratropium-albuterol  1 ampule Inhalation Q4H While awake    aspirin  81 mg Oral Daily    atorvastatin  40 mg Oral Nightly    digoxin  250 mcg Oral Daily    DULoxetine  20 mg Oral Daily    ferrous sulfate  325 mg Oral Daily with breakfast    furosemide  40 mg Oral Daily    pantoprazole  40 mg Oral QAM AC    pyridoxine  25 mg Oral Daily    QUEtiapine  25 mg Oral BID    senna  1 tablet Oral Daily    spironolactone  25 mg Oral Daily     Continuous Infusions:    fentaNYL 100 mcg/hr (04/30/19 1036)    propofol 30 mcg/kg/min (04/30/19 1006)    dextrose 5 % and 0.45 % NaCl 75 mL/hr at 04/30/19 0145    pantoprozole (PROTONIX) infusion 8 mg/hr (04/30/19 0616)    norepinephrine 8 mcg/min (04/30/19 1038)    midazolam (VERSED) 100 mg in dextrose 5% 100 mL infusion Stopped (04/30/19 1039)     PRN Meds: sodium chloride flush, oxyCODONE-acetaminophen **OR** oxyCODONE-acetaminophen, albuterol, HYDROmorphone **OR** HYDROmorphone, ondansetron **OR** ondansetron, aluminum & magnesium hydroxide-simethicone, dibucaine, nitroGLYCERIN, potassium chloride **OR** potassium alternative oral replacement **OR** potassium chloride, magnesium sulfate, magnesium hydroxide, nicotine, acetaminophen, phenol    Data:     Past Medical History:   has a past medical history of Asthma, Cancer ([facility] CHF (congestive heart failure) ([facility] COPD exacerbation ([facility] GERD (gastroesophageal reflux disease), History of blood transfusion, Hyperlipidemia, Hypertension, Neuropathy, Obesity (BMI 30-39.9), Psychiatric problem, SBO (small bowel obstruction) ([facility] Severe mitral regurgitation, and Severe tricuspid regurgitation. Social History:   reports that he has never smoked. He has never used smokeless tobacco. He reports that he does not drink alcohol or use drugs.      Family History:   Family History   Problem Relation Age of Onset    Heart Disease Father Vitals:  /65   Pulse 76   Temp 98.6 °F (37 °C) (Axillary)   Resp 20   Ht 5' 9\" (1.753 m)   Wt 237 lb 14.4 oz (107.9 kg)   SpO2 100%   BMI 35.13 kg/m²   Temp (24hrs), Av °F (37.2 °C), Min:98.6 °F (37 °C), Max:100.4 °F (38 °C)    Recent Labs     19  1416 19  1745 19  1813   POCGLU 122* 78 109*     I/O (24Hr): Intake/Output Summary (Last 24 hours) at 2019 1218  Last data filed at 2019 0534  Gross per 24 hour   Intake 3461.99 ml   Output 2230 ml   Net 1231.99 ml     Labs:    Hematology:  Recent Labs     19  0524  19  1859  19  0525 19  1200 19  1830 19  0452   WBC 20.9*  --  8.2  --  9.4  --   --  12.3*   RBC 5.26  --  2.93*  --  3.27*  --   --  3.55*   HGB 16.9   < > 9.4*   < > 10.6* 12.3* 11.9* 11.2*   HCT 53.9*   < > 30.0*   < > 32.0* 37.2* 37.5* 35.3*   .5  --  102.4  --  98.1  --   --  99.4   MCH 32.1  --  32.1  --  32.5  --   --  31.5   MCHC 31.4  --  31.3  --  33.1  --   --  31.7   RDW 16.0*  --  16.3*  --  18.6*  --   --  17.2*     --  76*  --  83*  --   --  108*   MPV 11.3  --  12.6  --  11.5  --   --  12.7   INR 1.2  --  1.6  --   --   --   --   --     < > = values in this interval not displayed.      Chemistry:  Recent Labs     19  2140 19  0525 19  1330 19  2240 19  0452    142  --   --  138   K 3.9 4.0  --   --  4.6   * 116*  --   --  107   CO2 16* 16*  --   --  20   GLUCOSE 116* 95  --   --  126*   BUN 26* 25*  --   --  26*   CREATININE 0.98 0.83  --   --  1.07   MG 1.1* 2.0  --   --  2.3   ANIONGAP 11 10  --   --  11   LABGLOM >60 >60  --   --  >60   GFRAA >60 >60  --   --  >60   CALCIUM 5.5* 6.6*  --   --  7.9*   TROPHS 23* 17 16 14 13   DIGOXIN  --  0.3*  --   --   --      Recent Labs     19  1234 19  1416 19  1745 19  1813 19  0525   PROT 7.7  --   --   --  3.9*   LABALBU 3.6  --   --   --  2.1*   AST 89*  --   --   --  419*   ALT 139*  --   --   --  485*   ALKPHOS 69  --   --   --  30*   BILITOT 0.73  --   --   --  1.39*   BILIDIR 0.21  --   --   --   --    LIPASE 14  --   --   --   --    POCGLU  --  122* 78 109*  --      Lab Results   Component Value Date/Time    SPECIAL RIGHT AC 7ML AEROBIC 1ML ANAEROBIC 04/28/2019 12:34 PM     Lab Results   Component Value Date/Time    CULTURE NO GROWTH 2 DAYS 04/28/2019 12:34 PM     Lab Results   Component Value Date    POCPH 7.36 04/30/2019    PHART 7.44 08/30/2012    PH 7.48 11/08/2012    POCPCO2 37 04/30/2019    IYT4AZD 60 08/30/2012    PCO2 45 11/08/2012    POCPO2 173 04/30/2019    PO2ART 73 08/30/2012    PO2 145 11/08/2012    POCHCO3 20.5 04/30/2019    PGH9CUC 24.1 11/01/2012    HCO3 33.6 11/08/2012    NBEA 5 04/30/2019    PBEA NOT REPORTED 04/30/2019    STZ9TLB 22 04/30/2019    JFYO2NSO 100 04/30/2019    J0JGMTPL 100 11/02/2012    O2SAT 99 11/02/2012    FIO2 45.0 04/30/2019     Radiology:  Xr Abdomen (kub) (single Ap View)    Result Date: 4/25/2019  EXAMINATION: SINGLE SUPINE XRAY VIEW(S) OF THE ABDOMEN 4/25/2019 8:44 pm COMPARISON: None. HISTORY: ORDERING SYSTEM PROVIDED HISTORY: NGT placement confirmation TECHNOLOGIST PROVIDED HISTORY: NGT placement confirmation Ordering Physician Provided Reason for Exam: NGT placement Acuity: Acute Type of Exam: Initial FINDINGS: 2 images of the abdomen were provided. Enteric tube is noted projecting on the left hemidiaphragm. This is likely in the stomach. Moderate multifocal bowel distention is noted. Ill-defined increased density in the left lung base is noted     Enteric tube as described     Ct Abdomen Pelvis W Iv Contrast Additional Contrast? None    Result Date: 4/25/2019  EXAMINATION: CT OF THE ABDOMEN AND PELVIS WITH CONTRAST 4/25/2019 6:10 pm TECHNIQUE: CT of the abdomen and pelvis was performed with the administration of intravenous contrast. Multiplanar reformatted images are provided for review.  Dose modulation, iterative reconstruction, and/or weight based adjustment of the mA/kV was utilized to reduce the radiation dose to as low as reasonably achievable. COMPARISON: CT abdomen and pelvis performed 02/23/2017. HISTORY: ORDERING SYSTEM PROVIDED HISTORY: abdominal pain TECHNOLOGIST PROVIDED HISTORY: FINDINGS: Lower Chest: Infiltrates are seen in the lung bases bilaterally that is worse on the left compared to the right. The heart is enlarged. Organs: The liver and spleen are normal size and overall attenuation. There are hepatic granulomas. The gallbladder, pancreas, and adrenal glands are unremarkable. There is no obstructive uropathy. There are bilateral nonobstructing renal stones. The largest stone is seen on the right measuring approximately 5 mm in diameter. There are bilateral renal cysts. The ureters are not dilated. The urinary bladder is unremarkable. GI/Bowel: The stomach is mildly distended and fluid-filled. Loops of small bowel are mildly distended and fluid-filled in the proximal and mid aspect. The distal and terminal ileum is decompressed. The colon is decompressed containing residual air and fecal contents. There is no intraperitoneal free air or free fluid. Pelvis: The prostate gland and seminal vesicles are unremarkable. Phleboliths are seen in the pelvis. Peritoneum/Retroperitoneum: The psoas muscles are symmetric. The abdominal aorta is normal in caliber. The inferior vena cava is unremarkable. There is no retroperitoneal or mesenteric adenopathy. Bones/Soft Tissues: The extra-abdominal soft tissues are unremarkable. There is no acute osseous abnormality. Mildly prominent fluid-filled loops of small bowel proximally and within the mid aspect with decompressed distal and terminal ileal loops. This may relate to a degree of obstruction versus an ileus and correlation is needed. Infiltrates in the lung bases bilaterally that may represent atelectasis versus pneumonia. Nonobstructing renal stones bilaterally.      Linda Segal Chest Portable    Result Date: 4/27/2019  EXAMINATION: SINGLE XRAY VIEW OF THE CHEST 4/27/2019 6:22 am COMPARISON: 04/25/2019. HISTORY: ORDERING SYSTEM PROVIDED HISTORY: pleural effusion, infiltrates TECHNOLOGIST PROVIDED HISTORY: pleural effusion, infiltrates Ordering Physician Provided Reason for Exam: plural effusion infiltrate Acuity: Unknown Type of Exam: Initial FINDINGS: A left cardiac device is unchanged in position. An enteric tube is seen coursing below the diaphragm. The cardiac silhouette is persistently enlarged. There is prominence of the central pulmonary vasculature. No convincing focal consolidation, pleural effusion or pneumothorax. 1. Persistent enlargement of the cardiac silhouette with prominence of the central pulmonary vasculature. 2. No convincing pleural effusion or pneumothorax. Xr Chest Portable    Result Date: 4/25/2019  EXAMINATION: SINGLE XRAY VIEW OF THE CHEST 4/25/2019 5:56 pm COMPARISON: Chest radiograph performed 11/03/2018. HISTORY: ORDERING SYSTEM PROVIDED HISTORY: shortness of breath TECHNOLOGIST PROVIDED HISTORY: shortness of breath Ordering Physician Provided Reason for Exam: chest pain Acuity: Acute Type of Exam: Initial Additional signs and symptoms: SOB Relevant Medical/Surgical History: CHF, COPD FINDINGS: There is chronic pulmonary change. There may be small bibasilar effusions with adjacent atelectasis. There is no pneumothorax. The heart is enlarged with stable cardiac lead. The upper abdomen is unremarkable. The extrathoracic soft tissues are unremarkable. Cardiomegaly with small bibasilar effusions and adjacent atelectasis. Ct Chest Pulmonary Embolism W Contrast    Result Date: 4/25/2019  EXAMINATION: CTA OF THE CHEST 4/25/2019 6:10 pm TECHNIQUE: CTA of the chest was performed after the administration of intravenous contrast.  Multiplanar reformatted images are provided for review. MIP images are provided for review.  Dose modulation, iterative reconstruction, and/or weight based adjustment of the mA/kV was utilized to reduce the radiation dose to as low as reasonably achievable. COMPARISON: None. HISTORY: ORDERING SYSTEM PROVIDED HISTORY: sob, history of CA FINDINGS: Pulmonary Arteries: Pulmonary arteries are adequately opacified for evaluation. There is an intraluminal filling defect within a subsegmental branch supplying the left lower lobe medially. The remainder of the arterial branches appear patent. Main pulmonary artery is normal in caliber. Mediastinum: No evidence of mediastinal lymphadenopathy. The heart and pericardium demonstrate no acute abnormality. The heart is enlarged. There is no acute abnormality of the thoracic aorta. Lungs/pleura: There is linear atelectasis versus scarring in the right lung posteriorly. There is dependent atelectasis in the left lung. There is a small right basilar effusion. There is no pneumothorax. The tracheobronchial tree is patent. Upper Abdomen: Limited images of the upper abdomen are unremarkable. Soft Tissues/Bones: No acute bone or soft tissue abnormality. Mild cardiomegaly with a small right basilar effusion. Filling defect involving a subsegmental branch supplying the left lower lobe medially consistent with an embolism. Critical results were called by Dr. Nida Burciaga to [facility] on [id number] at 18:48.      Physical Examination:        /65   Pulse 76   Temp 98.6 °F (37 °C) (Axillary)   Resp 20   Ht 5' 9\" (1.753 m)   Wt 237 lb 14.4 oz (107.9 kg)   SpO2 100%   BMI 35.13 kg/m²   Temp (24hrs), Av °F (37.2 °C), Min:98.6 °F (37 °C), Max:100.4 °F (38 °C)    Recent Labs     19  1416 19  1745 19  1813   POCGLU 122* 78 109*       Intake/Output Summary (Last 24 hours) at 2019 1218  Last data filed at 2019 0534  Gross per 24 hour   Intake 3461.99 ml   Output 2230 ml   Net 1231.99 ml     General Appearance: sedated, on ventilator   Mental status: un MEDICINE  IP CONSULT TO GENERAL SURGERY  IP CONSULT TO PULMONOLOGY  IP CONSULT TO PULMONOLOGY  IP CONSULT TO GI  IP CONSULT TO VASCULAR SURGERY  IP CONSULT TO CARDIOLOGY  IP CONSULT TO GI      Ivon Wesley MD  4/30/2019  12:18 PM

## 2019-04-30 NOTE — PROGRESS NOTES
[facility]                 Progress Note      Date:  4/30/2019  Patient: Lj Dhillon  Admission:  4/25/2019  4:57 PM  Admit DX: SBO (small bowel obstruction) ([facility] [M35.516]  Age:  61 y.o., 1955     LOS: 5 days           SUBJECTIVE:     The patient was seen and examined. Notes and labs reviewed. Patient's cardiac review of systems: negative. OBJECTIVE:    Telemetry: Sinus  BP (!) 110/59   Pulse 74   Temp 98.9 °F (37.2 °C) (Axillary)   Resp 20   Ht 5' 9\" (1.753 m)   Wt 237 lb 14.4 oz (107.9 kg)   SpO2 99%   BMI 35.13 kg/m²     EXAM:  CONSTITUTIONAL:  intubated  HEENT: Normal jugular venous pulsations, no carotid bruits. Head is atraumatic, normocephalic. Eyes and oral mucosa are normal.  LUNGS: auscultation: clear to auscultation bilaterally and normal percussion bilaterally  CARDIOVASCULAR:  Normal apical impulse, regular rate and rhythm,   ABDOMEN: Soft, nontender, nondistended. Bowel sounds present. No masses or tenderness. SKIN: Warm and dry. EXTREMITIES: No lower extremity edema. Motor movement grossly intact. No cyanosis or clubbing.     Current Inpatient Medications:   amiodarone  200 mg Oral BID    piperacillin-tazobactam  3.375 g Intravenous Q8H    sodium chloride flush  10 mL Intravenous 2 times per day    famotidine  20 mg Oral BID    ipratropium-albuterol  1 ampule Inhalation Q4H While awake    aspirin  81 mg Oral Daily    atorvastatin  40 mg Oral Nightly    digoxin  250 mcg Oral Daily    DULoxetine  20 mg Oral Daily    ferrous sulfate  325 mg Oral Daily with breakfast    furosemide  40 mg Oral Daily    metoprolol succinate  100 mg Oral Daily    pantoprazole  40 mg Oral QAM AC    pyridoxine  25 mg Oral Daily    QUEtiapine  25 mg Oral BID    senna  1 tablet Oral Daily    spironolactone  25 mg Oral Daily       IV Infusions (if any):   fentaNYL 100 mcg/hr (04/30/19 1240)    propofol 30 mcg/kg/min (04/29/19 7197)    dextrose 5 % and 0.45 % NaCl 75 mL/hr at 04/30/19 0145    pantoprozole (PROTONIX) infusion 8 mg/hr (04/30/19 0616)    norepinephrine 8 mcg/min (04/30/19 0616)    midazolam (VERSED) 100 mg in dextrose 5% 100 mL infusion 3 mg/hr (04/30/19 0617)       Diagnostics:       Labs:   CBC:   Recent Labs     04/29/19  0525  04/29/19  1830 04/30/19  0452   WBC 9.4  --   --  12.3*   HGB 10.6*   < > 11.9* 11.2*   HCT 32.0*   < > 37.5* 35.3*   PLT 83*  --   --  108*    < > = values in this interval not displayed. BMP:   Recent Labs     04/29/19  0525 04/30/19  0452    138   K 4.0 4.6   CO2 16* 20   BUN 25* 26*   CREATININE 0.83 1.07   LABGLOM >60 >60   GLUCOSE 95 126*     BNP: No results for input(s): BNP in the last 72 hours. PT/INR:   Recent Labs     04/28/19  0524 04/28/19  1859   PROTIME 11.8* 16.5*   INR 1.2 1.6     APTT:  Recent Labs     04/28/19  0524 04/28/19  1234   APTT 113.9* 31.5*     CARDIAC ENZYMES:No results for input(s): CKTOTAL, CKMB, CKMBINDEX, TROPONINI in the last 72 hours. FASTING LIPID PANEL:  Lab Results   Component Value Date    HDL 41 09/28/2016    TRIG 72 09/28/2016     LIVER PROFILE:  Recent Labs     04/28/19  1234 04/29/19  0525   AST 89* 419*   * 485*   LABALBU 3.6 2.1*     DIG 0.3    ASSESSMENT:  SBO S/P Laparotomy  RESPIRATORY FAILURE  ? SEPSIS  DILATED CMP, CHRONIC HFrEF  MODERATE MS/MR/TR (TTE 11/2018)  ICD IN SITU  PAF, IN NSR ON AMIODARONE  Patient Active Problem List   Diagnosis    Severe mitral regurgitation    Severe tricuspid regurgitation    Anemia due to GI blood loss    Abdominal pain    Piles (hemorrhoids)    Chest pain    HTN (hypertension)    HLD (hyperlipidemia)    Anemia    Transfusion of blood during current hospitalization    Dyspnea    Small bowel cancer ([address]    Neuropathic Pain Left leg    Essential hypertension    Obesity (BMI 30-39. 9)    Peripheral neuropathic pain    Atrial fibrillation with rapid ventricular response (HCC)    S/P cardiac cath - 10/3/16 -

## 2019-04-30 NOTE — PROGRESS NOTES
Physical Therapy  DATE: 2019    NAME: Bakari Bruce  MRN: [id number]   : 1955    Patient not seen this date for Physical Therapy due to:  [] Blood transfusion in progress  [] Cancel by RN  [] Hemodialysis  []  Refusal by Patient   [] Spine Precautions   [] Strict Bedrest  [] Surgery  [] Testing      [x] Other (Pt. intubated and sedated)        [] PT being discontinued at this time. Patient independent. No further needs. [] PT being discontinued at this time as the patient has been transferred to hospice care. No further needs.     Mary Samuels, PT  12:27

## 2019-04-30 NOTE — PROGRESS NOTES
Nutrition Assessment    Type and Reason for Visit: Initial, RD Nutrition Re-Screen(NPO / CL > 4 Days)    Nutrition Recommendations: 1. Suggest continuing NPO Effective Now status. 2. Consider starting continuous Tube Feeding with:  Osmolite 1.2 Jaime (standard without fiber) @ 10 ml/hr, increase as tolerated by 10 ml/hr every 6 hrs to 50 ml/hr (goal). Nutrition Assessment: Pt is @ risk for malnutrition, with dx of post-op respiratory insufficiency, on Vent. Suggest consider starting TF with Osmolite 1.2 Jaime (standard without fiber formula) @ 10 ml/hr, increase as tolerated to 50 ml/hr (goal). Malnutrition Assessment:  · Malnutrition Status: At risk for malnutrition  · Findings of the 6 clinical characteristics of malnutrition (Minimum of 2 out of 6 clinical characteristics is required to make the diagnosis of moderate or severe Protein Calorie Malnutrition based on AND/ASPEN Guidelines):  1. Energy Intake-Less than or equal to 50% of estimated energy requirement, Greater than or equal to 5 days    2. Weight Loss-1-2% loss(1.6%), (in 1.5 months)  3. Fat Loss-Unable to assess  4. Muscle Loss-Unable to assess  5. Fluid Accumulation-Mild fluid accumulation, Extremities(Scleral (R) & (L))  6.   Strength-Not measured    Nutrition Risk Level: High    Nutrient Needs:  · Estimated Daily Total Kcal: 2,150-2,300 kcal (post-op) (20-22 kcal/kg)  · Estimated Daily Protein (g):  g (post-op) (1.2-1.35 g/kg)  · Estimated Daily Total Fluid (ml/day): 2,100-2,200 ml (30-32 ml/kg)    Nutrition Diagnosis:   · Problem: Inadequate oral intake  · Etiology: related to Increased demand for energy/nutrients     Signs and symptoms:  as evidenced by NPO status due to medical condition, Presence of wounds, Weight loss, Localized or generalized fluid accumulation(+1.7 kg--1.6% since 3/19/19)    Objective Information:  · Nutrition-Focused Physical Findings: GI:  rounded, distended, tenderness, last BM (4/27), hypoactive/absent BS; PV:  BLE, non-pitting, +1, mild scleral, (L) moderate scleral (R);  Skin:  incision 4/28 abd (JONO, small drain)  · Wound Type: Surgical Wound  · Current Nutrition Therapies:  · Oral Diet Orders: NPO   · Anthropometric Measures:  · Ht: 5' 9\" (175.3 cm)   · Current Body Wt: 237 lb 14 oz (107.9 kg)  · Admission Body Wt: 238 lb 5.1 oz (108.1 kg)  · Usual Body Wt: 242 lb 1 oz (109.8 kg)(3/19/19)  · % Weight Change: 1.6%, x 1.5 months  · Ideal Body Wt: 160 lb 15 oz (73 kg), % Ideal Body 150%  · BMI Classification: BMI 35.0 - 39.9 Obese Class II    Nutrition Interventions:   Continue NPO, Start Tube Feeding  Continued Inpatient Monitoring, Discharge Planning    Nutrition Evaluation:   · Evaluation: Goals set   · Goals: EN inake to meet >75% of estimated kcal/protein needs, with good GI tolerance     · Monitoring: TF Intake, TF Tolerance, Skin Integrity, Wound Healing, I&O, Mental Status/Confusion, Weight, Pertinent Labs, Constipation, Monitor Bowel Function      Electronically signed by Seble Rollins, GERALD, SHONDA on 4/30/19 at 5:30 PM    Contact Number: [phone]

## 2019-04-30 NOTE — PROGRESS NOTES
Surgery Progress Note             POD # 2    PATIENT NAME: Kg Enciso     TODAY'S DATE: 4/30/2019, 2:02 PM    SUBJECTIVE:    Pt is stable he's still on a respirator NG drainage is clear     OBJECTIVE:   VITALS:  /75   Pulse 73   Temp 99.4 °F (37.4 °C) (Axillary)   Resp 20   Ht 5' 9\" (1.753 m)   Wt 237 lb 14.4 oz (107.9 kg)   SpO2 100%   BMI 35.13 kg/m²     INTAKE/OUTPUT:      Intake/Output Summary (Last 24 hours) at 4/30/2019 1402  Last data filed at 4/30/2019 0534  Gross per 24 hour   Intake 3461.99 ml   Output 2230 ml   Net 1231.99 ml                 CONSTITUTIONAL:  fatigued and unarousable. no apparent distress, No acute distress  CARDIOVASCULAR:  tachycardic with regular rhythm and no murmur noted, No Murmur  LUNGS:  clear to auscultation, CTA Bilaterally  ABDOMEN:   Abdomen soft, non-tender. BS normal. No masses,  No organomegaly, Abdomen soft, non-tender, non-distended  INCISION: dry, Incision clean/dry/intact    Data:  CBC:   Lab Results   Component Value Date    WBC 12.3 04/30/2019    RBC 3.55 04/30/2019    HGB 9.2 04/30/2019    HCT 29.4 04/30/2019    MCV 99.4 04/30/2019    MCH 31.5 04/30/2019    MCHC 31.7 04/30/2019    RDW 17.2 04/30/2019     04/30/2019    MPV 12.7 04/30/2019       Radiology Review:        Pathology  Noted and discussed with the family    ASSESSMENT   61 y.o. male   C/Dianne [address] Problems    Diagnosis Date Noted    SBO (small bowel obstruction) ([facility] [K56.609] 04/25/2019    Major depressive disorder, recurrent, in full remission ([facility] [F33.42] 08/30/2018    Chronic combined systolic and diastolic congestive heart failure ([facility] [I50.42] 06/22/2017    Essential hypertension [I10] 05/05/2017    Chronic atrial fibrillation (HCC) [I48.2]     GUME (obstructive sleep apnea) [G47.33]     Pulmonary embolism without acute cor pulmonale ([facility] [I26.99] 02/23/2017    Obesity (BMI 30-39. 9) [E66.9]     Small bowel cancer (HCC) [C17.9]          Plan  Vit  K 10 mg IV today  1. Preferably start him on TPN today  2.  Repeat his PT and PTT tomorrow morning      Electronically signed by Fabiano Jernigan MD  on 4/30/2019 at 2:02 PM

## 2019-05-01 ENCOUNTER — APPOINTMENT (OUTPATIENT)
Dept: GENERAL RADIOLOGY | Age: 64
DRG: 329 | End: 2019-05-01
Payer: MEDICARE

## 2019-05-01 LAB
ABSOLUTE EOS #: 0 K/UL (ref 0–0.4)
ABSOLUTE IMMATURE GRANULOCYTE: ABNORMAL K/UL (ref 0–0.3)
ABSOLUTE LYMPH #: 0.54 K/UL (ref 1–4.8)
ABSOLUTE MONO #: 1.17 K/UL (ref 0.2–0.8)
ANION GAP SERPL CALCULATED.3IONS-SCNC: 11 MMOL/L (ref 9–17)
BASOPHILS # BLD: 0 % (ref 0–2)
BASOPHILS ABSOLUTE: 0 K/UL (ref 0–0.2)
BUN BLDV-MCNC: 30 MG/DL (ref 8–23)
BUN/CREAT BLD: 26 (ref 9–20)
CALCIUM SERPL-MCNC: 7.7 MG/DL (ref 8.6–10.4)
CHLORIDE BLD-SCNC: 107 MMOL/L (ref 98–107)
CO2: 21 MMOL/L (ref 20–31)
CREAT SERPL-MCNC: 1.16 MG/DL (ref 0.7–1.2)
DIFFERENTIAL TYPE: ABNORMAL
EOSINOPHILS RELATIVE PERCENT: 0 % (ref 1–4)
FIO2: 35
GFR AFRICAN AMERICAN: >60 ML/MIN
GFR NON-AFRICAN AMERICAN: >60 ML/MIN
GFR SERPL CREATININE-BSD FRML MDRD: ABNORMAL ML/MIN/{1.73_M2}
GFR SERPL CREATININE-BSD FRML MDRD: ABNORMAL ML/MIN/{1.73_M2}
GLUCOSE BLD-MCNC: 144 MG/DL (ref 70–99)
GLUCOSE BLD-MCNC: 87 MG/DL (ref 75–110)
HCT VFR BLD CALC: 29.2 % (ref 41–53)
HCT VFR BLD CALC: 30.3 % (ref 40.7–50.3)
HCT VFR BLD CALC: 31.1 % (ref 40.7–50.3)
HCT VFR BLD CALC: 32.3 % (ref 40.7–50.3)
HEMOGLOBIN: 10.1 G/DL (ref 13–17)
HEMOGLOBIN: 9.5 G/DL (ref 13–17)
HEMOGLOBIN: 9.7 G/DL (ref 13.5–17.5)
HEMOGLOBIN: 9.9 G/DL (ref 13–17)
IMMATURE GRANULOCYTES: ABNORMAL %
LV EF: 60 %
LVEF MODALITY: NORMAL
LYMPHOCYTES # BLD: 6 % (ref 24–44)
MAGNESIUM: 2.2 MG/DL (ref 1.6–2.6)
MCH RBC QN AUTO: 32.5 PG (ref 26–34)
MCHC RBC AUTO-ENTMCNC: 33.2 G/DL (ref 31–37)
MCV RBC AUTO: 97.8 FL (ref 80–100)
MONOCYTES # BLD: 13 % (ref 1–7)
NEGATIVE BASE EXCESS, ART: 3 (ref 0–2)
NRBC AUTOMATED: ABNORMAL PER 100 WBC
O2 DEVICE/FLOW/%: ABNORMAL
PATIENT TEMP: 37
PDW BLD-RTO: 18.8 % (ref 11.5–14.5)
PLATELET # BLD: 84 K/UL (ref 130–400)
PLATELET ESTIMATE: ABNORMAL
PMV BLD AUTO: 10.6 FL (ref 6–12)
POC HCO3: 22.2 MMOL/L (ref 22–27)
POC O2 SATURATION: 99 %
POC PCO2 TEMP: ABNORMAL MM HG
POC PCO2: 39 MM HG (ref 32–45)
POC PH TEMP: ABNORMAL
POC PH: 7.36 (ref 7.35–7.45)
POC PO2 TEMP: ABNORMAL MM HG
POC PO2: 141 MM HG (ref 75–95)
POSITIVE BASE EXCESS, ART: ABNORMAL (ref 0–2)
POTASSIUM SERPL-SCNC: 4.4 MMOL/L (ref 3.7–5.3)
RBC # BLD: 2.99 M/UL (ref 4.5–5.9)
RBC # BLD: ABNORMAL 10*6/UL
SEG NEUTROPHILS: 81 % (ref 36–66)
SEGMENTED NEUTROPHILS ABSOLUTE COUNT: 7.29 K/UL (ref 1.8–7.7)
SODIUM BLD-SCNC: 139 MMOL/L (ref 135–144)
TCO2 (CALC), ART: 23 MMOL/L (ref 23–28)
TROPONIN INTERP: NORMAL
TROPONIN INTERP: NORMAL
TROPONIN T: NORMAL NG/ML
TROPONIN T: NORMAL NG/ML
TROPONIN, HIGH SENSITIVITY: 12 NG/L (ref 0–22)
TROPONIN, HIGH SENSITIVITY: 12 NG/L (ref 0–22)
WBC # BLD: 9 K/UL (ref 3.5–11)
WBC # BLD: ABNORMAL 10*3/UL

## 2019-05-01 PROCEDURE — 80048 BASIC METABOLIC PNL TOTAL CA: CPT

## 2019-05-01 PROCEDURE — 6360000002 HC RX W HCPCS: Performed by: SURGERY

## 2019-05-01 PROCEDURE — C9113 INJ PANTOPRAZOLE SODIUM, VIA: HCPCS | Performed by: SURGERY

## 2019-05-01 PROCEDURE — 85018 HEMOGLOBIN: CPT

## 2019-05-01 PROCEDURE — 82947 ASSAY GLUCOSE BLOOD QUANT: CPT

## 2019-05-01 PROCEDURE — 6360000002 HC RX W HCPCS: Performed by: INTERNAL MEDICINE

## 2019-05-01 PROCEDURE — 82803 BLOOD GASES ANY COMBINATION: CPT

## 2019-05-01 PROCEDURE — 85014 HEMATOCRIT: CPT

## 2019-05-01 PROCEDURE — 84484 ASSAY OF TROPONIN QUANT: CPT

## 2019-05-01 PROCEDURE — 94761 N-INVAS EAR/PLS OXIMETRY MLT: CPT

## 2019-05-01 PROCEDURE — 85025 COMPLETE CBC W/AUTO DIFF WBC: CPT

## 2019-05-01 PROCEDURE — 36415 COLL VENOUS BLD VENIPUNCTURE: CPT

## 2019-05-01 PROCEDURE — 71045 X-RAY EXAM CHEST 1 VIEW: CPT

## 2019-05-01 PROCEDURE — 93306 TTE W/DOPPLER COMPLETE: CPT

## 2019-05-01 PROCEDURE — 94003 VENT MGMT INPAT SUBQ DAY: CPT

## 2019-05-01 PROCEDURE — 2580000003 HC RX 258: Performed by: INTERNAL MEDICINE

## 2019-05-01 PROCEDURE — 94770 HC ETCO2 MONITOR DAILY: CPT

## 2019-05-01 PROCEDURE — 2500000003 HC RX 250 WO HCPCS: Performed by: INTERNAL MEDICINE

## 2019-05-01 PROCEDURE — 2580000003 HC RX 258: Performed by: SURGERY

## 2019-05-01 PROCEDURE — 94640 AIRWAY INHALATION TREATMENT: CPT

## 2019-05-01 PROCEDURE — 2500000003 HC RX 250 WO HCPCS: Performed by: SURGERY

## 2019-05-01 PROCEDURE — 99232 SBSQ HOSP IP/OBS MODERATE 35: CPT | Performed by: INTERNAL MEDICINE

## 2019-05-01 PROCEDURE — 6370000000 HC RX 637 (ALT 250 FOR IP): Performed by: SURGERY

## 2019-05-01 PROCEDURE — 2000000000 HC ICU R&B

## 2019-05-01 PROCEDURE — 83735 ASSAY OF MAGNESIUM: CPT

## 2019-05-01 PROCEDURE — 37799 UNLISTED PX VASCULAR SURGERY: CPT

## 2019-05-01 RX ORDER — DEXTROSE MONOHYDRATE 25 G/50ML
12.5 INJECTION, SOLUTION INTRAVENOUS PRN
Status: DISCONTINUED | OUTPATIENT
Start: 2019-05-01 | End: 2019-05-10 | Stop reason: HOSPADM

## 2019-05-01 RX ORDER — NICOTINE POLACRILEX 4 MG
15 LOZENGE BUCCAL PRN
Status: DISCONTINUED | OUTPATIENT
Start: 2019-05-01 | End: 2019-05-10 | Stop reason: HOSPADM

## 2019-05-01 RX ORDER — DEXTROSE MONOHYDRATE 50 MG/ML
100 INJECTION, SOLUTION INTRAVENOUS PRN
Status: DISCONTINUED | OUTPATIENT
Start: 2019-05-01 | End: 2019-05-10 | Stop reason: HOSPADM

## 2019-05-01 RX ADMIN — PROPOFOL 20 MCG/KG/MIN: 10 INJECTION, EMULSION INTRAVENOUS at 06:28

## 2019-05-01 RX ADMIN — Medication 100 MCG/HR: at 06:25

## 2019-05-01 RX ADMIN — TAZOBACTAM SODIUM AND PIPERACILLIN SODIUM 3.38 G: 375; 3 INJECTION, SOLUTION INTRAVENOUS at 18:43

## 2019-05-01 RX ADMIN — IPRATROPIUM BROMIDE AND ALBUTEROL SULFATE 1 AMPULE: .5; 3 SOLUTION RESPIRATORY (INHALATION) at 07:25

## 2019-05-01 RX ADMIN — VASOPRESSIN 0.04 UNITS/MIN: 20 INJECTION INTRAVENOUS at 14:21

## 2019-05-01 RX ADMIN — Medication 10 ML: at 09:26

## 2019-05-01 RX ADMIN — VASOPRESSIN 0.04 UNITS/MIN: 20 INJECTION INTRAVENOUS at 05:16

## 2019-05-01 RX ADMIN — PROPOFOL 30 MCG/KG/MIN: 10 INJECTION, EMULSION INTRAVENOUS at 02:10

## 2019-05-01 RX ADMIN — Medication 75 MCG/HR: at 19:19

## 2019-05-01 RX ADMIN — PANTOPRAZOLE SODIUM 8 MG/HR: 40 INJECTION, POWDER, FOR SOLUTION INTRAVENOUS at 01:02

## 2019-05-01 RX ADMIN — DEXTROSE AND SODIUM CHLORIDE: 5; 450 INJECTION, SOLUTION INTRAVENOUS at 05:16

## 2019-05-01 RX ADMIN — IPRATROPIUM BROMIDE AND ALBUTEROL SULFATE 1 AMPULE: .5; 3 SOLUTION RESPIRATORY (INHALATION) at 20:50

## 2019-05-01 RX ADMIN — CALCIUM GLUCONATE: 94 INJECTION, SOLUTION INTRAVENOUS at 18:28

## 2019-05-01 RX ADMIN — PANTOPRAZOLE SODIUM 8 MG/HR: 40 INJECTION, POWDER, FOR SOLUTION INTRAVENOUS at 14:17

## 2019-05-01 RX ADMIN — TAZOBACTAM SODIUM AND PIPERACILLIN SODIUM 3.38 G: 375; 3 INJECTION, SOLUTION INTRAVENOUS at 01:55

## 2019-05-01 RX ADMIN — PROPOFOL 10 MCG/KG/MIN: 10 INJECTION, EMULSION INTRAVENOUS at 18:28

## 2019-05-01 RX ADMIN — TAZOBACTAM SODIUM AND PIPERACILLIN SODIUM 3.38 G: 375; 3 INJECTION, SOLUTION INTRAVENOUS at 09:21

## 2019-05-01 RX ADMIN — DEXTROSE AND SODIUM CHLORIDE: 5; 450 INJECTION, SOLUTION INTRAVENOUS at 19:18

## 2019-05-01 RX ADMIN — IPRATROPIUM BROMIDE AND ALBUTEROL SULFATE 1 AMPULE: .5; 3 SOLUTION RESPIRATORY (INHALATION) at 11:25

## 2019-05-01 RX ADMIN — PANTOPRAZOLE SODIUM 8 MG/HR: 40 INJECTION, POWDER, FOR SOLUTION INTRAVENOUS at 23:11

## 2019-05-01 ASSESSMENT — PULMONARY FUNCTION TESTS
PIF_VALUE: 22
PIF_VALUE: 17
PIF_VALUE: 20
PIF_VALUE: 16
PIF_VALUE: 16
PIF_VALUE: 19
PIF_VALUE: 20
PIF_VALUE: 20

## 2019-05-01 NOTE — PROGRESS NOTES
Skin:  incision 4/28 abd (JONO, small drainage)  · Wound Type: Surgical Wound  · Current Nutrition Therapies:  · Oral Diet Orders: NPO   · Parenteral Nutrition Orders:  · Type and Formula: Premix Central   · Lipids: None  · Rate/Volume: @ 41.7 ml/hr / 2,000 ml  · Duration: Continuous   · Additives: Include 20 mEq Na Acetate, 10 mmol Na PO4, 5 mEq Ca Gluconate & MVI/Trace Elements.   · Current PN Order Provides: 880 kcal, 50 g protein  · Goal PN Orders Provides: @ 83.3 ml/hr (goal):  1,760 kcal, 100 g protein  · Additional Calories: IV D5%:  305 kcal & IV Propofol:  265 kcal  · Anthropometric Measures:  · Ht: 5' 9\" (175.3 cm)   · Current Body Wt: 237 lb 14 oz (107.9 kg)  · Admission Body Wt: 238 lb 5.1 oz (108.1 kg)  · Usual Body Wt: 242 lb 1 oz (109.8 kg)(3/19/19)  · % Weight Change:  1.6%, x 1.5 months  · Ideal Body Wt: 160 lb 15 oz (73 kg), % Ideal Body 150%  · BMI Classification: BMI 35.0 - 39.9 Obese Class II    Nutrition Interventions:   Continue NPO, Start Parenteral Nutrition  Continued Inpatient Monitoring, Coordination of Care    Nutrition Evaluation:   · Evaluation: Goals set   · Goals: PN intake to meet >80% of estimated kcal/protein needs, with good glycemic control   · Monitoring: PN Intake, PN Tolerance, Skin Integrity, Wound Healing, I&O, Mental Status/Confusion, Weight, Pertinent Labs, Constipation, Monitor Bowel Function      Electronically signed by Seble Sandy, GERALD, LD on 5/1/19 at 4:56 PM    Contact Number: [phone]

## 2019-05-01 NOTE — PROGRESS NOTES
Physical Therapy  DATE: 2019    NAME: Cindy Rico  MRN: [id number]   : 1955    Patient not seen this date for Physical Therapy due to:  [] Blood transfusion in progress  [] Hemodialysis  []  Patient Declined  [] Spine Precautions   [] Strict Bedrest  [] Surgery/ Procedure  [] Testing      [x] Other Sedated on vent       [] PT being discontinued at this time. Patient independent. No further needs. [] PT being discontinued at this time as the patient has been transferred to palliative care. No further needs.     Jitendra Rodriguez, PT

## 2019-05-01 NOTE — PROGRESS NOTES
05/01/19 1525   Vent Information   Vent Type Servo i   Vent Mode PRVC   Vt Ordered 550 mL   Rate Set 20 bmp   Pressure Support 10 cmH20   FiO2  30 %   Sensitivity 5   PEEP/CPAP 6   I Time/ I Time % 0.8 s   Humidification Source HME   Circuit Condensation Not drained   Nitric Oxide/Epoprostenol In Use?  No   CPAP trial stopped, pt /89, RN informed, will continue to monitor

## 2019-05-01 NOTE — PROGRESS NOTES
[facility]                 Progress Note      Date:  5/1/2019  Patient: Adryan Robison  Admission:  4/25/2019  4:57 PM  Admit DX: SBO (small bowel obstruction) ([facility] [U16.127]  Age:  61 y.o., 1955     LOS: 6 days           SUBJECTIVE:     The patient was seen and examined. Notes and labs reviewed. Patient's cardiac review of systems: negative. OBJECTIVE:    Telemetry: Sinus  BP (!) 102/56   Pulse 60   Temp 98.2 °F (36.8 °C) (Temporal)   Resp 20   Ht 5' 9\" (1.753 m)   Wt 237 lb 14.4 oz (107.9 kg)   SpO2 98%   BMI 35.13 kg/m²     EXAM:  CONSTITUTIONAL:  intubated  HEENT: Normal jugular venous pulsations, no carotid bruits. Head is atraumatic, normocephalic. Eyes and oral mucosa are normal.  LUNGS: auscultation: clear to auscultation bilaterally and normal percussion bilaterally  CARDIOVASCULAR:  Normal apical impulse, regular rate and rhythm,   ABDOMEN: Soft, nontender, nondistended. Bowel sounds present. No masses or tenderness. SKIN: Warm and dry. EXTREMITIES: No lower extremity edema. Motor movement grossly intact. No cyanosis or clubbing.     Current Inpatient Medications:   metoprolol succinate  25 mg Oral Daily    amiodarone  200 mg Oral Daily    sodium chloride  500 mL Intravenous Once    piperacillin-tazobactam  3.375 g Intravenous Q8H    sodium chloride flush  10 mL Intravenous 2 times per day    ipratropium-albuterol  1 ampule Inhalation Q4H While awake    aspirin  81 mg Oral Daily    atorvastatin  40 mg Oral Nightly    digoxin  250 mcg Oral Daily    DULoxetine  20 mg Oral Daily    ferrous sulfate  325 mg Oral Daily with breakfast    furosemide  40 mg Oral Daily    pyridoxine  25 mg Oral Daily    QUEtiapine  25 mg Oral BID    senna  1 tablet Oral Daily    spironolactone  25 mg Oral Daily       IV Infusions (if any):   vasopressin (Septic Shock) infusion 0.04 Units/min (05/01/19 0516)    fentaNYL 75 mcg/hr (05/01/19 0626)    propofol 20 pulmonary embolism    Essential hypertension    Neuropathy    Chronic combined systolic and diastolic congestive heart failure (HCC)    Major depressive disorder, recurrent, in full remission ([facility]    Acute systolic CHF (congestive heart failure) (HCC)    Acute on chronic congestive heart failure (HCC)    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 Small bowel obstruction s/p Laparotomy. Possible Sepsis. Intubated and sedated. On Vasopressin off Levophed. Good urine output. Continue diuretics with close follow on I/O and chart. 2. H/O dilated cardiomyopathy. S/p AICD placement. Cath done in 2016 showed mild CAD. Last EF evaluation was 30-35%. No signs of volume overload. Continue current medications. 3. PAF. Currently in NSR with ventricular paced rhythm. Had episodes of bradycardia and his pacing threshold was increased to 60 as lower limits, currently in NSR at rate of 70-80. Continue Amiodarone and Digoxin. Off anticoagulation due to GI bleeding. Will resume Eliquis when OK by GI and surgery. 4. Respiratory failure on vent being weaned. 5. Moderate Mitral stenosis and regurgitation. PLAN:    1. Continue Amiodarone and Digoxin. Resume BB when off pressors. Add ACEi when able to take PO medications. 2. Wean vasopressors as tolerated. 3. Monitor Is/Os, renal function and electrolytes. 4. Resume anticoagulation when cleared by GI and surgery. Please see orders. Discussed with  nursing.     Electronically signed by Tyrese Corrales MD on 5/1/2019 at 10:01 [facility]  [phone]

## 2019-05-01 NOTE — PROGRESS NOTES
Surgery Progress Note             POD # 3    PATIENT NAME: Luis Fishman     TODAY'S DATE: 5/1/2019, 4:11 PM    SUBJECTIVE:    Pt is a stable patient is off the pressors appears to be alert and awake and responsive     OBJECTIVE:   VITALS:  /66   Pulse 88   Temp 98.5 °F (36.9 °C) (Axillary)   Resp 21   Ht 5' 9\" (1.753 m)   Wt 237 lb 14.4 oz (107.9 kg)   SpO2 98%   BMI 35.13 kg/m²     INTAKE/OUTPUT:      Intake/Output Summary (Last 24 hours) at 5/1/2019 1611  Last data filed at 5/1/2019 0529  Gross per 24 hour   Intake 3262.49 ml   Output 2090 ml   Net 1172.49 ml                 CONSTITUTIONAL:  fatigued and alert. mild distress, No acute distress  CARDIOVASCULAR:  tachycardic with regular rhythm and no murmur noted, No Murmur  LUNGS:  clear to auscultation, CTA Bilaterally  ABDOMEN:   Abdomen soft, non-tender. BS normal. No masses,  No organomegaly, Abdomen soft, non-tender, non-distended  INCISION: dry, Incision clean/dry/intact    Data:  CBC:   Lab Results   Component Value Date    WBC 9.0 05/01/2019    RBC 2.99 05/01/2019    HGB 9.5 05/01/2019    HCT 30.3 05/01/2019    MCV 97.8 05/01/2019    MCH 32.5 05/01/2019    MCHC 33.2 05/01/2019    RDW 18.8 05/01/2019    PLT 84 05/01/2019    MPV 10.6 05/01/2019       Radiology Review:        Pathology  noted    ASSESSMENT   61 y.o. male   C/Dianne Fraire 1106 Problems    Diagnosis Date Noted    Coffee ground emesis [K92.0]     SBO (small bowel obstruction) ([facility] [K56.609] 04/25/2019    Major depressive disorder, recurrent, in full remission ([facility] [F33.42] 08/30/2018    Chronic combined systolic and diastolic congestive heart failure ([facility] [I50.42] 06/22/2017    Essential hypertension [I10] 05/05/2017    Chronic atrial fibrillation (HCC) [I48.2]     GUME (obstructive sleep apnea) [G47.33]     Pulmonary embolism without acute cor pulmonale ([facility] [I26.99] 02/23/2017    Obesity (BMI 30-39. 9) [E66.9]     Small bowel cancer ([facility] [C17.9]          Plan Will continue with the above plan of treatment  1.   2.   3.       Electronically signed by Princess Hopper MD  on 5/1/2019 at 4:11 PM

## 2019-05-01 NOTE — PROGRESS NOTES
05/01/19 1025   Vent Information   Vent Type Servo i   Vent Mode CPAP   Pressure Support 10 cmH20   FiO2  30 %   Sensitivity 5   PEEP/CPAP 6   started CPAP trial on pt, tolerating well.  RN notified, will continue to monitor

## 2019-05-01 NOTE — PROGRESS NOTES
[facility]    Progress Note    5/1/2019    2:43 PM    Name:   Fabiana Peñaloza  MRN:     [id number]     Nurialyside:      [de-identified]   Room:   [address] [place] [address] Day:  6  Admit Date:  4/25/2019  4:57 PM    PCP:   Montrell Rojas MD  Code Status:  Full Code    Subjective:     C/C:   Chief Complaint   Patient presents with    Abdominal Pain    Dizziness     onset today     Interval History Status: not changed  No new issues overnight  Continued to have small amounts of NG drainage  Still on vasopressin for hypotension  No fevers, chills overnight  On ventilator, tolerating well on sedation  Abdomen continued to be soft, no bowel movements reported    Brief History:     The patient is a 61 y.o. Non-/non  male who presents with Abdominal Pain and Dizziness (onset today)   and he is admitted to the hospital for the management of  SBO.      He has the following significant co-morbidities: HTN, Chronic AFib, Chronic combined systolic/diastolic heart failure, S/P AICD Placement, Depression, GUME, Adenocarcinoma of small bowel, Colon cancer.      He presented with the following:      He presented to the ED with abdominal pain and shortness of breath. The pain was sudden in onset, localized to the epigastric region. HE denied any constipation and was passing gas and having normal BMs. He had associated shortness of breath.      In the ED, imaging was concerning for SBO vs ileus. NG tube was placed.     CT Chest showed small subsegmental PE.      Patient was admitted for further care. Review of Systems:     Sedated and ventilator placement  Un able to obtain from patient    Medications: Allergies:     Allergies   Allergen Reactions    Morphine      itching     Current Meds:   Scheduled Meds:    insulin lispro  0-6 Units Subcutaneous 4 times per day    metoprolol succinate  25 mg Oral Daily    amiodarone  200 mg Oral Daily    sodium chloride 500 mL Intravenous Once    piperacillin-tazobactam  3.375 g Intravenous Q8H    sodium chloride flush  10 mL Intravenous 2 times per day    ipratropium-albuterol  1 ampule Inhalation Q4H While awake    aspirin  81 mg Oral Daily    atorvastatin  40 mg Oral Nightly    digoxin  250 mcg Oral Daily    DULoxetine  20 mg Oral Daily    ferrous sulfate  325 mg Oral Daily with breakfast    furosemide  40 mg Oral Daily    pyridoxine  25 mg Oral Daily    QUEtiapine  25 mg Oral BID    senna  1 tablet Oral Daily    spironolactone  25 mg Oral Daily     Continuous Infusions:    PN-Adult 2-in-1 Central Line (Standard)      dextrose      vasopressin (Septic Shock) infusion 0.04 Units/min (05/01/19 1421)    fentaNYL 75 mcg/hr (05/01/19 0626)    propofol 5 mcg/kg/min (05/01/19 1035)    dextrose 5 % and 0.45 % NaCl 75 mL/hr at 05/01/19 0516    pantoprozole (PROTONIX) infusion 8 mg/hr (05/01/19 1417)    norepinephrine Stopped (05/01/19 0259)    midazolam (VERSED) 100 mg in dextrose 5% 100 mL infusion Stopped (04/30/19 1039)     PRN Meds: glucose, dextrose, glucagon (rDNA), dextrose, sodium chloride flush, oxyCODONE-acetaminophen **OR** oxyCODONE-acetaminophen, albuterol, HYDROmorphone **OR** HYDROmorphone, ondansetron **OR** ondansetron, aluminum & magnesium hydroxide-simethicone, dibucaine, nitroGLYCERIN, potassium chloride **OR** potassium alternative oral replacement **OR** potassium chloride, magnesium sulfate, magnesium hydroxide, nicotine, acetaminophen, phenol    Data:     Past Medical History:   has a past medical history of Asthma, Cancer ([facility] CHF (congestive heart failure) ([facility] COPD exacerbation ([facility] GERD (gastroesophageal reflux disease), History of blood transfusion, Hyperlipidemia, Hypertension, Neuropathy, Obesity (BMI 30-39.9), Psychiatric problem, SBO (small bowel obstruction) ([facility] Severe mitral regurgitation, and Severe tricuspid regurgitation.     Social History:   reports that he has never smoked. He has never used smokeless tobacco. He reports that he does not drink alcohol or use drugs. Family History:   Family History   Problem Relation Age of Onset    Heart Disease Father      Vitals:  BP (!) 120/59   Pulse 72   Temp 98.5 °F (36.9 °C) (Axillary)   Resp 16   Ht 5' 9\" (1.753 m)   Wt 237 lb 14.4 oz (107.9 kg)   SpO2 97%   BMI 35.13 kg/m²   Temp (24hrs), Av.4 °F (36.9 °C), Min:98.1 °F (36.7 °C), Max:98.8 °F (37.1 °C)    Recent Labs     19  1745 19  1813   POCGLU 78 109*     I/O (24Hr): Intake/Output Summary (Last 24 hours) at 2019 1443  Last data filed at 2019 0529  Gross per 24 hour   Intake 3262.49 ml   Output 2090 ml   Net 1172.49 ml     Labs:    Hematology:  Recent Labs     19  1859  19  0525  19  0452  19  1612  19  0037 19  0502 19  1205   WBC 8.2  --  9.4  --  12.3*  --   --   --   --  9.0  --    RBC 2.93*  --  3.27*  --  3.55*  --   --   --   --  2.99*  --    HGB 9.4*   < > 10.6*   < > 11.2*   < >  --    < > 10.1* 9.7* 9.5*   HCT 30.0*   < > 32.0*   < > 35.3*   < >  --    < > 32.3* 29.2* 30.3*   .4  --  98.1  --  99.4  --   --   --   --  97.8  --    MCH 32.1  --  32.5  --  31.5  --   --   --   --  32.5  --    MCHC 31.3  --  33.1  --  31.7  --   --   --   --  33.2  --    RDW 16.3*  --  18.6*  --  17.2*  --   --   --   --  18.8*  --    PLT 76*  --  83*  --  108*  --   --   --   --  84*  --    MPV 12.6  --  11.5  --  12.7  --   --   --   --  10.6  --    INR 1.6  --   --   --   --   --  1.0  --   --   --   --     < > = values in this interval not displayed.      Chemistry:  Recent Labs     19  0525  19  0452 19  1300 19  0037 19  0502     --  138  --   --  139   K 4.0  --  4.6  --   --  4.4   *  --  107  --   --  107   CO2 16*  --  20  --   --  21   GLUCOSE 95  --  126*  --   --  144*   BUN 25*  --  26*  --   --  30*   CREATININE 0.83  --  1.07  --   --  1.16   MG 2.0 --  2.3  --   --  2.2   ANIONGAP 10  --  11  --   --  11   LABGLOM >60  --  >60  --   --  >60   GFRAA >60  --  >60  --   --  >60   CALCIUM 6.6*  --  7.9*  --   --  7.7*   TROPHS 17   < > 13 11 12 12   DIGOXIN 0.3*  --   --   --   --   --     < > = values in this interval not displayed. Recent Labs     04/28/19  1745 04/28/19  1813 04/29/19  0525   PROT  --   --  3.9*   LABALBU  --   --  2.1*   AST  --   --  419*   ALT  --   --  485*   ALKPHOS  --   --  30*   BILITOT  --   --  1.39*   POCGLU 78 109*  --      Lab Results   Component Value Date/Time    SPECIAL RIGHT AC 7ML AEROBIC 1ML ANAEROBIC 04/28/2019 12:34 PM     Lab Results   Component Value Date/Time    CULTURE NO GROWTH 3 DAYS 04/28/2019 12:34 PM     Lab Results   Component Value Date    POCPH 7.36 05/01/2019    PHART 7.44 08/30/2012    PH 7.48 11/08/2012    POCPCO2 39 05/01/2019    NCL2ANN 60 08/30/2012    PCO2 45 11/08/2012    POCPO2 141 05/01/2019    PO2ART 73 08/30/2012    PO2 145 11/08/2012    POCHCO3 22.2 05/01/2019    MQQ8SWM 24.1 11/01/2012    HCO3 33.6 11/08/2012    NBEA 3 05/01/2019    PBEA NOT REPORTED 05/01/2019    DQN5CSZ 23 05/01/2019    ZADF1DBN 99 05/01/2019    Q1PRYLKE 100 11/02/2012    O2SAT 99 11/02/2012    FIO2 35.0 05/01/2019     Radiology:  Xr Abdomen (kub) (single Ap View)    Result Date: 4/25/2019  EXAMINATION: SINGLE SUPINE XRAY VIEW(S) OF THE ABDOMEN 4/25/2019 8:44 pm COMPARISON: None. HISTORY: ORDERING SYSTEM PROVIDED HISTORY: NGT placement confirmation TECHNOLOGIST PROVIDED HISTORY: NGT placement confirmation Ordering Physician Provided Reason for Exam: NGT placement Acuity: Acute Type of Exam: Initial FINDINGS: 2 images of the abdomen were provided. Enteric tube is noted projecting on the left hemidiaphragm. This is likely in the stomach. Moderate multifocal bowel distention is noted.   Ill-defined increased density in the left lung base is noted     Enteric tube as described     Ct Abdomen Pelvis W Iv Contrast Additional small bowel proximally and within the mid aspect with decompressed distal and terminal ileal loops. This may relate to a degree of obstruction versus an ileus and correlation is needed. Infiltrates in the lung bases bilaterally that may represent atelectasis versus pneumonia. Nonobstructing renal stones bilaterally. Xr Chest Portable    Result Date: 4/27/2019  EXAMINATION: SINGLE XRAY VIEW OF THE CHEST 4/27/2019 6:22 am COMPARISON: 04/25/2019. HISTORY: ORDERING SYSTEM PROVIDED HISTORY: pleural effusion, infiltrates TECHNOLOGIST PROVIDED HISTORY: pleural effusion, infiltrates Ordering Physician Provided Reason for Exam: plural effusion infiltrate Acuity: Unknown Type of Exam: Initial FINDINGS: A left cardiac device is unchanged in position. An enteric tube is seen coursing below the diaphragm. The cardiac silhouette is persistently enlarged. There is prominence of the central pulmonary vasculature. No convincing focal consolidation, pleural effusion or pneumothorax. 1. Persistent enlargement of the cardiac silhouette with prominence of the central pulmonary vasculature. 2. No convincing pleural effusion or pneumothorax. Xr Chest Portable    Result Date: 4/25/2019  EXAMINATION: SINGLE XRAY VIEW OF THE CHEST 4/25/2019 5:56 pm COMPARISON: Chest radiograph performed 11/03/2018. HISTORY: ORDERING SYSTEM PROVIDED HISTORY: shortness of breath TECHNOLOGIST PROVIDED HISTORY: shortness of breath Ordering Physician Provided Reason for Exam: chest pain Acuity: Acute Type of Exam: Initial Additional signs and symptoms: SOB Relevant Medical/Surgical History: CHF, COPD FINDINGS: There is chronic pulmonary change. There may be small bibasilar effusions with adjacent atelectasis. There is no pneumothorax. The heart is enlarged with stable cardiac lead. The upper abdomen is unremarkable. The extrathoracic soft tissues are unremarkable. Cardiomegaly with small bibasilar effusions and adjacent atelectasis. Ct Chest Pulmonary Embolism W Contrast    Result Date: 2019  EXAMINATION: CTA OF THE CHEST 2019 6:10 pm TECHNIQUE: CTA of the chest was performed after the administration of intravenous contrast.  Multiplanar reformatted images are provided for review. MIP images are provided for review. Dose modulation, iterative reconstruction, and/or weight based adjustment of the mA/kV was utilized to reduce the radiation dose to as low as reasonably achievable. COMPARISON: None. HISTORY: ORDERING SYSTEM PROVIDED HISTORY: sob, history of CA FINDINGS: Pulmonary Arteries: Pulmonary arteries are adequately opacified for evaluation. There is an intraluminal filling defect within a subsegmental branch supplying the left lower lobe medially. The remainder of the arterial branches appear patent. Main pulmonary artery is normal in caliber. Mediastinum: No evidence of mediastinal lymphadenopathy. The heart and pericardium demonstrate no acute abnormality. The heart is enlarged. There is no acute abnormality of the thoracic aorta. Lungs/pleura: There is linear atelectasis versus scarring in the right lung posteriorly. There is dependent atelectasis in the left lung. There is a small right basilar effusion. There is no pneumothorax. The tracheobronchial tree is patent. Upper Abdomen: Limited images of the upper abdomen are unremarkable. Soft Tissues/Bones: No acute bone or soft tissue abnormality. Mild cardiomegaly with a small right basilar effusion. Filling defect involving a subsegmental branch supplying the left lower lobe medially consistent with an embolism. Critical results were called by Dr. Nishant Loja to [facility] on 3090 at 18:48.      Physical Examination:        BP (!) 120/59   Pulse 72   Temp 98.5 °F (36.9 °C) (Axillary)   Resp 16   Ht 5' 9\" (1.753 m)   Wt 237 lb 14.4 oz (107.9 kg)   SpO2 97%   BMI 35.13 kg/m²   Temp (24hrs), Av.4 °F (36.9 °C), Min:98.1 °F (36.7 °C), Max:98.8 °F (37.1 °C)    Recent Labs     04/28/19  1745 04/28/19  1813   POCGLU 78 109*       Intake/Output Summary (Last 24 hours) at 5/1/2019 1443  Last data filed at 5/1/2019 0529  Gross per 24 hour   Intake 3262.49 ml   Output 2090 ml   Net 1172.49 ml     General Appearance: sedated, on ventilator   Mental status: un able to wake him up at this time  Head:  normocephalic, atraumatic  Eye: no icterus, redness, pupils equal and reactive  Ear: normal external ear, no discharge, hearing intact  Nose:  no drainage noted  Mouth: mucous membranes moist  Neck: supple, no carotid bruits, thyroid not palpable  Lungs: Bilateral equal air entry, clear to ausculation, no wheezing  Cardiovascular: normal rate, regular rhythm, no murmur, gallop, rub. Abdomen: continued distention of abdomen, midline incision noted  Neurologic: not following any commands  Skin: No gross lesions, rashes, bruising or bleeding on exposed skin area  Extremities:  peripheral pulses palpable, no pedal edema or calf pain with palpation  Psych: distressed affect    Assessment:        Principal Problem:    SBO (small bowel obstruction) (HCC)  Active Problems:    Small bowel cancer (HCC)    Obesity (BMI 30-39. 9)    Pulmonary embolism without acute cor pulmonale (HCC)    GUME (obstructive sleep apnea)    Chronic atrial fibrillation (HCC)    Essential hypertension    Chronic combined systolic and diastolic congestive heart failure (HCC)    Major depressive disorder, recurrent, in full remission ([facility]    Plan:        - GI, primary problem. Small bowel obstruction with necrosis of bowel. Noted Op report with removal of bowel. Surgery on 4/28/2019. Continued close follow up. NG tube still present with dark colored secretions. On PPI drip. Await return of bowel function. GI consult awaited  - Sepsis, likely from intra abdominal process. Zosyn continued at this time. No fevers at this time  - Pulmonary, acute respiratory insufficiency, post surgery.  Continued follow up. PE at admission, anticoagulation once medically stable  - Oncology history: Previous small bowel cancer status post surgery and chemotherapy.  Recent diagnosis of gastric cancer from EGD biopsy at [facility]. Staging and treatment   - Hypertension, still on small dose levophed  - GI bleed, on PPI drip that is continued    Possible sedation holiday    Consultations:   IP CONSULT TO INTERNAL MEDICINE  IP CONSULT TO GENERAL SURGERY  IP CONSULT TO PULMONOLOGY  IP CONSULT TO PULMONOLOGY  IP CONSULT TO GI  IP CONSULT TO VASCULAR SURGERY  IP CONSULT TO CARDIOLOGY  IP CONSULT TO GI  IP CONSULT TO DIETITIAN  IP CONSULT TO Ryan Ramires MD  5/1/2019  2:43 PM

## 2019-05-01 NOTE — PROGRESS NOTES
[phone] MG/5ML suspension 30 mL Q6H PRN   ipratropium-albuterol (DUONEB) nebulizer solution 1 ampule Q4H While awake   aspirin EC tablet 81 mg Daily   atorvastatin (LIPITOR) tablet 40 mg Nightly   dibucaine 1 % rectal ointment 4x Daily PRN   digoxin (LANOXIN) tablet 250 mcg Daily   DULoxetine (CYMBALTA) extended release capsule 20 mg Daily   ferrous sulfate EC tablet 325 mg Daily with breakfast   furosemide (LASIX) tablet 40 mg Daily   nitroGLYCERIN (NITROSTAT) SL tablet 0.4 mg Q5 Min PRN   vitamin B-6 (PYRIDOXINE) tablet 25 mg Daily   QUEtiapine (SEROQUEL) tablet 25 mg BID   senna (SENOKOT) tablet 8.6 mg Daily   spironolactone (ALDACTONE) tablet 25 mg Daily   potassium chloride (KLOR-CON M) extended release tablet 40 mEq PRN   Or    potassium bicarb-citric acid (EFFER-K) effervescent tablet 40 mEq PRN   Or    potassium chloride 10 mEq/100 mL IVPB (Peripheral Line) PRN   magnesium sulfate 1 g in dextrose 5% 100 mL IVPB PRN   magnesium hydroxide (MILK OF MAGNESIA) 400 MG/5ML suspension 30 mL Daily PRN   nicotine (NICODERM CQ) 21 MG/24HR 1 patch Daily PRN   acetaminophen (TYLENOL) tablet 650 mg Q4H PRN   phenol 1.4 % mouth spray 1 spray Q2H PRN       Data:     Code Status:  Full Code    Family History   Problem Relation Age of Onset    Heart Disease Father        Social History     Socioeconomic History    Marital status: Single     Spouse name: Not on file    Number of children: Not on file    Years of education: Not on file    Highest education level: Not on file   Occupational History    Not on file   Social Needs    Financial resource strain: Not on file    Food insecurity:     Worry: Not on file     Inability: Not on file    Transportation needs:     Medical: Not on file     Non-medical: Not on file   Tobacco Use    Smoking status: Never Smoker    Smokeless tobacco: Never Used   Substance and Sexual Activity    Alcohol use: No    Drug use: No    Sexual activity: Not on file   Lifestyle    Physical activity:     Days per week: Not on file     Minutes per session: Not on file    Stress: Not on file   Relationships    Social connections:     Talks on phone: Not on file     Gets together: Not on file     Attends Pentecostal service: Not on file     Active member of club or organization: Not on file     Attends meetings of clubs or organizations: Not on file     Relationship status: Not on file    Intimate partner violence:     Fear of current or ex partner: Not on file     Emotionally abused: Not on file     Physically abused: Not on file     Forced sexual activity: Not on file   Other Topics Concern    Not on file   Social History Narrative    Not on file       Vitals:  /70   Pulse 89   Temp 98.5 °F (36.9 °C) (Axillary)   Resp 19   Ht 5' 9\" (1.753 m)   Wt 237 lb 14.4 oz (107.9 kg)   SpO2 97%   BMI 35.13 kg/m²   Temp (24hrs), Av.4 °F (36.9 °C), Min:98.1 °F (36.7 °C), Max:98.8 °F (37.1 °C)    Recent Labs     19  1416 19  1745 19  1813   POCGLU 122* 78 109*       I/O (24Hr):     Intake/Output Summary (Last 24 hours) at 2019 1302  Last data filed at 2019 0529  Gross per 24 hour   Intake 3262.49 ml   Output 2090 ml   Net 1172.49 ml       Labs:      CBC: Lab Results   Component Value Date    WBC 9.0 2019    RBC 2.99 2019    HGB 9.5 2019    HCT 30.3 2019    MCV 97.8 2019    MCH 32.5 2019    MCHC 33.2 2019    RDW 18.8 2019    PLT 84 2019    MPV 10.6 2019     CBC with Differential:  Lab Results   Component Value Date    WBC 9.0 2019    RBC 2.99 2019    HGB 9.5 2019    HCT 30.3 2019    PLT 84 2019    MCV 97.8 2019    MCH 32.5 2019    MCHC 33.2 2019    RDW 18.8 2019    NRBC 2 2012    LYMPHOPCT 6 2019    MONOPCT 13 2019    BASOPCT 0 2019    MONOSABS 1.17 2019    LYMPHSABS 0.54 2019    EOSABS 0.00 2019    BASOSABS 0.00 05/01/2019    DIFFTYPE NOT REPORTED 05/01/2019     Hemoglobin/Hematocrit:  Lab Results   Component Value Date    HGB 9.5 05/01/2019    HCT 30.3 05/01/2019     CMP:  Lab Results   Component Value Date     05/01/2019    K 4.4 05/01/2019     05/01/2019    CO2 21 05/01/2019    BUN 30 05/01/2019    CREATININE 1.16 05/01/2019    GFRAA >60 05/01/2019    LABGLOM >60 05/01/2019    GLUCOSE 144 05/01/2019    PROT 3.9 04/29/2019    PROT 4.4 11/03/2012    LABALBU 2.1 04/29/2019    CALCIUM 7.7 05/01/2019    BILITOT 1.39 04/29/2019    ALKPHOS 30 04/29/2019     04/29/2019     04/29/2019     BMP:  Lab Results   Component Value Date     05/01/2019    K 4.4 05/01/2019     05/01/2019    CO2 21 05/01/2019    BUN 30 05/01/2019    LABALBU 2.1 04/29/2019    CREATININE 1.16 05/01/2019    CALCIUM 7.7 05/01/2019    GFRAA >60 05/01/2019    LABGLOM >60 05/01/2019    GLUCOSE 144 05/01/2019     PT/INR:    Lab Results   Component Value Date    PROTIME 10.3 04/30/2019    INR 1.0 04/30/2019     PTT:    Lab Results   Component Value Date    APTT 31.5 04/28/2019   [APTT}    Physical Examination:        General appearance: Intubated and sedated,  Abdomen: Postoperative abdomen  Assessment:        Primary Problem  SBO (small bowel obstruction) ([facility]     Active Hospital Problems    Diagnosis Date Noted    Coffee ground emesis [K92.0]     SBO (small bowel obstruction) ([facility] [K56.609] 04/25/2019    [facility], [facility] [F33.42] 08/30/2018    Chronic combined systolic and diastolic congestive heart failure ([facility] [I50.42] 06/22/2017    Essential hypertension [I10] 05/05/2017    Chronic atrial fibrillation (HCC) [I48.2]     GUME (obstructive sleep apnea) [G47.33]     Pulmonary embolism without acute cor pulmonale [facility] [I26.99] 02/23/2017    Obesity (BMI 30-39. 9) [E66.9]     Small bowel cancer ([facility] [C17.9]      Past Medical History:   Diagnosis Date    Asthma     Cancer ([facility] [facility]     small intestine    CHF (congestive heart failure) (HCC)     COPD exacerbation (HCC)     GERD (gastroesophageal reflux disease)     History of blood transfusion     Hyperlipidemia     Hypertension     Neuropathy     Obesity (BMI 30-39.9) 2/25/2016    Psychiatric problem     SBO (small bowel obstruction) ([facility]     Severe mitral regurgitation 10/31/12    minimally invasive complex mitral valve repair     Severe tricuspid regurgitation 10/31/12    tricuspid repair under CPB        Plan:        1. Continue IV PPI  2. Follow-up hemoglobin  3.  Supportive postoperative care per surgery    Explained to the patient and d/W Nursing Staff  Will F/U with you  Please call or Page for any issues or change in status  Thanks    Electronically signed by Ángel Mary MD on 5/1/2019 at 1:02 PM

## 2019-05-01 NOTE — PROGRESS NOTES
Pulmonary Critical Care Progress Note  Dannie Faith CNP / Dr. Lyndsey Kilgore M.D. Patient seen for the follow up of respiratory failure, SBO (small bowel obstruction) ([address]     Subjective:  He is currently sedated on the ventilator, drowsy but able to open his eyes and follow commands. Denies pain. He has been weaned off of levothyroid. NG tube remains to LIS. Examination:  Vitals: /66   Pulse 88   Temp 98.5 °F (36.9 °C) (Axillary)   Resp 21   Ht 5' 9\" (1.753 m)   Wt 237 lb 14.4 oz (107.9 kg)   SpO2 98%   BMI 35.13 kg/m²     General appearance: Sedated on ventilator  Neck: No JVD  Lungs: Moderate air exchange, occasional rhonchi  Heart: regular rate and rhythm, S1, S2 normal, no gallop  Abdomen: Distended, tender  Extremities: no cyanosis or clubbing. Mild edema    LABs:  CBC:   Recent Labs     04/30/19  0452  05/01/19  0502 05/01/19  1205   WBC 12.3*  --  9.0  --    HGB 11.2*   < > 9.7* 9.5*   HCT 35.3*   < > 29.2* 30.3*   *  --  84*  --     < > = values in this interval not displayed. BMP:   Recent Labs     04/30/19  0452 05/01/19  0502    139   K 4.6 4.4   CO2 20 21   BUN 26* 30*   CREATININE 1.07 1.16   LABGLOM >60 >60   GLUCOSE 126* 144*     PT/INR:   Recent Labs     04/28/19  1859 04/30/19  1612   PROTIME 16.5* 10.3   INR 1.6 1.0     LIVER PROFILE:  Recent Labs     04/29/19  0525   *   *   LABALBU 2.1*     Radiology:      Impression   Central vascular congestion.       Stable supporting devices. Impression:  · Acute postoperative respiratory insufficiency  · SBO with ischemic bowel s/p exploratory lap with bowel resection 4/28/19   · Shock/Sepsis secondary to above  · Recurrent Pulmonary Embolism  · Pulmonary infiltrates atelectasis vs pneumonia   · Adenocarcinoma of small bowel, s/p bowel resection and chemo in 2014  ?  Gastric polyp biopsy 2/7/19  + Moderately differentiated adenocarcinoma  · Obesity/Obstructive sleep apnea ~ wears O2 at bedtime  · Chronic combined diastolic/systolic heart failure ~ EF 25-30%  · Pulmonary Hypertension ~ RVSP 41  · Upper GI bleed s/p microcoil embolization    Recommendations:  · AC ventilation   · CPAP trial as tolerated  · Sedation with propofol  · Monitor blood pressure off vasopressors  · IV D5W half-normal saline at 75 ML per hour  · Pain control  · Continue IV Zosyn  · Protonix gtt, GI on consult   · NG LIS  · Start TPN  · Albuterol and Ipratropium Q 4 hours and prn  · Monitor hemoglobin  · X-ray chest in am  · Labs: CBC and BMP in am  · DVT prophylaxis with EPC cuffs  · Will follow with you    Electronically signed by     MIQUEL Donahue CNP on 5/1/2019 at 4:12 PM  Patient was seen under the supervision of Dr. Mena Oliveira and Sleep Medicine,    Patient seen and evaluated by me. Currently is mildly sedated with the eyes open. He is on the ventilator and did fair with his CPAP trial earlier today. He has mild tracheal secretions. Lung exam reveals moderate exchange no wheezing. Plan is to do a CPAP trial and evaluated for extubation in the morning. Continue bronchodilators. Above was reviewed and discussed with Rissa Bey CNP. And I agree with assessment and plan of care.   Electronically signed by     Rosalee Aguilar MD on 5/1/2019 at 4:51 PM  Pulmonary Critical Care and [facility]  Cell: [phone]  Office: [phone]

## 2019-05-01 NOTE — PROGRESS NOTES
GI Progress notes    4/30/2019   10:56 PM    Name:  Luis Fishman  MRN:    [id number]     [place]:     [de-identified]   Room:  [address] [place] [address] Day: 5     Admit Date: 4/25/2019  4:57 PM  PCP: Jaime Banks MD    Subjective:     C/C:   Chief Complaint   Patient presents with    Abdominal Pain    Dizziness     onset today       Interval History: Status: worsened. Was asked to seen this patient is re consult for some bleeding noted in the NG last night  Has been intubated and sedated  No sig drop in HGB  Currently has dry old brown stuff in the tube  No melena is reported    ROS:  intubated and sedated      Medications: Allergies:    Allergies   Allergen Reactions    Morphine      itching       Current Meds:   metoprolol succinate (TOPROL XL) extended release tablet 25 mg Daily   amiodarone (CORDARONE) tablet 200 mg Daily   vasopressin 20 Units in dextrose 5 % 100 mL infusion Continuous   0.9 % sodium chloride bolus Once   fentaNYL 20 mcg/mL Infusion Continuous   propofol 1000 MG/100ML injection Titrated   dextrose 5 % and 0.45 % sodium chloride infusion Continuous   piperacillin-tazobactam (ZOSYN) 3.375 g in dextrose 50 mL IVPB extended infusion (premix) Q8H   sodium chloride flush 0.9 % injection 10 mL 2 times per day   sodium chloride flush 0.9 % injection 10 mL PRN   oxyCODONE-acetaminophen (PERCOCET) 5-325 MG per tablet 1 tablet Q4H PRN   Or    oxyCODONE-acetaminophen (PERCOCET) 5-325 MG per tablet 2 tablet Q4H PRN   pantoprazole (PROTONIX) 80 mg in sodium chloride 0.9 % 100 mL infusion Continuous   albuterol (PROVENTIL) nebulizer solution 2.5 mg As Directed RT PRN   HYDROmorphone (DILAUDID) injection 0.25 mg Q3H PRN   Or    HYDROmorphone (DILAUDID) injection 0.5 mg Q3H PRN   ondansetron (ZOFRAN-ODT) disintegrating tablet 4 mg Q6H PRN   Or    ondansetron (ZOFRAN) injection 4 mg Q6H PRN   norepinephrine (LEVOPHED) 16 mg in dextrose 5 % 250 mL infusion Continuous   midazolam (VERSED) 100 mg in dextrose 5% 100 mL infusion Continuous   aluminum & magnesium hydroxide-simethicone (MAALOX) 200-200-20 MG/5ML suspension 30 mL Q6H PRN   ipratropium-albuterol (DUONEB) nebulizer solution 1 ampule Q4H While awake   aspirin EC tablet 81 mg Daily   atorvastatin (LIPITOR) tablet 40 mg Nightly   dibucaine 1 % rectal ointment 4x Daily PRN   digoxin (LANOXIN) tablet 250 mcg Daily   DULoxetine (CYMBALTA) extended release capsule 20 mg Daily   ferrous sulfate EC tablet 325 mg Daily with breakfast   furosemide (LASIX) tablet 40 mg Daily   nitroGLYCERIN (NITROSTAT) SL tablet 0.4 mg Q5 Min PRN   vitamin B-6 (PYRIDOXINE) tablet 25 mg Daily   QUEtiapine (SEROQUEL) tablet 25 mg BID   senna (SENOKOT) tablet 8.6 mg Daily   spironolactone (ALDACTONE) tablet 25 mg Daily   potassium chloride (KLOR-CON M) extended release tablet 40 mEq PRN   Or    potassium bicarb-citric acid (EFFER-K) effervescent tablet 40 mEq PRN   Or    potassium chloride 10 mEq/100 mL IVPB (Peripheral Line) PRN   magnesium sulfate 1 g in dextrose 5% 100 mL IVPB PRN   magnesium hydroxide (MILK OF MAGNESIA) 400 MG/5ML suspension 30 mL Daily PRN   nicotine (NICODERM CQ) 21 MG/24HR 1 patch Daily PRN   acetaminophen (TYLENOL) tablet 650 mg Q4H PRN   phenol 1.4 % mouth spray 1 spray Q2H PRN       Data:     Code Status:  Full Code    Family History   Problem Relation Age of Onset    Heart Disease Father        Social History     Socioeconomic History    Marital status: Single     Spouse name: Not on file    Number of children: Not on file    Years of education: Not on file    Highest education level: Not on file   Occupational History    Not on file   Social Needs    Financial resource strain: Not on file    Food insecurity:     Worry: Not on file     Inability: Not on file    Transportation needs:     Medical: Not on file     Non-medical: Not on file   Tobacco Use    Smoking status: Never Smoker    Smokeless tobacco: Never Used   Substance and Sexual Activity    Alcohol use: No    Drug use: No    Sexual activity: Not on file   Lifestyle    Physical activity:     Days per week: Not on file     Minutes per session: Not on file    Stress: Not on file   Relationships    Social connections:     Talks on phone: Not on file     Gets together: Not on file     Attends Scientologist service: Not on file     Active member of club or organization: Not on file     Attends meetings of clubs or organizations: Not on file     Relationship status: Not on file    Intimate partner violence:     Fear of current or ex partner: Not on file     Emotionally abused: Not on file     Physically abused: Not on file     Forced sexual activity: Not on file   Other Topics Concern    Not on file   Social History Narrative    Not on file       Vitals:  /63   Pulse 70   Temp 98.1 °F (36.7 °C) (Temporal)   Resp 20   Ht 5' 9\" (1.753 m)   Wt 237 lb 14.4 oz (107.9 kg)   SpO2 100%   BMI 35.13 kg/m²   Temp (24hrs), Av.7 °F (37.1 °C), Min:98.1 °F (36.7 °C), Max:99.4 °F (37.4 °C)    Recent Labs     19  1416 19  1745 19  1813   POCGLU 122* 78 109*       I/O (24Hr):     Intake/Output Summary (Last 24 hours) at 2019  Last data filed at 2019 2110  Gross per 24 hour   Intake 3485.49 ml   Output 2570 ml   Net 915.49 ml       Labs:      CBC: Lab Results   Component Value Date    WBC 12.3 2019    RBC 3.55 2019    HGB 10.6 2019    HCT 33.0 2019    MCV 99.4 2019    MCH 31.5 2019    MCHC 31.7 2019    RDW 17.2 2019     2019    MPV 12.7 2019     CBC with Differential:  Lab Results   Component Value Date    WBC 12.3 2019    RBC 3.55 2019    HGB 10.6 2019    HCT 33.0 2019     2019    MCV 99.4 2019    MCH 31.5 2019    MCHC 31.7 2019    RDW 17.2 2019    NRBC 2 2012    LYMPHOPCT 5 2019    MONOPCT 19 2019    BASOPCT 0 2019    MONOSABS 2.34 04/30/2019    LYMPHSABS 0.62 04/30/2019    EOSABS 0.00 04/30/2019    BASOSABS 0.00 04/30/2019    DIFFTYPE NOT REPORTED 04/30/2019     Hemoglobin/Hematocrit:  Lab Results   Component Value Date    HGB 10.6 04/30/2019    HCT 33.0 04/30/2019     CMP:  Lab Results   Component Value Date     04/30/2019    K 4.6 04/30/2019     04/30/2019    CO2 20 04/30/2019    BUN 26 04/30/2019    CREATININE 1.07 04/30/2019    GFRAA >60 04/30/2019    LABGLOM >60 04/30/2019    GLUCOSE 126 04/30/2019    PROT 3.9 04/29/2019    PROT 4.4 11/03/2012    LABALBU 2.1 04/29/2019    CALCIUM 7.9 04/30/2019    BILITOT 1.39 04/29/2019    ALKPHOS 30 04/29/2019     04/29/2019     04/29/2019     BMP:  Lab Results   Component Value Date     04/30/2019    K 4.6 04/30/2019     04/30/2019    CO2 20 04/30/2019    BUN 26 04/30/2019    LABALBU 2.1 04/29/2019    CREATININE 1.07 04/30/2019    CALCIUM 7.9 04/30/2019    GFRAA >60 04/30/2019    LABGLOM >60 04/30/2019    GLUCOSE 126 04/30/2019     PT/INR:    Lab Results   Component Value Date    PROTIME 10.3 04/30/2019    INR 1.0 04/30/2019     PTT:    Lab Results   Component Value Date    APTT 31.5 04/28/2019   [APTT}    Physical Examination:        General appearance: intubated and sedated  Abdomen: post surgical     Assessment:        Primary Problem  SBO (small bowel obstruction) ([facility]     Active Hospital Problems    Diagnosis Date Noted    [facility] [K56.609] 04/25/2019    Major depressive disorder, recurrent, in full remission ([facility] [F33.42] 08/30/2018    Chronic combined systolic and diastolic congestive heart failure ([facility] [I50.42] 06/22/2017    Essential hypertension [I10] 05/05/2017    Chronic atrial fibrillation (HCC) [I48.2]     GUME (obstructive sleep apnea) [G47.33]     Pulmonary embolism without acute cor pulmonale (HCC) [I26.99] 02/23/2017    Obesity (BMI 30-39. 9) [E66.9]     Small bowel cancer ([facility] [C17.9]      Past Medical History:   Diagnosis Date    Asthma     Cancer ([facility]     small intestine    CHF (congestive heart failure) (HCC)     COPD exacerbation (HCC)     GERD (gastroesophageal reflux disease)     History of blood transfusion     Hyperlipidemia     Hypertension     Neuropathy     Obesity (BMI 30-39.9) 2/25/2016    Psychiatric problem     SBO (small bowel obstruction) ([facility]     Severe mitral regurgitation 10/31/12    minimally invasive complex mitral valve repair     Severe tricuspid regurgitation 10/31/12    tricuspid repair under CPB        Plan:        1. At this time no active bleeding is noted  2. IV PPI  3. F/u HGB  4. If has any active bleeding may need EGD  5.  Will observe at this time      d/W Nursing Staff in ICU  Will F/U with you  Please call or Page for any issues or change in status  Thanks    Electronically signed by Oma Nath MD on 4/30/2019 at 10:56 PM

## 2019-05-02 ENCOUNTER — APPOINTMENT (OUTPATIENT)
Dept: GENERAL RADIOLOGY | Age: 64
DRG: 329 | End: 2019-05-02
Payer: MEDICARE

## 2019-05-02 PROBLEM — E44.0 MODERATE MALNUTRITION (HCC): Status: ACTIVE | Noted: 2019-05-02

## 2019-05-02 LAB
ABO/RH: NORMAL
ABSOLUTE EOS #: 0 K/UL (ref 0–0.4)
ABSOLUTE IMMATURE GRANULOCYTE: ABNORMAL K/UL (ref 0–0.3)
ABSOLUTE LYMPH #: 0.59 K/UL (ref 1–4.8)
ABSOLUTE MONO #: 0.44 K/UL (ref 0.2–0.8)
ALBUMIN SERPL-MCNC: 2.3 G/DL (ref 3.5–5.2)
ALBUMIN/GLOBULIN RATIO: ABNORMAL (ref 1–2.5)
ALP BLD-CCNC: 54 U/L (ref 40–129)
ALT SERPL-CCNC: 305 U/L (ref 5–41)
ANION GAP SERPL CALCULATED.3IONS-SCNC: 8 MMOL/L (ref 9–17)
ANTIBODY IDENTIFICATION: NORMAL
ANTIBODY SCREEN: POSITIVE
ARM BAND NUMBER: NORMAL
AST SERPL-CCNC: 36 U/L
BASOPHILS # BLD: 0 %
BASOPHILS ABSOLUTE: 0 K/UL (ref 0–0.2)
BILIRUB SERPL-MCNC: 0.88 MG/DL (ref 0.3–1.2)
BLD PROD TYP BPU: NORMAL
BLD PROD TYP BPU: NORMAL
BUN BLDV-MCNC: 34 MG/DL (ref 8–23)
BUN/CREAT BLD: 22 (ref 9–20)
CALCIUM SERPL-MCNC: 7.9 MG/DL (ref 8.6–10.4)
CHLORIDE BLD-SCNC: 108 MMOL/L (ref 98–107)
CO2: 23 MMOL/L (ref 20–31)
CREAT SERPL-MCNC: 1.55 MG/DL (ref 0.7–1.2)
CROSSMATCH RESULT: NORMAL
CROSSMATCH RESULT: NORMAL
DIFFERENTIAL TYPE: ABNORMAL
DISPENSE STATUS BLOOD BANK: NORMAL
DISPENSE STATUS BLOOD BANK: NORMAL
DONOR ANTIGEN TYPING: NORMAL
DONOR ANTIGEN TYPING: NORMAL
EOSINOPHILS RELATIVE PERCENT: 0 % (ref 1–4)
EXPIRATION DATE: NORMAL
FIO2: 30
GFR AFRICAN AMERICAN: 55 ML/MIN
GFR NON-AFRICAN AMERICAN: 46 ML/MIN
GFR SERPL CREATININE-BSD FRML MDRD: ABNORMAL ML/MIN/{1.73_M2}
GFR SERPL CREATININE-BSD FRML MDRD: ABNORMAL ML/MIN/{1.73_M2}
GLUCOSE BLD-MCNC: 110 MG/DL (ref 75–110)
GLUCOSE BLD-MCNC: 117 MG/DL (ref 75–110)
GLUCOSE BLD-MCNC: 119 MG/DL (ref 75–110)
GLUCOSE BLD-MCNC: 137 MG/DL (ref 70–99)
GLUCOSE BLD-MCNC: 98 MG/DL (ref 75–110)
HCT VFR BLD CALC: 30.2 % (ref 40.7–50.3)
HCT VFR BLD CALC: 31.1 % (ref 40.7–50.3)
HCT VFR BLD CALC: 32.4 % (ref 40.7–50.3)
HEMOGLOBIN: 10.2 G/DL (ref 13–17)
HEMOGLOBIN: 9.3 G/DL (ref 13–17)
HEMOGLOBIN: 9.8 G/DL (ref 13–17)
IMMATURE GRANULOCYTES: ABNORMAL %
LYMPHOCYTES # BLD: 8 % (ref 24–44)
MAGNESIUM: 2.3 MG/DL (ref 1.6–2.6)
MCH RBC QN AUTO: 31.9 PG (ref 25.2–33.5)
MCHC RBC AUTO-ENTMCNC: 31.5 G/DL (ref 28.4–34.8)
MCV RBC AUTO: 101.3 FL (ref 82.6–102.9)
MONOCYTES # BLD: 6 % (ref 1–7)
NEGATIVE BASE EXCESS, ART: 3 (ref 0–2)
NEGATIVE BASE EXCESS, ART: 4 (ref 0–2)
NEGATIVE BASE EXCESS, ART: 4 (ref 0–2)
NRBC AUTOMATED: 0 PER 100 WBC
O2 DEVICE/FLOW/%: ABNORMAL
PATIENT TEMP: 37
PATIENT TEMP: 99.1
PATIENT TEMP: ABNORMAL
PDW BLD-RTO: 17.2 % (ref 11.8–14.4)
PHOSPHORUS: 3.4 MG/DL (ref 2.5–4.5)
PLATELET # BLD: 84 K/UL (ref 138–453)
PLATELET ESTIMATE: ABNORMAL
PMV BLD AUTO: 12.3 FL (ref 8.1–13.5)
POC HCO3: 22.2 MMOL/L (ref 22–27)
POC HCO3: 22.8 MMOL/L (ref 22–27)
POC HCO3: 23.8 MMOL/L (ref 22–27)
POC O2 SATURATION: 97 %
POC O2 SATURATION: 97 %
POC O2 SATURATION: 98 %
POC PCO2 TEMP: ABNORMAL MM HG
POC PCO2: 41 MM HG (ref 32–45)
POC PCO2: 46 MM HG (ref 32–45)
POC PCO2: 48 MM HG (ref 32–45)
POC PH TEMP: ABNORMAL
POC PH: 7.3 (ref 7.35–7.45)
POC PH: 7.3 (ref 7.35–7.45)
POC PH: 7.34 (ref 7.35–7.45)
POC PO2 TEMP: ABNORMAL MM HG
POC PO2: 104 MM HG (ref 75–95)
POC PO2: 98 MM HG (ref 75–95)
POC PO2: 99 MM HG (ref 75–95)
POSITIVE BASE EXCESS, ART: ABNORMAL (ref 0–2)
POTASSIUM SERPL-SCNC: 4.1 MMOL/L (ref 3.7–5.3)
RBC # BLD: 3.07 M/UL (ref 4.21–5.77)
RBC # BLD: ABNORMAL 10*6/UL
SEG NEUTROPHILS: 86 % (ref 36–66)
SEGMENTED NEUTROPHILS ABSOLUTE COUNT: 6.37 K/UL (ref 1.8–7.7)
SODIUM BLD-SCNC: 139 MMOL/L (ref 135–144)
TCO2 (CALC), ART: 23 MMOL/L (ref 23–28)
TCO2 (CALC), ART: 24 MMOL/L (ref 23–28)
TCO2 (CALC), ART: 25 MMOL/L (ref 23–28)
TOTAL PROTEIN: 5.1 G/DL (ref 6.4–8.3)
TRANSFUSION STATUS: NORMAL
TRANSFUSION STATUS: NORMAL
TRIGL SERPL-MCNC: 101 MG/DL
UNIT DIVISION: 0
UNIT DIVISION: 0
UNIT NUMBER: NORMAL
UNIT NUMBER: NORMAL
UNIT TAG COMMENT: NORMAL
UNIT TAG COMMENT: NORMAL
WBC # BLD: 7.4 K/UL (ref 3.5–11.3)
WBC # BLD: ABNORMAL 10*3/UL

## 2019-05-02 PROCEDURE — C9113 INJ PANTOPRAZOLE SODIUM, VIA: HCPCS | Performed by: SURGERY

## 2019-05-02 PROCEDURE — 85018 HEMOGLOBIN: CPT

## 2019-05-02 PROCEDURE — 94761 N-INVAS EAR/PLS OXIMETRY MLT: CPT

## 2019-05-02 PROCEDURE — 37799 UNLISTED PX VASCULAR SURGERY: CPT

## 2019-05-02 PROCEDURE — 36600 WITHDRAWAL OF ARTERIAL BLOOD: CPT

## 2019-05-02 PROCEDURE — 6370000000 HC RX 637 (ALT 250 FOR IP): Performed by: SURGERY

## 2019-05-02 PROCEDURE — 85014 HEMATOCRIT: CPT

## 2019-05-02 PROCEDURE — 6360000002 HC RX W HCPCS: Performed by: SURGERY

## 2019-05-02 PROCEDURE — 94770 HC ETCO2 MONITOR DAILY: CPT

## 2019-05-02 PROCEDURE — 84100 ASSAY OF PHOSPHORUS: CPT

## 2019-05-02 PROCEDURE — 84478 ASSAY OF TRIGLYCERIDES: CPT

## 2019-05-02 PROCEDURE — 36415 COLL VENOUS BLD VENIPUNCTURE: CPT

## 2019-05-02 PROCEDURE — 80053 COMPREHEN METABOLIC PANEL: CPT

## 2019-05-02 PROCEDURE — 82803 BLOOD GASES ANY COMBINATION: CPT

## 2019-05-02 PROCEDURE — 6360000002 HC RX W HCPCS: Performed by: INTERNAL MEDICINE

## 2019-05-02 PROCEDURE — 85025 COMPLETE CBC W/AUTO DIFF WBC: CPT

## 2019-05-02 PROCEDURE — 71045 X-RAY EXAM CHEST 1 VIEW: CPT

## 2019-05-02 PROCEDURE — 2580000003 HC RX 258: Performed by: INTERNAL MEDICINE

## 2019-05-02 PROCEDURE — 83735 ASSAY OF MAGNESIUM: CPT

## 2019-05-02 PROCEDURE — 2000000000 HC ICU R&B

## 2019-05-02 PROCEDURE — 82947 ASSAY GLUCOSE BLOOD QUANT: CPT

## 2019-05-02 PROCEDURE — 99232 SBSQ HOSP IP/OBS MODERATE 35: CPT | Performed by: INTERNAL MEDICINE

## 2019-05-02 PROCEDURE — C9113 INJ PANTOPRAZOLE SODIUM, VIA: HCPCS | Performed by: INTERNAL MEDICINE

## 2019-05-02 PROCEDURE — 94640 AIRWAY INHALATION TREATMENT: CPT

## 2019-05-02 PROCEDURE — 2500000003 HC RX 250 WO HCPCS: Performed by: SURGERY

## 2019-05-02 PROCEDURE — 94003 VENT MGMT INPAT SUBQ DAY: CPT

## 2019-05-02 PROCEDURE — 2580000003 HC RX 258: Performed by: SURGERY

## 2019-05-02 RX ORDER — HEPARIN SODIUM 5000 [USP'U]/ML
5000 INJECTION, SOLUTION INTRAVENOUS; SUBCUTANEOUS EVERY 8 HOURS SCHEDULED
Status: DISCONTINUED | OUTPATIENT
Start: 2019-05-02 | End: 2019-05-07 | Stop reason: DRUGHIGH

## 2019-05-02 RX ORDER — FENTANYL CITRATE 50 UG/ML
50 INJECTION, SOLUTION INTRAMUSCULAR; INTRAVENOUS
Status: DISCONTINUED | OUTPATIENT
Start: 2019-05-02 | End: 2019-05-10 | Stop reason: HOSPADM

## 2019-05-02 RX ORDER — 0.9 % SODIUM CHLORIDE 0.9 %
10 VIAL (ML) INJECTION 2 TIMES DAILY
Status: DISCONTINUED | OUTPATIENT
Start: 2019-05-02 | End: 2019-05-08

## 2019-05-02 RX ORDER — DIGOXIN 125 MCG
125 TABLET ORAL DAILY
Status: DISCONTINUED | OUTPATIENT
Start: 2019-05-03 | End: 2019-05-03

## 2019-05-02 RX ORDER — PANTOPRAZOLE SODIUM 40 MG/10ML
40 INJECTION, POWDER, LYOPHILIZED, FOR SOLUTION INTRAVENOUS 2 TIMES DAILY
Status: DISCONTINUED | OUTPATIENT
Start: 2019-05-02 | End: 2019-05-08

## 2019-05-02 RX ADMIN — Medication 75 MCG/HR: at 07:45

## 2019-05-02 RX ADMIN — CALCIUM GLUCONATE: 94 INJECTION, SOLUTION INTRAVENOUS at 17:53

## 2019-05-02 RX ADMIN — DEXTROSE AND SODIUM CHLORIDE: 5; 450 INJECTION, SOLUTION INTRAVENOUS at 08:37

## 2019-05-02 RX ADMIN — IPRATROPIUM BROMIDE AND ALBUTEROL SULFATE 1 AMPULE: .5; 3 SOLUTION RESPIRATORY (INHALATION) at 07:54

## 2019-05-02 RX ADMIN — PANTOPRAZOLE SODIUM 40 MG: 40 INJECTION, POWDER, FOR SOLUTION INTRAVENOUS at 20:28

## 2019-05-02 RX ADMIN — IPRATROPIUM BROMIDE AND ALBUTEROL SULFATE 1 AMPULE: .5; 3 SOLUTION RESPIRATORY (INHALATION) at 20:32

## 2019-05-02 RX ADMIN — TAZOBACTAM SODIUM AND PIPERACILLIN SODIUM 3.38 G: 375; 3 INJECTION, SOLUTION INTRAVENOUS at 17:53

## 2019-05-02 RX ADMIN — HEPARIN SODIUM 5000 UNITS: 5000 INJECTION, SOLUTION INTRAVENOUS; SUBCUTANEOUS at 18:02

## 2019-05-02 RX ADMIN — INSULIN LISPRO 1 UNITS: 100 INJECTION, SOLUTION INTRAVENOUS; SUBCUTANEOUS at 06:39

## 2019-05-02 RX ADMIN — PROPOFOL 10 MCG/KG/MIN: 10 INJECTION, EMULSION INTRAVENOUS at 17:53

## 2019-05-02 RX ADMIN — IPRATROPIUM BROMIDE AND ALBUTEROL SULFATE 1 AMPULE: .5; 3 SOLUTION RESPIRATORY (INHALATION) at 14:55

## 2019-05-02 RX ADMIN — IPRATROPIUM BROMIDE AND ALBUTEROL SULFATE 1 AMPULE: .5; 3 SOLUTION RESPIRATORY (INHALATION) at 12:05

## 2019-05-02 RX ADMIN — PANTOPRAZOLE SODIUM 8 MG/HR: 40 INJECTION, POWDER, FOR SOLUTION INTRAVENOUS at 08:11

## 2019-05-02 RX ADMIN — PROPOFOL 10 MCG/KG/MIN: 10 INJECTION, EMULSION INTRAVENOUS at 04:13

## 2019-05-02 RX ADMIN — TAZOBACTAM SODIUM AND PIPERACILLIN SODIUM 3.38 G: 375; 3 INJECTION, SOLUTION INTRAVENOUS at 08:11

## 2019-05-02 RX ADMIN — TAZOBACTAM SODIUM AND PIPERACILLIN SODIUM 3.38 G: 375; 3 INJECTION, SOLUTION INTRAVENOUS at 01:58

## 2019-05-02 RX ADMIN — Medication 10 ML: at 20:28

## 2019-05-02 ASSESSMENT — PULMONARY FUNCTION TESTS
PIF_VALUE: 24
PIF_VALUE: 21
PIF_VALUE: 27
PIF_VALUE: 12
PIF_VALUE: 20
PIF_VALUE: 12
PIF_VALUE: 20
PIF_VALUE: 21

## 2019-05-02 NOTE — PROGRESS NOTES
Pulmonary Critical Care Progress Note  Yeni Rodriguez M.D. Patient seen for the follow up of respiratory failure, SBO (small bowel obstruction) (HCC)     Subjective:  He is on CPAP since 9:30 this morning. He denies any chest pain. He is awake on small dose of Diprivan. He was started on TPN yesterday. He has good urine output. NG tube remains under LIS     Examination:  Vitals: BP (!) 148/56   Pulse 76   Temp 99.4 °F (37.4 °C) (Axillary)   Resp 17   Ht 5' 9\" (1.753 m)   Wt 237 lb 14.4 oz (107.9 kg)   SpO2 97%   BMI 35.13 kg/m²     General appearance: Mildly sedated on ventilator  Neck: No JVD  Lungs: Moderate air exchange, occasional rhonchi  Heart: regular rate and rhythm, S1, S2 normal, no gallop  Abdomen: Distended, tender  Extremities: no cyanosis or clubbing. Mild edema    LABs:  CBC:   Recent Labs     05/01/19  0502  05/01/19  1843 05/02/19  0702   WBC 9.0  --   --  7.4   HGB 9.7*   < > 9.9* 9.8*   HCT 29.2*   < > 31.1* 31.1*   PLT 84*  --   --  84*    < > = values in this interval not displayed. BMP:   Recent Labs     05/01/19  0502 05/02/19  0702    139   K 4.4 4.1   CO2 21 23   BUN 30* 34*   CREATININE 1.16 1.55*   LABGLOM >60 46*   GLUCOSE 144* 137*     PT/INR:   Recent Labs     04/30/19  1612   PROTIME 10.3   INR 1.0     LIVER PROFILE:  Recent Labs     05/02/19  0702   AST 36   *   LABALBU 2.3*     Radiology:  5/2/19      Impression:  · Acute postoperative respiratory insufficiency  · SBO with ischemic bowel s/p exploratory lap with bowel resection 4/28/19   · Shock/Sepsis secondary to above  · Recurrent Pulmonary Embolism  · Pulmonary infiltrates atelectasis vs pneumonia   · Adenocarcinoma of small bowel, s/p bowel resection and chemo in 2014  ?  Gastric polyp biopsy 2/7/19  + Moderately differentiated adenocarcinoma  · Obesity/Obstructive sleep apnea ~ wears O2 at bedtime  · Chronic combined diastolic/systolic heart failure ~ EF 25-30%  · Pulmonary Hypertension ~ RVSP 41  · Upper GI bleed s/p microcoil embolization    Recommendations:  · Continue CPAP trial.  Obtain ABGs and endotracheal tube cuff leak. If acceptable Will proceed with extubation.   · Discontinue Sedation with propofol  · IV D5W half-normal saline at 75 ML per hour, total fluid 125 ML's per hour  · Pain control, discontinue fentanyl drip and start when necessary IV push  · Continue IV Zosyn  · Protonix gtt, GI on consult   · NG LIS  · Continue TPN  · Albuterol and Ipratropium Q 4 hours and prn  · Monitor hemoglobin  · X-ray chest in am  · Labs: CBC and BMP in am  · DVT prophylaxis with EPC cuffs, will start SQ heparin after getting clearance from GI  · Will follow with you    Electronically signed by     Finn Narvaez MD on 5/2/2019 at 11:40 AM  Pulmonary Critical Care and Sleep Medicine,  [facility]  Cell: [phone]  Office: [phone]

## 2019-05-02 NOTE — PROGRESS NOTES
05/02/19 0931   Vent Information   Vent Type Servo i   Vent Mode CPAP   Pressure Support 6 cmH20   FiO2  30 %   Sensitivity 5   PEEP/CPAP 6   started CPAP trial, tolerating well, RN informed.  Will continue to monitor

## 2019-05-02 NOTE — PROGRESS NOTES
Nutrition Assessment (Parenteral Nutrition)    Type and Reason for Visit: Reassess    Nutrition Recommendations: 1. Suggest continuing with NPO status. 2. Consider increasing PN Adult 2-in-1 Central Line (Custom) to be @ 50 ml/hr x 24 hrs, without IV Fat Emulsions. Include Additives: increase potassium acetate 0 to 5 meq, increase calcium gluconate 5 to 8 meq, decrease MVI 10 to 0 ml, decrease trace elements 1 to 0 ml. Nutrition Assessment: Patient slowly improving from a nutritional standpoint as evidenced by tolerating TPN at 41.7 ml/hr. Remains at risk for nutritional compromise related to unintentional 27 lb (10%) weight loss x1.5 months, mild fluid accumulation, and less than 75% energy intake x7 days. Suggest continuing with TPN and increase rate to 50 ml/hr, which will provide 1,056 kcal (48% of estimated kcal needs) and 60 g protein (63% of estimated protein needs) per day. Note IV Propofol running at 6.5 ml/hr and IV D5% running at 75 ml/hr, which contributes an additional 478 kcal/d. Communicated with Pharmacist regarding the patient and TPN suggested changes. Malnutrition Assessment:  · Malnutrition Status: Meets the criteria for moderate malnutrition  · Context: Acute illness or injury  · Findings of the 6 clinical characteristics of malnutrition (Minimum of 2 out of 6 clinical characteristics is required to make the diagnosis of moderate or severe Protein Calorie Malnutrition based on AND/ASPEN Guidelines):  1. Energy Intake-Less than or equal to 50% of estimated energy requirement, Greater than or equal to 7 days    2. Weight Loss-10% loss or greater, (1.5 months)  3. Fat Loss-Unable to assess  4. Muscle Loss-Unable to assess  5. Fluid Accumulation-Mild fluid accumulation, Extremities  6.   Strength-Not measured    Nutrition Risk Level: High    Nutrient Needs:  · Estimated Daily Total Kcal: 2,150-2,300 kcal (post-op) (20-22 kcal/kg)  · Estimated Daily Protein (g):  g (post-op) Wound Healing, I&O, Weight, Pertinent Labs, Monitor Bowel Function      Electronically signed by Miri Nichole RD, SHONDA on 5/2/19 at 10:13 AM    Contact Number: [phone]

## 2019-05-02 NOTE — PROGRESS NOTES
Physical Therapy  DATE: 2019    NAME: Nishant Jon  MRN: [id number]   : 1955    Patient not seen this date for Physical Therapy due to:  [] Blood transfusion in progress  [] Cancel by RN  [] Hemodialysis  []  Refusal by Patient   [] Spine Precautions   [] Strict Bedrest  [] Surgery  [] Testing      [x] Other: Vent        [] PT being discontinued at this time. Patient independent. No further needs. [] PT being discontinued at this time as the patient has been transferred to hospice care. No further needs.     Radhika Torres, PT

## 2019-05-02 NOTE — PROGRESS NOTES
HYDROmorphone (DILAUDID) injection 0.25 mg Q3H PRN   Or    HYDROmorphone (DILAUDID) injection 0.5 mg Q3H PRN   ondansetron (ZOFRAN-ODT) disintegrating tablet 4 mg Q6H PRN   Or    ondansetron (ZOFRAN) injection 4 mg Q6H PRN   norepinephrine (LEVOPHED) 16 mg in dextrose 5 % 250 mL infusion Continuous   midazolam (VERSED) 100 mg in dextrose 5% 100 mL infusion Continuous   aluminum & magnesium hydroxide-simethicone (MAALOX) 200-200-20 MG/5ML suspension 30 mL Q6H PRN   ipratropium-albuterol (DUONEB) nebulizer solution 1 ampule Q4H While awake   aspirin EC tablet 81 mg Daily   atorvastatin (LIPITOR) tablet 40 mg Nightly   dibucaine 1 % rectal ointment 4x Daily PRN   DULoxetine (CYMBALTA) extended release capsule 20 mg Daily   ferrous sulfate EC tablet 325 mg Daily with breakfast   nitroGLYCERIN (NITROSTAT) SL tablet 0.4 mg Q5 Min PRN   vitamin B-6 (PYRIDOXINE) tablet 25 mg Daily   QUEtiapine (SEROQUEL) tablet 25 mg BID   senna (SENOKOT) tablet 8.6 mg Daily   potassium chloride (KLOR-CON M) extended release tablet 40 mEq PRN   Or    potassium bicarb-citric acid (EFFER-K) effervescent tablet 40 mEq PRN   Or    potassium chloride 10 mEq/100 mL IVPB (Peripheral Line) PRN   magnesium sulfate 1 g in dextrose 5% 100 mL IVPB PRN   magnesium hydroxide (MILK OF MAGNESIA) 400 MG/5ML suspension 30 mL Daily PRN   nicotine (NICODERM CQ) 21 MG/24HR 1 patch Daily PRN   acetaminophen (TYLENOL) tablet 650 mg Q4H PRN   phenol 1.4 % mouth spray 1 spray Q2H PRN       Data:     Code Status:  Full Code    Family History   Problem Relation Age of Onset    Heart Disease Father        Social History     Socioeconomic History    Marital status: Single     Spouse name: Not on file    Number of children: Not on file    Years of education: Not on file    Highest education level: Not on file   Occupational History    Not on file   Social Needs    Financial resource strain: Not on file    Food insecurity:     Worry: Not on file     Inability: Not on file    Transportation needs:     Medical: Not on file     Non-medical: Not on file   Tobacco Use    Smoking status: Never Smoker    Smokeless tobacco: Never Used   Substance and Sexual Activity    Alcohol use: No    Drug use: No    Sexual activity: Not on file   Lifestyle    Physical activity:     Days per week: Not on file     Minutes per session: Not on file    Stress: Not on file   Relationships    Social connections:     Talks on phone: Not on file     Gets together: Not on file     Attends Advent service: Not on file     Active member of club or organization: Not on file     Attends meetings of clubs or organizations: Not on file     Relationship status: Not on file    Intimate partner violence:     Fear of current or ex partner: Not on file     Emotionally abused: Not on file     Physically abused: Not on file     Forced sexual activity: Not on file   Other Topics Concern    Not on file   Social History Narrative    Not on file       Vitals:  BP (!) 169/73   Pulse 101   Temp 98.8 °F (37.1 °C) (Oral)   Resp 21   Ht 5' 9\" (1.753 m)   Wt 237 lb 14.4 oz (107.9 kg)   SpO2 98%   BMI 35.13 kg/m²   Temp (24hrs), Av.3 °F (37.4 °C), Min:98.8 °F (37.1 °C), Max:99.9 °F (37.7 °C)    Recent Labs     19  1750 19  0126 19  1132   POCGLU 87 98 117*       I/O (24Hr):     Intake/Output Summary (Last 24 hours) at 2019 1444  Last data filed at 2019 1001  Gross per 24 hour   Intake 3188 ml   Output 4810 ml   Net -1622 ml       Labs:      CBC: Lab Results   Component Value Date    WBC 7.4 2019    RBC 3.07 2019    HGB 10.2 2019    HCT 32.4 2019    .3 2019    MCH 31.9 2019    MCHC 31.5 2019    RDW 17.2 2019    PLT 84 2019    MPV 12.3 2019     CBC with Differential:  Lab Results   Component Value Date    WBC 7.4 2019    RBC 3.07 2019    HGB 10.2 2019    HCT 32.4 2019    PLT 84 2019 .3 05/02/2019    MCH 31.9 05/02/2019    MCHC 31.5 05/02/2019    RDW 17.2 05/02/2019    NRBC 2 11/02/2012    LYMPHOPCT 8 05/02/2019    MONOPCT 6 05/02/2019    BASOPCT 0 05/02/2019    MONOSABS 0.44 05/02/2019    LYMPHSABS 0.59 05/02/2019    EOSABS 0.00 05/02/2019    BASOSABS 0.00 05/02/2019    DIFFTYPE NOT REPORTED 05/02/2019     Hemoglobin/Hematocrit:  Lab Results   Component Value Date    HGB 10.2 05/02/2019    HCT 32.4 05/02/2019     CMP:  Lab Results   Component Value Date     05/02/2019    K 4.1 05/02/2019     05/02/2019    CO2 23 05/02/2019    BUN 34 05/02/2019    CREATININE 1.55 05/02/2019    GFRAA 55 05/02/2019    LABGLOM 46 05/02/2019    GLUCOSE 137 05/02/2019    PROT 5.1 05/02/2019    PROT 4.4 11/03/2012    LABALBU 2.3 05/02/2019    CALCIUM 7.9 05/02/2019    BILITOT 0.88 05/02/2019    ALKPHOS 54 05/02/2019    AST 36 05/02/2019     05/02/2019     BMP:  Lab Results   Component Value Date     05/02/2019    K 4.1 05/02/2019     05/02/2019    CO2 23 05/02/2019    BUN 34 05/02/2019    LABALBU 2.3 05/02/2019    CREATININE 1.55 05/02/2019    CALCIUM 7.9 05/02/2019    GFRAA 55 05/02/2019    LABGLOM 46 05/02/2019    GLUCOSE 137 05/02/2019     PT/INR:    Lab Results   Component Value Date    PROTIME 10.3 04/30/2019    INR 1.0 04/30/2019     PTT:    Lab Results   Component Value Date    APTT 31.5 04/28/2019   [APTT}    Physical Examination:        General appearance: intubated and sedated    Abdomen:post op belly    Assessment:        Primary Problem  SBO (small bowel obstruction) ([facility]     Active Hospital Problems    Diagnosis Date Noted    Moderate [facility] ([facility] [E44.0] 05/02/2019    Coffee ground emesis [K92.0]     SBO (small bowel obstruction) ([facility] [K56.609] 04/25/2019    Major depressive disorder, recurrent, in full remission ([facility] [F33.42] 08/30/2018    Chronic combined systolic and diastolic congestive heart failure ([facility] [I50.42] 06/22/2017    Essential hypertension [I10] 05/05/2017    Chronic atrial fibrillation (HCC) [I48.2]     GUME (obstructive sleep apnea) [G47.33]     Pulmonary embolism without acute cor pulmonale (HCC) [I26.99] 02/23/2017    Obesity (BMI 30-39. 9) [E66.9]     Small bowel cancer ([facility] [facility] [C17.9]      Past Medical History:   Diagnosis Date    Asthma     [facility] ([facility]     small intestine    CHF (congestive heart failure) (HCC)     COPD exacerbation (HCC)     GERD (gastroesophageal reflux disease)     History of blood transfusion     Hyperlipidemia     Hypertension     Neuropathy     Obesity (BMI 30-39.9) 2/25/2016    Psychiatric problem     SBO (small bowel obstruction) ([facility]     Severe mitral regurgitation 10/31/12    minimally invasive complex mitral valve repair     Severe tricuspid regurgitation 10/31/12    tricuspid repair under CPB        Plan:        1. Change PPI to BID IV  2. [id number] Sonya Galloway for anti coagulation   3. Will watch  4.  F/u HGB    d/W Nursing Staff in ICU   Will F/U with you  Please call or Page for any issues or change in status  Thanks    Electronically signed by Deedee Ruth MD on 5/2/2019 at 2:44 PM

## 2019-05-02 NOTE — PROGRESS NOTES
[facility]                 Progress Note      Date:  5/2/2019  Patient: Deborah Anthony  Admission:  4/25/2019  4:57 PM  Admit DX: SBO (small bowel obstruction) ([facility] [R30.639]  Age:  61 y.o., 1955     LOS: 7 days           SUBJECTIVE:     The patient was seen and examined. Notes and labs reviewed. Patient remains intubated and sedated, he is maintaining sinus rhythm with HR 70-72 overnight, Hemodynamically stable, Cr is elevated at 1.55 this AM, urine output is fair, 3188/4060 cc over last 24 hours    Getting TPN    OBJECTIVE:    Telemetry: Sinus  BP (!) 167/69   Pulse 86   Temp 99.4 °F (37.4 °C) (Axillary)   Resp 20   Ht 5' 9\" (1.753 m)   Wt 237 lb 14.4 oz (107.9 kg)   SpO2 99%   BMI 35.13 kg/m²     EXAM:  CONSTITUTIONAL:  Intubated and sedated   HEENT: Normal jugular venous pulsations, no carotid bruits. LUNGS: auscultation: clear to auscultation bilaterally and normal percussion bilaterally, reduced air entry at bases. CARDIOVASCULAR:  Normal apical impulse, regular rate and rhythm, no murmur appreciated   ABDOMEN: soft, midline incision with dressing with sluggish bowel sounds   SKIN: Warm and dry. EXTREMITIES: No lower extremity pitting edema. No cyanosis or clubbing.     Current Inpatient Medications:   heparin (porcine)  5,000 Units Subcutaneous 3 times per day    [START ON 5/3/2019] digoxin  125 mcg Oral Daily    insulin lispro  0-6 Units Subcutaneous 4 times per day    metoprolol succinate  25 mg Oral Daily    amiodarone  200 mg Oral Daily    sodium chloride  500 mL Intravenous Once    piperacillin-tazobactam  3.375 g Intravenous Q8H    sodium chloride flush  10 mL Intravenous 2 times per day    ipratropium-albuterol  1 ampule Inhalation Q4H While awake    aspirin  81 mg Oral Daily    atorvastatin  40 mg Oral Nightly    DULoxetine  20 mg Oral Daily    ferrous sulfate  325 mg Oral Daily with breakfast    pyridoxine  25 mg Oral Daily    timothy    Pulmonary embolism without acute cor pulmonale (HCC)    Major depressive disorder with current active episode    GUME (obstructive sleep apnea)    Chronic atrial fibrillation ([facility]    AICD discharge    History of pulmonary embolism    Essential hypertension    Neuropathy    Chronic combined systolic and diastolic congestive heart failure (HCC)    Major depressive disorder, recurrent, in full remission ([facility]    Acute systolic CHF (congestive heart failure) (HCC)    Acute on chronic congestive heart failure (HCC)    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 Cath done in 2016 showed mild CAD. No signs of volume overload. · PAF. Currently in NSR. Had episodes of bradycardia and his pacing threshold was increased to 60 as lower limits, currently in NSR at rate of 70-80. · Post op Respiratory failure on vent   · Moderate Mitral stenosis and regurgitation. · Hx of adenocarcinoma of small bowel  · Upper GI bleeding s/p micorcoiling  · Thrombocytopenia  · JERRELL         PLAN:  1. Continue Amiodarone and Toprol XL  2. Decrease digoxin dose to 125 mcg daily due to JERRELL and concomitant use of amiodarone  3. Monitor strict Is/Os, renal function and electrolytes. No signs of volume overload on examination and CXR   4. Not on anticoagulation due to Recent GI bleed and thrombocytopenia, will resume anticoagulation when cleared by GI and surgery. 5. Repeat echo reviewed which shows improvement in LVEF from 11/2018 to ~ 45%.        Electronically signed by Sarah Swift MD on 5/2/2019 at 9:22 AM

## 2019-05-02 NOTE — PROGRESS NOTES
day    metoprolol succinate  25 mg Oral Daily    amiodarone  200 mg Oral Daily    sodium chloride  500 mL Intravenous Once    piperacillin-tazobactam  3.375 g Intravenous Q8H    sodium chloride flush  10 mL Intravenous 2 times per day    ipratropium-albuterol  1 ampule Inhalation Q4H While awake    aspirin  81 mg Oral Daily    atorvastatin  40 mg Oral Nightly    DULoxetine  20 mg Oral Daily    ferrous sulfate  325 mg Oral Daily with breakfast    pyridoxine  25 mg Oral Daily    QUEtiapine  25 mg Oral BID    senna  1 tablet Oral Daily     Continuous Infusions:    PN-Adult 2-in-1 Central Line (Standard) 41.7 mL/hr at 05/01/19 1828    dextrose      vasopressin (Septic Shock) infusion Stopped (05/01/19 1531)    fentaNYL 25 mcg/hr (05/02/19 1243)    propofol Stopped (05/02/19 1146)    dextrose 5 % and 0.45 % NaCl 75 mL/hr at 05/02/19 0837    pantoprozole (PROTONIX) infusion 8 mg/hr (05/02/19 0811)    norepinephrine Stopped (05/01/19 0259)    midazolam (VERSED) 100 mg in dextrose 5% 100 mL infusion Stopped (04/30/19 1039)     PRN Meds: fentanNYL, glucose, dextrose, glucagon (rDNA), dextrose, sodium chloride flush, oxyCODONE-acetaminophen **OR** oxyCODONE-acetaminophen, albuterol, HYDROmorphone **OR** HYDROmorphone, ondansetron **OR** ondansetron, aluminum & magnesium hydroxide-simethicone, dibucaine, nitroGLYCERIN, potassium chloride **OR** potassium alternative oral replacement **OR** potassium chloride, magnesium sulfate, magnesium hydroxide, nicotine, acetaminophen, phenol    Data:     Past Medical History:   has a past medical history of Asthma, Cancer ([facility] CHF (congestive heart failure) ([facility] COPD exacerbation ([facility] [address] GERD (gastroesophageal reflux disease), History of blood transfusion, Hyperlipidemia, Hypertension, Neuropathy, Obesity (BMI 30-39.9), Psychiatric problem, SBO (small bowel obstruction) ([facility] [address] Severe mitral regurgitation, and Severe tricuspid regurgitation.     Social History: pelvis was performed with the administration of intravenous contrast. Multiplanar reformatted images are provided for review. Dose modulation, iterative reconstruction, and/or weight based adjustment of the mA/kV was utilized to reduce the radiation dose to as low as reasonably achievable. COMPARISON: CT abdomen and pelvis performed 02/23/2017. HISTORY: ORDERING SYSTEM PROVIDED HISTORY: abdominal pain TECHNOLOGIST PROVIDED HISTORY: FINDINGS: Lower Chest: Infiltrates are seen in the lung bases bilaterally that is worse on the left compared to the right. The heart is enlarged. Organs: The liver and spleen are normal size and overall attenuation. There are hepatic granulomas. The gallbladder, pancreas, and adrenal glands are unremarkable. There is no obstructive uropathy. There are bilateral nonobstructing renal stones. The largest stone is seen on the right measuring approximately 5 mm in diameter. There are bilateral renal cysts. The ureters are not dilated. The urinary bladder is unremarkable. GI/Bowel: The stomach is mildly distended and fluid-filled. Loops of small bowel are mildly distended and fluid-filled in the proximal and mid aspect. The distal and terminal ileum is decompressed. The colon is decompressed containing residual air and fecal contents. There is no intraperitoneal free air or free fluid. Pelvis: The prostate gland and seminal vesicles are unremarkable. Phleboliths are seen in the pelvis. Peritoneum/Retroperitoneum: The psoas muscles are symmetric. The abdominal aorta is normal in caliber. The inferior vena cava is unremarkable. There is no retroperitoneal or mesenteric adenopathy. Bones/Soft Tissues: The extra-abdominal soft tissues are unremarkable. There is no acute osseous abnormality. Mildly prominent fluid-filled loops of small bowel proximally and within the mid aspect with decompressed distal and terminal ileal loops.   This may relate to a degree of obstruction versus an ileus and correlation is needed. Infiltrates in the lung bases bilaterally that may represent atelectasis versus pneumonia. Nonobstructing renal stones bilaterally. Xr Chest Portable    Result Date: 4/27/2019  EXAMINATION: SINGLE XRAY VIEW OF THE CHEST 4/27/2019 6:22 am COMPARISON: 04/25/2019. HISTORY: ORDERING SYSTEM PROVIDED HISTORY: pleural effusion, infiltrates TECHNOLOGIST PROVIDED HISTORY: pleural effusion, infiltrates Ordering Physician Provided Reason for Exam: plural effusion infiltrate Acuity: Unknown Type of Exam: Initial FINDINGS: A left cardiac device is unchanged in position. An enteric tube is seen coursing below the diaphragm. The cardiac silhouette is persistently enlarged. There is prominence of the central pulmonary vasculature. No convincing focal consolidation, pleural effusion or pneumothorax. 1. Persistent enlargement of the cardiac silhouette with prominence of the central pulmonary vasculature. 2. No convincing pleural effusion or pneumothorax. Xr Chest Portable    Result Date: 4/25/2019  EXAMINATION: SINGLE XRAY VIEW OF THE CHEST 4/25/2019 5:56 pm COMPARISON: Chest radiograph performed 11/03/2018. HISTORY: ORDERING SYSTEM PROVIDED HISTORY: shortness of breath TECHNOLOGIST PROVIDED HISTORY: shortness of breath Ordering Physician Provided Reason for Exam: chest pain Acuity: Acute Type of Exam: Initial Additional signs and symptoms: SOB Relevant Medical/Surgical History: CHF, COPD FINDINGS: There is chronic pulmonary change. There may be small bibasilar effusions with adjacent atelectasis. There is no pneumothorax. The heart is enlarged with stable cardiac lead. The upper abdomen is unremarkable. The extrathoracic soft tissues are unremarkable. Cardiomegaly with small bibasilar effusions and adjacent atelectasis.      Ct Chest Pulmonary Embolism W Contrast    Result Date: 4/25/2019  EXAMINATION: CTA OF THE CHEST 4/25/2019 6:10 pm TECHNIQUE: CTA of the chest was performed after the administration of intravenous contrast.  Multiplanar reformatted images are provided for review. MIP images are provided for review. Dose modulation, iterative reconstruction, and/or weight based adjustment of the mA/kV was utilized to reduce the radiation dose to as low as reasonably achievable. COMPARISON: None. HISTORY: ORDERING SYSTEM PROVIDED HISTORY: sob, history of CA FINDINGS: Pulmonary Arteries: Pulmonary arteries are adequately opacified for evaluation. There is an intraluminal filling defect within a subsegmental branch supplying the left lower lobe medially. The remainder of the arterial branches appear patent. Main pulmonary artery is normal in caliber. Mediastinum: No evidence of mediastinal lymphadenopathy. The heart and pericardium demonstrate no acute abnormality. The heart is enlarged. There is no acute abnormality of the thoracic aorta. Lungs/pleura: There is linear atelectasis versus scarring in the right lung posteriorly. There is dependent atelectasis in the left lung. There is a small right basilar effusion. There is no pneumothorax. The tracheobronchial tree is patent. Upper Abdomen: Limited images of the upper abdomen are unremarkable. Soft Tissues/Bones: No acute bone or soft tissue abnormality. Mild cardiomegaly with a small right basilar effusion. Filling defect involving a subsegmental branch supplying the left lower lobe medially consistent with an embolism. Critical results were called by Dr. Ronda Arguello to [facility] on 3841 at 18:48.      Physical Examination:        BP (!) 169/73   Pulse 97   Temp 98.8 °F (37.1 °C) (Oral)   Resp 20   Ht 5' 9\" (1.753 m)   Wt 237 lb 14.4 oz (107.9 kg)   SpO2 97%   BMI 35.13 kg/m²   Temp (24hrs), Av.3 °F (37.4 °C), Min:98.8 °F (37.1 °C), Max:99.9 °F (37.7 °C)    Recent Labs     19  1750 19  0126 19  1132   POCGLU 87 98 117*       Intake/Output Summary (Last 24 hours) at 2019 [address] 1428 filed at 5/2/2019 1001  Gross per 24 hour   Intake 3188 ml   Output 4810 ml   Net -1622 ml     General Appearance: sedated, on ventilator   Mental status: un able to wake him up at this time  Head:  normocephalic, atraumatic  Eye: no icterus, redness, pupils equal and reactive  Ear: normal external ear, no discharge, hearing intact  Nose:  no drainage noted  Mouth: mucous membranes moist  Neck: supple, no carotid bruits, thyroid not palpable  Lungs: Bilateral equal air entry, clear to ausculation, no wheezing  Cardiovascular: normal rate, regular rhythm, no murmur, gallop, rub. Abdomen: continued distention of abdomen, midline incision noted  Neurologic: not following any commands  Skin: No gross lesions, rashes, bruising or bleeding on exposed skin area  Extremities:  peripheral pulses palpable, no pedal edema or calf pain with palpation  Psych: distressed affect    Assessment:        Principal Problem:    SBO (small bowel obstruction) (HCC)  Active Problems:    Small bowel cancer (HCC)    Obesity (BMI 30-39. 9)    Pulmonary embolism without acute cor pulmonale (HCC)    GUME (obstructive sleep apnea)    Chronic atrial fibrillation (HCC)    Essential hypertension    Chronic combined systolic and diastolic congestive heart failure (HCC)    Major depressive disorder, recurrent, in full remission ([facility] [address]    Plan:        - GI, primary problem. Small bowel obstruction with necrosis of bowel. Noted Op report with removal of bowel. Surgery on 4/28/2019. POD 5. Continued close follow up. NG tube still present.   - Sepsis, likely from intra abdominal process. Zosyn continued at this time. No fevers, continued   - Post operative ileus  - Pulmonary, acute respiratory insufficiency, post surgery. Continued follow up. PE at admission, anticoagulation once medically stable, can start heparin for DVT prophylaxis today  - Oncology history: Previous small bowel cancer status post surgery and chemotherapy.  Recent diagnosis of gastric cancer from EGD biopsy at [facility]. Staging and treatment   - Hypertension, still on small dose levophed  - GI bleed, on PPI drip that is continued  - JERRELL, likely from fluid changes, clinically looks eu volemic, continued close follow up    Consultations:   SUSAN [address] TO PULMONOLOGY  IP CONSULT TO PULMONOLOGY  IP CONSULT TO GI  IP CONSULT TO VASCULAR SURGERY  IP CONSULT TO CARDIOLOGY  IP CONSULT TO GI  IP CONSULT TO DIETITIAN  IP CONSULT TO Milady Ochoa MD  5/2/2019  1:04 PM

## 2019-05-02 NOTE — PROGRESS NOTES
05/02/19 1402   Vent Information   Vent Type Servo i   Vent Mode PRVC   Vt Ordered 550 mL   Rate Set 20 bmp   FiO2  30 %   Sensitivity 5   PEEP/CPAP 6   I Time/ I Time % 0.9 s   pt /80, abg did not improve, placed pt back on full support

## 2019-05-03 ENCOUNTER — APPOINTMENT (OUTPATIENT)
Dept: GENERAL RADIOLOGY | Age: 64
DRG: 329 | End: 2019-05-03
Payer: MEDICARE

## 2019-05-03 PROBLEM — E43 SEVERE MALNUTRITION (HCC): Status: ACTIVE | Noted: 2019-05-02

## 2019-05-03 LAB
ABSOLUTE EOS #: 0.19 K/UL (ref 0–0.4)
ABSOLUTE IMMATURE GRANULOCYTE: 0 K/UL (ref 0–0.3)
ABSOLUTE LYMPH #: 0.63 K/UL (ref 1–4.8)
ABSOLUTE MONO #: 1.07 K/UL (ref 0.2–0.8)
ANION GAP SERPL CALCULATED.3IONS-SCNC: 9 MMOL/L (ref 9–17)
BASOPHILS # BLD: 1 %
BASOPHILS ABSOLUTE: 0.06 K/UL (ref 0–0.2)
BUN BLDV-MCNC: 33 MG/DL (ref 8–23)
BUN/CREAT BLD: 19 (ref 9–20)
CALCIUM SERPL-MCNC: 8.1 MG/DL (ref 8.6–10.4)
CHLORIDE BLD-SCNC: 114 MMOL/L (ref 98–107)
CO2: 23 MMOL/L (ref 20–31)
CREAT SERPL-MCNC: 1.72 MG/DL (ref 0.7–1.2)
DIFFERENTIAL TYPE: ABNORMAL
EOSINOPHILS RELATIVE PERCENT: 3 % (ref 1–4)
FIO2: 30
FIO2: 30
GFR AFRICAN AMERICAN: 49 ML/MIN
GFR NON-AFRICAN AMERICAN: 40 ML/MIN
GFR SERPL CREATININE-BSD FRML MDRD: ABNORMAL ML/MIN/{1.73_M2}
GFR SERPL CREATININE-BSD FRML MDRD: ABNORMAL ML/MIN/{1.73_M2}
GLUCOSE BLD-MCNC: 104 MG/DL (ref 75–110)
GLUCOSE BLD-MCNC: 108 MG/DL (ref 75–110)
GLUCOSE BLD-MCNC: 120 MG/DL (ref 75–110)
GLUCOSE BLD-MCNC: 121 MG/DL (ref 75–110)
GLUCOSE BLD-MCNC: 130 MG/DL (ref 70–99)
HCT VFR BLD CALC: 31.6 % (ref 40.7–50.3)
HCT VFR BLD CALC: 31.7 % (ref 40.7–50.3)
HEMOGLOBIN: 9.7 G/DL (ref 13–17)
HEMOGLOBIN: 9.8 G/DL (ref 13–17)
IMMATURE GRANULOCYTES: 0 %
LYMPHOCYTES # BLD: 10 % (ref 24–44)
MAGNESIUM: 2.2 MG/DL (ref 1.6–2.6)
MCH RBC QN AUTO: 32 PG (ref 25.2–33.5)
MCHC RBC AUTO-ENTMCNC: 31 G/DL (ref 28.4–34.8)
MCV RBC AUTO: 103.3 FL (ref 82.6–102.9)
MONOCYTES # BLD: 17 % (ref 1–7)
NEGATIVE BASE EXCESS, ART: 3 (ref 0–2)
NEGATIVE BASE EXCESS, ART: ABNORMAL (ref 0–2)
NRBC AUTOMATED: 0 PER 100 WBC
O2 DEVICE/FLOW/%: ABNORMAL
O2 DEVICE/FLOW/%: ABNORMAL
PATIENT TEMP: ABNORMAL
PATIENT TEMP: ABNORMAL
PDW BLD-RTO: 17.2 % (ref 11.8–14.4)
PHOSPHORUS: 3.5 MG/DL (ref 2.5–4.5)
PLATELET # BLD: 98 K/UL (ref 138–453)
PLATELET ESTIMATE: ABNORMAL
PMV BLD AUTO: 12.5 FL (ref 8.1–13.5)
POC HCO3: 22.9 MMOL/L (ref 22–27)
POC HCO3: 26.9 MMOL/L (ref 22–27)
POC O2 SATURATION: 96 %
POC O2 SATURATION: 98 %
POC PCO2 TEMP: ABNORMAL MM HG
POC PCO2 TEMP: ABNORMAL MM HG
POC PCO2: 45 MM HG (ref 32–45)
POC PCO2: 49 MM HG (ref 32–45)
POC PH TEMP: ABNORMAL
POC PH TEMP: ABNORMAL
POC PH: 7.32 (ref 7.35–7.45)
POC PH: 7.35 (ref 7.35–7.45)
POC PO2 TEMP: ABNORMAL MM HG
POC PO2 TEMP: ABNORMAL MM HG
POC PO2: 112 MM HG (ref 75–95)
POC PO2: 90 MM HG (ref 75–95)
POSITIVE BASE EXCESS, ART: 1 (ref 0–2)
POSITIVE BASE EXCESS, ART: ABNORMAL (ref 0–2)
POTASSIUM SERPL-SCNC: 4.1 MMOL/L (ref 3.7–5.3)
RBC # BLD: 3.06 M/UL (ref 4.21–5.77)
RBC # BLD: ABNORMAL 10*6/UL
SEG NEUTROPHILS: 69 % (ref 36–66)
SEGMENTED NEUTROPHILS ABSOLUTE COUNT: 4.35 K/UL (ref 1.8–7.7)
SODIUM BLD-SCNC: 146 MMOL/L (ref 135–144)
TCO2 (CALC), ART: 24 MMOL/L (ref 23–28)
TCO2 (CALC), ART: 28 MMOL/L (ref 23–28)
WBC # BLD: 6.3 K/UL (ref 3.5–11.3)
WBC # BLD: ABNORMAL 10*3/UL

## 2019-05-03 PROCEDURE — 85025 COMPLETE CBC W/AUTO DIFF WBC: CPT

## 2019-05-03 PROCEDURE — 6360000002 HC RX W HCPCS: Performed by: INTERNAL MEDICINE

## 2019-05-03 PROCEDURE — 99232 SBSQ HOSP IP/OBS MODERATE 35: CPT | Performed by: INTERNAL MEDICINE

## 2019-05-03 PROCEDURE — 6370000000 HC RX 637 (ALT 250 FOR IP): Performed by: SURGERY

## 2019-05-03 PROCEDURE — 94770 HC ETCO2 MONITOR DAILY: CPT

## 2019-05-03 PROCEDURE — 6360000002 HC RX W HCPCS: Performed by: SURGERY

## 2019-05-03 PROCEDURE — 94761 N-INVAS EAR/PLS OXIMETRY MLT: CPT

## 2019-05-03 PROCEDURE — 2580000003 HC RX 258: Performed by: INTERNAL MEDICINE

## 2019-05-03 PROCEDURE — 82947 ASSAY GLUCOSE BLOOD QUANT: CPT

## 2019-05-03 PROCEDURE — 2000000000 HC ICU R&B

## 2019-05-03 PROCEDURE — 94003 VENT MGMT INPAT SUBQ DAY: CPT

## 2019-05-03 PROCEDURE — 2580000003 HC RX 258: Performed by: SURGERY

## 2019-05-03 PROCEDURE — 2500000003 HC RX 250 WO HCPCS: Performed by: INTERNAL MEDICINE

## 2019-05-03 PROCEDURE — 82803 BLOOD GASES ANY COMBINATION: CPT

## 2019-05-03 PROCEDURE — 83735 ASSAY OF MAGNESIUM: CPT

## 2019-05-03 PROCEDURE — 6370000000 HC RX 637 (ALT 250 FOR IP): Performed by: INTERNAL MEDICINE

## 2019-05-03 PROCEDURE — 80048 BASIC METABOLIC PNL TOTAL CA: CPT

## 2019-05-03 PROCEDURE — 84100 ASSAY OF PHOSPHORUS: CPT

## 2019-05-03 PROCEDURE — 94640 AIRWAY INHALATION TREATMENT: CPT

## 2019-05-03 PROCEDURE — C9113 INJ PANTOPRAZOLE SODIUM, VIA: HCPCS | Performed by: INTERNAL MEDICINE

## 2019-05-03 PROCEDURE — 2500000003 HC RX 250 WO HCPCS: Performed by: SURGERY

## 2019-05-03 PROCEDURE — 36600 WITHDRAWAL OF ARTERIAL BLOOD: CPT

## 2019-05-03 PROCEDURE — 71045 X-RAY EXAM CHEST 1 VIEW: CPT

## 2019-05-03 PROCEDURE — 37799 UNLISTED PX VASCULAR SURGERY: CPT

## 2019-05-03 RX ORDER — HYDRALAZINE HYDROCHLORIDE 20 MG/ML
10 INJECTION INTRAMUSCULAR; INTRAVENOUS EVERY 4 HOURS PRN
Status: DISCONTINUED | OUTPATIENT
Start: 2019-05-03 | End: 2019-05-10 | Stop reason: HOSPADM

## 2019-05-03 RX ORDER — ALBUTEROL SULFATE 2.5 MG/3ML
2.5 SOLUTION RESPIRATORY (INHALATION) 2 TIMES DAILY
Status: DISCONTINUED | OUTPATIENT
Start: 2019-05-04 | End: 2019-05-05

## 2019-05-03 RX ORDER — METOCLOPRAMIDE HYDROCHLORIDE 5 MG/ML
10 INJECTION INTRAMUSCULAR; INTRAVENOUS EVERY 6 HOURS
Status: DISCONTINUED | OUTPATIENT
Start: 2019-05-03 | End: 2019-05-09

## 2019-05-03 RX ORDER — ALBUTEROL SULFATE 2.5 MG/3ML
2.5 SOLUTION RESPIRATORY (INHALATION)
Status: DISCONTINUED | OUTPATIENT
Start: 2019-05-03 | End: 2019-05-10 | Stop reason: HOSPADM

## 2019-05-03 RX ORDER — METOPROLOL TARTRATE 5 MG/5ML
5 INJECTION INTRAVENOUS EVERY 6 HOURS
Status: DISCONTINUED | OUTPATIENT
Start: 2019-05-03 | End: 2019-05-04

## 2019-05-03 RX ORDER — METOPROLOL TARTRATE 5 MG/5ML
5 INJECTION INTRAVENOUS EVERY 8 HOURS
Status: DISCONTINUED | OUTPATIENT
Start: 2019-05-03 | End: 2019-05-03

## 2019-05-03 RX ORDER — IPRATROPIUM BROMIDE AND ALBUTEROL SULFATE 2.5; .5 MG/3ML; MG/3ML
1 SOLUTION RESPIRATORY (INHALATION)
Status: DISCONTINUED | OUTPATIENT
Start: 2019-05-03 | End: 2019-05-09

## 2019-05-03 RX ADMIN — Medication 10 ML: at 09:42

## 2019-05-03 RX ADMIN — PANTOPRAZOLE SODIUM 40 MG: 40 INJECTION, POWDER, FOR SOLUTION INTRAVENOUS at 09:37

## 2019-05-03 RX ADMIN — Medication 10 ML: at 21:26

## 2019-05-03 RX ADMIN — METOPROLOL TARTRATE 5 MG: 5 INJECTION INTRAVENOUS at 21:26

## 2019-05-03 RX ADMIN — ALBUTEROL SULFATE 2.5 MG: 2.5 SOLUTION RESPIRATORY (INHALATION) at 23:26

## 2019-05-03 RX ADMIN — HEPARIN SODIUM 5000 UNITS: 5000 INJECTION, SOLUTION INTRAVENOUS; SUBCUTANEOUS at 14:54

## 2019-05-03 RX ADMIN — METOPROLOL TARTRATE 5 MG: 5 INJECTION INTRAVENOUS at 09:37

## 2019-05-03 RX ADMIN — HEPARIN SODIUM 5000 UNITS: 5000 INJECTION, SOLUTION INTRAVENOUS; SUBCUTANEOUS at 21:27

## 2019-05-03 RX ADMIN — METOPROLOL TARTRATE 5 MG: 5 INJECTION INTRAVENOUS at 16:49

## 2019-05-03 RX ADMIN — IPRATROPIUM BROMIDE AND ALBUTEROL SULFATE 1 AMPULE: .5; 3 SOLUTION RESPIRATORY (INHALATION) at 16:12

## 2019-05-03 RX ADMIN — METOCLOPRAMIDE 10 MG: 5 INJECTION, SOLUTION INTRAMUSCULAR; INTRAVENOUS at 16:49

## 2019-05-03 RX ADMIN — TAZOBACTAM SODIUM AND PIPERACILLIN SODIUM 3.38 G: 375; 3 INJECTION, SOLUTION INTRAVENOUS at 16:49

## 2019-05-03 RX ADMIN — IPRATROPIUM BROMIDE AND ALBUTEROL SULFATE 1 AMPULE: .5; 3 SOLUTION RESPIRATORY (INHALATION) at 07:45

## 2019-05-03 RX ADMIN — TAZOBACTAM SODIUM AND PIPERACILLIN SODIUM 3.38 G: 375; 3 INJECTION, SOLUTION INTRAVENOUS at 02:14

## 2019-05-03 RX ADMIN — CALCIUM GLUCONATE: 94 INJECTION, SOLUTION INTRAVENOUS at 16:51

## 2019-05-03 RX ADMIN — Medication 10 ML: at 21:27

## 2019-05-03 RX ADMIN — METOCLOPRAMIDE 10 MG: 5 INJECTION, SOLUTION INTRAMUSCULAR; INTRAVENOUS at 21:26

## 2019-05-03 RX ADMIN — HYDRALAZINE HYDROCHLORIDE 10 MG: 20 INJECTION INTRAMUSCULAR; INTRAVENOUS at 19:46

## 2019-05-03 RX ADMIN — IPRATROPIUM BROMIDE AND ALBUTEROL SULFATE 1 AMPULE: .5; 3 SOLUTION RESPIRATORY (INHALATION) at 11:08

## 2019-05-03 RX ADMIN — TAZOBACTAM SODIUM AND PIPERACILLIN SODIUM 3.38 G: 375; 3 INJECTION, SOLUTION INTRAVENOUS at 09:36

## 2019-05-03 RX ADMIN — DEXTROSE AND SODIUM CHLORIDE: 5; 450 INJECTION, SOLUTION INTRAVENOUS at 19:58

## 2019-05-03 RX ADMIN — HEPARIN SODIUM 5000 UNITS: 5000 INJECTION, SOLUTION INTRAVENOUS; SUBCUTANEOUS at 07:46

## 2019-05-03 RX ADMIN — IPRATROPIUM BROMIDE AND ALBUTEROL SULFATE 1 AMPULE: .5; 3 SOLUTION RESPIRATORY (INHALATION) at 19:28

## 2019-05-03 RX ADMIN — PANTOPRAZOLE SODIUM 40 MG: 40 INJECTION, POWDER, FOR SOLUTION INTRAVENOUS at 21:26

## 2019-05-03 RX ADMIN — I.V. FAT EMULSION 100 ML: 20 EMULSION INTRAVENOUS at 16:52

## 2019-05-03 ASSESSMENT — PULMONARY FUNCTION TESTS
PIF_VALUE: 13
PIF_VALUE: 18
PIF_VALUE: 14

## 2019-05-03 NOTE — PROGRESS NOTES
Pulmonary Critical Care Progress Note  Alexey Magana M.D. Patient seen for the follow up of respiratory failure, SBO (small bowel obstruction) (HCC)     Subjective:  He is on CPAP since 9:30 this morning. He denies any chest pain. He is awake on small dose of Diprivan. He was started on TPN yesterday. He has good urine output. NG tube remains under LIS     Examination:  Vitals: BP (!) 160/73   Pulse 75   Temp 99 °F (37.2 °C) (Tympanic)   Resp 19   Ht 5' 9\" (1.753 m)   Wt 237 lb 14.4 oz (107.9 kg)   SpO2 98%   BMI 35.13 kg/m²     General appearance: Mildly sedated on ventilator  Neck: No JVD  Lungs: Moderate air exchange, occasional rhonchi  Heart: regular rate and rhythm, S1, S2 normal, no gallop  Abdomen: Distended, tender  Extremities: no cyanosis or clubbing. Mild edema    LABs:  CBC:   Recent Labs     05/02/19  0702  05/02/19  2355 05/03/19  0420   WBC 7.4  --   --  6.3   HGB 9.8*   < > 9.7* 9.8*   HCT 31.1*   < > 31.7* 31.6*   PLT 84*  --   --  98*    < > = values in this interval not displayed. BMP:   Recent Labs     05/02/19  0702 05/03/19  0420    146*   K 4.1 4.1   CO2 23 23   BUN 34* 33*   CREATININE 1.55* 1.72*   LABGLOM 46* 40*   GLUCOSE 137* 130*     PT/INR:   Recent Labs     04/30/19  1612   PROTIME 10.3   INR 1.0     LIVER PROFILE:  Recent Labs     05/02/19  0702   AST 36   *   LABALBU 2.3*     Radiology:  5/3/19      Impression:  · Acute postoperative respiratory insufficiency  · SBO with ischemic bowel s/p exploratory lap with bowel resection 4/28/19   · Shock/Sepsis secondary to above  · Recurrent Pulmonary Embolism  · Pulmonary infiltrates atelectasis vs pneumonia   · Adenocarcinoma of small bowel, s/p bowel resection and chemo in 2014  ?  Gastric polyp biopsy 2/7/19  + Moderately differentiated adenocarcinoma  · Obesity/Obstructive sleep apnea ~ wears O2 at bedtime  · Chronic combined diastolic/systolic heart failure ~ EF 25-30%  · Pulmonary Hypertension ~ RVSP 41  · Upper GI bleed s/p microcoil embolization    Recommendations:  · Proceed with extubation  · Discontinue Sedation with propofol  · IV D5W half-normal saline at 75 ML per hour, total fluid 125 ML's per hour  · Pain control, discontinue fentanyl drip and start when necessary IV push  · Continue IV Zosyn  · Protonix gtt, GI on consult   · NG LIS  · Continue TPN  · Albuterol and Ipratropium Q 4 hours and prn  · Monitor hemoglobin  · X-ray chest in am  · Labs: CBC and BMP in am  · DVT prophylaxis with EPC cuffs, will start SQ heparin after getting clearance from GI  · Will follow with you    Electronically signed by     Sae Carrero MD on 5/3/2019 at 12:23 PM  Pulmonary Critical Care and Sleep Medicine,  [facility]  Cell: [phone]  Office: [phone]

## 2019-05-03 NOTE — PROGRESS NOTES
Surgery Progress Note             POD # 6    PATIENT NAME: Katerine Eaton     TODAY'S DATE: 5/3/2019, 6:52 AM    SUBJECTIVE:    Pt is stable he's alert and awake     OBJECTIVE:   VITALS:  /65   Pulse 75   Temp 99 °F (37.2 °C) (Tympanic)   Resp 20   Ht 5' 9\" (1.753 m)   Wt 237 lb 14.4 oz (107.9 kg)   SpO2 97%   BMI 35.13 kg/m²     INTAKE/OUTPUT:      Intake/Output Summary (Last 24 hours) at 5/3/2019 4854  Last data filed at 5/3/2019 0527  Gross per 24 hour   Intake 3157 ml   Output 3930 ml   Net -773 ml                 CONSTITUTIONAL:  awake and alert. no apparent distress, No acute distress  CARDIOVASCULAR:  tachycardic with regular rhythm and no murmur noted, No Murmur  LUNGS:  clear to auscultation, CTA Bilaterally  ABDOMEN:   Abdomen soft, non-tender. BS normal. No masses,  No organomegaly, Abdomen soft, non-tender, non-distended  INCISION: dry, Incision clean/dry/intact    Data:  CBC:   Lab Results   Component Value Date    WBC 7.4 05/02/2019    RBC 3.07 05/02/2019    HGB 9.7 05/02/2019    HCT 31.7 05/02/2019    .3 05/02/2019    MCH 31.9 05/02/2019    MCHC 31.5 05/02/2019    RDW 17.2 05/02/2019    PLT 84 05/02/2019    MPV 12.3 05/02/2019       Radiology Review:        Pathology  noted    ASSESSMENT   61 y.o. male   C/Dianne Fraire [address] Problems    Diagnosis Date Noted    Moderate malnutrition ([address] [E44.0] 05/02/2019    Coffee ground emesis [K92.0]     SBO (small bowel obstruction) ([address] [K56.609] 04/25/2019    Major depressive disorder, recurrent, in full remission ([address] [F33.42] 08/30/2018    Chronic combined systolic and diastolic congestive heart failure ([address] [I50.42] 06/22/2017    Essential hypertension [I10] 05/05/2017    Chronic atrial fibrillation (HCC) [I48.2]     GUME (obstructive sleep apnea) [G47.33]     Pulmonary embolism without acute cor pulmonale ([facility] [I26.99] 02/23/2017    Obesity (BMI 30-39. 9) [E66.9]     Small bowel cancer ([facility] [C17.9]          Plan  1.  Will

## 2019-05-03 NOTE — PROGRESS NOTES
Order obtained for extubation. SpO2 of 97%on 30% FiO2. Patient extubated and placed on 3 liters/min via nasal cannula. Post extubation SpO2 is 97% with HR  75 bpm and RR 18 breaths/min. Patient had strong cough that was productive of tan sputum. Extubation Well tolerated by patient. .   Breath Sounds: slight diminished    Symone Miller   12:35 PM

## 2019-05-03 NOTE — PROGRESS NOTES
Nutrition Assessment (Parenteral Nutrition)    Type and Reason for Visit: Reassess    Nutrition Recommendations: 1. Suggest continuing NPO Effective Now status. 2. Consider increasing PN-Adult 2-in-1 Central Line (Custom) to be @ 60 ml/hr, with adding of IV Fat Emulsions 20% 20 g, @ 8.33 ml/hr x 12 hrs. Additive changes:  See below. Nutrition Assessment: Pt adjusted to be severely malnourished, as evidenced by, +15.2 kg (13.5%) wt loss since 11/2/18 (125 kg), or x past 6 months. Dx:  SBO, s/p Resection with post-op ileus. Suggest increasing PN to 60 ml/hr, with addition of IV Fat Emusions (20 g) since no longer on IV Propofol. Communicated with [place] Pharmacist.    Malnutrition Assessment:  · Malnutrition Status: Meets the criteria for severe malnutrition  · Context: Chronic illness  · Findings of the 6 clinical characteristics of malnutrition (Minimum of 2 out of 6 clinical characteristics is required to make the diagnosis of moderate or severe Protein Calorie Malnutrition based on AND/ASPEN Guidelines):  1. Energy Intake-Less than or equal to 75% of estimated energy requirement, Greater than or equal to 3 months    2. Weight Loss-10% loss or greater(13.5%), in 6 months  3. Fat Loss-Unable to assess  4. Muscle Loss-Unable to assess  5. Fluid Accumulation-Mild fluid accumulation, Extremities  6.   Strength-Not measured    Nutrition Risk Level: High    Nutrient Needs:  · Estimated Daily Total Kcal: 2,150-2,300 kcal (post-op) (20-22 kcal/kg)  · Estimated Daily Protein (g):  g (post-op) (1.2-1.35 g/kg)  · Estimated Daily Total Fluid (ml/day): 2,100-2,200 ml (30-32 ml/kg)    Nutrition Diagnosis:   · Problem: Severe malnutrition, In context of chronic illness  · Etiology: related to Alteration in GI function     Signs and symptoms:  as evidenced by NPO status due to medical condition, Intubation, Nutrition support - PN, Weight loss greater than or equal to 10% in 6 months:  +15.2 kg--13.5% since 11/2/18, Localized or generalized fluid accumulation    Objective Information:  · Nutrition-Focused Physical Findings: GI:  last BM (4/27), hypoactive bowel sounds; PV:  RUE/BLE, non-pitting, +1; Skin:  incision 4/28 abd (c/d/i)  · Wound Type: Surgical Wound  · Current Nutrition Therapies:  · Oral Diet Orders: NPO   · Parenteral Nutrition Orders:  · Type and Formula: Premix Central   · Lipids: 100ml, Daily  · Rate/Volume: @ 60 ml/hr / 1,440 ml  · Duration: Continuous   · Additive changes:  remove 20 mEq Na Acetate, remove 10 mmol Na PO4, increase K Acetate 10 to 20 mEq, add 10 mmol K PO4, increase Ca Gluconate 8 to 10 mEq & re-add MVI/Trace Elements.   · Current PN Order Provides: 1,467 kcal, 72 g protein  · Goal PN Orders Provides: @ 83.3 ml/hr (goal): 1,760 kcal, 100 g protein  · Additional Calories: IV Propofol:  150 kcal & IV D5%:  250 kcal  · Anthropometric Measures:  · Ht: 5' 9\" (175.3 cm)   · Current Body Wt: 237 lb 14 oz (107.9 kg)  · Admission Body Wt: 238 lb 5.1 oz (108.1 kg)  · Usual Body Wt: 275 lb 9.2 oz (125 kg)(11/2/18)  · % Weight Change:  13.5% x 6 months  · Ideal Body Wt: 160 lb (72.6 kg), % Ideal Body 148%  · BMI Classification: BMI 35.0 - 39.9 Obese Class II    Nutrition Interventions:   Continue NPO, Modify current Parenteral Nutrition  Continued Inpatient Monitoring, Coordination of Care    Nutrition Evaluation:   · Evaluation: Progressing toward goals   · Goals: PN intake to meet >80% of estimated kcal/protein needs, with good glycemic control   · Monitoring: PN Intake, PN Tolerance, Skin Integrity, Wound Healing, I&O, Mental Status/Confusion, Weight, Pertinent Labs, Nausea or Vomiting, Constipation, Monitor Bowel Function      Electronically signed by Seble Juan, GERALD, SHONDA on 5/3/19 at 4:26 PM    Contact Number: [phone]

## 2019-05-03 NOTE — PROGRESS NOTES
[facility]    Progress Note    5/3/2019    8:53 AM    Name:   Deborah Anthony  MRN:     [id number]     [place]:      [de-identified]   Room:   [address] [place] [address] Day:  8  Admit Date:  4/25/2019  4:57 PM    PCP:   Teodora Morrissey MD  Code Status:  Full Code    Subjective:     C/C:   Chief Complaint   Patient presents with    Abdominal Pain    Dizziness     onset today     Interval History Status: not changed  Continued to be on ventilator support overnight  BP improved now, off pressors  No bowel movements reported overnight  Also no gas passed yet  No fevers, chills reported  Little worsened creatinine this morning    Brief History:     The patient is a 61 y.o. Non-/non  male who presents with Abdominal Pain and Dizziness (onset today)  and he is admitted to the hospital for the management of  SBO.      He has the following significant co-morbidities: HTN, Chronic AFib, Chronic combined systolic/diastolic heart failure, S/P AICD Placement, Depression, GUME, Adenocarcinoma of small bowel, Colon cancer.      He presented to the ED with abdominal pain and shortness of breath. The pain was sudden in onset, localized to the epigastric region. HE denied any constipation and was passing gas and having normal BMs. He had associated shortness of breath. In the ED, imaging was concerning for SBO vs ileus. NG tube was placed. CT Chest showed small subsegmental PE, similar finding on CT scan at [facility] hospital two months back, admitted for GI bleed with finding of large gastric polyp with diagnosis of adenocarcinoma of stomach     Sudden worsening of clinical condition after initial improvement on 4/27/2019.  Open laparotomy on 4/28 with finding of ischemic bowel, needed bowel resection along with WESLEY     Review of Systems:     Sedated and ventilator placement  Un able to obtain from patient  Per staff overnight  No fevers, chills reported  No bowel Cancer ([facility] CHF (congestive heart failure) ([facility] COPD exacerbation ([facility] GERD (gastroesophageal reflux disease), History of blood transfusion, Hyperlipidemia, Hypertension, Neuropathy, Obesity (BMI 30-39.9), Psychiatric problem, SBO (small bowel obstruction) ([facility] Severe mitral regurgitation, and Severe tricuspid regurgitation. Social History:   reports that he has never smoked. He has never used smokeless tobacco. He reports that he does not drink alcohol or use drugs. Family History:   Family History   Problem Relation Age of Onset    Heart Disease Father      Vitals:  /65   Pulse 77   Temp 99 °F (37.2 °C) (Tympanic)   Resp 20   Ht 5' 9\" (1.753 m)   Wt 237 lb 14.4 oz (107.9 kg)   SpO2 97%   BMI 35.13 kg/m²   Temp (24hrs), Av.7 °F (37.1 °C), Min:97.9 °F (36.6 °C), Max:99 °F (37.2 °C)    Recent Labs     19  1132 19  1737 19  2251 19  0632   POCGLU 117* 119* 110 121*     I/O (24Hr): Intake/Output Summary (Last 24 hours) at 5/3/2019 0853  Last data filed at 5/3/2019 0527  Gross per 24 hour   Intake 3157 ml   Output 3930 ml   Net -773 ml     Labs:    Hematology:  Recent Labs     19  1612  19  0502  19  0702  19  1845 19  2355 19  0420   WBC  --   --  9.0  --  7.4  --   --   --  6.3   RBC  --   --  2.99*  --  3.07*  --   --   --  3.06*   HGB  --    < > 9.7*   < > 9.8*   < > 9.3* 9.7* 9.8*   HCT  --    < > 29.2*   < > 31.1*   < > 30.2* 31.7* 31.6*   MCV  --   --  97.8  --  101.3  --   --   --  103.3*   MCH  --   --  32.5  --  31.9  --   --   --  32.0   MCHC  --   --  33.2  --  31.5  --   --   --  31.0   RDW  --   --  18.8*  --  17.2*  --   --   --  17.2*   PLT  --   --  84*  --  84*  --   --   --  98*   MPV  --   --  10.6  --  12.3  --   --   --  12.5   INR 1.0  --   --   --   --   --   --   --   --     < > = values in this interval not displayed.      Chemistry:  Recent Labs     19  1300 19  0037 19  0502 05/02/19  0702 05/03/19  0420   NA  --   --  139 139 146*   K  --   --  4.4 4.1 4.1   CL  --   --  107 108* 114*   CO2  --   --  21 23 23   GLUCOSE  --   --  144* 137* 130*   BUN  --   --  30* 34* 33*   CREATININE  --   --  1.16 1.55* 1.72*   MG  --   --  2.2 2.3 2.2   ANIONGAP  --   --  11 8* 9   LABGLOM  --   --  >60 46* 40*   GFRAA  --   --  >60 55* 49*   CALCIUM  --   --  7.7* 7.9* 8.1*   PHOS  --   --   --  3.4 3.5   TROPHS 11 12 12  --   --      Recent Labs     05/01/19  1750 05/02/19  0126 05/02/19  0702 05/02/19  1132 05/02/19  1737 05/02/19  2251 05/03/19  0632   PROT  --   --  5.1*  --   --   --   --    LABALBU  --   --  2.3*  --   --   --   --    AST  --   --  36  --   --   --   --    ALT  --   --  305*  --   --   --   --    ALKPHOS  --   --  54  --   --   --   --    BILITOT  --   --  0.88  --   --   --   --    TRIG  --   --  101  --   --   --   --    POCGLU 87 98  --  117* 119* 110 121*     Lab Results   Component Value Date/Time    SPECIAL RIGHT AC 7ML AEROBIC 1ML ANAEROBIC 04/28/2019 12:34 PM     Lab Results   Component Value Date/Time    CULTURE NO GROWTH 5 DAYS 04/28/2019 12:34 PM     Lab Results   Component Value Date    POCPH 7.32 05/03/2019    PHART 7.44 08/30/2012    PH 7.48 11/08/2012    POCPCO2 45 05/03/2019    JJF5YYQ 60 08/30/2012    PCO2 45 11/08/2012    POCPO2 90 05/03/2019    PO2ART 73 08/30/2012    PO2 145 11/08/2012    POCHCO3 22.9 05/03/2019    SEF8VWZ 24.1 11/01/2012    HCO3 33.6 11/08/2012    NBEA 3 05/03/2019    PBEA NOT REPORTED 05/03/2019    MNM1MFX 24 05/03/2019    CAAG3ONA 96 05/03/2019    V6CUTQRV 100 11/02/2012    O2SAT 99 11/02/2012    FIO2 30.0 05/03/2019     Radiology:  Xr Abdomen (kub) (single Ap View)    Result Date: 4/25/2019  EXAMINATION: SINGLE SUPINE XRAY VIEW(S) OF THE ABDOMEN 4/25/2019 8:44 pm COMPARISON: None.  HISTORY: ORDERING SYSTEM PROVIDED HISTORY: NGT placement confirmation TECHNOLOGIST PROVIDED HISTORY: NGT placement confirmation Ordering Physician Provided Reason for Exam: NGT placement Acuity: Acute Type of Exam: Initial FINDINGS: 2 images of the abdomen were provided. Enteric tube is noted projecting on the left hemidiaphragm. This is likely in the stomach. Moderate multifocal bowel distention is noted. Ill-defined increased density in the left lung base is noted     Enteric tube as described     Ct Abdomen Pelvis W Iv Contrast Additional Contrast? None    Result Date: 4/25/2019  EXAMINATION: CT OF THE ABDOMEN AND PELVIS WITH CONTRAST 4/25/2019 6:10 pm TECHNIQUE: CT of the abdomen and pelvis was performed with the administration of intravenous contrast. Multiplanar reformatted images are provided for review. Dose modulation, iterative reconstruction, and/or weight based adjustment of the mA/kV was utilized to reduce the radiation dose to as low as reasonably achievable. COMPARISON: CT abdomen and pelvis performed 02/23/2017. HISTORY: ORDERING SYSTEM PROVIDED HISTORY: abdominal pain TECHNOLOGIST PROVIDED HISTORY: FINDINGS: Lower Chest: Infiltrates are seen in the lung bases bilaterally that is worse on the left compared to the right. The heart is enlarged. Organs: The liver and spleen are normal size and overall attenuation. There are hepatic granulomas. The gallbladder, pancreas, and adrenal glands are unremarkable. There is no obstructive uropathy. There are bilateral nonobstructing renal stones. The largest stone is seen on the right measuring approximately 5 mm in diameter. There are bilateral renal cysts. The ureters are not dilated. The urinary bladder is unremarkable. GI/Bowel: The stomach is mildly distended and fluid-filled. Loops of small bowel are mildly distended and fluid-filled in the proximal and mid aspect. The distal and terminal ileum is decompressed. The colon is decompressed containing residual air and fecal contents. There is no intraperitoneal free air or free fluid. Pelvis:  The prostate gland and seminal vesicles are unremarkable. Phleboliths are seen in the pelvis. Peritoneum/Retroperitoneum: The psoas muscles are symmetric. The abdominal aorta is normal in caliber. The inferior vena cava is unremarkable. There is no retroperitoneal or mesenteric adenopathy. Bones/Soft Tissues: The extra-abdominal soft tissues are unremarkable. There is no acute osseous abnormality. Mildly prominent fluid-filled loops of small bowel proximally and within the mid aspect with decompressed distal and terminal ileal loops. This may relate to a degree of obstruction versus an ileus and correlation is needed. Infiltrates in the lung bases bilaterally that may represent atelectasis versus pneumonia. Nonobstructing renal stones bilaterally. Xr Chest Portable    Result Date: 4/27/2019  EXAMINATION: SINGLE XRAY VIEW OF THE CHEST 4/27/2019 6:22 am COMPARISON: 04/25/2019. HISTORY: ORDERING SYSTEM PROVIDED HISTORY: pleural effusion, infiltrates TECHNOLOGIST PROVIDED HISTORY: pleural effusion, infiltrates Ordering Physician Provided Reason for Exam: plural effusion infiltrate Acuity: Unknown Type of Exam: Initial FINDINGS: A left cardiac device is unchanged in position. An enteric tube is seen coursing below the diaphragm. The cardiac silhouette is persistently enlarged. There is prominence of the central pulmonary vasculature. No convincing focal consolidation, pleural effusion or pneumothorax. 1. Persistent enlargement of the cardiac silhouette with prominence of the central pulmonary vasculature. 2. No convincing pleural effusion or pneumothorax. Xr Chest Portable    Result Date: 4/25/2019  EXAMINATION: SINGLE XRAY VIEW OF THE CHEST 4/25/2019 5:56 pm COMPARISON: Chest radiograph performed 11/03/2018.  HISTORY: ORDERING SYSTEM PROVIDED HISTORY: shortness of breath TECHNOLOGIST PROVIDED HISTORY: shortness of breath Ordering Physician Provided Reason for Exam: chest pain Acuity: Acute Type of Exam: Initial Additional signs and symptoms: SOB Relevant Medical/Surgical History: CHF, COPD FINDINGS: There is chronic pulmonary change. There may be small bibasilar effusions with adjacent atelectasis. There is no pneumothorax. The heart is enlarged with stable cardiac lead. The upper abdomen is unremarkable. The extrathoracic soft tissues are unremarkable. Cardiomegaly with small bibasilar effusions and adjacent atelectasis. Ct Chest Pulmonary Embolism W Contrast    Result Date: 4/25/2019  EXAMINATION: CTA OF THE CHEST 4/25/2019 6:10 pm TECHNIQUE: CTA of the chest was performed after the administration of intravenous contrast.  Multiplanar reformatted images are provided for review. MIP images are provided for review. Dose modulation, iterative reconstruction, and/or weight based adjustment of the mA/kV was utilized to reduce the radiation dose to as low as reasonably achievable. COMPARISON: None. HISTORY: ORDERING SYSTEM PROVIDED HISTORY: sob, history of CA FINDINGS: Pulmonary Arteries: Pulmonary arteries are adequately opacified for evaluation. There is an intraluminal filling defect within a subsegmental branch supplying the left lower lobe medially. The remainder of the arterial branches appear patent. Main pulmonary artery is normal in caliber. Mediastinum: No evidence of mediastinal lymphadenopathy. The heart and pericardium demonstrate no acute abnormality. The heart is enlarged. There is no acute abnormality of the thoracic aorta. Lungs/pleura: There is linear atelectasis versus scarring in the right lung posteriorly. There is dependent atelectasis in the left lung. There is a small right basilar effusion. There is no pneumothorax. The tracheobronchial tree is patent. Upper Abdomen: Limited images of the upper abdomen are unremarkable. Soft Tissues/Bones: No acute bone or soft tissue abnormality. Mild cardiomegaly with a small right basilar effusion.  Filling defect involving a subsegmental branch supplying the left lower lobe medially consistent with an embolism. Critical results were called by Dr. Marc To to [facility] on  at 18:48. Physical Examination:        /65   Pulse 77   Temp 99 °F (37.2 °C) (Tympanic)   Resp 20   Ht 5' 9\" (1.753 m)   Wt 237 lb 14.4 oz (107.9 kg)   SpO2 97%   BMI 35.13 kg/m²   Temp (24hrs), Av.7 °F (37.1 °C), Min:97.9 °F (36.6 °C), Max:99 °F (37.2 °C)    Recent Labs     19  1132 19  1737 19  2251 19  0632   POCGLU 117* 119* 110 121*       Intake/Output Summary (Last 24 hours) at 5/3/2019 0853  Last data filed at 5/3/2019 7871  Gross per 24 hour   Intake 3157 ml   Output 3930 ml   Net -773 ml     General Appearance: sedated, on ventilator   Mental status: un able to wake him up at this time  Head:  normocephalic, atraumatic  Eye: no icterus, redness, pupils equal and reactive  Ear: normal external ear, no discharge, hearing intact  Nose:  no drainage noted  Mouth: mucous membranes moist  Neck: supple, no carotid bruits, thyroid not palpable  Lungs: Bilateral equal air entry, clear to ausculation, no wheezing  Cardiovascular: normal rate, regular rhythm, no murmur, gallop, rub. Abdomen: continued distention of abdomen, midline incision noted  Neurologic: not following any commands  Skin: No gross lesions, rashes, bruising or bleeding on exposed skin area  Extremities:  peripheral pulses palpable, no pedal edema or calf pain with palpation  Psych: distressed affect    Assessment:        Principal Problem:    SBO (small bowel obstruction) (HCC)  Active Problems:    Small bowel cancer (HCC)    Obesity (BMI 30-39. 9)    Pulmonary embolism without acute cor pulmonale (HCC)    GUME (obstructive sleep apnea)    Chronic atrial fibrillation (HCC)    Essential hypertension    Chronic combined systolic and diastolic congestive heart failure (HCC)    Major depressive disorder, recurrent, in full remission ([facility]    Plan:        - GI, primary problem. Small bowel obstruction with necrosis of bowel. Noted Op report with removal of bowel. Surgery on 4/28/2019. POD 5. Continued close follow up. NG tube still present. No significant bowel sounds yet, possibly post operative ileus  - Sepsis, likely from intra abdominal process. Zosyn continued at this time. No fevers, continued   - Post operative ileus  - Pulmonary, acute respiratory insufficiency, post surgery. Continued follow up. PE at admission, anticoagulation once medically stable, can start heparin for DVT prophylaxis today  - Oncology history: Previous small bowel cancer status post surgery and chemotherapy.  Recent diagnosis of gastric cancer from EGD biopsy at [facility]. Staging and treatment   - Hypertension, still on small dose levophed  - GI bleed, on PPI drip that is continued  - JERRELL, likely from fluid changes, clinically looks eu volemic, creatinine at 1.7 today, continued close follow up      Consultations:   SUSAN [facility] 73 TO PULMONOLOGY  IP CONSULT TO PULMONOLOGY  IP CONSULT TO GI  IP CONSULT TO VASCULAR SURGERY  IP CONSULT TO CARDIOLOGY  IP CONSULT TO GI  IP CONSULT TO DIETITIAN  IP CONSULT TO Gema Burch MD  5/3/2019  8:53 AM

## 2019-05-03 NOTE — PROGRESS NOTES
Per Dr. Julieta West, orders to keep packing in wound and not change it during dressing changes, and to continuing holding PO medications though NG.

## 2019-05-03 NOTE — PROGRESS NOTES
Guillermo Pruitt, PPatient Assessment complete. SBO (small bowel obstruction) ([facility] [K56.609] . Vitals:    05/03/19 1234   BP:    Pulse: 75   Resp: 24   Temp:    SpO2: 98%   . Patients home meds are   Prior to Admission medications    Medication Sig Start Date End Date Taking? Authorizing Provider   aspirin 81 MG chewable tablet Take 81 mg by mouth daily   Yes Historical Provider, MD   atorvastatin (LIPITOR) 40 MG tablet Take 40 mg by mouth nightly   Yes Historical Provider, MD   DULoxetine (CYMBALTA) 20 MG extended release capsule Take 20 mg by mouth daily   Yes Historical Provider, MD   docusate sodium (COLACE) 100 MG capsule Take 100 mg by mouth 2 times daily   Yes Historical Provider, MD   ferrous sulfate 325 (65 Fe) MG EC tablet Take 325 mg by mouth 4 times daily   Yes Historical Provider, MD   furosemide (LASIX) 40 MG tablet Take 40 mg by mouth daily   Yes Historical Provider, MD   pregabalin (LYRICA) 50 MG capsule Take 50 mg by mouth 2 times daily.    Yes Historical Provider, MD   metoprolol tartrate (LOPRESSOR) 50 MG tablet Take 50 mg by mouth 2 times daily (before meals)   Yes Historical Provider, MD   pantoprazole (PROTONIX) 40 MG tablet Take 40 mg by mouth 2 times daily (before meals)   Yes Historical Provider, MD   QUEtiapine (SEROQUEL) 25 MG tablet Take 25 mg by mouth 2 times daily   Yes Historical Provider, MD   spironolactone (ALDACTONE) 25 MG tablet Take 25 mg by mouth daily   Yes Historical Provider, MD   acetaminophen (TYLENOL) 325 MG tablet Take 650 mg by mouth every 6 hours as needed for Pain or Fever   Yes Historical Provider, MD   senna (SENOKOT) 8.6 MG tablet Take 1 tablet by mouth daily as needed for Constipation   Yes Historical Provider, MD   sennosides-docusate sodium (SENOKOT-S) 8.6-50 MG tablet Take 1 tablet by mouth daily as needed for Constipation   Yes Historical Provider, MD   vitamin B-6 (PYRIDOXINE) 25 MG tablet Take 25 mg by mouth daily   Yes Historical Provider, MD   dibucaine ([employer] HEM[employer]HO[employer]NALGESIC) 1 % OINT rectal ointment Place rectally 4 times daily as needed for Pain 8/30/18  Yes Abby Adair MD   amiodarone (PACERONE) 400 MG tablet Take 1 tablet by mouth 2 times daily 6/7/17  Yes Lesli Camilo DO   nitroGLYCERIN (NITROSTAT) 0.4 MG SL tablet up to max of 3 total doses. If no relief after 1 dose, call 911. 3/1/17  Yes MIQUEL Art CNP   .   Recent Surgical History: None = 0     Assessment     Peak Flow (asthma only)    Predicted: na  Personal Best: na  PEF na  % Predicted na  Peak Flow : not applicable = 0    SAU7/LDE    FEV1 Predicted na      FEV1 unable just extubated    FEV1 % Predicted na  FVC na  IS volume na  IBW na  FIO2% 3 lpm  SPO2 97%  RR 20  Breath Sounds: diminished      · Bronchodilator assessment at level  4  · Hyperinflation assessment at level   · Secretion Management assessment at level    ·   · []    Bronchodilator Assessment  BRONCHODILATOR ASSESSMENT SCORE  Score 0 1 2 3 4 5   Breath Sounds   []  Patient Baseline []  No Wheeze good aeration []  Faint, scattered wheezing, good aeration []  Expiratory Wheezing and or moderately diminished [x]  Insp/Exp wheeze and/or very diminished []  Insp/Exp and/ or marked distress   Respiratory Rate   []  Patient Baseline []  Less than 20 []  Less than 20 [x]  20-25 []  Greater than 25 []  Greater than 25   Peak flow % of Pred or PB [x]  NA   []  Greater than 90%  []  81-90% []  71-80% []  Less than or equal to 70%  or unable to perform []  Unable due to Respiratory Distress   Dyspnea re []  Patient Baseline []  No SOB []  No SOB []  SOB on exertion [x]  SOB min activity []  At rest/acute   e FEV% Predicted       []  NA []  Above 69%  []  Unable []  Above 60-69%  []  Unable []  Above 50-59%  []  Unable []  Above 35-49%  [x]  Unable []  Less than 35%  []  Unable                 []  Hyperinflation Assessment  Score 1 2 3   CXR and Breath Sounds   []  Clear []  No atelectasis  Basilar aeration []  Atelectasis or absent basilar breath sounds   Incentive Spirometry Volume  (Per IBW)   []  Greater than or equal to 15ml/Kg []  less than 15ml/Kg []  less than 15ml/Kg   Surgery within last 2 weeks []  None or general   []  Abdominal or thoracic surgery  []  Abdominal or thoracic   Chronic Pulmonary Historyre []  No []  Yes []  Yes     []  Secretion Management Assessment  Score 1 2 3   Bilateral Breath Sounds   []  Occasional Rhonchi []  Scattered Rhonchi []  Course Rhonchi and/or poor aeration   Sputum    []  Small amount of thin secretions []  Moderate amount of viscous secretions []  Copius, Viscious Yellow/ Secretions   CXR as reported by physician []  clear  []  Unavailable []  Infiltrates and/or consolidation  []  Unavailable []  Mucus Plugging and or lobar consolidation  []  Unavailable   Cough []  Strong, productive cough []  Weak productive cough []  No cough or weak non-productive cough

## 2019-05-03 NOTE — PROGRESS NOTES
GI Progress notes    5/3/2019   4:30 PM    Name:  Fabiana Peñaloza  MRN:    [id number]     [place]:     [de-identified]   Room:  [address] [place] [address] Day: 8     Admit Date: 4/25/2019  4:57 PM  PCP: Montrell Rojas MD    Subjective:     C/C:   Chief Complaint   Patient presents with    Abdominal Pain    Dizziness     onset today       Interval History: Status: improved. Extubated now  NG still in place  No bleeding or any blood was noted  Very lethargic     ROS:  Constitutional: negative for chills, fevers and sweats    Gastrointestinal: +  abdominal pain, constipation, diarrhea, nausea and vomiting      Medications: Allergies:    Allergies   Allergen Reactions    Morphine      itching       Current Meds:   PN-Adult 2-in-1 Central Line (Custom) Continuous TPN   fat emulsion 20 % infusion 100 mL Continuous TPN   albuterol (PROVENTIL) nebulizer solution 2.5 mg As Directed RT PRN   ipratropium-albuterol (DUONEB) nebulizer solution 1 ampule Q4H WA   [START ON 5/4/2019] albuterol (PROVENTIL) nebulizer solution 2.5 mg BID   metoclopramide (REGLAN) injection 10 mg Q6H   metoprolol (LOPRESSOR) injection 5 mg Q6H   heparin (porcine) injection 5,000 Units 3 times per day   PN-Adult 2-in-1 Central Line (Custom) Continuous TPN   fentaNYL (SUBLIMAZE) injection 50 mcg Q1H PRN   pantoprazole (PROTONIX) injection 40 mg BID   And    sodium chloride (PF) 0.9 % injection 10 mL BID   insulin lispro (HUMALOG) injection vial 0-6 Units 4 times per day   glucose (GLUTOSE) 40 % oral gel 15 g PRN   dextrose 50 % solution 12.5 g PRN   glucagon (rDNA) injection 1 mg PRN   dextrose 5 % solution PRN   metoprolol succinate (TOPROL XL) extended release tablet 25 mg Daily   amiodarone (CORDARONE) tablet 200 mg Daily   vasopressin 20 Units in dextrose 5 % 100 mL infusion Continuous   0.9 % sodium chloride bolus Once   fentaNYL 20 mcg/mL Infusion Continuous   dextrose 5 % and 0.45 % sodium chloride infusion Continuous   piperacillin-tazobactam (ZOSYN) education level: Not on file   Occupational History    Not on file   Social Needs    Financial resource strain: Not on file    Food insecurity:     Worry: Not on file     Inability: Not on file    Transportation needs:     Medical: Not on file     Non-medical: Not on file   Tobacco Use    Smoking status: Never Smoker    Smokeless tobacco: Never Used   Substance and Sexual Activity    Alcohol use: No    Drug use: No    Sexual activity: Not on file   Lifestyle    Physical activity:     Days per week: Not on file     Minutes per session: Not on file    Stress: Not on file   Relationships    Social connections:     Talks on phone: Not on file     Gets together: Not on file     Attends Christianity service: Not on file     Active member of club or organization: Not on file     Attends meetings of clubs or organizations: Not on file     Relationship status: Not on file    Intimate partner violence:     Fear of current or ex partner: Not on file     Emotionally abused: Not on file     Physically abused: Not on file     Forced sexual activity: Not on file   Other Topics Concern    Not on file   Social History Narrative    Not on file       Vitals:  BP (!) 157/74   Pulse 75   Temp 98.6 °F (37 °C) (Axillary)   Resp 16   Ht 5' 9\" (1.753 m)   Wt 237 lb 14.4 oz (107.9 kg)   SpO2 99%   BMI 35.13 kg/m²   Temp (24hrs), Av.9 °F (37.2 °C), Min:98.6 °F (37 °C), Max:99 °F (37.2 °C)    Recent Labs     19  1737 19  2251 19  0632 19  1121   POCGLU 119* 110 121* 120*       I/O (24Hr):     Intake/Output Summary (Last 24 hours) at 5/3/2019 1630  Last data filed at 5/3/2019 0527  Gross per 24 hour   Intake 3157 ml   Output 3110 ml   Net 47 ml       Labs:      CBC: Lab Results   Component Value Date    WBC 6.3 2019    RBC 3.06 2019    HGB 9.8 2019    HCT 31.6 2019    .3 2019    MCH 32.0 2019    MCHC 31.0 2019    RDW 17.2 2019    PLT 98 05/03/2019    MPV 12.5 05/03/2019     CBC with Differential:  Lab Results   Component Value Date    WBC 6.3 05/03/2019    RBC 3.06 05/03/2019    HGB 9.8 05/03/2019    HCT 31.6 05/03/2019    PLT 98 05/03/2019    .3 05/03/2019    MCH 32.0 05/03/2019    MCHC 31.0 05/03/2019    RDW 17.2 05/03/2019    NRBC 2 11/02/2012    LYMPHOPCT 10 05/03/2019    MONOPCT 17 05/03/2019    BASOPCT 1 05/03/2019    MONOSABS 1.07 05/03/2019    LYMPHSABS 0.63 05/03/2019    EOSABS 0.19 05/03/2019    BASOSABS 0.06 05/03/2019    DIFFTYPE NOT REPORTED 05/03/2019     Hemoglobin/Hematocrit:  Lab Results   Component Value Date    HGB 9.8 05/03/2019    HCT 31.6 05/03/2019     CMP:  Lab Results   Component Value Date     05/03/2019    K 4.1 05/03/2019     05/03/2019    CO2 23 05/03/2019    BUN 33 05/03/2019    CREATININE 1.72 05/03/2019    GFRAA 49 05/03/2019    LABGLOM 40 05/03/2019    GLUCOSE 130 05/03/2019    PROT 5.1 05/02/2019    PROT 4.4 11/03/2012    LABALBU 2.3 05/02/2019    CALCIUM 8.1 05/03/2019    BILITOT 0.88 05/02/2019    ALKPHOS 54 05/02/2019    AST 36 05/02/2019     05/02/2019     BMP:  Lab Results   Component Value Date     05/03/2019    K 4.1 05/03/2019     05/03/2019    CO2 23 05/03/2019    BUN 33 05/03/2019    LABALBU 2.3 05/02/2019    CREATININE 1.72 05/03/2019    CALCIUM 8.1 05/03/2019    GFRAA 49 05/03/2019    LABGLOM 40 05/03/2019    GLUCOSE 130 05/03/2019     PT/INR:    Lab Results   Component Value Date    PROTIME 10.3 04/30/2019    INR 1.0 04/30/2019     PTT:    Lab Results   Component Value Date    APTT 31.5 04/28/2019   [APTT}    Physical Examination:        General appearance: drowsy and lethargic    Abdomen:post op abdomen     Assessment:        Primary Problem  SBO (small bowel obstruction) ([facility]     Active Hospital Problems    Diagnosis Date Noted    Moderate malnutrition ([facility] [E44.0] 05/02/2019    Coffee ground emesis [K92.0]     [facility]BO [facility] bowel obstruction) ([facility] [U46.722] 04/25/2019    Major depressive disorder, recurrent, in full remission ([facility] [F33.42] 08/30/2018    Chronic combined systolic and diastolic congestive heart failure ([facility] [I50.42] 06/22/2017    Essential hypertension [I10] 05/05/2017    Chronic atrial fibrillation (HCC) [I48.2]     GUME (obstructive sleep apnea) [G47.33]     Pulmonary embolism without acute cor pulmonale ([facility] [I26.99] 02/23/2017    Obesity (BMI 30-39. 9) [E66.9]     Small bowel cancer ([facility] [C17.9]      Past Medical History:   Diagnosis Date    Asthma     Cancer ([facility]     small intestine    CHF (congestive heart failure) (HCC)     COPD exacerbation (HCC)     GERD (gastroesophageal reflux disease)     History of blood transfusion     Hyperlipidemia     Hypertension     Neuropathy     Obesity (BMI 30-39.9) 2/25/2016    Psychiatric problem     SBO (small bowel obstruction) ([facility]     Severe mitral regurgitation 10/31/12    minimally invasive complex mitral valve repair     Severe tricuspid regurgitation 10/31/12    tricuspid repair under CPB        Plan:        1. Cont post op care per surgery  2. F/U HGB  3.  Cont PPI BID     d/W Nursing Staff in ICU  Will F/U with you  Please call or Page for any issues or change in status  Thanks    Electronically signed by Lee Moya MD on 5/3/2019 at 4:30 PM

## 2019-05-03 NOTE — PROGRESS NOTES
[facility]                 Progress Note      Date:  5/3/2019  Patient: Lj Dhillon  Admission:  4/25/2019  4:57 PM  Admit DX: SBO (small bowel obstruction) ([facility] [J90.585]  Age:  61 y.o., 1955     LOS: 8 days           SUBJECTIVE:     The patient was seen and examined. Notes and labs reviewed. Patient remains intubated and sedated, he is maintaining sinus rhythm with HR 70-72 overnight, Hemodynamically stable (-150), Cr is elevated at 1.7 this AM, urine output is fair, 3157/3930 cc over last 24 hours    Tolerating weaning off vent    Getting TPN, not getting any po meds due to ileus     OBJECTIVE:    Telemetry: Sinus  /65   Pulse 77   Temp 99 °F (37.2 °C) (Tympanic)   Resp 20   Ht 5' 9\" (1.753 m)   Wt 237 lb 14.4 oz (107.9 kg)   SpO2 97%   BMI 35.13 kg/m²     EXAM:  CONSTITUTIONAL:  Intubated and sedated   HEENT: Normal jugular venous pulsations, no carotid bruits. LUNGS: auscultation: clear to auscultation bilaterally and normal percussion bilaterally, reduced air entry at bases. CARDIOVASCULAR:  Normal apical impulse, regular rate and rhythm, no murmur appreciated   ABDOMEN: soft, midline incision with dressing with sluggish bowel sounds   SKIN: Warm and dry. EXTREMITIES: No lower extremity pitting edema. No cyanosis or clubbing.     Current Inpatient Medications:   metoprolol  5 mg Intravenous Q8H    heparin (porcine)  5,000 Units Subcutaneous 3 times per day    digoxin  125 mcg Oral Daily    pantoprazole  40 mg Intravenous BID    And    sodium chloride (PF)  10 mL Intravenous BID    insulin lispro  0-6 Units Subcutaneous 4 times per day    metoprolol succinate  25 mg Oral Daily    amiodarone  200 mg Oral Daily    sodium chloride  500 mL Intravenous Once    piperacillin-tazobactam  3.375 g Intravenous Q8H    sodium chloride flush  10 mL Intravenous 2 times per day    ipratropium-albuterol  1 ampule Inhalation Q4H While awake    aspirin  81 mg Oral Daily    atorvastatin  40 mg Oral Nightly    DULoxetine  20 mg Oral Daily    ferrous sulfate  325 mg Oral Daily with breakfast    pyridoxine  25 mg Oral Daily    QUEtiapine  25 mg Oral BID    senna  1 tablet Oral Daily       IV Infusions (if any):   PN-Adult 2-in-1 Central Line (Standard) 50 mL/hr at 05/02/19 1753    dextrose      vasopressin (Septic Shock) infusion Stopped (05/01/19 1531)    fentaNYL Stopped (05/03/19 0445)    propofol Stopped (05/03/19 0525)    dextrose 5 % and 0.45 % NaCl 75 mL/hr at 05/02/19 0837    norepinephrine Stopped (05/01/19 0259)    midazolam (VERSED) 100 mg in dextrose 5% 100 mL infusion Stopped (04/30/19 1039)       Diagnostics:       Labs:   CBC:   Recent Labs     05/02/19  0702  05/02/19  2355 05/03/19  0420   WBC 7.4  --   --  6.3   HGB 9.8*   < > 9.7* 9.8*   HCT 31.1*   < > 31.7* 31.6*   PLT 84*  --   --  98*    < > = values in this interval not displayed. BMP:   Recent Labs     05/02/19  0702 05/03/19  0420    146*   K 4.1 4.1   CO2 23 23   BUN 34* 33*   CREATININE 1.55* 1.72*   LABGLOM 46* 40*   GLUCOSE 137* 130*     PT/INR:   Recent Labs     04/30/19  1612   PROTIME 10.3   INR 1.0     APTT:  No results for input(s): APTT in the last 72 hours. FASTING LIPID PANEL:  Lab Results   Component Value Date    HDL 41 09/28/2016    TRIG 101 05/02/2019     LIVER PROFILE:  Recent Labs     05/02/19  0702   AST 36   *   LABALBU 2.3*       Patient Active Problem List   Diagnosis    Severe mitral regurgitation    Severe tricuspid regurgitation    Anemia due to GI blood loss    Abdominal pain    Piles (hemorrhoids)    Chest pain    HTN (hypertension)    HLD (hyperlipidemia)    Anemia    Transfusion of blood during current hospitalization    Dyspnea    Small bowel cancer ([facility] [address]    Neuropathic Pain Left leg    Essential hypertension    Obesity (BMI 30-39. 9)    Peripheral neuropathic pain    Atrial fibrillation with rapid ventricular response ([facility]    S/P cardiac cath - 10/3/16 - Dr. Ashley Mckenzie for cardioversion procedure 10/3/16-Dr. 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 Cath done in 2016 showed mild CAD. No signs of volume overload. · PAF. Currently in NSR. Had episodes of bradycardia and his pacing threshold was increased to 60 as lower limits, currently in NSR at rate of 70-80. · Post op Respiratory failure on vent   · Moderate Mitral stenosis and regurgitation. · Hx of adenocarcinoma of small bowel  · Upper GI bleeding s/p micorcoiling  · Thrombocytopenia  · JERRELL   · Hypernatremia         PLAN:  1. IV lopressor 5 mg q 8 hours  2. Resume Amiodarone and Toprol XL when cleared by gen surgery for po meds   3. D/c Digoxin  4. Monitor strict Is/Os, renal function and electrolytes. No signs of volume overload on examination and CXR. Might need to adjust TPN constitution for high Na  5. Not on anticoagulation due to Recent GI bleed and thrombocytopenia, will resume anticoagulation when cleared by gen surgery  6.  Repeat echo reviewed which shows improvement in LVEF from 11/2018 to ~ 45%.        Electronically signed by Arely Moore MD on 5/3/2019 at 9:08 AM

## 2019-05-03 NOTE — PROGRESS NOTES
Physical Therapy  DATE: 5/3/2019    NAME: Bakari Bruce  MRN: [id number]   : 1955    Patient not seen this date for Physical Therapy due to:  [] Blood transfusion in progress  [] Cancel by RN  [] Hemodialysis  []  Refusal by Patient   [] Spine Precautions   [] Strict Bedrest  [] Surgery  [] Testing      [x] Other: vent        [] PT being discontinued at this time. Patient independent. No further needs. [] PT being discontinued at this time as the patient has been transferred to hospice care. No further needs.     Yasmany Simental, PT

## 2019-05-03 NOTE — CARE COORDINATION
Discharge planning  Noted pt was extubated today  Remains npo w/ NG tube in place  Will continue to follow

## 2019-05-04 ENCOUNTER — APPOINTMENT (OUTPATIENT)
Dept: GENERAL RADIOLOGY | Age: 64
DRG: 329 | End: 2019-05-04
Payer: MEDICARE

## 2019-05-04 LAB
ABSOLUTE EOS #: 0.3 K/UL (ref 0–0.4)
ABSOLUTE IMMATURE GRANULOCYTE: ABNORMAL K/UL (ref 0–0.3)
ABSOLUTE LYMPH #: 0.4 K/UL (ref 1–4.8)
ABSOLUTE MONO #: 1.4 K/UL (ref 0.2–0.8)
ANION GAP SERPL CALCULATED.3IONS-SCNC: 8 MMOL/L (ref 9–17)
BASOPHILS # BLD: 0 % (ref 0–2)
BASOPHILS ABSOLUTE: 0 K/UL (ref 0–0.2)
BUN BLDV-MCNC: 30 MG/DL (ref 8–23)
BUN/CREAT BLD: 20 (ref 9–20)
CALCIUM SERPL-MCNC: 8.2 MG/DL (ref 8.6–10.4)
CHLORIDE BLD-SCNC: 114 MMOL/L (ref 98–107)
CO2: 23 MMOL/L (ref 20–31)
CREAT SERPL-MCNC: 1.47 MG/DL (ref 0.7–1.2)
CULTURE: NORMAL
CULTURE: NORMAL
DIFFERENTIAL TYPE: ABNORMAL
EOSINOPHILS RELATIVE PERCENT: 4 % (ref 1–4)
GFR AFRICAN AMERICAN: 59 ML/MIN
GFR NON-AFRICAN AMERICAN: 48 ML/MIN
GFR SERPL CREATININE-BSD FRML MDRD: ABNORMAL ML/MIN/{1.73_M2}
GFR SERPL CREATININE-BSD FRML MDRD: ABNORMAL ML/MIN/{1.73_M2}
GLUCOSE BLD-MCNC: 103 MG/DL (ref 75–110)
GLUCOSE BLD-MCNC: 116 MG/DL (ref 75–110)
GLUCOSE BLD-MCNC: 118 MG/DL (ref 75–110)
GLUCOSE BLD-MCNC: 124 MG/DL (ref 75–110)
GLUCOSE BLD-MCNC: 147 MG/DL (ref 70–99)
HCT VFR BLD CALC: 31.1 % (ref 41–53)
HEMOGLOBIN: 10.3 G/DL (ref 13.5–17.5)
IMMATURE GRANULOCYTES: ABNORMAL %
LYMPHOCYTES # BLD: 6 % (ref 24–44)
Lab: NORMAL
Lab: NORMAL
MAGNESIUM: 1.9 MG/DL (ref 1.6–2.6)
MCH RBC QN AUTO: 32.4 PG (ref 26–34)
MCHC RBC AUTO-ENTMCNC: 33 G/DL (ref 31–37)
MCV RBC AUTO: 98 FL (ref 80–100)
MONOCYTES # BLD: 19 % (ref 1–7)
NRBC AUTOMATED: ABNORMAL PER 100 WBC
PDW BLD-RTO: 17.6 % (ref 11.5–14.5)
PHOSPHORUS: 3 MG/DL (ref 2.5–4.5)
PLATELET # BLD: 131 K/UL (ref 130–400)
PLATELET ESTIMATE: ABNORMAL
PMV BLD AUTO: 9.2 FL (ref 6–12)
POTASSIUM SERPL-SCNC: 4.4 MMOL/L (ref 3.7–5.3)
RBC # BLD: 3.17 M/UL (ref 4.5–5.9)
RBC # BLD: ABNORMAL 10*6/UL
SEG NEUTROPHILS: 71 % (ref 36–66)
SEGMENTED NEUTROPHILS ABSOLUTE COUNT: 5.3 K/UL (ref 1.8–7.7)
SODIUM BLD-SCNC: 145 MMOL/L (ref 135–144)
SPECIMEN DESCRIPTION: NORMAL
SPECIMEN DESCRIPTION: NORMAL
WBC # BLD: 7.4 K/UL (ref 3.5–11)
WBC # BLD: ABNORMAL 10*3/UL

## 2019-05-04 PROCEDURE — 6360000002 HC RX W HCPCS: Performed by: INTERNAL MEDICINE

## 2019-05-04 PROCEDURE — 85025 COMPLETE CBC W/AUTO DIFF WBC: CPT

## 2019-05-04 PROCEDURE — 99232 SBSQ HOSP IP/OBS MODERATE 35: CPT | Performed by: INTERNAL MEDICINE

## 2019-05-04 PROCEDURE — 84100 ASSAY OF PHOSPHORUS: CPT

## 2019-05-04 PROCEDURE — 82947 ASSAY GLUCOSE BLOOD QUANT: CPT

## 2019-05-04 PROCEDURE — 2700000000 HC OXYGEN THERAPY PER DAY

## 2019-05-04 PROCEDURE — 2580000003 HC RX 258: Performed by: SURGERY

## 2019-05-04 PROCEDURE — 6370000000 HC RX 637 (ALT 250 FOR IP): Performed by: SURGERY

## 2019-05-04 PROCEDURE — 6360000002 HC RX W HCPCS: Performed by: SURGERY

## 2019-05-04 PROCEDURE — 2000000000 HC ICU R&B

## 2019-05-04 PROCEDURE — 94761 N-INVAS EAR/PLS OXIMETRY MLT: CPT

## 2019-05-04 PROCEDURE — 2580000003 HC RX 258: Performed by: NURSE PRACTITIONER

## 2019-05-04 PROCEDURE — 2500000003 HC RX 250 WO HCPCS: Performed by: INTERNAL MEDICINE

## 2019-05-04 PROCEDURE — 2500000003 HC RX 250 WO HCPCS: Performed by: SURGERY

## 2019-05-04 PROCEDURE — 2500000003 HC RX 250 WO HCPCS: Performed by: NURSE PRACTITIONER

## 2019-05-04 PROCEDURE — 71045 X-RAY EXAM CHEST 1 VIEW: CPT

## 2019-05-04 PROCEDURE — 2580000003 HC RX 258: Performed by: INTERNAL MEDICINE

## 2019-05-04 PROCEDURE — 94640 AIRWAY INHALATION TREATMENT: CPT

## 2019-05-04 PROCEDURE — C9113 INJ PANTOPRAZOLE SODIUM, VIA: HCPCS | Performed by: INTERNAL MEDICINE

## 2019-05-04 PROCEDURE — 6370000000 HC RX 637 (ALT 250 FOR IP): Performed by: INTERNAL MEDICINE

## 2019-05-04 PROCEDURE — 80048 BASIC METABOLIC PNL TOTAL CA: CPT

## 2019-05-04 PROCEDURE — APPNB30 APP NON BILLABLE TIME 0-30 MINS: Performed by: NURSE PRACTITIONER

## 2019-05-04 PROCEDURE — 83735 ASSAY OF MAGNESIUM: CPT

## 2019-05-04 RX ORDER — CHLORPROMAZINE HYDROCHLORIDE 25 MG/ML
10 INJECTION INTRAMUSCULAR DAILY PRN
Status: DISCONTINUED | OUTPATIENT
Start: 2019-05-04 | End: 2019-05-04

## 2019-05-04 RX ORDER — CHLORPROMAZINE HYDROCHLORIDE 25 MG/ML
10 INJECTION INTRAMUSCULAR 3 TIMES DAILY PRN
Status: DISCONTINUED | OUTPATIENT
Start: 2019-05-04 | End: 2019-05-04 | Stop reason: SDUPTHER

## 2019-05-04 RX ORDER — METOPROLOL TARTRATE 5 MG/5ML
5 INJECTION INTRAVENOUS EVERY 4 HOURS
Status: DISCONTINUED | OUTPATIENT
Start: 2019-05-04 | End: 2019-05-08

## 2019-05-04 RX ADMIN — Medication 10 ML: at 20:10

## 2019-05-04 RX ADMIN — CHLORPROMAZINE HYDROCHLORIDE 10 MG: 25 INJECTION INTRAMUSCULAR at 23:32

## 2019-05-04 RX ADMIN — METOPROLOL TARTRATE 5 MG: 5 INJECTION INTRAVENOUS at 03:51

## 2019-05-04 RX ADMIN — PANTOPRAZOLE SODIUM 40 MG: 40 INJECTION, POWDER, FOR SOLUTION INTRAVENOUS at 08:14

## 2019-05-04 RX ADMIN — FENTANYL CITRATE 50 MCG: 50 INJECTION, SOLUTION INTRAMUSCULAR; INTRAVENOUS at 08:13

## 2019-05-04 RX ADMIN — HYDRALAZINE HYDROCHLORIDE 10 MG: 20 INJECTION INTRAMUSCULAR; INTRAVENOUS at 08:13

## 2019-05-04 RX ADMIN — TAZOBACTAM SODIUM AND PIPERACILLIN SODIUM 3.38 G: 375; 3 INJECTION, SOLUTION INTRAVENOUS at 18:01

## 2019-05-04 RX ADMIN — Medication 10 ML: at 20:12

## 2019-05-04 RX ADMIN — IPRATROPIUM BROMIDE AND ALBUTEROL SULFATE 1 AMPULE: .5; 3 SOLUTION RESPIRATORY (INHALATION) at 07:30

## 2019-05-04 RX ADMIN — HEPARIN SODIUM 5000 UNITS: 5000 INJECTION, SOLUTION INTRAVENOUS; SUBCUTANEOUS at 13:08

## 2019-05-04 RX ADMIN — METOPROLOL TARTRATE 5 MG: 5 INJECTION INTRAVENOUS at 18:01

## 2019-05-04 RX ADMIN — HEPARIN SODIUM 5000 UNITS: 5000 INJECTION, SOLUTION INTRAVENOUS; SUBCUTANEOUS at 21:41

## 2019-05-04 RX ADMIN — TAZOBACTAM SODIUM AND PIPERACILLIN SODIUM 3.38 G: 375; 3 INJECTION, SOLUTION INTRAVENOUS at 08:14

## 2019-05-04 RX ADMIN — CALCIUM GLUCONATE: 94 INJECTION, SOLUTION INTRAVENOUS at 18:02

## 2019-05-04 RX ADMIN — I.V. FAT EMULSION 100 ML: 20 EMULSION INTRAVENOUS at 18:02

## 2019-05-04 RX ADMIN — HEPARIN SODIUM 5000 UNITS: 5000 INJECTION, SOLUTION INTRAVENOUS; SUBCUTANEOUS at 06:40

## 2019-05-04 RX ADMIN — METOCLOPRAMIDE 10 MG: 5 INJECTION, SOLUTION INTRAMUSCULAR; INTRAVENOUS at 03:50

## 2019-05-04 RX ADMIN — METOPROLOL TARTRATE 5 MG: 5 INJECTION INTRAVENOUS at 21:41

## 2019-05-04 RX ADMIN — ALBUTEROL SULFATE 2.5 MG: 2.5 SOLUTION RESPIRATORY (INHALATION) at 03:12

## 2019-05-04 RX ADMIN — METOCLOPRAMIDE 10 MG: 5 INJECTION, SOLUTION INTRAMUSCULAR; INTRAVENOUS at 23:20

## 2019-05-04 RX ADMIN — INSULIN LISPRO 1 UNITS: 100 INJECTION, SOLUTION INTRAVENOUS; SUBCUTANEOUS at 06:39

## 2019-05-04 RX ADMIN — IPRATROPIUM BROMIDE AND ALBUTEROL SULFATE 1 AMPULE: .5; 3 SOLUTION RESPIRATORY (INHALATION) at 11:07

## 2019-05-04 RX ADMIN — TAZOBACTAM SODIUM AND PIPERACILLIN SODIUM 3.38 G: 375; 3 INJECTION, SOLUTION INTRAVENOUS at 01:19

## 2019-05-04 RX ADMIN — IPRATROPIUM BROMIDE AND ALBUTEROL SULFATE 1 AMPULE: .5; 3 SOLUTION RESPIRATORY (INHALATION) at 19:55

## 2019-05-04 RX ADMIN — CHLORPROMAZINE HYDROCHLORIDE 10 MG: 25 INJECTION INTRAMUSCULAR at 14:41

## 2019-05-04 RX ADMIN — METOPROLOL TARTRATE 5 MG: 5 INJECTION INTRAVENOUS at 10:05

## 2019-05-04 RX ADMIN — HYDROMORPHONE HYDROCHLORIDE 0.5 MG: 1 INJECTION, SOLUTION INTRAMUSCULAR; INTRAVENOUS; SUBCUTANEOUS at 22:43

## 2019-05-04 RX ADMIN — PANTOPRAZOLE SODIUM 40 MG: 40 INJECTION, POWDER, FOR SOLUTION INTRAVENOUS at 20:10

## 2019-05-04 RX ADMIN — METOPROLOL TARTRATE 5 MG: 5 INJECTION INTRAVENOUS at 13:08

## 2019-05-04 RX ADMIN — IPRATROPIUM BROMIDE AND ALBUTEROL SULFATE 1 AMPULE: .5; 3 SOLUTION RESPIRATORY (INHALATION) at 15:16

## 2019-05-04 RX ADMIN — Medication 10 ML: at 08:14

## 2019-05-04 RX ADMIN — METOCLOPRAMIDE 10 MG: 5 INJECTION, SOLUTION INTRAMUSCULAR; INTRAVENOUS at 18:01

## 2019-05-04 RX ADMIN — METOCLOPRAMIDE 10 MG: 5 INJECTION, SOLUTION INTRAMUSCULAR; INTRAVENOUS at 08:13

## 2019-05-04 RX ADMIN — HYDROMORPHONE HYDROCHLORIDE 0.5 MG: 1 INJECTION, SOLUTION INTRAMUSCULAR; INTRAVENOUS; SUBCUTANEOUS at 19:21

## 2019-05-04 RX ADMIN — FENTANYL CITRATE 50 MCG: 50 INJECTION, SOLUTION INTRAMUSCULAR; INTRAVENOUS at 13:08

## 2019-05-04 RX ADMIN — ONDANSETRON 4 MG: 2 INJECTION INTRAMUSCULAR; INTRAVENOUS at 10:08

## 2019-05-04 ASSESSMENT — PAIN SCALES - GENERAL
PAINLEVEL_OUTOF10: 8
PAINLEVEL_OUTOF10: 9
PAINLEVEL_OUTOF10: 10
PAINLEVEL_OUTOF10: 4
PAINLEVEL_OUTOF10: 9

## 2019-05-04 ASSESSMENT — PAIN DESCRIPTION - LOCATION: LOCATION: ABDOMEN

## 2019-05-04 ASSESSMENT — PAIN DESCRIPTION - PAIN TYPE: TYPE: ACUTE PAIN;SURGICAL PAIN

## 2019-05-04 NOTE — PROGRESS NOTES
CBC  Recent Labs     05/02/19  0702  05/02/19  2355 05/03/19  0420 05/04/19  0500   WBC 7.4  --   --  6.3 7.4   HGB 9.8*   < > 9.7* 9.8* 10.3*   HCT 31.1*   < > 31.7* 31.6* 31.1*   .3  --   --  103.3* 98.0   PLT 84*  --   --  98* 131    < > = values in this interval not displayed. BMP  Recent Labs     05/02/19  0702 05/03/19  0420 05/04/19  0500    146* 145*   K 4.1 4.1 4.4   * 114* 114*   CO2 23 23 23   BUN 34* 33* 30*   CREATININE 1.55* 1.72* 1.47*   GLUCOSE 137* 130* 147*   CALCIUM 7.9* 8.1* 8.2*       LFTS  Recent Labs     05/02/19  0702   ALKPHOS 54   *   AST 36   PROT 5.1*   BILITOT 0.88   LABALBU 2.3*     FINDINGS:ct abd 4/28/19   Images degraded by respiratory motion.       Lower Chest: Cardiomegaly and pacemaker wires noted.  Minor subsegmental   atelectasis and scarring posterior lung bases with some bronchiectasis noted   along with trace right pleural effusion.       Organs: There is extensive portal venous gas.  No focal liver mass.  Spleen,   pancreas and adrenal glands show no significant abnormalities.  Vicarious   excretion of contrast in the gallbladder.  Bilateral renal cysts and   nonobstructing right nephrolithiasis with several calculi up to 5 mm noted in   the lower pole.       GI/Bowel: There is limited evaluation due to absence of oral contrast.   Some   gastric distention.       Fluid-filled dilated jejunal loops with some pneumatosis. Maxine Aminata is also   extensive confluent thickening of proximal/mid ileal loops and probably to   some distal jejunal loops with presence of pneumatosis and bowel dilatation   with interloop free fluid.  Differential is bowel ischemia or infection.    There is a transition point to collapsed distal ileal loops in the right   lower quadrant best seen on series 2, image 125 and coronal image 45-50   indicating component of bowel obstruction.  No evidence for acute   appendicitis.  No acute process in the colon.       Pelvis: Bladder grossly normal.  No suspicious pelvic mass.       Peritoneum/Retroperitoneum: Small volume free fluid mainly insinuating   between the abnormal small-bowel loops.  No free intraperitoneal air.  No   significant lymphadenopathy.       Bones/Soft Tissues: Degenerative changes in the spine.           Impression   1. Interval progression of previously noted mildly dilated proximal small   bowel loops now showing significant dilatation with transition point in the   right lower quadrant representing in small-bowel obstruction in the mid to   distal ileum. 2. In addition to small-bowel obstruction there is new finding of extensive   wall thickening pneumatosis involving the dilated distal jejunal and   proximal/mid ileal small bowel loops along with portal venous gas and   interloop edema/free fluid indicating superimposed bowel ischemia with or   without superimposed infection. Findings were discussed with Dr. Macy Skelton at 12:31 pm on 4/28/2019.             FINDINGS:ct abd 4/25/19   Lower Chest: Infiltrates are seen in the lung bases bilaterally that is worse   on the left compared to the right.  The heart is enlarged.       Organs: The liver and spleen are normal size and overall attenuation.  There   are hepatic granulomas.  The gallbladder, pancreas, and adrenal glands are   unremarkable. Danella Gell is no obstructive uropathy.  There are bilateral   nonobstructing renal stones.  The largest stone is seen on the right   measuring approximately 5 mm in diameter.  There are bilateral renal cysts. The ureters are not dilated.  The urinary bladder is unremarkable.       GI/Bowel: The stomach is mildly distended and fluid-filled.  Loops of small   bowel are mildly distended and fluid-filled in the proximal and mid aspect.    The distal and terminal ileum is decompressed.  The colon is decompressed   containing residual air and fecal contents.  There is no intraperitoneal free   air or free fluid.       Pelvis: The prostate gland and seminal vesicles are unremarkable. Phleboliths are seen in the pelvis.       Peritoneum/Retroperitoneum: The psoas muscles are symmetric.  The abdominal   aorta is normal in caliber.  The inferior vena cava is unremarkable. Teresa José Miguel   is no retroperitoneal or mesenteric adenopathy.       Bones/Soft Tissues: The extra-abdominal soft tissues are unremarkable. Teresa José Miguel   is no acute osseous abnormality.           Impression   Mildly prominent fluid-filled loops of small bowel proximally and within the   mid aspect with decompressed distal and terminal ileal loops.  This may   relate to a degree of obstruction versus an ileus and correlation is needed.       Infiltrates in the lung bases bilaterally that may represent atelectasis   versus pneumonia.       Nonobstructing renal stones bilaterally.           ASSESSMENT/PLAN:  1. Abdominal pain with SBO/ischemic colitis s/p ex lap with WESLEY and small bowel resection with site to site anastomosis on 4/28/19 improving    2. Coffee ground emesis; currently resolved  -continue the ppi, he currently has no active bleeding and from a GI standpoint there is no reason not to start anticoagulation but will defer this decision to vascular and surgery service  -trend the h/h    3. 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 Leanna Lara. Electronically signed by: MIQUEL Quezada CNP on 5/4/2019 at 3:03 PM       Attending Physician Statement  I have discussed the care of Bakari Bruce and   I have examined the patient myselft and taken ros and hpi , including pertinent history and exam findings,  with the author of this note .    I have reviewed the key elements of all parts of the encounter with the nurse practitioner/resident.     I agree with the assessment, plan and orders as documented by the above health care provider   Addition/summary:    Low-dose Thorazine for the hiccups  Decision to restart heparin drip was left up to the surgery/vascular service, I don't see a reason from the GI standpoint right now not to start the we can start her with very careful monitoring    Electronically signed by Christina Rodríguez MD

## 2019-05-04 NOTE — PROGRESS NOTES
[facility]    Progress Note    5/4/2019    1:52 PM    Name:   Emerald Rodas  MRN:     [id number]     Oscar Rockwood:      [de-identified]   Room:   [address] [place] [address] Day:  9  Admit Date:  4/25/2019  4:57 PM    PCP:   Jessica Perez MD  Code Status:  Full Code    Subjective:     C/C:   Chief Complaint   Patient presents with    Abdominal Pain    Dizziness     onset today     Interval History Status: improved  Extubated yesterday  Continued confusion overnight, says no significant abdominal pain but not sure about reliability  No fevers, chills reported overnight  Blood pressure continued to stay high needing hydralazine  No reported bowel movements     Brief History:     The patient is a 61 y.o. Non-/non  male who presents with Abdominal Pain and Dizziness (onset today)  and he is admitted to the hospital for the management of  SBO.      He has the following significant co-morbidities: HTN, Chronic AFib, Chronic combined systolic/diastolic heart failure, S/P AICD Placement, Depression, GUME, Adenocarcinoma of small bowel, Colon cancer.      He presented to the ED with abdominal pain and shortness of breath. The pain was sudden in onset, localized to the epigastric region. HE denied any constipation and was passing gas and having normal BMs. He had associated shortness of breath. In the ED, imaging was concerning for SBO vs ileus. NG tube was placed. CT Chest showed small subsegmental PE, similar finding on CT scan at [facility] hospital two months back, admitted for GI bleed with finding of large gastric polyp with diagnosis of adenocarcinoma of stomach     Sudden worsening of clinical condition after initial improvement on 4/27/2019.  Open laparotomy on 4/28 with finding of ischemic bowel, needed bowel resection along with WESLEY     Review of Systems:     Patient seems confused  No fevers, chills reported overnight  No worsened SOB, coughing overnight  No significant abdominal discomfort    Medications: Allergies:     Allergies   Allergen Reactions    Morphine      itching     Current Meds:   Scheduled Meds:    metoprolol  5 mg Intravenous Q4H    ipratropium-albuterol  1 ampule Inhalation Q4H WA    albuterol  2.5 mg Nebulization BID    metoclopramide  10 mg Intravenous Q6H    heparin (porcine)  5,000 Units Subcutaneous 3 times per day    pantoprazole  40 mg Intravenous BID    And    sodium chloride (PF)  10 mL Intravenous BID    insulin lispro  0-6 Units Subcutaneous 4 times per day    metoprolol succinate  25 mg Oral Daily    amiodarone  200 mg Oral Daily    sodium chloride  500 mL Intravenous Once    piperacillin-tazobactam  3.375 g Intravenous Q8H    sodium chloride flush  10 mL Intravenous 2 times per day    aspirin  81 mg Oral Daily    atorvastatin  40 mg Oral Nightly    DULoxetine  20 mg Oral Daily    ferrous sulfate  325 mg Oral Daily with breakfast    pyridoxine  25 mg Oral Daily    QUEtiapine  25 mg Oral BID    senna  1 tablet Oral Daily     Continuous Infusions:    PN-Adult 2-in-1 Central Line (Standard)      PN-Adult 2-in-1 Central Line (Standard) 60 mL/hr at 05/03/19 1651    fat emulsion Stopped (05/04/19 0700)    dextrose      vasopressin (Septic Shock) infusion Stopped (05/01/19 1531)    fentaNYL Stopped (05/03/19 0445)    dextrose 5 % and 0.45 % NaCl 75 mL/hr at 05/03/19 1958    norepinephrine Stopped (05/01/19 0259)     PRN Meds: chlorproMAZINE, albuterol, hydrALAZINE, fentanNYL, glucose, dextrose, glucagon (rDNA), dextrose, sodium chloride flush, oxyCODONE-acetaminophen **OR** oxyCODONE-acetaminophen, HYDROmorphone **OR** HYDROmorphone, ondansetron **OR** ondansetron, aluminum & magnesium hydroxide-simethicone, dibucaine, nitroGLYCERIN, potassium chloride **OR** potassium alternative oral replacement **OR** potassium chloride, magnesium sulfate, magnesium hydroxide, nicotine, acetaminophen, phenol    Data:     Past CALCIUM 7.9* 8.1* 8.2*   PHOS 3.4 3.5 3.0     Recent Labs     05/02/19  0702  05/02/19  2251 05/03/19  0632 05/03/19  1121 05/03/19  1829 05/04/19  0009 05/04/19  1120   PROT 5.1*  --   --   --   --   --   --   --    LABALBU 2.3*  --   --   --   --   --   --   --    AST 36  --   --   --   --   --   --   --    *  --   --   --   --   --   --   --    ALKPHOS 54  --   --   --   --   --   --   --    BILITOT 0.88  --   --   --   --   --   --   --    TRIG 101  --   --   --   --   --   --   --    POCGLU  --    < > 110 121* 120* 108 116* 124*    < > = values in this interval not displayed. Lab Results   Component Value Date/Time    SPECIAL RIGHT AC 7ML AEROBIC 1ML ANAEROBIC 04/28/2019 12:34 PM     Lab Results   Component Value Date/Time    CULTURE NO GROWTH 6 DAYS 04/28/2019 12:34 PM     Lab Results   Component Value Date    POCPH 7.35 05/03/2019    PHART 7.44 08/30/2012    PH 7.48 11/08/2012    POCPCO2 49 05/03/2019    PYL6BQB 60 08/30/2012    PCO2 45 11/08/2012    POCPO2 112 05/03/2019    PO2ART 73 08/30/2012    PO2 145 11/08/2012    POCHCO3 26.9 05/03/2019    UBH8ZOC 24.1 11/01/2012    HCO3 33.6 11/08/2012    NBEA NOT REPORTED 05/03/2019    PBEA 1 05/03/2019    SDZ0HVG 28 05/03/2019    YQMP3DPQ 98 05/03/2019    A8BYREHP 100 11/02/2012    O2SAT 99 11/02/2012    FIO2 30.0 05/03/2019     Radiology:  Xr Abdomen (kub) (single Ap View)    Result Date: 4/25/2019  EXAMINATION: SINGLE SUPINE XRAY VIEW(S) OF THE ABDOMEN 4/25/2019 8:44 pm COMPARISON: None. HISTORY: ORDERING SYSTEM PROVIDED HISTORY: NGT placement confirmation TECHNOLOGIST PROVIDED HISTORY: NGT placement confirmation Ordering Physician Provided Reason for Exam: NGT placement Acuity: Acute Type of Exam: Initial FINDINGS: 2 images of the abdomen were provided. Enteric tube is noted projecting on the left hemidiaphragm. This is likely in the stomach. Moderate multifocal bowel distention is noted.   Ill-defined increased density in the left lung base is noted     Enteric tube as described     Ct Abdomen Pelvis W Iv Contrast Additional Contrast? None    Result Date: 4/25/2019  EXAMINATION: CT OF THE ABDOMEN AND PELVIS WITH CONTRAST 4/25/2019 6:10 pm TECHNIQUE: CT of the abdomen and pelvis was performed with the administration of intravenous contrast. Multiplanar reformatted images are provided for review. Dose modulation, iterative reconstruction, and/or weight based adjustment of the mA/kV was utilized to reduce the radiation dose to as low as reasonably achievable. COMPARISON: CT abdomen and pelvis performed 02/23/2017. HISTORY: ORDERING SYSTEM PROVIDED HISTORY: abdominal pain TECHNOLOGIST PROVIDED HISTORY: FINDINGS: Lower Chest: Infiltrates are seen in the lung bases bilaterally that is worse on the left compared to the right. The heart is enlarged. Organs: The liver and spleen are normal size and overall attenuation. There are hepatic granulomas. The gallbladder, pancreas, and adrenal glands are unremarkable. There is no obstructive uropathy. There are bilateral nonobstructing renal stones. The largest stone is seen on the right measuring approximately 5 mm in diameter. There are bilateral renal cysts. The ureters are not dilated. The urinary bladder is unremarkable. GI/Bowel: The stomach is mildly distended and fluid-filled. Loops of small bowel are mildly distended and fluid-filled in the proximal and mid aspect. The distal and terminal ileum is decompressed. The colon is decompressed containing residual air and fecal contents. There is no intraperitoneal free air or free fluid. Pelvis: The prostate gland and seminal vesicles are unremarkable. Phleboliths are seen in the pelvis. Peritoneum/Retroperitoneum: The psoas muscles are symmetric. The abdominal aorta is normal in caliber. The inferior vena cava is unremarkable. There is no retroperitoneal or mesenteric adenopathy. Bones/Soft Tissues: The extra-abdominal soft tissues are unremarkable. There is no acute osseous abnormality. Mildly prominent fluid-filled loops of small bowel proximally and within the mid aspect with decompressed distal and terminal ileal loops. This may relate to a degree of obstruction versus an ileus and correlation is needed. Infiltrates in the lung bases bilaterally that may represent atelectasis versus pneumonia. Nonobstructing renal stones bilaterally. Xr Chest Portable    Result Date: 4/27/2019  EXAMINATION: SINGLE XRAY VIEW OF THE CHEST 4/27/2019 6:22 am COMPARISON: 04/25/2019. HISTORY: ORDERING SYSTEM PROVIDED HISTORY: pleural effusion, infiltrates TECHNOLOGIST PROVIDED HISTORY: pleural effusion, infiltrates Ordering Physician Provided Reason for Exam: plural effusion infiltrate Acuity: Unknown Type of Exam: Initial FINDINGS: A left cardiac device is unchanged in position. An enteric tube is seen coursing below the diaphragm. The cardiac silhouette is persistently enlarged. There is prominence of the central pulmonary vasculature. No convincing focal consolidation, pleural effusion or pneumothorax. 1. Persistent enlargement of the cardiac silhouette with prominence of the central pulmonary vasculature. 2. No convincing pleural effusion or pneumothorax. Xr Chest Portable    Result Date: 4/25/2019  EXAMINATION: SINGLE XRAY VIEW OF THE CHEST 4/25/2019 5:56 pm COMPARISON: Chest radiograph performed 11/03/2018. HISTORY: ORDERING SYSTEM PROVIDED HISTORY: shortness of breath TECHNOLOGIST PROVIDED HISTORY: shortness of breath Ordering Physician Provided Reason for Exam: chest pain Acuity: Acute Type of Exam: Initial Additional signs and symptoms: SOB Relevant Medical/Surgical History: CHF, COPD FINDINGS: There is chronic pulmonary change. There may be small bibasilar effusions with adjacent atelectasis. There is no pneumothorax. The heart is enlarged with stable cardiac lead. The upper abdomen is unremarkable.   The extrathoracic soft tissues are unremarkable. Cardiomegaly with small bibasilar effusions and adjacent atelectasis. Ct Chest Pulmonary Embolism W Contrast    Result Date: 4/25/2019  EXAMINATION: CTA OF THE CHEST 4/25/2019 6:10 pm TECHNIQUE: CTA of the chest was performed after the administration of intravenous contrast.  Multiplanar reformatted images are provided for review. MIP images are provided for review. Dose modulation, iterative reconstruction, and/or weight based adjustment of the mA/kV was utilized to reduce the radiation dose to as low as reasonably achievable. COMPARISON: None. HISTORY: ORDERING SYSTEM PROVIDED HISTORY: sob, history of CA FINDINGS: Pulmonary Arteries: Pulmonary arteries are adequately opacified for evaluation. There is an intraluminal filling defect within a subsegmental branch supplying the left lower lobe medially. The remainder of the arterial branches appear patent. Main pulmonary artery is normal in caliber. Mediastinum: No evidence of mediastinal lymphadenopathy. The heart and pericardium demonstrate no acute abnormality. The heart is enlarged. There is no acute abnormality of the thoracic aorta. Lungs/pleura: There is linear atelectasis versus scarring in the right lung posteriorly. There is dependent atelectasis in the left lung. There is a small right basilar effusion. There is no pneumothorax. The tracheobronchial tree is patent. Upper Abdomen: Limited images of the upper abdomen are unremarkable. Soft Tissues/Bones: No acute bone or soft tissue abnormality. Mild cardiomegaly with a small right basilar effusion. Filling defect involving a subsegmental branch supplying the left lower lobe medially consistent with an embolism. Critical results were called by Dr. Nishant Loja to [facility] on 4/33/5928 at 18:48.      Physical Examination:        BP (!) 152/73   Pulse 73   Temp 98.7 °F (37.1 °C) (Axillary)   Resp 19   Ht 5' 9\" (1.753 m)   Wt 249 lb 8 oz (113.2 kg)   SpO2 97% BMI 36.84 kg/m²   Temp (24hrs), Av.5 °F (36.9 °C), Min:98.2 °F (36.8 °C), Max:99 °F (37.2 °C)    Recent Labs     19  1121 19  1829 19  0009 19  1120   POCGLU 120* 108 116* 124*       Intake/Output Summary (Last 24 hours) at 2019 1352  Last data filed at 2019 0600  Gross per 24 hour   Intake 3308.99 ml   Output 3535 ml   Net -226.01 ml     General Appearance: alert, confused, responds to questions but not sure about reliability  Mental status: alert and confused  Head:  normocephalic, atraumatic  Eye: no icterus, redness, pupils equal and reactive  Ear: normal external ear, no discharge, hearing intact  Nose:  no drainage noted  Mouth: mucous membranes moist  Neck: supple, no carotid bruits, thyroid not palpable  Lungs: Bilateral equal air entry, clear to ausculation, no wheezing  Cardiovascular: normal rate, regular rhythm, no murmur, gallop, rub. Abdomen: continued distention of abdomen, midline incision noted  Neurologic: following commands, able to move all extremities  Skin: No gross lesions, rashes, bruising or bleeding on exposed skin area  Extremities:  peripheral pulses palpable, no pedal edema or calf pain with palpation  Psych: distressed affect    Assessment:        Principal Problem:    SBO (small bowel obstruction) (HCC)  Active Problems:    Small bowel cancer (HCC)    Obesity (BMI 30-39. 9)    Pulmonary embolism without acute cor pulmonale (HCC)    GUME (obstructive sleep apnea)    Chronic atrial fibrillation (HCC)    Essential hypertension    Chronic combined systolic and diastolic congestive heart failure (HCC)    Major depressive disorder, recurrent, in full remission ([facility] [address]    Plan:        - GI, primary problem. Small bowel obstruction with necrosis of bowel. Noted Op report with removal of bowel. Surgery on 2019. POD 5. Continued close follow up. NG tube still present.  No significant bowel sounds yet, possibly post operative ileus  - Sepsis, likely from intra abdominal process. Zosyn continued at this time. No fevers, continued   - Post operative ileus, persistent, added reglan per surgical team. Continued close follow up  - Pulmonary, acute respiratory insufficiency, post surgery. Continued follow up. PE at admission, anticoagulation once medically stable, continue prophylactic DVT at this time  - Oncology history: Previous small bowel cancer status post surgery and chemotherapy.  Recent diagnosis of gastric cancer from EGD biopsy at [facility]. Staging and treatment   - Hypertension, still on small dose levophed  - GI bleed, protonix IV boluses, resolved now  - JERRELL, likely from fluid changes, clinically looks eu volemic, creatinine improving now at 1.4    Hopeful improvement in ileus    Consultations:   [address]  IP CONSULT TO PULMONOLOGY  IP CONSULT TO PULMONOLOGY  IP CONSULT TO GI  IP CONSULT TO VASCULAR SURGERY  IP CONSULT TO CARDIOLOGY  IP CONSULT TO GI  IP CONSULT TO DIETITIAN  IP CONSULT TO Kate Khan MD  5/4/2019  1:52 PM

## 2019-05-04 NOTE — PROGRESS NOTES
Pulmonary Critical Care Progress Note       Patient seen for the follow up of respiratory failure, SBO (small bowel obstruction) (HCC)     Subjective:   he has been extubated yesterday. Oxygen 3 L nasal cannula. He is still lethargic. He follows commands. He He denies any chest pain. He has fair urine output. He  Has been onTPN . He has good urine output stable blood pressure. He has increased blood pressure    Examination:  Vitals: BP (!) 160/72   Pulse 69   Temp 98.4 °F (36.9 °C) (Oral)   Resp 17   Ht 5' 9\" (1.753 m)   Wt 249 lb 8 oz (113.2 kg)   SpO2 99%   BMI 36.84 kg/m²     General appearance: awake alert no acute distress  Neck: No JVD  Lungs: Moderate air exchange, occasional rhonchi  Heart: regular rate and rhythm, S1, S2 normal, no gallop  Abdomen: Distended, tender clean dressing drain in place  Extremities: no cyanosis or clubbing. 1+ edema    LABs:  CBC:   Recent Labs     05/03/19  0420 05/04/19  0500   WBC 6.3 7.4   HGB 9.8* 10.3*   HCT 31.6* 31.1*   PLT 98* 131     BMP:   Recent Labs     05/03/19  0420 05/04/19  0500   * 145*   K 4.1 4.4   CO2 23 23   BUN 33* 30*   CREATININE 1.72* 1.47*   LABGLOM 40* 48*   GLUCOSE 130* 147*     LIVER PROFILE:  Recent Labs     05/02/19  0702   AST 36   *   LABALBU 2.3*     Radiology:  5/4/19   chest x-ray   cardiomegaly/pulmonary edema      Impression:    · Acute postoperative respiratory insufficiency  ·  pulmonary edema/ atelectasis  · SBO with ischemic bowel s/p exploratory lap with bowel resection 4/28/19   · S/p Shock/Sepsis secondary to above  · Recurrent Pulmonary Embolism  · Adenocarcinoma of small bowel, s/p bowel resection and chemo in 2014  ?  Gastric polyp biopsy 2/7/19  + Moderately differentiated adenocarcinoma  · Obesity/Obstructive sleep apnea ~ wears O2 at bedtime  · Chronic combined diastolic/systolic heart failure ~ EF 25-30%  · Pulmonary Hypertension ~ RVSP 41  · Upper GI bleed s/p microcoil embolization    Recommendations:      ·  oxygen by nasal cannula  ·  incentive spirometer every hour awake  ·  DuoNeb by nebulizer  ·  fentanyl p.r.n.  For pain  ·  TPN   · NPO for now per surgery  · Continue IV Zosyn  ·   Monitor urine output/consider diuresis  ·  blood pressure control per Cardiology  · Protonix gtt, GI on consult   ·  will need to start heparin drip when cleared by GI/ surgery given history of recurrent PE  ·  discussed with RN  · Monitor hemoglobin  ·  bedside physical therapy  · DVT prophylaxis  On heparin 5000 subcu q.8 hours          Cristel Mendez MD on 5/4/2019 at 12:46 PM  Pulmonary Critical Care and Sleep Medicine,  [facility] AT [place]: [phone]

## 2019-05-04 NOTE — PROGRESS NOTES
Surgery Progress Note             POD # 7    PATIENT NAME: Luis Fishman     TODAY'S DATE: 5/4/2019, 10:09 AM    SUBJECTIVE:    Pt is stable he denies any nausea or vomiting     OBJECTIVE:   VITALS:  BP (!) 163/73   Pulse 75   Temp 98.3 °F (36.8 °C) (Axillary)   Resp 20   Ht 5' 9\" (1.753 m)   Wt 249 lb 8 oz (113.2 kg)   SpO2 98%   BMI 36.84 kg/m²     INTAKE/OUTPUT:      Intake/Output Summary (Last 24 hours) at 5/4/2019 1009  Last data filed at 5/4/2019 0600  Gross per 24 hour   Intake 3308.99 ml   Output 3535 ml   Net -226.01 ml                 CONSTITUTIONAL:  awake and alert. no apparent distress, No acute distress  CARDIOVASCULAR:  tachycardic with regular rhythm and no murmur noted, No Murmur  LUNGS:  clear to auscultation, CTA Bilaterally  ABDOMEN:   Abdomen soft, non-tender.  BS normal. No masses,  No organomegaly, Abdomen soft, non-tender, non-distended  INCISION: dry, Incision clean/dry/intact    Data:  CBC:   Lab Results   Component Value Date    WBC 7.4 05/04/2019    RBC 3.17 05/04/2019    HGB 10.3 05/04/2019    HCT 31.1 05/04/2019    MCV 98.0 05/04/2019    MCH 32.4 05/04/2019    MCHC 33.0 05/04/2019    RDW 17.6 05/04/2019     05/04/2019    MPV 9.2 05/04/2019       Radiology Review:        Pathology  Noted    ASSESSMENT   61 y.o. male   Active Hospital Problems    Diagnosis Date Noted    Severe malnutrition ([facility] [address] [E43] 05/02/2019     Priority: Medium     Class: Present on Admission    Coffee ground emesis [K92.0]     SBO (small bowel obstruction) ([facility] [address] [K56.609] 04/25/2019    Major depressive disorder, recurrent, in full remission ([facility] [address] [F33.42] 08/30/2018    Chronic combined systolic and diastolic congestive heart failure ([facility] [address] [I50.42] 06/22/2017    Essential hypertension [I10] 05/05/2017    Chronic atrial fibrillation (HCC) [I48.2]     GUME (obstructive sleep apnea) [G47.33]     [facility] cor [facility]ale [facility] [I26.99] 02/23/2017    Obesity (BMI 30-39. 9) [E66.9]     Small bowel cancer ([facility] [address] [C17.9]          Plan  1. Will leave him on TPN and nothing by mouth as above  2. Once his GI tract is functional and we will DC the NG tube and put him on clear liquid diet  3.        Electronically signed by Ubaldo Eastman MD  on 5/4/2019 at 10:09 AM

## 2019-05-04 NOTE — PROGRESS NOTES
[facility] ([facility] ([facility].) [K56.609] . Vitals:    05/04/19 1531   BP:    Pulse:    Resp: 20   Temp:    SpO2: 99%   . Patients home meds are   Prior to Admission medications    Medication Sig Start Date End Date Taking? Authorizing Provider   aspirin 81 MG chewable tablet Take 81 mg by mouth daily   Yes Historical Provider, MD   atorvastatin (LIPITOR) 40 MG tablet Take 40 mg by mouth nightly   Yes Historical Provider, MD   DULoxetine (CYMBALTA) 20 MG extended release capsule Take 20 mg by mouth daily   Yes Historical Provider, MD   docusate sodium (COLACE) 100 MG capsule Take 100 mg by mouth 2 times daily   Yes Historical Provider, MD   ferrous sulfate 325 (65 Fe) MG EC tablet Take 325 mg by mouth 4 times daily   Yes Historical Provider, MD   furosemide (LASIX) 40 MG tablet Take 40 mg by mouth daily   Yes Historical Provider, MD   pregabalin (LYRICA) 50 MG capsule Take 50 mg by mouth 2 times daily.    Yes Historical Provider, MD   metoprolol tartrate (LOPRESSOR) 50 MG tablet Take 50 mg by mouth 2 times daily (before meals)   Yes Historical Provider, MD   pantoprazole (PROTONIX) 40 MG tablet Take 40 mg by mouth 2 times daily (before meals)   Yes Historical Provider, MD   QUEtiapine (SEROQUEL) 25 MG tablet Take 25 mg by mouth 2 times daily   Yes Historical Provider, MD   spironolactone (ALDACTONE) 25 MG tablet Take 25 mg by mouth daily   Yes Historical Provider, MD   acetaminophen (TYLENOL) 325 MG tablet Take 650 mg by mouth every 6 hours as needed for Pain or Fever   Yes Historical Provider, MD   senna (SENOKOT) 8.6 MG tablet Take 1 tablet by mouth daily as needed for Constipation   Yes Historical Provider, MD   sennosides-docusate sodium (SENOKOT-S) 8.6-50 MG tablet Take 1 tablet by mouth daily as needed for Constipation   Yes Historical Provider, MD   vitamin B-6 (PYRIDOXINE) 25 MG tablet Take 25 mg by mouth daily   Yes Historical Provider, MD dibucaine ([facility]) 1 % OINT rectal ointment Place rectally 4 times daily as needed for Pain 8/30/18  Yes Abby Adair MD   amiodarone (PACERONE) 400 MG tablet Take 1 tablet by mouth 2 times daily 6/7/17  Yes Chioma Bailey, DO   nitroGLYCERIN (NITROSTAT) 0.4 MG SL tablet up to max of 3 total doses. If no relief after 1 dose, call 911. 3/1/17  Yes MIQUEL Garibay CNP   .   Recent Surgical History: None = 0     Assessment     Peak Flow (asthma only)    Predicted:    Personal Best:    PEF    % Predicted    Peak Flow : not applicable = 0    PQI3/DKU    FEV1 Predicted        FEV1      FEV1 % Predicted    FVC    IS volume    IBW    FIO2% 3  SPO2 97  RR 20  Breath Sounds: diminished sl rhonchi      · Bronchodilator assessment at level  4  · Hyperinflation assessment at level   · Secretion Management assessment at level    ·   · []    Bronchodilator Assessment  BRONCHODILATOR ASSESSMENT SCORE  Score 0 1 2 3 4 5   Breath Sounds   []  Patient Baseline []  No Wheeze good aeration []  Faint, scattered wheezing, good aeration []  Expiratory Wheezing and or moderately diminished [x]  Insp/Exp wheeze and/or very diminished []  Insp/Exp and/ or marked distress   Respiratory Rate   []  Patient Baseline []  Less than 20 []  Less than 20 [x]  20-25 []  Greater than 25 []  Greater than 25   Peak flow % of Pred or PB []  NA   []  Greater than 90%  []  81-90% []  71-80% [x]  Less than or equal to 70%  or unable to perform []  Unable due to Respiratory Distress   Dyspnea re []  Patient Baseline []  No SOB []  No SOB []  SOB on exertion [x]  SOB min activity []  At rest/acute   e FEV% Predicted       []  NA []  Above 69%  []  Unable []  Above 60-69%  []  Unable []  Above 50-59%  []  Unable []  Above 35-49%  [x]  Unable []  Less than 35%  []  Unable                 []  Hyperinflation Assessment  Score 1 2 3   CXR and Breath Sounds   []  Clear []  No atelectasis  Basilar aeration []  Atelectasis or absent basilar breath sounds   Incentive Spirometry Volume  (Per IBW)   []  Greater than or equal to 15ml/Kg []  less than 15ml/Kg []  less than 15ml/Kg   Surgery within last 2 weeks []  None or general   []  Abdominal or thoracic surgery  []  Abdominal or thoracic   Chronic Pulmonary Historyre []  No []  Yes []  Yes     []  Secretion Management Assessment  Score 1 2 3   Bilateral Breath Sounds   []  Occasional Rhonchi []  Scattered Rhonchi []  Course Rhonchi and/or poor aeration   Sputum    []  Small amount of thin secretions []  Moderate amount of viscous secretions []  Copius, Viscious Yellow/ Secretions   CXR as reported by physician []  clear  []  Unavailable []  Infiltrates and/or consolidation  []  Unavailable []  Mucus Plugging and or lobar consolidation  []  Unavailable   Cough []  Strong, productive cough []  Weak productive cough []  No cough or weak non-productive cough

## 2019-05-04 NOTE — PROGRESS NOTES
Nutrition Assessment (Parenteral Nutrition)    Type and Reason for Visit: Reassess    Nutrition Recommendations: 1. Suggest continuing NPO Effective Now status. 2. Consider increasing PN-Adult 2-in-1 Central Line (Custom) to be @ 70 ml/hr, with IV Fat Emulsions 20% 20 g, @ 8.33 ml/hr x 12 hrs. Additive changes:  KPO4 10 to 20 mmol, MgSO4 0 to 5 mEq, Calcium gluconate 10 to 12 mEq. Nutrition Assessment: Patient improving from a nutritional standpoint as now extubated, stable, no nausea and vomiting and labs improving. Will increase PN to 70 ml/hr and adjust additives. Communicated PN changes with Parmacist.     Malnutrition Assessment:  · Malnutrition Status: Meets the criteria for severe malnutrition  · Context: Chronic illness  · Findings of the 6 clinical characteristics of malnutrition (Minimum of 2 out of 6 clinical characteristics is required to make the diagnosis of moderate or severe Protein Calorie Malnutrition based on AND/ASPEN Guidelines):  1. Energy Intake-Less than or equal to 75% of estimated energy requirement, Greater than or equal to 3 months    2. Weight Loss-10% loss or greater(13.5%), in 6 months  3. Fat Loss-Unable to assess,    4. Muscle Loss-Unable to assess,    5. Fluid Accumulation-Mild fluid accumulation, Extremities  6.   Strength-Not measured    Nutrition Risk Level: High    Nutrient Needs:  · Estimated Daily Total Kcal: 2,150-2,300 kcal (post-op) (20-22 kcal/kg)  · Estimated Daily Protein (g):  g (post-op) (1.2-1.35 g/kg)  · Estimated Daily Total Fluid (ml/day): 2,100-2,200 ml (30-32 ml/kg)    Nutrition Diagnosis:   · Problem: Severe malnutrition, In context of chronic illness  · Etiology: related to Alteration in GI function     Signs and symptoms:  as evidenced by NPO status due to medical condition, Intubation, Nutrition support - PN, Weight loss greater than or equal to 10% in 6 months, Localized or generalized fluid accumulation(+15.2 kg--13.5% since 11/2/18)    Objective Information:  · Nutrition-Focused Physical Findings: GI: abdomen soft, non-tender, passing flatus, hypoactive bowel sounds. Edema: +2 RUE/LUE, +1 RLE/LLE.  Skin: surgical incision abdomen  · Wound Type: Surgical Wound  · Current Nutrition Therapies:  · Oral Diet Orders: NPO   · Oral Diet intake: NPO  · Oral Nutrition Supplement (ONS) Orders: None  · ONS intake: NPO  · Parenteral Nutrition Orders:  · Type and Formula: Premix Central   · Lipids: 100ml, Daily  · Rate/Volume: @ 70 ml/hr / 1,680 ml  · Duration: Continuous  · Current PN Order Provides: 1,678 kcal, 84 gm protein  · Goal PN Orders Provides: @ 83.3 ml/hr (goal): 1,760 kcal, 100 g protein  · Additional Calories: IV D5 @ 75 = 306 kcal  · Anthropometric Measures:  · Ht: 5' 9\" (175.3 cm)   · Current Body Wt: 249 lb 8 oz (113.2 kg)  · Admission Body Wt: 238 lb 5.1 oz (108.1 kg)  · Usual Body Wt: 275 lb 9.2 oz (125 kg)(11/2/18)  · % Weight Change:  ,  13.5% x 6 months  · Ideal Body Wt: 160 lb (72.6 kg), % Ideal Body 148%  · BMI Classification: BMI 35.0 - 39.9 Obese Class II    Nutrition Interventions:   Continue NPO, Modify current Parenteral Nutrition  Continued Inpatient Monitoring, Coordination of Care    Nutrition Evaluation:   · Evaluation: Progressing toward goals   · Goals: PN intake to meet >80% of estimated kcal/protein needs, with good glycemic control   · Monitoring: PN Intake, PN Tolerance, Skin Integrity, Wound Healing, I&O, Mental Status/Confusion, Weight, Pertinent Labs, Nausea or Vomiting, Constipation, Monitor Bowel Function      Elias Lyon RD, LD  Office phone [phone]

## 2019-05-05 ENCOUNTER — APPOINTMENT (OUTPATIENT)
Dept: GENERAL RADIOLOGY | Age: 64
DRG: 329 | End: 2019-05-05
Payer: MEDICARE

## 2019-05-05 LAB
ABSOLUTE EOS #: 0.17 K/UL (ref 0–0.4)
ABSOLUTE IMMATURE GRANULOCYTE: 0 K/UL (ref 0–0.3)
ABSOLUTE LYMPH #: 0.68 K/UL (ref 1–4.8)
ABSOLUTE MONO #: 1.7 K/UL (ref 0.2–0.8)
ANION GAP SERPL CALCULATED.3IONS-SCNC: 7 MMOL/L (ref 9–17)
BASOPHILS # BLD: 0 %
BASOPHILS ABSOLUTE: 0 K/UL (ref 0–0.2)
BUN BLDV-MCNC: 31 MG/DL (ref 8–23)
BUN/CREAT BLD: 20 (ref 9–20)
CALCIUM SERPL-MCNC: 8.7 MG/DL (ref 8.6–10.4)
CHLORIDE BLD-SCNC: 115 MMOL/L (ref 98–107)
CO2: 25 MMOL/L (ref 20–31)
CREAT SERPL-MCNC: 1.53 MG/DL (ref 0.7–1.2)
DIFFERENTIAL TYPE: ABNORMAL
EOSINOPHILS RELATIVE PERCENT: 2 % (ref 1–4)
FIO2: 28
GFR AFRICAN AMERICAN: 56 ML/MIN
GFR NON-AFRICAN AMERICAN: 46 ML/MIN
GFR SERPL CREATININE-BSD FRML MDRD: ABNORMAL ML/MIN/{1.73_M2}
GFR SERPL CREATININE-BSD FRML MDRD: ABNORMAL ML/MIN/{1.73_M2}
GLUCOSE BLD-MCNC: 105 MG/DL (ref 75–110)
GLUCOSE BLD-MCNC: 109 MG/DL (ref 75–110)
GLUCOSE BLD-MCNC: 143 MG/DL (ref 70–99)
HCT VFR BLD CALC: 32.8 % (ref 40.7–50.3)
HEMOGLOBIN: 10.1 G/DL (ref 13–17)
IMMATURE GRANULOCYTES: 0 %
LYMPHOCYTES # BLD: 8 % (ref 24–44)
MAGNESIUM: 1.8 MG/DL (ref 1.6–2.6)
MCH RBC QN AUTO: 31.8 PG (ref 25.2–33.5)
MCHC RBC AUTO-ENTMCNC: 30.8 G/DL (ref 28–38)
MCV RBC AUTO: 103.1 FL (ref 82.6–102.9)
MONOCYTES # BLD: 20 % (ref 1–7)
NEGATIVE BASE EXCESS, ART: ABNORMAL (ref 0–2)
NRBC AUTOMATED: ABNORMAL PER 100 WBC
O2 DEVICE/FLOW/%: ABNORMAL
PATIENT TEMP: ABNORMAL
PDW BLD-RTO: 16.4 % (ref 11.8–14.4)
PHOSPHORUS: 3.5 MG/DL (ref 2.5–4.5)
PLATELET # BLD: 164 K/UL (ref 138–453)
PLATELET ESTIMATE: ABNORMAL
PMV BLD AUTO: 11.4 FL (ref 8.1–13.5)
POC HCO3: 26.8 MMOL/L (ref 22–27)
POC O2 SATURATION: 97 %
POC PCO2 TEMP: ABNORMAL MM HG
POC PCO2: 50 MM HG (ref 32–45)
POC PH TEMP: ABNORMAL
POC PH: 7.34 (ref 7.35–7.45)
POC PO2 TEMP: ABNORMAL MM HG
POC PO2: 99 MM HG (ref 75–95)
POSITIVE BASE EXCESS, ART: 1 (ref 0–2)
POTASSIUM SERPL-SCNC: 4.3 MMOL/L (ref 3.7–5.3)
RBC # BLD: 3.18 M/UL (ref 4.21–5.77)
RBC # BLD: ABNORMAL 10*6/UL
SEG NEUTROPHILS: 70 % (ref 36–66)
SEGMENTED NEUTROPHILS ABSOLUTE COUNT: 5.95 K/UL (ref 1.8–7.7)
SODIUM BLD-SCNC: 147 MMOL/L (ref 135–144)
TCO2 (CALC), ART: 28 MMOL/L (ref 23–28)
WBC # BLD: 8.5 K/UL (ref 3.5–11.3)
WBC # BLD: ABNORMAL 10*3/UL

## 2019-05-05 PROCEDURE — 74018 RADEX ABDOMEN 1 VIEW: CPT

## 2019-05-05 PROCEDURE — 36415 COLL VENOUS BLD VENIPUNCTURE: CPT

## 2019-05-05 PROCEDURE — 6360000002 HC RX W HCPCS: Performed by: INTERNAL MEDICINE

## 2019-05-05 PROCEDURE — 6360000002 HC RX W HCPCS: Performed by: SURGERY

## 2019-05-05 PROCEDURE — 94640 AIRWAY INHALATION TREATMENT: CPT

## 2019-05-05 PROCEDURE — 85025 COMPLETE CBC W/AUTO DIFF WBC: CPT

## 2019-05-05 PROCEDURE — 2500000003 HC RX 250 WO HCPCS: Performed by: INTERNAL MEDICINE

## 2019-05-05 PROCEDURE — 2500000003 HC RX 250 WO HCPCS: Performed by: SURGERY

## 2019-05-05 PROCEDURE — C9113 INJ PANTOPRAZOLE SODIUM, VIA: HCPCS | Performed by: INTERNAL MEDICINE

## 2019-05-05 PROCEDURE — 2580000003 HC RX 258: Performed by: NURSE PRACTITIONER

## 2019-05-05 PROCEDURE — 83735 ASSAY OF MAGNESIUM: CPT

## 2019-05-05 PROCEDURE — 80048 BASIC METABOLIC PNL TOTAL CA: CPT

## 2019-05-05 PROCEDURE — 84100 ASSAY OF PHOSPHORUS: CPT

## 2019-05-05 PROCEDURE — 36600 WITHDRAWAL OF ARTERIAL BLOOD: CPT

## 2019-05-05 PROCEDURE — 71045 X-RAY EXAM CHEST 1 VIEW: CPT

## 2019-05-05 PROCEDURE — 2580000003 HC RX 258: Performed by: INTERNAL MEDICINE

## 2019-05-05 PROCEDURE — 6370000000 HC RX 637 (ALT 250 FOR IP): Performed by: SURGERY

## 2019-05-05 PROCEDURE — 82947 ASSAY GLUCOSE BLOOD QUANT: CPT

## 2019-05-05 PROCEDURE — 94761 N-INVAS EAR/PLS OXIMETRY MLT: CPT

## 2019-05-05 PROCEDURE — 99232 SBSQ HOSP IP/OBS MODERATE 35: CPT | Performed by: INTERNAL MEDICINE

## 2019-05-05 PROCEDURE — 6370000000 HC RX 637 (ALT 250 FOR IP): Performed by: INTERNAL MEDICINE

## 2019-05-05 PROCEDURE — 2000000000 HC ICU R&B

## 2019-05-05 PROCEDURE — 2500000003 HC RX 250 WO HCPCS: Performed by: NURSE PRACTITIONER

## 2019-05-05 PROCEDURE — 2580000003 HC RX 258: Performed by: SURGERY

## 2019-05-05 PROCEDURE — 82803 BLOOD GASES ANY COMBINATION: CPT

## 2019-05-05 PROCEDURE — 2700000000 HC OXYGEN THERAPY PER DAY

## 2019-05-05 RX ADMIN — HEPARIN SODIUM 5000 UNITS: 5000 INJECTION, SOLUTION INTRAVENOUS; SUBCUTANEOUS at 05:46

## 2019-05-05 RX ADMIN — IPRATROPIUM BROMIDE AND ALBUTEROL SULFATE 1 AMPULE: .5; 3 SOLUTION RESPIRATORY (INHALATION) at 19:32

## 2019-05-05 RX ADMIN — IPRATROPIUM BROMIDE AND ALBUTEROL SULFATE 1 AMPULE: .5; 3 SOLUTION RESPIRATORY (INHALATION) at 11:08

## 2019-05-05 RX ADMIN — METOPROLOL TARTRATE 5 MG: 5 INJECTION INTRAVENOUS at 01:06

## 2019-05-05 RX ADMIN — METOPROLOL SUCCINATE 25 MG: 25 TABLET, FILM COATED, EXTENDED RELEASE ORAL at 09:08

## 2019-05-05 RX ADMIN — QUETIAPINE FUMARATE 25 MG: 25 TABLET ORAL at 09:08

## 2019-05-05 RX ADMIN — FENTANYL CITRATE 50 MCG: 50 INJECTION, SOLUTION INTRAMUSCULAR; INTRAVENOUS at 09:20

## 2019-05-05 RX ADMIN — FENTANYL CITRATE 50 MCG: 50 INJECTION, SOLUTION INTRAMUSCULAR; INTRAVENOUS at 00:10

## 2019-05-05 RX ADMIN — CALCIUM GLUCONATE: 94 INJECTION, SOLUTION INTRAVENOUS at 18:13

## 2019-05-05 RX ADMIN — INSULIN LISPRO 1 UNITS: 100 INJECTION, SOLUTION INTRAVENOUS; SUBCUTANEOUS at 05:46

## 2019-05-05 RX ADMIN — CHLORPROMAZINE HYDROCHLORIDE 10 MG: 25 INJECTION INTRAMUSCULAR at 15:28

## 2019-05-05 RX ADMIN — HEPARIN SODIUM 5000 UNITS: 5000 INJECTION, SOLUTION INTRAVENOUS; SUBCUTANEOUS at 21:30

## 2019-05-05 RX ADMIN — METOCLOPRAMIDE 10 MG: 5 INJECTION, SOLUTION INTRAMUSCULAR; INTRAVENOUS at 18:13

## 2019-05-05 RX ADMIN — METOCLOPRAMIDE 10 MG: 5 INJECTION, SOLUTION INTRAMUSCULAR; INTRAVENOUS at 11:59

## 2019-05-05 RX ADMIN — ATORVASTATIN CALCIUM 40 MG: 40 TABLET, FILM COATED ORAL at 21:29

## 2019-05-05 RX ADMIN — Medication 10 ML: at 20:21

## 2019-05-05 RX ADMIN — AMIODARONE HYDROCHLORIDE 200 MG: 200 TABLET ORAL at 09:08

## 2019-05-05 RX ADMIN — FENTANYL CITRATE 50 MCG: 50 INJECTION, SOLUTION INTRAMUSCULAR; INTRAVENOUS at 20:20

## 2019-05-05 RX ADMIN — FERROUS SULFATE TAB EC 325 MG (65 MG FE EQUIVALENT) 325 MG: 325 (65 FE) TABLET DELAYED RESPONSE at 09:08

## 2019-05-05 RX ADMIN — FENTANYL CITRATE 50 MCG: 50 INJECTION, SOLUTION INTRAMUSCULAR; INTRAVENOUS at 18:15

## 2019-05-05 RX ADMIN — METOPROLOL TARTRATE 5 MG: 5 INJECTION INTRAVENOUS at 21:30

## 2019-05-05 RX ADMIN — PANTOPRAZOLE SODIUM 40 MG: 40 INJECTION, POWDER, FOR SOLUTION INTRAVENOUS at 20:34

## 2019-05-05 RX ADMIN — QUETIAPINE FUMARATE 25 MG: 25 TABLET ORAL at 21:34

## 2019-05-05 RX ADMIN — ALBUTEROL SULFATE 2.5 MG: 2.5 SOLUTION RESPIRATORY (INHALATION) at 04:00

## 2019-05-05 RX ADMIN — METOCLOPRAMIDE 10 MG: 5 INJECTION, SOLUTION INTRAMUSCULAR; INTRAVENOUS at 05:34

## 2019-05-05 RX ADMIN — ONDANSETRON 4 MG: 2 INJECTION INTRAMUSCULAR; INTRAVENOUS at 04:02

## 2019-05-05 RX ADMIN — FENTANYL CITRATE 50 MCG: 50 INJECTION, SOLUTION INTRAMUSCULAR; INTRAVENOUS at 14:04

## 2019-05-05 RX ADMIN — IPRATROPIUM BROMIDE AND ALBUTEROL SULFATE 1 AMPULE: .5; 3 SOLUTION RESPIRATORY (INHALATION) at 15:04

## 2019-05-05 RX ADMIN — IPRATROPIUM BROMIDE AND ALBUTEROL SULFATE 1 AMPULE: .5; 3 SOLUTION RESPIRATORY (INHALATION) at 07:33

## 2019-05-05 RX ADMIN — TAZOBACTAM SODIUM AND PIPERACILLIN SODIUM 3.38 G: 375; 3 INJECTION, SOLUTION INTRAVENOUS at 00:16

## 2019-05-05 RX ADMIN — METOPROLOL TARTRATE 5 MG: 5 INJECTION INTRAVENOUS at 05:34

## 2019-05-05 RX ADMIN — METOPROLOL TARTRATE 5 MG: 5 INJECTION INTRAVENOUS at 18:13

## 2019-05-05 RX ADMIN — HEPARIN SODIUM 5000 UNITS: 5000 INJECTION, SOLUTION INTRAVENOUS; SUBCUTANEOUS at 14:04

## 2019-05-05 RX ADMIN — PYRIDOXINE HCL TAB 50 MG 25 MG: 50 TAB at 09:08

## 2019-05-05 RX ADMIN — Medication 10 ML: at 09:09

## 2019-05-05 RX ADMIN — FENTANYL CITRATE 50 MCG: 50 INJECTION, SOLUTION INTRAMUSCULAR; INTRAVENOUS at 11:59

## 2019-05-05 RX ADMIN — Medication 10 ML: at 20:33

## 2019-05-05 RX ADMIN — TAZOBACTAM SODIUM AND PIPERACILLIN SODIUM 3.38 G: 375; 3 INJECTION, SOLUTION INTRAVENOUS at 18:13

## 2019-05-05 RX ADMIN — TAZOBACTAM SODIUM AND PIPERACILLIN SODIUM 3.38 G: 375; 3 INJECTION, SOLUTION INTRAVENOUS at 09:08

## 2019-05-05 RX ADMIN — PANTOPRAZOLE SODIUM 40 MG: 40 INJECTION, POWDER, FOR SOLUTION INTRAVENOUS at 09:08

## 2019-05-05 RX ADMIN — I.V. FAT EMULSION 100 ML: 20 EMULSION INTRAVENOUS at 18:13

## 2019-05-05 RX ADMIN — METOPROLOL TARTRATE 5 MG: 5 INJECTION INTRAVENOUS at 14:04

## 2019-05-05 RX ADMIN — SENNOSIDES 8.6 MG: 8.6 TABLET, FILM COATED ORAL at 09:08

## 2019-05-05 ASSESSMENT — PAIN SCALES - GENERAL
PAINLEVEL_OUTOF10: 5
PAINLEVEL_OUTOF10: 8
PAINLEVEL_OUTOF10: 0
PAINLEVEL_OUTOF10: 0
PAINLEVEL_OUTOF10: 7
PAINLEVEL_OUTOF10: 0
PAINLEVEL_OUTOF10: 6
PAINLEVEL_OUTOF10: 0
PAINLEVEL_OUTOF10: 6
PAINLEVEL_OUTOF10: 10

## 2019-05-05 ASSESSMENT — PAIN DESCRIPTION - PAIN TYPE: TYPE: SURGICAL PAIN

## 2019-05-05 ASSESSMENT — PAIN DESCRIPTION - LOCATION: LOCATION: ABDOMEN

## 2019-05-05 NOTE — PROGRESS NOTES
Pulmonary Critical Care Progress Note       Patient seen for the follow up of respiratory failure, SBO (small bowel obstruction) (HCC)     Subjective:   he has been lethargic on oxygen at 3 L nasal cannula. He received pain medication  And Seroquel earlier. He follows commands. He He denies any chest pain. He has fair urine output. He  Has been onTPN . He received meds per NG per surgery. He has postoperative abdominal pain    Examination:  Vitals: BP (!) 150/68   Pulse 67   Temp 97.6 °F (36.4 °C) (Oral)   Resp 19   Ht 5' 9\" (1.753 m)   Wt 253 lb 4.9 oz (114.9 kg)   SpO2 97%   BMI 37.41 kg/m²     General appearance: awake alert no acute distress  Neck: No JVD  Lungs: Moderate air exchange, occasional rhonchi  Heart: regular rate and rhythm, S1, S2 normal, no gallop  Abdomen: Distended, tender clean dressing drain in place  Extremities: no cyanosis or clubbing. 1+ edema    LABs:  CBC:   Recent Labs     05/04/19  0500 05/05/19  0447   WBC 7.4 8.5   HGB 10.3* 10.1*   HCT 31.1* 32.8*    164     BMP:   Recent Labs     05/04/19  0500 05/05/19  0447   * 147*   K 4.4 4.3   CO2 23 25   BUN 30* 31*   CREATININE 1.47* 1.53*   LABGLOM 48* 46*   GLUCOSE 147* 143*     Radiology:  0/6021   chest x-ray   Stable appearing chest with persistent findings suggesting mild CHF. Impression:    · Acute postoperative respiratory insufficiency  ·  pulmonary edema/ atelectasis  · SBO with ischemic bowel s/p exploratory lap with bowel resection 4/28/19   · S/p Shock/Sepsis secondary to above  · Recurrent Pulmonary Embolism  · Adenocarcinoma of small bowel, s/p bowel resection and chemo in 2014  ?  Gastric polyp biopsy 2/7/19  + Moderately differentiated adenocarcinoma  · Obesity/Obstructive sleep apnea ~ wears O2 at bedtime  · Chronic combined diastolic/systolic heart failure ~ EF 25-30%  · Pulmonary Hypertension ~ RVSP 41  · Upper GI bleed s/p microcoil embolization    Recommendations:      ·  oxygen by nasal cannula  ·  incentive spirometer every hour awake  ·  ABG  ·  DuoNeb by nebulizer  ·  fentanyl p.r.n.  For pain  ·  TPN   · NPO for now per surgery  · Continue IV Zosyn  ·  monitor sodium increase free water in TPN  ·   Monitor urine output/consider diuresis  ·  cardiology on consult  · Protonix  40 IV q.12 hours, GI on consult   ·  will need to start heparin drip when cleared by GI/ surgery given history of recurrent PE  ·  discussed with RN  ·  bedside physical therapy  ·  observe in ICU  · DVT prophylaxis  On heparin 5000 subcu q.8 hours          Trent Cruz MD on 5/5/2019 at 11:57 AM  Pulmonary Critical Care and Sleep Medicine,  [facility] AT [place]: [phone]

## 2019-05-05 NOTE — PROGRESS NOTES
[facility]    Progress Note    5/5/2019    12:16 PM    Name:   Lj Dhillon  MRN:     [id number]     Guerda:      [de-identified]   Room:   [address] [place] [address] Day:  8  Admit Date:  4/25/2019  4:57 PM    PCP:   Claudene Dearth, MD  Code Status:  Full Code    Subjective:     C/C:   Chief Complaint   Patient presents with    Abdominal Pain    Dizziness     onset today     Interval History Status: improved  Continued to do well overnight  Denies any significant abdominal pain  No bowel movements reported overnight  Bowel sounds were better heard this morning  No fevers, chills overnight    Brief History:     The patient is a 61 y.o. Non-/non  male who presents with Abdominal Pain and Dizziness (onset today)  and he is admitted to the hospital for the management of  SBO     He has the following significant co-morbidities: HTN, Chronic AFib, Chronic combined systolic/diastolic heart failure, S/P AICD Placement, Depression, GUME, Adenocarcinoma of small bowel, Colon cancer.      He presented to the ED with abdominal pain and shortness of breath. The pain was sudden in onset, localized to the epigastric region. HE denied any constipation and was passing gas and having normal BMs. He had associated shortness of breath. In the ED, imaging was concerning for SBO vs ileus. NG tube was placed. CT Chest showed small subsegmental PE, similar finding on CT scan at [facility] hospital two months back, admitted for GI bleed with finding of large gastric polyp with diagnosis of adenocarcinoma of stomach     Sudden worsening of clinical condition after initial improvement on 4/27/2019. Open laparotomy on 4/28 with finding of ischemic bowel, needed bowel resection along with WESLEY.      Review of Systems:     Patient continued to be mildly confused overnight  No fevers, chills reported overnight  No worsened SOB, coughing overnight  No significant abdominal discomfort  Says he was passing gas today    Medications: Allergies:     Allergies   Allergen Reactions    Morphine      itching     Current Meds:   Scheduled Meds:    metoprolol  5 mg Intravenous Q4H    ipratropium-albuterol  1 ampule Inhalation Q4H WA    albuterol  2.5 mg Nebulization BID    metoclopramide  10 mg Intravenous Q6H    heparin (porcine)  5,000 Units Subcutaneous 3 times per day    pantoprazole  40 mg Intravenous BID    And    sodium chloride (PF)  10 mL Intravenous BID    insulin lispro  0-6 Units Subcutaneous 4 times per day    metoprolol succinate  25 mg Oral Daily    amiodarone  200 mg Oral Daily    sodium chloride  500 mL Intravenous Once    piperacillin-tazobactam  3.375 g Intravenous Q8H    sodium chloride flush  10 mL Intravenous 2 times per day    aspirin  81 mg Oral Daily    atorvastatin  40 mg Oral Nightly    DULoxetine  20 mg Oral Daily    ferrous sulfate  325 mg Oral Daily with breakfast    pyridoxine  25 mg Oral Daily    QUEtiapine  25 mg Oral BID    senna  1 tablet Oral Daily     Continuous Infusions:    PN-Adult 2-in-1 Central Line (Standard) 70 mL/hr at 05/04/19 1802    fat emulsion Stopped (05/05/19 0549)    dextrose      dextrose 5 % and 0.45 % NaCl 75 mL/hr at 05/03/19 1958     PRN Meds: chlorpromazine (THORAZINE) IVPB, albuterol, hydrALAZINE, fentanNYL, glucose, dextrose, glucagon (rDNA), dextrose, sodium chloride flush, oxyCODONE-acetaminophen **OR** oxyCODONE-acetaminophen, HYDROmorphone **OR** HYDROmorphone, ondansetron **OR** ondansetron, aluminum & magnesium hydroxide-simethicone, dibucaine, nitroGLYCERIN, potassium chloride **OR** potassium alternative oral replacement **OR** potassium chloride, magnesium sulfate, magnesium hydroxide, nicotine, acetaminophen, phenol    Data:     Past Medical History:   has a past medical history of Asthma, Cancer ([facility] COPD exacerbation ([facility] GERD (gastroesophageal reflux disease), History of blood transfusion, Hyperlipidemia, Hypertension, Neuropathy, Obesity (BMI 30-39.9), Psychiatric problem, SBO (small bowel obstruction) ([address] Severe mitral regurgitation, and Severe tricuspid regurgitation. Social History:   reports that he has never smoked. He has never used smokeless tobacco. He reports that he does not drink alcohol or use drugs. Family History:   Family History   Problem Relation Age of Onset    Heart Disease Father      Vitals:  BP (!) 150/68   Pulse 67   Temp 97.6 °F (36.4 °C) (Oral)   Resp 19   Ht 5' 9\" (1.753 m)   Wt 253 lb 4.9 oz (114.9 kg)   SpO2 97%   BMI 37.41 kg/m²   Temp (24hrs), Av.2 °F (36.8 °C), Min:97.2 °F (36.2 °C), Max:99 °F (37.2 °C)    Recent Labs     19  1120 19  1659 19  2329 19  1131   POCGLU 124* 103 118* 109     I/O (24Hr):     Intake/Output Summary (Last 24 hours) at 2019 1216  Last data filed at 2019 0800  Gross per 24 hour   Intake 3241 ml   Output 3850 ml   Net -609 ml     Labs:    Hematology:  Recent Labs     19  04219  0500 19  0447   WBC 6.3 7.4 8.5   RBC 3.06* 3.17* 3.18*   HGB 9.8* 10.3* 10.1*   HCT 31.6* 31.1* 32.8*   .3* 98.0 103.1*   MCH 32.0 32.4 31.8   MCHC 31.0 33.0 30.8   RDW 17.2* 17.6* 16.4*   PLT 98* 131 164   MPV 12.5 9.2 11.4     Chemistry:  Recent Labs     19  04219  0500 19  0447   * 145* 147*   K 4.1 4.4 4.3   * 114* 115*   CO2 23 23 25   GLUCOSE 130* 147* 143*   BUN 33* 30* 31*   CREATININE 1.72* 1.47* 1.53*   MG 2.2 1.9 1.8   ANIONGAP 9 8* 7*   LABGLOM 40* 48* 46*   GFRAA 49* 59* 56*   CALCIUM 8.1* 8.2* 8.7   PHOS 3.5 3.0 3.5     Recent Labs     19  1829 19  0009 19  1120 19  1659 19  2329 19  1131   POCGLU 108 116* 124* 103 118* 109     Lab Results   Component Value Date/Time    SPECIAL RIGHT AC 7ML AEROBIC 1ML ANAEROBIC 2019 12:34 PM     Lab Results   Component Value Date/Time worse on the left compared to the right. The heart is enlarged. Organs: The liver and spleen are normal size and overall attenuation. There are hepatic granulomas. The gallbladder, pancreas, and adrenal glands are unremarkable. There is no obstructive uropathy. There are bilateral nonobstructing renal stones. The largest stone is seen on the right measuring approximately 5 mm in diameter. There are bilateral renal cysts. The ureters are not dilated. The urinary bladder is unremarkable. GI/Bowel: The stomach is mildly distended and fluid-filled. Loops of small bowel are mildly distended and fluid-filled in the proximal and mid aspect. The distal and terminal ileum is decompressed. The colon is decompressed containing residual air and fecal contents. There is no intraperitoneal free air or free fluid. Pelvis: The prostate gland and seminal vesicles are unremarkable. Phleboliths are seen in the pelvis. Peritoneum/Retroperitoneum: The psoas muscles are symmetric. The abdominal aorta is normal in caliber. The inferior vena cava is unremarkable. There is no retroperitoneal or mesenteric adenopathy. Bones/Soft Tissues: The extra-abdominal soft tissues are unremarkable. There is no acute osseous abnormality. Mildly prominent fluid-filled loops of small bowel proximally and within the mid aspect with decompressed distal and terminal ileal loops. This may relate to a degree of obstruction versus an ileus and correlation is needed. Infiltrates in the lung bases bilaterally that may represent atelectasis versus pneumonia. Nonobstructing renal stones bilaterally. Xr Chest Portable    Result Date: 4/27/2019  EXAMINATION: SINGLE XRAY VIEW OF THE CHEST 4/27/2019 6:22 am COMPARISON: 04/25/2019.  HISTORY: ORDERING SYSTEM PROVIDED HISTORY: pleural effusion, infiltrates TECHNOLOGIST PROVIDED HISTORY: pleural effusion, infiltrates Ordering Physician Provided Reason for Exam: plural effusion infiltrate Acuity: Unknown Type of Exam: Initial FINDINGS: A left cardiac device is unchanged in position. An enteric tube is seen coursing below the diaphragm. The cardiac silhouette is persistently enlarged. There is prominence of the central pulmonary vasculature. No convincing focal consolidation, pleural effusion or pneumothorax. 1. Persistent enlargement of the cardiac silhouette with prominence of the central pulmonary vasculature. 2. No convincing pleural effusion or pneumothorax. Xr Chest Portable    Result Date: 4/25/2019  EXAMINATION: SINGLE XRAY VIEW OF THE CHEST 4/25/2019 5:56 pm COMPARISON: Chest radiograph performed 11/03/2018. HISTORY: ORDERING SYSTEM PROVIDED HISTORY: shortness of breath TECHNOLOGIST PROVIDED HISTORY: shortness of breath Ordering Physician Provided Reason for Exam: chest pain Acuity: Acute Type of Exam: Initial Additional signs and symptoms: SOB Relevant Medical/Surgical History: CHF, COPD FINDINGS: There is chronic pulmonary change. There may be small bibasilar effusions with adjacent atelectasis. There is no pneumothorax. The heart is enlarged with stable cardiac lead. The upper abdomen is unremarkable. The extrathoracic soft tissues are unremarkable. Cardiomegaly with small bibasilar effusions and adjacent atelectasis. Ct Chest Pulmonary Embolism W Contrast    Result Date: 4/25/2019  EXAMINATION: CTA OF THE CHEST 4/25/2019 6:10 pm TECHNIQUE: CTA of the chest was performed after the administration of intravenous contrast.  Multiplanar reformatted images are provided for review. MIP images are provided for review. Dose modulation, iterative reconstruction, and/or weight based adjustment of the mA/kV was utilized to reduce the radiation dose to as low as reasonably achievable. COMPARISON: None. HISTORY: ORDERING SYSTEM PROVIDED HISTORY: sob, history of CA FINDINGS: Pulmonary Arteries: Pulmonary arteries are adequately opacified for evaluation.   There is an intraluminal filling defect within a subsegmental branch supplying the left lower lobe medially. The remainder of the arterial branches appear patent. Main pulmonary artery is normal in caliber. Mediastinum: No evidence of mediastinal lymphadenopathy. The heart and pericardium demonstrate no acute abnormality. The heart is enlarged. There is no acute abnormality of the thoracic aorta. Lungs/pleura: There is linear atelectasis versus scarring in the right lung posteriorly. There is dependent atelectasis in the left lung. There is a small right basilar effusion. There is no pneumothorax. The tracheobronchial tree is patent. Upper Abdomen: Limited images of the upper abdomen are unremarkable. Soft Tissues/Bones: No acute bone or soft tissue abnormality. Mild cardiomegaly with a small right basilar effusion. Filling defect involving a subsegmental branch supplying the left lower lobe medially consistent with an embolism. Critical results were called by Dr. Kia James to [facility] on 4297 at 18:48.      Physical Examination:        BP (!) 150/68   Pulse 67   Temp 97.6 °F (36.4 °C) (Oral)   Resp 19   Ht 5' 9\" (1.753 m)   Wt 253 lb 4.9 oz (114.9 kg)   SpO2 97%   BMI 37.41 kg/m²   Temp (24hrs), Av.2 °F (36.8 °C), Min:97.2 °F (36.2 °C), Max:99 °F (37.2 °C)    Recent Labs     19  1120 19  1659 19  2329 19  1131   POCGLU 124* 103 118* 109       Intake/Output Summary (Last 24 hours) at 2019 1216  Last data filed at 2019 0800  Gross per 24 hour   Intake 3241 ml   Output 3850 ml   Net -609 ml     General Appearance: alert, confused, responds to questions but not sure about reliability  Mental status: alert and confused  Head:  normocephalic, atraumatic  Eye: no icterus, redness, pupils equal and reactive  Ear: normal external ear, no discharge, hearing intact  Nose:  no drainage noted  Mouth: mucous membranes moist  Neck: supple, no carotid bruits, thyroid not palpable  Lungs: Bilateral equal air entry, clear to ausculation, no wheezing  Cardiovascular: normal rate, regular rhythm, no murmur, gallop, rub. Abdomen: continued distention of abdomen, midline incision noted  Neurologic: following commands, able to move all extremities  Skin: No gross lesions, rashes, bruising or bleeding on exposed skin area  Extremities:  peripheral pulses palpable, no pedal edema or calf pain with palpation  Psych: distressed affect    Assessment:        Principal Problem:    SBO (small bowel obstruction) (HCC)  Active Problems:    Small bowel cancer (HCC)    Obesity (BMI 30-39. 9)    Pulmonary embolism without acute cor pulmonale (HCC)    GUME (obstructive sleep apnea)    Chronic atrial fibrillation (HCC)    Essential hypertension    Chronic combined systolic and diastolic congestive heart failure (HCC)    Major depressive disorder, recurrent, in full remission ([facility]    Plan:        - GI, primary problem. Small bowel obstruction with necrosis of bowel. Noted Op report with removal of bowel. Surgery on 4/28/2019. POD 5. Continued close follow up. NG tube still present. Bowel sounds present today. Abdominal X ray with possible localized ileues  - Sepsis, likely from intra abdominal process. Zosyn continued at this time. No fevers, likely to finish 10 to 14 day course   - Post operative ileus, persistent, added reglan per surgical team. Can hear significant bowel sounds today. Continued close follow up  - Pulmonary, acute respiratory insufficiency, post surgery. Continued follow up. PE at admission, anticoagulation once medically stable and if ok with surgical team, continue prophylactic DVT at this time  - Oncology history: Previous small bowel cancer status post surgery and chemotherapy.  Recent diagnosis of gastric cancer from EGD biopsy at [facility]. Staging and treatment   - Hypertension, still on small dose levophed  - GI bleed, protonix IV boluses, resolved now  - JERRELL, likely from fluid changes, clinically looks eu volemic, creatinine improving now at 1.5    Hopeful improvement in ileus with continued supportive care    Consultations:   [address]  IP CONSULT TO PULMONOLOGY  IP CONSULT TO PULMONOLOGY  IP CONSULT TO GI  IP CONSULT TO VASCULAR SURGERY  IP CONSULT TO CARDIOLOGY  IP CONSULT TO GI  IP CONSULT TO DIETITIAN  IP CONSULT TO DIETITIAN      Traci Gonzalez MD  5/5/2019  12:16 PM

## 2019-05-05 NOTE — FLOWSHEET NOTE
Patient is intubated, no family was present.  shares prayer at bedside.    leaves prayer card for possible follow up.     04/30/19 1310   Encounter Summary   Services provided to: Patient   Referral/Consult From: Kathleen   Continue Visiting   (4-30-19 PT: intubated)   Complexity of Encounter Low   Length of Encounter 15 minutes   Spiritual Assessment Completed Yes   Routine   Type Follow up   Assessment Unable to respond   Intervention Prayer   Outcome Did not respond
Patient was sleeping, appeared a bit relentless.  shared in silent prayer.  leaves note of follow up visit. Jennifer Hou      05/05/19 1256   Encounter Summary   Services provided to: Patient   Referral/Consult From: Kathleen   Continue Visiting   (5-5-19 PT: sleeping)   Complexity of Encounter Low   Length of Encounter 15 minutes   Spiritual Assessment Completed Yes   Routine   Type Follow up   Assessment Sleeping   Intervention Prayer
Patient was sleeping. Patient has NG tube placed.  shares in bedside prayer for patient.    leaves prayer card for possible follow up.     05/04/19 1120   Encounter Summary   Services provided to: Patient   Referral/Consult From: Kathleen   Continue Visiting   (5-4-19 PT: was sleeping)   Complexity of Encounter Low   Length of Encounter 15 minutes   Spiritual Assessment Completed Yes   Routine   Type Follow up   Assessment Sleeping   Intervention Prayer
Writer beba. Patient intubated. No family present. RN present at bedside. RN states that patient is currently stable. Writer prays for patient, and leaves note of support. Spiritual Care will follow as needed.        04/29/19 7746   Encounter Summary   Services provided to: Patient  (RN)   Referral/Consult From: Beba   Continue Visiting   (4/29/19)   Complexity of Encounter Low   Length of Encounter 15 minutes   Routine   Type Follow up   Assessment Unable to respond   Intervention Prayer   Outcome Did not respond
Writer jared. Patient intubated and sleeping. There are no visitors. Writer prays for patient and leaves note of support. Spiritual Care will follow as needed. 05/02/19 1144   Encounter Summary   Services provided to: Patient   Referral/Consult From: Kathleen   Continue Visiting   (5/2/19; intubated.  sleeping)   Complexity of Encounter Low   Length of Encounter 15 minutes   Routine   Type Follow up   Intervention Prayer   Outcome Did not respond
clear to auscultation bilaterally/no wheezes/no rales

## 2019-05-05 NOTE — PROGRESS NOTES
Surgery Progress Note             POD # 8    PATIENT NAME: Tegan Castillo     TODAY'S DATE: 5/5/2019, 7:50 AM    SUBJECTIVE:    Pt is awake afebrile today his vital signs stablefatigued     OBJECTIVE:   VITALS:  BP (!) 155/80   Pulse 63   Temp 97.2 °F (36.2 °C) (Oral)   Resp 23   Ht 5' 9\" (1.753 m)   Wt 253 lb 4.9 oz (114.9 kg)   SpO2 98%   BMI 37.41 kg/m²     INTAKE/OUTPUT:      Intake/Output Summary (Last 24 hours) at 5/5/2019 0750  Last data filed at 5/5/2019 0545  Gross per 24 hour   Intake 3141 ml   Output 3850 ml   Net -709 ml                 CONSTITUTIONAL:  fatigued and alert. mild distress, No acute distress  CARDIOVASCULAR:  tachycardic with regular rhythm and no murmur noted, No Murmur  LUNGS:  no crackles or wheezing, CTA Bilaterally  ABDOMEN:   Abdomen soft, non-tender.  BS normal. No masses,  No organomegaly, Abdomen soft, non-tender, non-distended  INCISION: dry, Incision clean/dry/intact    Data:  CBC:   Lab Results   Component Value Date    WBC 8.5 05/05/2019    RBC 3.18 05/05/2019    HGB 10.1 05/05/2019    HCT 32.8 05/05/2019    .1 05/05/2019    MCH 31.8 05/05/2019    MCHC 30.8 05/05/2019    RDW 16.4 05/05/2019     05/05/2019    MPV 11.4 05/05/2019       Radiology Review:        Pathology  Noted    ASSESSMENT   61 y.o. male   Active Hospital Problems    Diagnosis Date Noted    Severe malnutrition ([facility] [E43] 05/02/2019     Priority: Medium     Class: Present on Admission    Ischemic colitis ([facility] [K55.9]     Coffee ground emesis [K92.0]     SBO (small bowel obstruction) ([facility] [K56.609] 04/25/2019    Major depressive disorder, recurrent, in full remission ([facility] [F33.42] 08/30/2018    Chronic combined systolic and diastolic congestive heart failure ([facility] [I50.42] 06/22/2017    Essential hypertension [I10] 05/05/2017    Chronic atrial fibrillation (HCC) [I48.2]     GUME (obstructive sleep apnea) [G47.33]     Pulmonary embol[facility] without acute [facility]ale [facility] [I26.99] 02/23/2017    Obesity (BMI 30-39. 9) [E66.9]     Small bowel cancer ([address] [C17.9]          Plan  1.  Will start him on ice chips today and he'll be okay to be transferred out of intensive care unit  2.   3.       Electronically signed by Remberto Parikh MD  on 5/5/2019 at 7:50 AM

## 2019-05-05 NOTE — PROGRESS NOTES
Nutrition Assessment (Parenteral Nutrition)    Type and Reason for Visit: Reassess    Nutrition Recommendations: 1. Suggest continuing NPO Effective Now, except ice chips per Surgery. 2. Consider increasing PN-Adult 2-in-1 Central Line (Custom) to be @ 75 ml/hr, with IV Fat Emulsions 20% 20 g, @ 8.33 ml/hr x 12 hrs. Additive changes:  See below. Nutrition Assessment: Pt sleeping, with +o/g tube in place. Severely malnourished, with dx of SBO with necrosis, s/p Resection with post-op ileus. Spoke with Yomaira Quintero RN:  Pt responding to some questions, accurately; IV with D5% decreased to 20 ml/hr (82 kcal/day). Communicated with Pharmacist about Pt/PN Juani Nick suggested adjustments. Malnutrition Assessment:  · Malnutrition Status: Meets the criteria for severe malnutrition  · Context: Chronic illness  · Findings of the 6 clinical characteristics of malnutrition (Minimum of 2 out of 6 clinical characteristics is required to make the diagnosis of moderate or severe Protein Calorie Malnutrition based on AND/ASPEN Guidelines):  1. Energy Intake-Less than or equal to 75% of estimated energy requirement, Greater than or equal to 3 months    2. Weight Loss-10% loss or greater(13.5%), in 6 months  3. Fat Loss-Unable to assess  4. Muscle Loss-Unable to assess  5. Fluid Accumulation-Mild fluid accumulation(Scleral (R)/(L)), Extremities  6.   Strength-Not measured    Nutrition Risk Level: High    Nutrient Needs:  · Estimated Daily Total Kcal: 2,150-2,300 kcal (post-op) (20-22 kcal/kg)  · Estimated Daily Protein (g):  g (post-op) (1.2-1.35 g/kg)  · Estimated Daily Total Fluid (ml/day): 2,100-2,200 ml (30-32 ml/kg)    Nutrition Diagnosis:   · Problem: Severe malnutrition, In context of chronic illness  · Etiology: related to Alteration in GI function     Signs and symptoms:  as evidenced by NPO status due to medical condition, Intubation, Nutrition support - PN, Weight loss greater than or equal to 10% in 6 months:  +15.2 kg--13.5% since 11/2/18, Localized or generalized fluid accumulation    Objective Information:  · Nutrition-Focused Physical Findings: GI:  distended, tenderness, last BM (4/27), passing flatus, hypoactive bowel sounds; PV:  BUE, trace, BLE, trace, non-pitting, scleral (R)/L), mild; Skin:  incision 4/28 abd  · Wound Type: Surgical Wound  · Current Nutrition Therapies:  · Oral Diet Orders: NPO   · Parenteral Nutrition Orders:  · Type and Formula: Premix Central   · Lipids: 100ml, Daily  · Rate/Volume: @ 75 ml/hr / 1,800 ml  · Duration: Continuous   · Additive changes:  Decrease K Acetate 20 to 10 mEq & increase Ca Gluconate 12 to 13 mEq.   · Current PN Order Provides: 3,252, 90 g protein  · Goal PN Orders Provides: @ 83.3 ml/hr (goal): 1,760 kcal, 100 g protein  · Additional Calories: IVF with D5% @ 20 ml/hr (82 kcal)  · Anthropometric Measures:  · Ht: 5' 9\" (175.3 cm)   · Current Body Wt: 253 lb 4.9 oz (114.9 kg)  · Admission Body Wt: 238 lb 5.1 oz (108.1 kg)  · Usual Body Wt: 275 lb 9.2 oz (125 kg)(11/2/18)  · % Weight Change: 13.5% x 6 months  · Ideal Body Wt: 160 lb (72.6 kg), % Ideal Body 148%  · BMI Classification: BMI 35.0 - 39.9 Obese Class II    Nutrition Interventions:   Continue NPO, Modify current Parenteral Nutrition  Continued Inpatient Monitoring, Coordination of Care    Nutrition Evaluation:   · Evaluation: Progressing toward goals   · Goals: PN intake to meet >80% of estimated kcal/protein needs, with good glycemic control   · Monitoring: PN Intake, PN Tolerance, Skin Integrity, Wound Healing, I&O, Mental Status/Confusion, Weight, Pertinent Labs, Chewing/Swallowing, Nausea or Vomiting, Constipation, Monitor Bowel Function      Electronically signed by Seble Petersen RDN, LD on 5/5/19 at 3:37 PM    Contact Number: [phone]

## 2019-05-05 NOTE — PROGRESS NOTES
[facility]. [facility]) ([facility] [K56.609] . Vitals:    05/05/19 1428   BP:    Pulse:    Resp: 15   Temp:    SpO2: 97%   . Patients home meds are   Prior to Admission medications    Medication Sig Start Date End Date Taking? Authorizing Provider   aspirin 81 MG chewable tablet Take 81 mg by mouth daily   Yes Historical Provider, MD   atorvastatin (LIPITOR) 40 MG tablet Take 40 mg by mouth nightly   Yes Historical Provider, MD   DULoxetine (CYMBALTA) 20 MG extended release capsule Take 20 mg by mouth daily   Yes Historical Provider, MD   docusate sodium (COLACE) 100 MG capsule Take 100 mg by mouth 2 times daily   Yes Historical Provider, MD   ferrous sulfate 325 (65 Fe) MG EC tablet Take 325 mg by mouth 4 times daily   Yes Historical Provider, MD   furosemide (LASIX) 40 MG tablet Take 40 mg by mouth daily   Yes Historical Provider, MD   pregabalin (LYRICA) 50 MG capsule Take 50 mg by mouth 2 times daily.    Yes Historical Provider, MD   metoprolol tartrate (LOPRESSOR) 50 MG tablet Take 50 mg by mouth 2 times daily (before meals)   Yes Historical Provider, MD   pantoprazole (PROTONIX) 40 MG tablet Take 40 mg by mouth 2 times daily (before meals)   Yes Historical Provider, MD   QUEtiapine (SEROQUEL) 25 MG tablet Take 25 mg by mouth 2 times daily   Yes Historical Provider, MD   spironolactone (ALDACTONE) 25 MG tablet Take 25 mg by mouth daily   Yes Historical Provider, MD   acetaminophen (TYLENOL) 325 MG tablet Take 650 mg by mouth every 6 hours as needed for Pain or Fever   Yes Historical Provider, MD   senna (SENOKOT) 8.6 MG tablet Take 1 tablet by mouth daily as needed for Constipation   Yes Historical Provider, MD   sennosides-docusate sodium (SENOKOT-S) 8.6-50 MG tablet Take 1 tablet by mouth daily as needed for Constipation   Yes Historical Provider, MD   vitamin B-6 (PYRIDOXINE) 25 MG tablet Take 25 mg by mouth daily   Yes Historical Provider, MD   dibucaine ([employer] HEMOR[employer]HO[employer]NALGES[employer]) 1 % OINT rectal ointment Place rectally 4 times daily as needed for Pain 8/30/18  Yes Abby Adair MD   amiodarone (PACERONE) 400 MG tablet Take 1 tablet by mouth 2 times daily 6/7/17  Yes Raghavendra Thurman DO   nitroGLYCERIN (NITROSTAT) 0.4 MG SL tablet up to max of 3 total doses. If no relief after 1 dose, call 911. 3/1/17  Yes MIQUEL Villanueva CNP   .   Recent Surgical History: None = 0     Assessment: Pt very lethargic, drowsy, unable to perform test     Peak Flow (asthma only)    Predicted:   Personal Best:   PEF   % Predicted   Peak Flow : not applicable = 0    WBU2/NJG    FEV1 Predicted       FEV1     FEV1 % Predicted   FVC   IS volume   IBW   FIO2% 28  SPO2 98  RR 16  Breath Sounds: Clear/Diminished      · Bronchodilator assessment at level  3  · Hyperinflation assessment at level   · Secretion Management assessment at level    ·   · [x]    Bronchodilator Assessment  BRONCHODILATOR ASSESSMENT SCORE  Score 0 1 2 3 4 5   Breath Sounds   []  Patient Baseline []  No Wheeze good aeration []  Faint, scattered wheezing, good aeration [x]  Expiratory Wheezing and or moderately diminished []  Insp/Exp wheeze and/or very diminished []  Insp/Exp and/ or marked distress   Respiratory Rate   []  Patient Baseline []  Less than 20 [x]  Less than 20 []  20-25 []  Greater than 25 []  Greater than 25   Peak flow % of Pred or PB [x]  NA   []  Greater than 90%  []  81-90% []  71-80% []  Less than or equal to 70%  or unable to perform []  Unable due to Respiratory Distress   Dyspnea re []  Patient Baseline []  No SOB []  No SOB [x]  SOB on exertion []  SOB min activity []  At rest/acute   e FEV% Predicted       []  NA []  Above 69%  []  Unable []  Above 60-69%  []  Unable []  Above 50-59%  [x]  Unable []  Above 35-49%  []  Unable []  Less than 35%  []  Unable                 []  Hyperinflation Assessment  Score 1 2 3   CXR and Breath Sounds   []  Clear []  No atelectasis  Basilar aeration

## 2019-05-06 ENCOUNTER — APPOINTMENT (OUTPATIENT)
Dept: GENERAL RADIOLOGY | Age: 64
DRG: 329 | End: 2019-05-06
Payer: MEDICARE

## 2019-05-06 LAB
ABSOLUTE EOS #: 0.22 K/UL (ref 0–0.4)
ABSOLUTE IMMATURE GRANULOCYTE: 0 K/UL (ref 0–0.3)
ABSOLUTE LYMPH #: 0.65 K/UL (ref 1–4.8)
ABSOLUTE MONO #: 1.08 K/UL (ref 0.2–0.8)
ANION GAP SERPL CALCULATED.3IONS-SCNC: 8 MMOL/L (ref 9–17)
BASOPHILS # BLD: 0 %
BASOPHILS ABSOLUTE: 0 K/UL (ref 0–0.2)
BUN BLDV-MCNC: 33 MG/DL (ref 8–23)
BUN/CREAT BLD: 22 (ref 9–20)
CALCIUM SERPL-MCNC: 8.9 MG/DL (ref 8.6–10.4)
CHLORIDE BLD-SCNC: 113 MMOL/L (ref 98–107)
CO2: 24 MMOL/L (ref 20–31)
CREAT SERPL-MCNC: 1.48 MG/DL (ref 0.7–1.2)
DIFFERENTIAL TYPE: ABNORMAL
EOSINOPHILS RELATIVE PERCENT: 2 % (ref 1–4)
GFR AFRICAN AMERICAN: 58 ML/MIN
GFR NON-AFRICAN AMERICAN: 48 ML/MIN
GFR SERPL CREATININE-BSD FRML MDRD: ABNORMAL ML/MIN/{1.73_M2}
GFR SERPL CREATININE-BSD FRML MDRD: ABNORMAL ML/MIN/{1.73_M2}
GLUCOSE BLD-MCNC: 101 MG/DL (ref 75–110)
GLUCOSE BLD-MCNC: 102 MG/DL (ref 75–110)
GLUCOSE BLD-MCNC: 112 MG/DL (ref 75–110)
GLUCOSE BLD-MCNC: 126 MG/DL (ref 70–99)
GLUCOSE BLD-MCNC: 90 MG/DL (ref 75–110)
HCT VFR BLD CALC: 34.1 % (ref 40.7–50.3)
HEMOGLOBIN: 10.4 G/DL (ref 13–17)
IMMATURE GRANULOCYTES: 0 %
LYMPHOCYTES # BLD: 6 % (ref 24–44)
MAGNESIUM: 1.9 MG/DL (ref 1.6–2.6)
MCH RBC QN AUTO: 31.6 PG (ref 25.2–33.5)
MCHC RBC AUTO-ENTMCNC: 30.5 G/DL (ref 28–38)
MCV RBC AUTO: 103.6 FL (ref 82.6–102.9)
MONOCYTES # BLD: 10 % (ref 1–7)
NRBC AUTOMATED: ABNORMAL PER 100 WBC
PDW BLD-RTO: 16.2 % (ref 11.8–14.4)
PHOSPHORUS: 3.9 MG/DL (ref 2.5–4.5)
PLATELET # BLD: 195 K/UL (ref 138–453)
PLATELET ESTIMATE: ABNORMAL
PMV BLD AUTO: 11.8 FL (ref 8.1–13.5)
POTASSIUM SERPL-SCNC: 4.5 MMOL/L (ref 3.7–5.3)
RBC # BLD: 3.29 M/UL (ref 4.21–5.77)
RBC # BLD: ABNORMAL 10*6/UL
SEG NEUTROPHILS: 82 % (ref 36–66)
SEGMENTED NEUTROPHILS ABSOLUTE COUNT: 8.85 K/UL (ref 1.8–7.7)
SODIUM BLD-SCNC: 145 MMOL/L (ref 135–144)
WBC # BLD: 10.8 K/UL (ref 3.5–11.3)
WBC # BLD: ABNORMAL 10*3/UL

## 2019-05-06 PROCEDURE — 2580000003 HC RX 258: Performed by: SURGERY

## 2019-05-06 PROCEDURE — 2700000000 HC OXYGEN THERAPY PER DAY

## 2019-05-06 PROCEDURE — C9113 INJ PANTOPRAZOLE SODIUM, VIA: HCPCS | Performed by: INTERNAL MEDICINE

## 2019-05-06 PROCEDURE — 2500000003 HC RX 250 WO HCPCS: Performed by: NURSE PRACTITIONER

## 2019-05-06 PROCEDURE — 6370000000 HC RX 637 (ALT 250 FOR IP): Performed by: INTERNAL MEDICINE

## 2019-05-06 PROCEDURE — 6370000000 HC RX 637 (ALT 250 FOR IP): Performed by: SURGERY

## 2019-05-06 PROCEDURE — 80048 BASIC METABOLIC PNL TOTAL CA: CPT

## 2019-05-06 PROCEDURE — 99232 SBSQ HOSP IP/OBS MODERATE 35: CPT | Performed by: INTERNAL MEDICINE

## 2019-05-06 PROCEDURE — 6360000002 HC RX W HCPCS: Performed by: INTERNAL MEDICINE

## 2019-05-06 PROCEDURE — 6360000002 HC RX W HCPCS: Performed by: NURSE PRACTITIONER

## 2019-05-06 PROCEDURE — 71045 X-RAY EXAM CHEST 1 VIEW: CPT

## 2019-05-06 PROCEDURE — 2500000003 HC RX 250 WO HCPCS: Performed by: INTERNAL MEDICINE

## 2019-05-06 PROCEDURE — 84100 ASSAY OF PHOSPHORUS: CPT

## 2019-05-06 PROCEDURE — 82947 ASSAY GLUCOSE BLOOD QUANT: CPT

## 2019-05-06 PROCEDURE — 94761 N-INVAS EAR/PLS OXIMETRY MLT: CPT

## 2019-05-06 PROCEDURE — 2060000000 HC ICU INTERMEDIATE R&B

## 2019-05-06 PROCEDURE — 97110 THERAPEUTIC EXERCISES: CPT

## 2019-05-06 PROCEDURE — 2580000003 HC RX 258: Performed by: INTERNAL MEDICINE

## 2019-05-06 PROCEDURE — 94640 AIRWAY INHALATION TREATMENT: CPT

## 2019-05-06 PROCEDURE — 36415 COLL VENOUS BLD VENIPUNCTURE: CPT

## 2019-05-06 PROCEDURE — 2500000003 HC RX 250 WO HCPCS: Performed by: SURGERY

## 2019-05-06 PROCEDURE — 6360000002 HC RX W HCPCS: Performed by: SURGERY

## 2019-05-06 PROCEDURE — 2580000003 HC RX 258: Performed by: NURSE PRACTITIONER

## 2019-05-06 PROCEDURE — 83735 ASSAY OF MAGNESIUM: CPT

## 2019-05-06 PROCEDURE — 85025 COMPLETE CBC W/AUTO DIFF WBC: CPT

## 2019-05-06 RX ORDER — DIPHENHYDRAMINE HYDROCHLORIDE 50 MG/ML
25 INJECTION INTRAMUSCULAR; INTRAVENOUS EVERY 6 HOURS PRN
Status: DISCONTINUED | OUTPATIENT
Start: 2019-05-06 | End: 2019-05-10 | Stop reason: HOSPADM

## 2019-05-06 RX ADMIN — PYRIDOXINE HCL TAB 50 MG 25 MG: 50 TAB at 08:22

## 2019-05-06 RX ADMIN — HEPARIN SODIUM 5000 UNITS: 5000 INJECTION, SOLUTION INTRAVENOUS; SUBCUTANEOUS at 05:30

## 2019-05-06 RX ADMIN — Medication 10 ML: at 20:43

## 2019-05-06 RX ADMIN — METOPROLOL TARTRATE 5 MG: 5 INJECTION INTRAVENOUS at 22:00

## 2019-05-06 RX ADMIN — METOPROLOL TARTRATE 5 MG: 5 INJECTION INTRAVENOUS at 18:14

## 2019-05-06 RX ADMIN — HEPARIN SODIUM 5000 UNITS: 5000 INJECTION, SOLUTION INTRAVENOUS; SUBCUTANEOUS at 14:52

## 2019-05-06 RX ADMIN — I.V. FAT EMULSION 100 ML: 20 EMULSION INTRAVENOUS at 18:07

## 2019-05-06 RX ADMIN — METOPROLOL TARTRATE 5 MG: 5 INJECTION INTRAVENOUS at 11:01

## 2019-05-06 RX ADMIN — ASPIRIN 81 MG: 81 TABLET, COATED ORAL at 08:22

## 2019-05-06 RX ADMIN — ATORVASTATIN CALCIUM 40 MG: 40 TABLET, FILM COATED ORAL at 20:42

## 2019-05-06 RX ADMIN — METOCLOPRAMIDE 10 MG: 5 INJECTION, SOLUTION INTRAMUSCULAR; INTRAVENOUS at 18:14

## 2019-05-06 RX ADMIN — PANTOPRAZOLE SODIUM 40 MG: 40 INJECTION, POWDER, FOR SOLUTION INTRAVENOUS at 08:23

## 2019-05-06 RX ADMIN — METOPROLOL TARTRATE 5 MG: 5 INJECTION INTRAVENOUS at 05:30

## 2019-05-06 RX ADMIN — FERROUS SULFATE TAB EC 325 MG (65 MG FE EQUIVALENT) 325 MG: 325 (65 FE) TABLET DELAYED RESPONSE at 08:22

## 2019-05-06 RX ADMIN — DIPHENHYDRAMINE HYDROCHLORIDE 25 MG: 50 INJECTION, SOLUTION INTRAMUSCULAR; INTRAVENOUS at 22:41

## 2019-05-06 RX ADMIN — METOPROLOL SUCCINATE 25 MG: 25 TABLET, FILM COATED, EXTENDED RELEASE ORAL at 08:22

## 2019-05-06 RX ADMIN — HEPARIN SODIUM 5000 UNITS: 5000 INJECTION, SOLUTION INTRAVENOUS; SUBCUTANEOUS at 22:00

## 2019-05-06 RX ADMIN — METOCLOPRAMIDE 10 MG: 5 INJECTION, SOLUTION INTRAMUSCULAR; INTRAVENOUS at 23:47

## 2019-05-06 RX ADMIN — QUETIAPINE FUMARATE 25 MG: 25 TABLET ORAL at 08:22

## 2019-05-06 RX ADMIN — CALCIUM GLUCONATE: 94 INJECTION, SOLUTION INTRAVENOUS at 18:07

## 2019-05-06 RX ADMIN — Medication 10 ML: at 08:23

## 2019-05-06 RX ADMIN — TAZOBACTAM SODIUM AND PIPERACILLIN SODIUM 3.38 G: 375; 3 INJECTION, SOLUTION INTRAVENOUS at 08:23

## 2019-05-06 RX ADMIN — FENTANYL CITRATE 50 MCG: 50 INJECTION, SOLUTION INTRAMUSCULAR; INTRAVENOUS at 00:01

## 2019-05-06 RX ADMIN — CHLORPROMAZINE HYDROCHLORIDE 10 MG: 25 INJECTION INTRAMUSCULAR at 19:53

## 2019-05-06 RX ADMIN — DEXTROSE AND SODIUM CHLORIDE: 5; 450 INJECTION, SOLUTION INTRAVENOUS at 03:56

## 2019-05-06 RX ADMIN — IPRATROPIUM BROMIDE AND ALBUTEROL SULFATE 1 AMPULE: .5; 3 SOLUTION RESPIRATORY (INHALATION) at 07:42

## 2019-05-06 RX ADMIN — DULOXETINE HYDROCHLORIDE 20 MG: 20 CAPSULE, DELAYED RELEASE ORAL at 08:22

## 2019-05-06 RX ADMIN — METOCLOPRAMIDE 10 MG: 5 INJECTION, SOLUTION INTRAMUSCULAR; INTRAVENOUS at 00:01

## 2019-05-06 RX ADMIN — TAZOBACTAM SODIUM AND PIPERACILLIN SODIUM 3.38 G: 375; 3 INJECTION, SOLUTION INTRAVENOUS at 00:08

## 2019-05-06 RX ADMIN — METOPROLOL TARTRATE 5 MG: 5 INJECTION INTRAVENOUS at 14:52

## 2019-05-06 RX ADMIN — TAZOBACTAM SODIUM AND PIPERACILLIN SODIUM 3.38 G: 375; 3 INJECTION, SOLUTION INTRAVENOUS at 17:35

## 2019-05-06 RX ADMIN — QUETIAPINE FUMARATE 25 MG: 25 TABLET ORAL at 20:42

## 2019-05-06 RX ADMIN — Medication 10 ML: at 20:42

## 2019-05-06 RX ADMIN — METOCLOPRAMIDE 10 MG: 5 INJECTION, SOLUTION INTRAMUSCULAR; INTRAVENOUS at 05:29

## 2019-05-06 RX ADMIN — PANTOPRAZOLE SODIUM 40 MG: 40 INJECTION, POWDER, FOR SOLUTION INTRAVENOUS at 20:42

## 2019-05-06 RX ADMIN — METOCLOPRAMIDE 10 MG: 5 INJECTION, SOLUTION INTRAMUSCULAR; INTRAVENOUS at 12:01

## 2019-05-06 RX ADMIN — IPRATROPIUM BROMIDE AND ALBUTEROL SULFATE 1 AMPULE: .5; 3 SOLUTION RESPIRATORY (INHALATION) at 15:57

## 2019-05-06 RX ADMIN — IPRATROPIUM BROMIDE AND ALBUTEROL SULFATE 1 AMPULE: .5; 3 SOLUTION RESPIRATORY (INHALATION) at 11:48

## 2019-05-06 RX ADMIN — IPRATROPIUM BROMIDE AND ALBUTEROL SULFATE 1 AMPULE: .5; 3 SOLUTION RESPIRATORY (INHALATION) at 19:48

## 2019-05-06 RX ADMIN — AMIODARONE HYDROCHLORIDE 200 MG: 200 TABLET ORAL at 08:22

## 2019-05-06 RX ADMIN — FENTANYL CITRATE 50 MCG: 50 INJECTION, SOLUTION INTRAMUSCULAR; INTRAVENOUS at 23:47

## 2019-05-06 RX ADMIN — METOPROLOL TARTRATE 5 MG: 5 INJECTION INTRAVENOUS at 02:05

## 2019-05-06 RX ADMIN — CHLORPROMAZINE HYDROCHLORIDE 10 MG: 25 INJECTION INTRAMUSCULAR at 02:31

## 2019-05-06 RX ADMIN — SENNOSIDES 8.6 MG: 8.6 TABLET, FILM COATED ORAL at 08:22

## 2019-05-06 RX ADMIN — OXYCODONE AND ACETAMINOPHEN 1 TABLET: 5; 325 TABLET ORAL at 09:41

## 2019-05-06 ASSESSMENT — PAIN SCALES - GENERAL
PAINLEVEL_OUTOF10: 8
PAINLEVEL_OUTOF10: 0
PAINLEVEL_OUTOF10: 6
PAINLEVEL_OUTOF10: 0
PAINLEVEL_OUTOF10: 5

## 2019-05-06 ASSESSMENT — PAIN DESCRIPTION - PAIN TYPE: TYPE: ACUTE PAIN

## 2019-05-06 NOTE — PROGRESS NOTES
acute cor pulmonale ([facility] [I26.99] 02/23/2017    Obesity (BMI 30-39. 9) [E66.9]     Small bowel cancer ([facility] [C17.9]          Plan  1. To be okay to transfer  2.  Leave treatment as above        Electronically signed by Remberto Parikh MD  on 5/6/2019 at 7:21 AM

## 2019-05-06 NOTE — PROGRESS NOTES
Nutrition Assessment (Parenteral Nutrition)    Type and Reason for Visit: Reassess    Nutrition Recommendations: 1. Suggest continuing NPO Effective Now. Await advancement as tolerated to PO diet:  Start with clear liquids if OK with Surgery? 2. Consider increasing PN-Adult 2-in-1 Central Line (Custom) to be @ 80 ml/hr, with IV Fat Emulsions 20% 20 g, @ 8.33 ml/hr x 12 hrs. Additive changes:  See below. Nutrition Assessment: Pt sleeping, with +NGT in place (50 ml ouput). Remains severely malnourished, as evidenced by, wt loss of +15.2 kg (13.5%) since 11/2/18, or past 6 months. Continues on NPO status, awaiting advancement to PO diet. Communicated suggested PN Order changes to Jonah Rodriguez.      Malnutrition Assessment:  · Malnutrition Status: Meets the criteria for severe malnutrition  · Context: Chronic illness  · Findings of the 6 clinical characteristics of malnutrition (Minimum of 2 out of 6 clinical characteristics is required to make the diagnosis of moderate or severe Protein Calorie Malnutrition based on AND/ASPEN Guidelines):  1. Energy Intake-Less than or equal to 75% of estimated energy requirement, Greater than or equal to 3 months    2. Weight Loss-10% loss or greater(13.5%), in 6 months  3. Fat Loss-Unable to assess  4. Muscle Loss-Unable to assess  5. Fluid Accumulation-Mild fluid accumulation, Extremities  6.   Strength-Not measured    Nutrition Risk Level: High    Nutrient Needs:  · Estimated Daily Total Kcal: 2,150-2,300 kcal (post-op) (20-22 kcal/kg)  · Estimated Daily Protein (g):  g (post-op) (1.2-1.35 g/kg)  · Estimated Daily Total Fluid (ml/day): 2,100-2,200 ml (30-32 ml/kg)    Nutrition Diagnosis:   · Problem: Severe malnutrition, In context of chronic illness  · Etiology: related to Alteration in GI function     Signs and symptoms:  as evidenced by NPO status due to medical condition, Intubation, Nutrition support - PN, Weight loss greater than or equal to 10% in

## 2019-05-06 NOTE — PROGRESS NOTES
Pulmonary Critical Care Progress Note  Ana Hyatt M.D. Patient seen for the follow up of respiratory failure, SBO (small bowel obstruction) ([facility]     Subjective:  Patient was successfully extubated on 5/3/19. He has been on TPN since 5/1/19. He is awake and responds to questions. He has fair urine output. No significant overnight events. Examination:  Vitals: BP (!) 140/71   Pulse 69   Temp 98.6 °F (37 °C)   Resp 18   Ht 5' 9\" (1.753 m)   Wt 250 lb 11.2 oz (113.7 kg)   SpO2 94%   BMI 37.02 kg/m²     General appearance: Awake alert  Neck: No JVD  Lungs: Moderate air exchange, occasional rhonchi  Heart: regular rate and rhythm, S1, S2 normal, no gallop  Abdomen: Distended, tender  Extremities: no cyanosis or clubbing. Mild edema    LABs:  CBC:   Recent Labs     05/05/19  0447 05/06/19  0408   WBC 8.5 10.8   HGB 10.1* 10.4*   HCT 32.8* 34.1*    195     BMP:   Recent Labs     05/05/19  0447 05/06/19  0408   * 145*   K 4.3 4.5   CO2 25 24   BUN 31* 33*   CREATININE 1.53* 1.48*   LABGLOM 46* 48*   GLUCOSE 143* 126*     Radiology:  5/6/19      Impression:  · Acute postoperative respiratory insufficiency  · SBO with ischemic bowel s/p exploratory lap with bowel resection 4/28/19   · Shock/Sepsis secondary to above  · Recurrent Pulmonary Embolism  · Pulmonary infiltrates atelectasis vs pneumonia   · Adenocarcinoma of small bowel, s/p bowel resection and chemo in 2014  ? Gastric polyp biopsy 2/7/19  + Moderately differentiated adenocarcinoma  · Obesity/Obstructive sleep apnea ~ wears O2 at bedtime  · Chronic combined diastolic/systolic heart failure ~ EF 25-30%  · Pulmonary Hypertension ~ RVSP 41  · Upper GI bleed s/p microcoil embolization    Recommendations:  · IV D5W half-normal saline at 20 ML per hour, total fluid 100 ML's per hour  · Continue IV Zosyn  · Protonix gtt, GI on consult   · NG LIS  · Continue TPN, start orals?   · Albuterol and Ipratropium Q 4 hours and prn  · Monitor hemoglobin  · X-ray chest in am  · Labs: CBC and BMP in am  · PTOT  · Incentive spirometry every hour while awake  · DVT prophylaxis with EPC cuffs, will start  heparin after getting clearance from GI  · Will follow with you    Electronically signed by     Man Garcia MD on 5/6/2019 at 8:29 AM  Pulmonary Critical Care and Sleep Medicine,  [facility]  Cell: [phone]  Office: [phone]

## 2019-05-06 NOTE — PROGRESS NOTES
Post-Op Day 9    Pt education regarding SSI prevention deferred due to pt uncomfortable. Chart reviewed and discussed with RN. Care plan and pt education for SSI prevention reviewed and updated. Will continue to follow.   Electronically signed by Antonio Bourne RN on 5/6/19 at 4:26 PM

## 2019-05-06 NOTE — PROGRESS NOTES
for constipation, diarrhea, nausea, vomiting  Neurological:  negative for dizziness, headache    Medications: Allergies:     Allergies   Allergen Reactions    Morphine      itching       Current Meds:   Scheduled Meds:    fat emulsion  100 mL Intravenous Daily    metoprolol  5 mg Intravenous Q4H    ipratropium-albuterol  1 ampule Inhalation Q4H WA    metoclopramide  10 mg Intravenous Q6H    heparin (porcine)  5,000 Units Subcutaneous 3 times per day    pantoprazole  40 mg Intravenous BID    And    sodium chloride (PF)  10 mL Intravenous BID    insulin lispro  0-6 Units Subcutaneous 4 times per day    metoprolol succinate  25 mg Oral Daily    amiodarone  200 mg Oral Daily    sodium chloride  500 mL Intravenous Once    piperacillin-tazobactam  3.375 g Intravenous Q8H    sodium chloride flush  10 mL Intravenous 2 times per day    aspirin  81 mg Oral Daily    atorvastatin  40 mg Oral Nightly    DULoxetine  20 mg Oral Daily    ferrous sulfate  325 mg Oral Daily with breakfast    pyridoxine  25 mg Oral Daily    QUEtiapine  25 mg Oral BID    senna  1 tablet Oral Daily     Continuous Infusions:    PN-Adult 2-in-1 Central Line (Standard)      PN-Adult 2-in-1 [facility] (Standard) 75 mL/hr at 05/05/19 1813    dextrose      dextrose 5 % and 0.45 % NaCl 75 mL/hr at 05/06/19 0356     PRN Meds: chlorpromazine (THORAZINE) IVPB, albuterol, hydrALAZINE, fentanNYL, glucose, dextrose, glucagon (rDNA), dextrose, sodium chloride flush, oxyCODONE-acetaminophen **OR** oxyCODONE-acetaminophen, HYDROmorphone **OR** HYDROmorphone, ondansetron **OR** ondansetron, aluminum & magnesium hydroxide-simethicone, dibucaine, nitroGLYCERIN, potassium chloride **OR** potassium alternative oral replacement **OR** potassium chloride, magnesium sulfate, magnesium hydroxide, nicotine, acetaminophen, phenol    Data:     Past Medical History:   has a past medical history of Asthma, Cancer ([facility] CHF (congestive heart failure) ([facility] COPD exacerbation ([facility] GERD (gastroesophageal reflux disease), History of blood transfusion, Hyperlipidemia, Hypertension, Neuropathy, Obesity (BMI 30-39.9), Psychiatric problem, [facility] Severe mitral regurgitation, and Severe tricuspid regurgitation. Social History:   reports that he has never smoked. He has never used smokeless tobacco. He reports that he does not drink alcohol or use drugs. Family History:   Family History   Problem Relation Age of Onset    Heart Disease Father        Vitals:  BP (!) 148/80   Pulse 72   Temp 98.2 °F (36.8 °C) (Oral)   Resp 14   Ht 5' 9\" (1.753 m)   Wt 250 lb 11.2 oz (113.7 kg)   SpO2 96%   BMI 37.02 kg/m²   Temp (24hrs), Av.5 °F (36.9 °C), Min:97.8 °F (36.6 °C), Max:99 °F (37.2 °C)    Recent Labs     19  1131 19  1633 19  2356 19  1115   POCGLU 109 105 102 112*       I/O (24Hr):     Intake/Output Summary (Last 24 hours) at 2019 1304  Last data filed at 2019 0530  Gross per 24 hour   Intake 2797 ml   Output 3710 ml   Net -913 ml       Labs:    Hematology:  Recent Labs     19  0500 19  0447 19  0408   WBC 7.4 8.5 10.8   RBC 3.17* 3.18* 3.29*   HGB 10.3* 10.1* 10.4*   HCT 31.1* 32.8* 34.1*   MCV 98.0 103.1* 103.6*   MCH 32.4 31.8 31.6   MCHC 33.0 30.8 30.5   RDW 17.6* 16.4* 16.2*    164 195   MPV 9.2 11.4 11.8     Chemistry:  Recent Labs     19  0500 19  0447 19  0408   * 147* 145*   K 4.4 4.3 4.5   * 115* 113*   CO2 23 25 24   GLUCOSE 147* 143* 126*   BUN 30* 31* 33*   CREATININE 1.47* 1.53* 1.48*   MG 1.9 1.8 1.9   ANIONGAP 8* 7* 8*   LABGLOM 48* 46* 48*   GFRAA 59* 56* 58*   CALCIUM 8.2* 8.7 8.9   PHOS 3.0 3.5 3.9     Recent Labs     19  1659 19  2329 19  1131 19  1633 19  2356 19  1115   POCGLU 103 118* 109 105 102 112*         Lab Results   Component Value Date/Time    SPECIAL RIGHT AC 7ML AEROBIC 1ML ANAEROBIC 04/28/2019 12:34 PM     Lab Results   Component Value Date/Time    CULTURE NO GROWTH 6 DAYS 04/28/2019 12:34 PM       Lab Results   Component Value Date    POCPH 7.34 05/05/2019    PHART 7.44 08/30/2012    PH 7.48 11/08/2012    POCPCO2 50 05/05/2019    DZQ7NSE 60 08/30/2012    PCO2 45 11/08/2012    POCPO2 99 05/05/2019    PO2ART 73 08/30/2012    PO2 145 11/08/2012    POCHCO3 26.8 05/05/2019    BLA7OEZ 24.1 11/01/2012    HCO3 33.6 11/08/2012    NBEA NOT REPORTED 05/05/2019    PBEA 1 05/05/2019    DZF9DUZ 28 05/05/2019    OJJO6ALC 97 05/05/2019    E1TYLRFD 100 11/02/2012    O2SAT 99 11/02/2012    FIO2 28.0 05/05/2019       Radiology:    Nothing new      Physical Examination:        General appearance:  alert, cooperative and no distress  Mental Status:  oriented to person, place and time and normal affect  Lungs:  clear to auscultation bilaterally, normal effort  Heart:  regular rate and rhythm, no murmur  Abdomen:  soft, appropriately ttp, nondistended, normal bowel sounds, no masses, hepatomegaly, splenomegaly  Extremities:  no edema, redness, tenderness in the calves  Skin:  no gross lesions, rashes, induration    Assessment:        Primary Problem  [facility] obstruction) ([facility]    Active Hospital Problems    Diagnosis Date Noted    Severe malnutrition ([facility] [E43] 05/02/2019     Priority: Medium     Class: Present on Admission    Ischemic colitis ([facility] [K55.9]     Coffee ground emesis [K92.0]     SBO (small bowel obstruction) ([facility] [K56.609] 04/25/2019    Major depressive disorder, recurrent, in full remission ([facility] [F33.42] 08/30/2018    Chronic combined systolic and diastolic congestive heart failure ([facility] [I50.42] 06/22/2017    Essential hypertension [I10] 05/05/2017    Chronic atrial fibrillation (HCC) [I48.2]     GUME (obstructive sleep apnea) [G47.33]     Pulmonary embolism without acute cor pulmonale ([facility] [I26.99] 02/23/2017    Obesity (BMI 30-39. 9) [E66.9]     Small bowel cancer ([facility] [C17.9]        Plan:        1. Transfer out of icu  2. Pain control  3.  Plans per surgery    Glynn Feldman,   5/6/2019  1:04 PM

## 2019-05-06 NOTE — PROGRESS NOTES
Physical Therapy    Facility/Department: [facility] ICU  RE- Assessment    NAME: Oumou Becerra  : 1955  MRN: [id number]    Date of Service: 2019    Discharge Recommendations:  Subacute/Skilled Nursing Facility        Assessment   Body structures, Functions, Activity limitations: Decreased balance;Decreased endurance;Decreased functional mobility   Assessment: Pt is re-admit and dx with ischemic bowel -> s/p surgery . Pt vented post op and now extubated but extremely somnulent during treatment. Pt somewhat able to folllow commands and participate in mobility -. Pt unable to progress to dangle as pt too lethargic and would be total lift. Clinical Presentation: unstable. Patient Education: safety. pressure relief  REQUIRES PT FOLLOW UP: Yes  Activity Tolerance  Activity Tolerance: Patient limited by endurance;Treatment limited secondary to medical complications (free text)       Patient Diagnosis(es): The primary encounter diagnosis was SBO (small bowel obstruction) ([address] A diagnosis of Other acute pulmonary embolism without acute cor pulmonale (HCC) was also pertinent to this visit. has a past medical history of Asthma, Cancer ([address] CHF (congestive heart failure) ([address] COPD exacerbation ([address] GERD (gastroesophageal reflux disease), History of blood transfusion, Hyperlipidemia, Hypertension, Neuropathy, Obesity (BMI 30-39.9), Psychiatric problem, SBO (small bowel obstruction) ([address] Severe mitral regurgitation, and Severe tricuspid regurgitation. has a past surgical history that includes other surgical history (12); Mitral valve surgery (10/31/12); Tricuspid valve surgery (10/31/12); Upper gastrointestinal endoscopy (3-3-15); Abdomen surgery; Tunneled venous port placement (Left, 2015); pacemaker placement (Left, 2017); and LAPAROTOMY EXPLORATORY (N/A, 2019).     Restrictions  Restrictions/Precautions  Restrictions/Precautions: Fall Risk, General Precautions  Required Braces or Orthoses?: No  Implants present? : Pacemaker  Position Activity Restriction  Other position/activity restrictions: NPO, NG tube, 2 L O2 per NC, bed alarm, up as kellie, per RN pt is incontinent. LUE IV; pt extubated but somnolent   Vision/Hearing        Subjective  Pain Screening  Patient Currently in Pain: No          Orientation  Orientation  Overall Orientation Status: Within Normal Limits  Social/Functional History  Social/Functional History  Lives With: Alone(pt states he has been at SNF for reahab approx 2 months and has his own apt however might have to give it up )  Type of Home: Apartment(pt was at [facility] )  Home Layout: Multi-level(pt states he is on the 9th floor )  Home Access: Level entry, Elevator  Bathroom Shower/Tub: Tub/Shower unit  Bathroom Toilet: Standard  Bathroom Equipment: (no DME )  Home Equipment: Quad cane  Receives Help From: Family(pt states he has a supportive brother and sister )  ADL Assistance: Independent  Homemaking Assistance: Independent  Homemaking Responsibilities: Yes  Ambulation Assistance: Independent(with quad cane )  Transfer Assistance: Independent  Active : No  Patient's  Info: Public transport   Mode of Transportation: Bus  Occupation: On disability  Type of occupation: railroad work   Leisure & Hobbies: going to [employer] and reading   Cognition        Objective     Observation/Palpation  Posture: Fair  Observation: NG tube, LUE IV     AROM RLE (degrees)  RLE AROM: WFL  AROM LLE (degrees)  LLE AROM : WFL  Strength RLE  Comment: grossly 3-/5   Strength LLE  Comment: grossly 3-/5        Bed mobility  Rolling to Left: Maximum assistance;Dependent/Total  Rolling to Right: Maximum assistance;Dependent/Total  Supine to Sit: Dependent/Total;Unable to assess     Exercises  Comments: AAROM x 4 extremities x 10 reps. Re-orientation and senosry stimulation. Pressure relief activitiies.       Plan   Plan  Times per week: 1-2x/day,6-7 days/week  Current Treatment Recommendations: Home Exercise Program, ROM, Patient/Caregiver Education & Training, Safety Education & Training, Positioning, Strengthening, Transfer Training  Safety Devices  Type of devices: Gait belt, Left in bed, Call light within reach, Bed alarm in place, Nurse notified  Restraints  Initially in place: No  AM-PAC Score 6/24     Goals  Short term goals  Time Frame for Short term goals: 12 treatments  Short term goal 1: Prevent decrease ROM / strength  Short term goal 2: Progress function as toleraterd and re-eval   Short term goal 3: Pt oriented and alert   Short term goal 4: Tolerate 30 min ther act  Patient Goals   Patient goals : Back to SNF     Therapy Time   Individual Concurrent Group Co-treatment   Time In 4194         Time Out 1421         Minutes 24            AROLDO SAN, PT

## 2019-05-06 NOTE — PROGRESS NOTES
[]  less than 15ml/Kg []  less than 15ml/Kg   Surgery within last 2 weeks []  None or general   []  Abdominal or thoracic surgery  []  Abdominal or thoracic   Chronic Pulmonary Historyre []  No []  Yes []  Yes     []  Secretion Management Assessment  Score 1 2 3   Bilateral Breath Sounds   []  Occasional Rhonchi []  Scattered Rhonchi []  Course Rhonchi and/or poor aeration   Sputum    []  Small amount of thin secretions []  Moderate amount of viscous secretions []  Copius, Viscious Yellow/ Secretions   CXR as reported by physician []  clear  []  Unavailable []  Infiltrates and/or consolidation  []  Unavailable []  Mucus Plugging and or lobar consolidation  []  Unavailable   Cough []  Strong, productive cough []  Weak productive cough []  No cough or weak non-productive cough

## 2019-05-07 LAB
-: NORMAL
ABSOLUTE EOS #: 0.12 K/UL (ref 0–0.4)
ABSOLUTE IMMATURE GRANULOCYTE: 0.12 K/UL (ref 0–0.3)
ABSOLUTE LYMPH #: 0.73 K/UL (ref 1–4.8)
ABSOLUTE MONO #: 0.98 K/UL (ref 0.2–0.8)
ANION GAP SERPL CALCULATED.3IONS-SCNC: 7 MMOL/L (ref 9–17)
BASOPHILS # BLD: 0 %
BASOPHILS ABSOLUTE: 0 K/UL (ref 0–0.2)
BUN BLDV-MCNC: 32 MG/DL (ref 8–23)
BUN/CREAT BLD: 23 (ref 9–20)
CALCIUM SERPL-MCNC: 9 MG/DL (ref 8.6–10.4)
CHLORIDE BLD-SCNC: 111 MMOL/L (ref 98–107)
CO2: 24 MMOL/L (ref 20–31)
CREAT SERPL-MCNC: 1.37 MG/DL (ref 0.7–1.2)
DIFFERENTIAL TYPE: ABNORMAL
EOSINOPHILS RELATIVE PERCENT: 1 % (ref 1–4)
GFR AFRICAN AMERICAN: >60 ML/MIN
GFR NON-AFRICAN AMERICAN: 52 ML/MIN
GFR SERPL CREATININE-BSD FRML MDRD: ABNORMAL ML/MIN/{1.73_M2}
GFR SERPL CREATININE-BSD FRML MDRD: ABNORMAL ML/MIN/{1.73_M2}
GLUCOSE BLD-MCNC: 109 MG/DL (ref 75–110)
GLUCOSE BLD-MCNC: 113 MG/DL (ref 75–110)
GLUCOSE BLD-MCNC: 115 MG/DL (ref 75–110)
GLUCOSE BLD-MCNC: 135 MG/DL (ref 70–99)
HCT VFR BLD CALC: 33.7 % (ref 40.7–50.3)
HEMOGLOBIN: 10.6 G/DL (ref 13–17)
IMMATURE GRANULOCYTES: 1 %
LYMPHOCYTES # BLD: 6 % (ref 24–44)
MAGNESIUM: 1.8 MG/DL (ref 1.6–2.6)
MCH RBC QN AUTO: 31.6 PG (ref 25.2–33.5)
MCHC RBC AUTO-ENTMCNC: 31.5 G/DL (ref 28.4–34.8)
MCV RBC AUTO: 100.6 FL (ref 82.6–102.9)
MONOCYTES # BLD: 8 % (ref 1–7)
NRBC AUTOMATED: 0 PER 100 WBC
PDW BLD-RTO: 15.9 % (ref 11.8–14.4)
PLATELET # BLD: 190 K/UL (ref 138–453)
PLATELET ESTIMATE: ABNORMAL
PMV BLD AUTO: 11.9 FL (ref 8.1–13.5)
POTASSIUM SERPL-SCNC: 4 MMOL/L (ref 3.7–5.3)
RBC # BLD: 3.35 M/UL (ref 4.21–5.77)
RBC # BLD: ABNORMAL 10*6/UL
REASON FOR REJECTION: NORMAL
SEG NEUTROPHILS: 84 % (ref 36–66)
SEGMENTED NEUTROPHILS ABSOLUTE COUNT: 10.25 K/UL (ref 1.8–7.7)
SODIUM BLD-SCNC: 142 MMOL/L (ref 135–144)
WBC # BLD: 12.2 K/UL (ref 3.5–11.3)
WBC # BLD: ABNORMAL 10*3/UL
ZZ NTE CLEAN UP: ORDERED TEST: NORMAL
ZZ NTE WITH NAME CLEAN UP: SPECIMEN SOURCE: NORMAL

## 2019-05-07 PROCEDURE — 2500000003 HC RX 250 WO HCPCS: Performed by: INTERNAL MEDICINE

## 2019-05-07 PROCEDURE — 83735 ASSAY OF MAGNESIUM: CPT

## 2019-05-07 PROCEDURE — 99232 SBSQ HOSP IP/OBS MODERATE 35: CPT | Performed by: INTERNAL MEDICINE

## 2019-05-07 PROCEDURE — 6370000000 HC RX 637 (ALT 250 FOR IP): Performed by: SURGERY

## 2019-05-07 PROCEDURE — 97110 THERAPEUTIC EXERCISES: CPT

## 2019-05-07 PROCEDURE — 2060000000 HC ICU INTERMEDIATE R&B

## 2019-05-07 PROCEDURE — 6360000002 HC RX W HCPCS: Performed by: SURGERY

## 2019-05-07 PROCEDURE — 85025 COMPLETE CBC W/AUTO DIFF WBC: CPT

## 2019-05-07 PROCEDURE — 2580000003 HC RX 258: Performed by: SURGERY

## 2019-05-07 PROCEDURE — 2500000003 HC RX 250 WO HCPCS: Performed by: NURSE PRACTITIONER

## 2019-05-07 PROCEDURE — 6360000002 HC RX W HCPCS: Performed by: INTERNAL MEDICINE

## 2019-05-07 PROCEDURE — C9113 INJ PANTOPRAZOLE SODIUM, VIA: HCPCS | Performed by: INTERNAL MEDICINE

## 2019-05-07 PROCEDURE — 2500000003 HC RX 250 WO HCPCS: Performed by: SURGERY

## 2019-05-07 PROCEDURE — 94640 AIRWAY INHALATION TREATMENT: CPT

## 2019-05-07 PROCEDURE — 2580000003 HC RX 258: Performed by: NURSE PRACTITIONER

## 2019-05-07 PROCEDURE — 6370000000 HC RX 637 (ALT 250 FOR IP): Performed by: INTERNAL MEDICINE

## 2019-05-07 PROCEDURE — 80048 BASIC METABOLIC PNL TOTAL CA: CPT

## 2019-05-07 PROCEDURE — 2580000003 HC RX 258: Performed by: INTERNAL MEDICINE

## 2019-05-07 PROCEDURE — 94761 N-INVAS EAR/PLS OXIMETRY MLT: CPT

## 2019-05-07 PROCEDURE — 97530 THERAPEUTIC ACTIVITIES: CPT

## 2019-05-07 PROCEDURE — 36415 COLL VENOUS BLD VENIPUNCTURE: CPT

## 2019-05-07 PROCEDURE — 82947 ASSAY GLUCOSE BLOOD QUANT: CPT

## 2019-05-07 RX ORDER — HEPARIN SODIUM 5000 [USP'U]/ML
5000 INJECTION, SOLUTION INTRAVENOUS; SUBCUTANEOUS 2 TIMES DAILY
Status: DISCONTINUED | OUTPATIENT
Start: 2019-05-07 | End: 2019-05-10 | Stop reason: HOSPADM

## 2019-05-07 RX ADMIN — PANTOPRAZOLE SODIUM 40 MG: 40 INJECTION, POWDER, FOR SOLUTION INTRAVENOUS at 21:52

## 2019-05-07 RX ADMIN — FERROUS SULFATE TAB EC 325 MG (65 MG FE EQUIVALENT) 325 MG: 325 (65 FE) TABLET DELAYED RESPONSE at 07:41

## 2019-05-07 RX ADMIN — CHLORPROMAZINE HYDROCHLORIDE 10 MG: 25 INJECTION INTRAMUSCULAR at 17:03

## 2019-05-07 RX ADMIN — QUETIAPINE FUMARATE 25 MG: 25 TABLET ORAL at 21:52

## 2019-05-07 RX ADMIN — PANTOPRAZOLE SODIUM 40 MG: 40 INJECTION, POWDER, FOR SOLUTION INTRAVENOUS at 08:17

## 2019-05-07 RX ADMIN — METOCLOPRAMIDE 10 MG: 5 INJECTION, SOLUTION INTRAMUSCULAR; INTRAVENOUS at 05:37

## 2019-05-07 RX ADMIN — I.V. FAT EMULSION 100 ML: 20 EMULSION INTRAVENOUS at 17:20

## 2019-05-07 RX ADMIN — ATORVASTATIN CALCIUM 40 MG: 40 TABLET, FILM COATED ORAL at 21:52

## 2019-05-07 RX ADMIN — AMIODARONE HYDROCHLORIDE 200 MG: 200 TABLET ORAL at 08:17

## 2019-05-07 RX ADMIN — METOPROLOL TARTRATE 5 MG: 5 INJECTION INTRAVENOUS at 10:42

## 2019-05-07 RX ADMIN — IPRATROPIUM BROMIDE AND ALBUTEROL SULFATE 1 AMPULE: .5; 3 SOLUTION RESPIRATORY (INHALATION) at 19:21

## 2019-05-07 RX ADMIN — QUETIAPINE FUMARATE 25 MG: 25 TABLET ORAL at 08:17

## 2019-05-07 RX ADMIN — SENNOSIDES 8.6 MG: 8.6 TABLET, FILM COATED ORAL at 08:16

## 2019-05-07 RX ADMIN — Medication 10 ML: at 08:19

## 2019-05-07 RX ADMIN — METOPROLOL SUCCINATE 25 MG: 25 TABLET, FILM COATED, EXTENDED RELEASE ORAL at 08:17

## 2019-05-07 RX ADMIN — PYRIDOXINE HCL TAB 50 MG 25 MG: 50 TAB at 08:16

## 2019-05-07 RX ADMIN — IPRATROPIUM BROMIDE AND ALBUTEROL SULFATE 1 AMPULE: .5; 3 SOLUTION RESPIRATORY (INHALATION) at 11:13

## 2019-05-07 RX ADMIN — METOPROLOL TARTRATE 5 MG: 5 INJECTION INTRAVENOUS at 21:52

## 2019-05-07 RX ADMIN — METOPROLOL TARTRATE 5 MG: 5 INJECTION INTRAVENOUS at 13:21

## 2019-05-07 RX ADMIN — CALCIUM GLUCONATE: 94 INJECTION, SOLUTION INTRAVENOUS at 17:20

## 2019-05-07 RX ADMIN — Medication 10 ML: at 21:53

## 2019-05-07 RX ADMIN — METOPROLOL TARTRATE 5 MG: 5 INJECTION INTRAVENOUS at 18:09

## 2019-05-07 RX ADMIN — Medication 10 ML: at 21:52

## 2019-05-07 RX ADMIN — METOCLOPRAMIDE 10 MG: 5 INJECTION, SOLUTION INTRAMUSCULAR; INTRAVENOUS at 13:21

## 2019-05-07 RX ADMIN — METOPROLOL TARTRATE 5 MG: 5 INJECTION INTRAVENOUS at 05:37

## 2019-05-07 RX ADMIN — TAZOBACTAM SODIUM AND PIPERACILLIN SODIUM 3.38 G: 375; 3 INJECTION, SOLUTION INTRAVENOUS at 08:16

## 2019-05-07 RX ADMIN — TAZOBACTAM SODIUM AND PIPERACILLIN SODIUM 3.38 G: 375; 3 INJECTION, SOLUTION INTRAVENOUS at 01:41

## 2019-05-07 RX ADMIN — HEPARIN SODIUM 5000 UNITS: 5000 INJECTION INTRAVENOUS; SUBCUTANEOUS at 21:49

## 2019-05-07 RX ADMIN — TAZOBACTAM SODIUM AND PIPERACILLIN SODIUM 3.38 G: 375; 3 INJECTION, SOLUTION INTRAVENOUS at 17:12

## 2019-05-07 RX ADMIN — HEPARIN SODIUM 5000 UNITS: 5000 INJECTION INTRAVENOUS; SUBCUTANEOUS at 10:49

## 2019-05-07 RX ADMIN — METOPROLOL TARTRATE 5 MG: 5 INJECTION INTRAVENOUS at 01:41

## 2019-05-07 RX ADMIN — DULOXETINE HYDROCHLORIDE 20 MG: 20 CAPSULE, DELAYED RELEASE ORAL at 08:16

## 2019-05-07 RX ADMIN — ASPIRIN 81 MG: 81 TABLET, COATED ORAL at 08:16

## 2019-05-07 RX ADMIN — OXYCODONE AND ACETAMINOPHEN 1 TABLET: 5; 325 TABLET ORAL at 07:40

## 2019-05-07 RX ADMIN — HEPARIN SODIUM 5000 UNITS: 5000 INJECTION, SOLUTION INTRAVENOUS; SUBCUTANEOUS at 05:37

## 2019-05-07 RX ADMIN — IPRATROPIUM BROMIDE AND ALBUTEROL SULFATE 1 AMPULE: .5; 3 SOLUTION RESPIRATORY (INHALATION) at 15:04

## 2019-05-07 RX ADMIN — METOCLOPRAMIDE 10 MG: 5 INJECTION, SOLUTION INTRAMUSCULAR; INTRAVENOUS at 18:09

## 2019-05-07 RX ADMIN — IPRATROPIUM BROMIDE AND ALBUTEROL SULFATE 1 AMPULE: .5; 3 SOLUTION RESPIRATORY (INHALATION) at 07:42

## 2019-05-07 ASSESSMENT — PAIN DESCRIPTION - ONSET: ONSET: GRADUAL

## 2019-05-07 ASSESSMENT — PAIN SCALES - GENERAL
PAINLEVEL_OUTOF10: 0
PAINLEVEL_OUTOF10: 3
PAINLEVEL_OUTOF10: 0
PAINLEVEL_OUTOF10: 0
PAINLEVEL_OUTOF10: 4
PAINLEVEL_OUTOF10: 5

## 2019-05-07 ASSESSMENT — PAIN DESCRIPTION - LOCATION
LOCATION: ABDOMEN
LOCATION: BUTTOCKS

## 2019-05-07 ASSESSMENT — PAIN DESCRIPTION - PROGRESSION: CLINICAL_PROGRESSION: GRADUALLY IMPROVING

## 2019-05-07 ASSESSMENT — PAIN DESCRIPTION - PAIN TYPE
TYPE: ACUTE PAIN
TYPE: SURGICAL PAIN

## 2019-05-07 ASSESSMENT — PAIN DESCRIPTION - ORIENTATION: ORIENTATION: MID

## 2019-05-07 ASSESSMENT — PAIN DESCRIPTION - FREQUENCY: FREQUENCY: INTERMITTENT

## 2019-05-07 ASSESSMENT — PAIN DESCRIPTION - DESCRIPTORS
DESCRIPTORS: TENDER
DESCRIPTORS: ACHING

## 2019-05-07 NOTE — PROGRESS NOTES
[facility]    Progress Note    5/7/2019    12:53 PM    Name:   Gil Torrez  MRN:     [id number]     Nurialyside:      [de-identified]   Room:   [address] [place] [address] Day:  12  Admit Date:  4/25/2019  4:57 PM    PCP:   Porter Alvarado MD  Code Status:  Full Code    Subjective:     C/C:   Chief Complaint   Patient presents with    Abdominal Pain    Dizziness     onset today     Interval History Status: improved. Had a bm  Denies cp/sob/n/v  Minimal abd pain  On tpn still    Brief History:     Per my partner: \"The patient is O 20 y.o.  Non-/non  male who presents with Abdominal Pain and Dizziness (onset today)  and he is admitted to the hospital for the management of  SBO     He has the following significant co-morbidities: HTN, Chronic AFib, Chronic combined systolic/diastolic heart failure, S/P AICD Placement, Depression, GUME, Adenocarcinoma of small bowel, Colon cancer.      He presented to the ED with abdominal pain and shortness of breath. The pain was sudden in onset, localized to the epigastric region. HE denied any constipation and was passing gas and having normal BMs. He had associated shortness of breath. In the ED, imaging was concerning for SBO vs ileus. NG tube was placed. CT Chest showed small subsegmental PE, similar finding on CT scan at [facility] hospital two months back, admitted for GI bleed with finding of large gastric polyp with diagnosis of adenocarcinoma of stomach     Sudden worsening of clinical condition after initial improvement on 4/27/2019. Open laparotomy on 4/28 with finding of ischemic bowel, needed bowel resection along with WESLEY. \"    Review of Systems:     Constitutional:  negative for chills, fevers, sweats  Respiratory:  negative for cough, dyspnea on exertion, shortness of breath, wheezing  Cardiovascular:  negative for chest pain, chest pressure/discomfort, lower extremity edema, palpitations  Gastrointestinal: negative for constipation, diarrhea, nausea, vomiting  Neurological:  negative for dizziness, headache    Medications: Allergies:     Allergies   Allergen Reactions    Morphine      itching       Current Meds:   Scheduled Meds:    heparin (porcine)  5,000 Units Subcutaneous BID    fat emulsion  100 mL Intravenous Daily    metoprolol  5 mg Intravenous Q4H    ipratropium-albuterol  1 ampule Inhalation Q4H WA    metoclopramide  10 mg Intravenous Q6H    pantoprazole  40 mg Intravenous BID    And    sodium chloride (PF)  10 mL Intravenous BID    insulin lispro  0-6 Units Subcutaneous 4 times per day    metoprolol succinate  25 mg Oral Daily    amiodarone  200 mg Oral Daily    sodium chloride  500 mL Intravenous Once    piperacillin-tazobactam  3.375 g Intravenous Q8H    sodium chloride flush  10 mL Intravenous 2 times per day    aspirin  81 mg Oral Daily    atorvastatin  40 mg Oral Nightly    DULoxetine  20 mg Oral Daily    ferrous sulfate  325 mg Oral Daily with breakfast    pyridoxine  25 mg Oral Daily    QUEtiapine  25 mg Oral BID    senna  1 tablet Oral Daily     Continuous Infusions:    PN-Adult 2-in-1 Central Line (Standard) 80 mL/hr at 05/06/19 1807    dextrose      dextrose 5 % and 0.45 % NaCl 75 mL/hr at 05/06/19 0356     PRN Meds: diphenhydrAMINE, chlorpromazine (THORAZINE) IVPB, albuterol, hydrALAZINE, fentanNYL, glucose, dextrose, glucagon (rDNA), dextrose, sodium chloride flush, oxyCODONE-acetaminophen **OR** oxyCODONE-acetaminophen, HYDROmorphone **OR** HYDROmorphone, ondansetron **OR** ondansetron, aluminum & magnesium hydroxide-simethicone, dibucaine, nitroGLYCERIN, potassium chloride **OR** potassium alternative oral replacement **OR** potassium chloride, magnesium sulfate, magnesium hydroxide, nicotine, acetaminophen, phenol    Data:     Past Medical History:   has a past medical history of Asthma, [facility] ([facility] CHF (congestive heart failure) ([facility] COPD exacerbation ([facility] Lab Results   Component Value Date/Time    CULTURE NO GROWTH 6 DAYS 04/28/2019 12:34 PM       Lab Results   Component Value Date    POCPH 7.34 05/05/2019    PHART 7.44 08/30/2012    PH 7.48 11/08/2012    POCPCO2 50 05/05/2019    RDZ9HHL 60 08/30/2012    PCO2 45 11/08/2012    POCPO2 99 05/05/2019    PO2ART 73 08/30/2012    PO2 145 11/08/2012    POCHCO3 26.8 05/05/2019    EDN4NJS 24.1 11/01/2012    HCO3 33.6 11/08/2012    NBEA NOT REPORTED 05/05/2019    PBEA 1 05/05/2019    HZW0ASB 28 05/05/2019    COYT8JBR 97 05/05/2019    C3PNIHFB 100 11/02/2012    O2SAT 99 11/02/2012    FIO2 28.0 05/05/2019       Radiology:    Nothing new      Physical Examination:        General appearance:  alert, cooperative and no distress  Mental Status:  oriented to person, place and time and normal affect  Lungs:  clear to auscultation bilaterally, normal effort  Heart:  regular rate and rhythm, no murmur  Abdomen:  soft, minimally ttp, nondistended, normal bowel sounds, no masses, hepatomegaly, splenomegaly  Extremities:  no edema, redness, tenderness in the calves  Skin:  no gross lesions, rashes, induration    Assessment:        Primary Problem  SBO (small bowel obstruction) ([facility]    Active Hospital Problems    Diagnosis Date Noted    Severe malnutrition ([facility] [E43] 05/02/2019     Priority: Medium     Class: Present on Admission    Ischemic colitis ([facility] [K55.9]     Coffee ground emesis [K92.0]     SBO (small bowel obstruction) ([facility] [K56.609] 04/25/2019    Major depressive disorder, recurrent, in full remission ([facility] [F33.42] 08/30/2018    Chronic combined systolic and diastolic congestive heart failure ([facility] [I50.42] 06/22/2017    Essential hypertension [I10] 05/05/2017    Chronic atrial fibrillation (HCC) [I48.2]     GUME (obstructive sleep apnea) [G47.33]     Pulmonary embolism without acute cor pulmonale ([facility] [I26.99] 02/23/2017    Obesity (BMI 30-39. 9) [E66.9]     Small bowel cancer ([facility] [C17.9]        Plan: 1. Start clears when ok with surgery, then wean tpn  2. mobilize    Efrain Feldman DO  5/7/2019  12:53 PM

## 2019-05-07 NOTE — PROGRESS NOTES
Nutrition Assessment (Parenteral Nutrition)    Type and Reason for Visit: Reassess  Nutrition Recommendations: 1. Suggest continuing NPO Effective Now. Await advancement as tolerated to PO diet:  Start with clear liquids if OK with Surgery? 2. Consider maintaing PN-Adult 2-in-1 Central Line (Custom) to be @ 80 ml/hr, with IV Fat Emulsions 20% 20 g, @ 8.33 ml/hr x 12 hrs. Additive changes: K+ Acetate 0 to 10 mEq, remove MVI and trace elements. Nutrition Assessment: Patient improving from a nutritional standpoint as evidenced by upward trend in weight and TPN and D5 meeting 100% of estimated nutrition needs. Has NGT for suctioning with 100 ml output. Continues PN with NPO status, awaitingg advancement to PO diet. Communicated suggested PN changes to Pharmacist.     Malnutrition Assessment:  · Malnutrition Status: Meets the criteria for severe malnutrition  · Context: Chronic illness  · Findings of the 6 clinical characteristics of malnutrition (Minimum of 2 out of 6 clinical characteristics is required to make the diagnosis of moderate or severe Protein Calorie Malnutrition based on AND/ASPEN Guidelines):  1. Energy Intake-Less than or equal to 75% of estimated energy requirement, Greater than or equal to 3 months    2. Weight Loss-10% loss or greater(13.5%), in 6 months  3. Fat Loss-Unable to assess,    4. Muscle Loss-Unable to assess,    5. Fluid Accumulation-Mild fluid accumulation, Extremities  6.   Strength-Not measured    Nutrition Risk Level: High    Nutrient Needs:  · Estimated Daily Total Kcal: 2,150-2,300 kcal (post-op) (20-22 kcal/kg)  · Estimated Daily Protein (g):  g (post-op) (1.2-1.35 g/kg)  · Estimated Daily Total Fluid (ml/day): 2,100-2,200 ml (30-32 ml/kg)    Nutrition Diagnosis:   · Problem: Severe malnutrition, In context of chronic illness  · Etiology: related to Alteration in GI function     Signs and symptoms:  as evidenced by NPO status due to medical condition, Intubation,

## 2019-05-07 NOTE — PROGRESS NOTES
Surgery Progress Note             POD # 10    PATIENT NAME: Cirilo Wallace     TODAY'S DATE: 5/7/2019, 4:46 PM    SUBJECTIVE:    Pt is a well he's afebrile today widely awake and alert in no acute distress at the present timeawake     OBJECTIVE:   VITALS:  /87   Pulse 73   Temp 98.4 °F (36.9 °C) (Oral)   Resp 20   Ht 5' 9\" (1.753 m)   Wt 253 lb 9.6 oz (115 kg)   SpO2 94%   BMI 37.45 kg/m²     INTAKE/OUTPUT:      Intake/Output Summary (Last 24 hours) at 5/7/2019 1646  Last data filed at 5/7/2019 1530  Gross per 24 hour   Intake 3824 ml   Output 3920 ml   Net -96 ml                 CONSTITUTIONAL:  awake and alert. no apparent distress, No acute distress  CARDIOVASCULAR:  tachycardic with regular rhythm and no murmur noted, No Murmur  LUNGS:  crackles right base, CTA Bilaterally  ABDOMEN:   Abdomen soft, non-tender.  BS normal. No masses,  No organomegaly, Abdomen soft, non-tender, non-distended  INCISION: dry, Incision clean/dry/intact    Data:  CBC:   Lab Results   Component Value Date    WBC 12.2 05/07/2019    RBC 3.35 05/07/2019    HGB 10.6 05/07/2019    HCT 33.7 05/07/2019    .6 05/07/2019    MCH 31.6 05/07/2019    MCHC 31.5 05/07/2019    RDW 15.9 05/07/2019     05/07/2019    MPV 11.9 05/07/2019       Radiology Review:        Pathology  noted    ASSESSMENT   61 y.o. male   Active Hospital Problems    Diagnosis Date Noted    Severe malnutrition ([facility] [E43] 05/02/2019     Priority: Medium     Class: Present on Admission    Ischemic colitis ([facility] [K55.9]     Coffee ground emesis [K92.0]     SBO (small bowel obstruction) ([facility] [K56.609] 04/25/2019    Major depressive disorder, recurrent, in full remission ([facility] [F33.42] 08/30/2018    Chronic combined systolic and diastolic congestive heart failure ([facility] [I50.42] 06/22/2017    Essential hypertension [I10] 05/05/2017    Chronic atrial fibrillation (HCC) [I48.2]     GUME (obstructive sleep apnea) [G47.33]     Pulmonary embolism without acute cor pulmonale (HCC) [I26.99] 02/23/2017    Obesity (BMI 30-39. 9) [E66.9]     Small bowel cancer ([address] [C17.9]          Plan  1. We will clamp his NG tube overnight and if he tolerates that D seen in the morning and then start him on clear liquid diet  2. Will be okay to transfer to stepdown ICU.   3.       Electronically signed by Chace Anderson MD  on 5/7/2019 at 4:46 PM

## 2019-05-07 NOTE — PROGRESS NOTES
[facility] Membreno, [facility] SBO (small bowel obstruction) ([facility] [K56.609] . Vitals:    05/07/19 1506   BP:    Pulse:    Resp: 24   Temp:    SpO2: 95%   . Patients home meds are   Prior to Admission medications    Medication Sig Start Date End Date Taking? Authorizing Provider   aspirin 81 MG chewable tablet Take 81 mg by mouth daily   Yes Historical Provider, MD   atorvastatin (LIPITOR) 40 MG tablet Take 40 mg by mouth nightly   Yes Historical Provider, MD   DULoxetine (CYMBALTA) 20 MG extended release capsule Take 20 mg by mouth daily   Yes Historical Provider, MD   docusate sodium (COLACE) 100 MG capsule Take 100 mg by mouth 2 times daily   Yes Historical Provider, MD   ferrous sulfate 325 (65 Fe) MG EC tablet Take 325 mg by mouth 4 times daily   Yes Historical Provider, MD   furosemide (LASIX) 40 MG tablet Take 40 mg by mouth daily   Yes Historical Provider, MD   pregabalin (LYRICA) 50 MG capsule Take 50 mg by mouth 2 times daily.    Yes Historical Provider, MD   metoprolol tartrate (LOPRESSOR) 50 MG tablet Take 50 mg by mouth 2 times daily (before meals)   Yes Historical Provider, MD   pantoprazole (PROTONIX) 40 MG tablet Take 40 mg by mouth 2 times daily (before meals)   Yes Historical Provider, MD   QUEtiapine (SEROQUEL) 25 MG tablet Take 25 mg by mouth 2 times daily   Yes Historical Provider, MD   spironolactone (ALDACTONE) 25 MG tablet Take 25 mg by mouth daily   Yes Historical Provider, MD   acetaminophen (TYLENOL) 325 MG tablet Take 650 mg by mouth every 6 hours as needed for Pain or Fever   Yes Historical Provider, MD   senna (SENOKOT) 8.6 MG tablet Take 1 tablet by mouth daily as needed for Constipation   Yes Historical Provider, MD   sennosides-docusate sodium (SENOKOT-S) 8.6-50 MG tablet Take 1 tablet by mouth daily as needed for Constipation   Yes Historical Provider, MD   vitamin B-6 (PYRIDOXINE) 25 MG tablet Take 25 mg by mouth daily   Yes Historical Provider, MD   dibucaine ([employer] H[employer]HO[employer]NALG[employer]) 1 % OINT rectal ointment Place rectally 4 times daily as needed for Pain 8/30/18  Yes Abby Adair MD   amiodarone (PACERONE) 400 MG tablet Take 1 tablet by mouth 2 times daily 6/7/17  Yes Rebel Harmon,    nitroGLYCERIN (NITROSTAT) 0.4 MG SL tablet up to max of 3 total doses. If no relief after 1 dose, call 911. 3/1/17  Yes MIQUEL Pickard CNP   .   Recent Surgical History: None = 0     Assessment     Peak Flow (asthma only)    Predicted: na  Personal Best: na  PEF na  % Predicted na  Peak Flow : not applicable = 0    YPK0/RXR    FEV1 Predicted na      FEV1  Unable to blow    FEV1 % Predicted na  FVC na  IS volume na  IBW na  FIO2% room air  SPO2 97%  RR 19  Breath Sounds: slight diminished      · Bronchodilator assessment at level 3  · Hyperinflation assessment at level   · Secretion Management assessment at level    ·   · []    Bronchodilator Assessment  BRONCHODILATOR ASSESSMENT SCORE  Score 0 1 2 3 4 5   Breath Sounds   []  Patient Baseline []  No Wheeze good aeration []  Faint, scattered wheezing, good aeration [x]  Expiratory Wheezing and or moderately diminished []  Insp/Exp wheeze and/or very diminished []  Insp/Exp and/ or marked distress   Respiratory Rate   []  Patient Baseline []  Less than 20 [x]  Less than 20 []  20-25 []  Greater than 25 []  Greater than 25   Peak flow % of Pred or PB [x]  NA   []  Greater than 90%  []  81-90% []  71-80% []  Less than or equal to 70%  or unable to perform []  Unable due to Respiratory Distress   Dyspnea re []  Patient Baseline []  No SOB []  No SOB [x]  SOB on exertion []  SOB min activity []  At rest/acute   e FEV% Predicted       []  NA []  Above 69%  []  Unable []  Above 60-69%  []  Unable []  Above 50-59%  [x]  Unable []  Above 35-49%  []  Unable []  Less than 35%  []  Unable                 []  Hyperinflation Assessment  Score 1 2 3   CXR and Breath Sounds   []  Clear []  No atelectasis  Basilar aeration []  Atelectasis or absent basilar breath sounds   Incentive Spirometry Volume  (Per IBW)   []  Greater than or equal to 15ml/Kg []  less than 15ml/Kg []  less than 15ml/Kg   Surgery within last 2 weeks []  None or general   []  Abdominal or thoracic surgery  []  Abdominal or thoracic   Chronic Pulmonary Historyre []  No []  Yes []  Yes     []  Secretion Management Assessment  Score 1 2 3   Bilateral Breath Sounds   []  Occasional Rhonchi []  Scattered Rhonchi []  Course Rhonchi and/or poor aeration   Sputum    []  Small amount of thin secretions []  Moderate amount of viscous secretions []  Copius, Viscious Yellow/ Secretions   CXR as reported by physician []  clear  []  Unavailable []  Infiltrates and/or consolidation  []  Unavailable []  Mucus Plugging and or lobar consolidation  []  Unavailable   Cough []  Strong, productive cough []  Weak productive cough []  No cough or weak non-productive cough

## 2019-05-07 NOTE — PROGRESS NOTES
Pulmonary Critical Care Progress Note  Yasmine Motley M.D. Patient seen for the follow up of respiratory failure, SBO (small bowel obstruction) ([facility]     Subjective:  Patient was successfully extubated on 5/3/19. He has been on TPN since 5/1/19. He is awake and responds to questions. He has fair urine output. No significant overnight events. Examination:  Vitals: BP (!) 141/76   Pulse 76   Temp 98.1 °F (36.7 °C) (Oral)   Resp 20   Ht 5' 9\" (1.753 m)   Wt 253 lb 9.6 oz (115 kg)   SpO2 92%   BMI 37.45 kg/m²     General appearance: Awake alert  Neck: No JVD  Lungs: Moderate air exchange, occasional rhonchi  Heart: regular rate and rhythm, S1, S2 normal, no gallop  Abdomen: Distended, tender  Extremities: no cyanosis or clubbing. Mild edema    LABs:  CBC:   Recent Labs     05/06/19  0408 05/07/19  0515   WBC 10.8 12.2*   HGB 10.4* 10.6*   HCT 34.1* 33.7*    190     BMP:   Recent Labs     05/06/19  0408 05/07/19  0616   * 142   K 4.5 4.0   CO2 24 24   BUN 33* 32*   CREATININE 1.48* 1.37*   LABGLOM 48* 52*   GLUCOSE 126* 135*     Radiology:  5/6/19      Impression:  · Acute postoperative respiratory insufficiency  · SBO with ischemic bowel s/p exploratory lap with bowel resection 4/28/19   · Shock/Sepsis secondary to above  · Recurrent Pulmonary Embolism  · Pulmonary infiltrates atelectasis vs pneumonia   · Adenocarcinoma of small bowel, s/p bowel resection and chemo in 2014  ? Gastric polyp biopsy 2/7/19  + Moderately differentiated adenocarcinoma  · Obesity/Obstructive sleep apnea ~ wears O2 at bedtime  · Chronic combined diastolic/systolic heart failure ~ EF 25-30%  · Pulmonary Hypertension ~ RVSP 41  · Upper GI bleed s/p microcoil embolization    Recommendations:  · IV D5W half-normal saline at 20 ML per hour, total fluid 100 ML's per hour  · Continue IV Zosyn  · Protonix gtt, GI on consult   · NG LIS  · Continue TPN, start orals?   · Albuterol and Ipratropium Q 4 hours and

## 2019-05-07 NOTE — PROGRESS NOTES
Physical Therapy  Facility/Department: [facility] ICU  Daily Treatment Note  NAME: Ifrah Diver  : 1955  MRN: [id number]    Date of Service: 2019    Discharge Recommendations:  [address]        Patient Diagnosis(es): The primary encounter diagnosis was SBO (small bowel obstruction) ([facility] A diagnosis of Other acute pulmonary embolism without acute cor pulmonale (HCC) was also pertinent to this visit. has a past medical history of Asthma, Cancer ([facility] CHF (congestive heart failure) ([facility] COPD exacerbation ([facility] GERD (gastroesophageal reflux disease), History of blood transfusion, Hyperlipidemia, Hypertension, Neuropathy, Obesity (BMI 30-39.9), Psychiatric problem, SBO (small bowel obstruction) ([facility] Severe mitral regurgitation, and Severe tricuspid regurgitation. has a past surgical history that includes other surgical history (12); Mitral valve surgery (10/31/12); Tricuspid valve surgery (10/31/12); Upper gastrointestinal endoscopy (3-3-15); Abdomen surgery; Tunneled venous port placement (Left, 2015); pacemaker placement (Left, 2017); and LAPAROTOMY EXPLORATORY (N/A, 2019). Restrictions  Restrictions/Precautions  Restrictions/Precautions: Fall Risk, General Precautions  Required Braces or Orthoses?: No  Implants present? : Pacemaker  Position Activity Restriction  Other position/activity restrictions: NPO, NG tube, 2 L O2 per NC, bed alarm, up as kellie, per RN pt is incontinent. LUE IV  Subjective   General  Chart Reviewed: Yes  Response To Previous Treatment: Patient with no complaints from previous session.   Family / Caregiver Present: No  General Comment  Comments: OK for PT per Luis Yin RN  Pain Screening  Patient Currently in Pain: Denies  Vital Signs  Patient Currently in Pain: Denies       Orientation  Orientation  Overall Orientation Status: Within Normal Limits  Cognition      Objective   Bed mobility  Rolling to Left: Maximum assistance;Dependent/Total  Rolling to Right: Maximum assistance;Dependent/Total  Supine to Sit: Dependent/Total;Unable to assess              Exercises  Comments: AROM x 4 extremities x 10 reps. Re-orientation and senosry stimulation. instructed pt in proper technique to roll to side                        Assessment   Body structures, Functions, Activity limitations: Decreased balance;Decreased endurance;Decreased functional mobility   Patient Education: safety. pressure relief  REQUIRES PT FOLLOW UP: Yes  Activity Tolerance  Activity Tolerance: Patient limited by endurance;Treatment limited secondary to medical complications (free text)     G-Code     OutComes Score                                                  AM-PAC Score             Goals  Short term goals  Time Frame for Short term goals: 12 treatments  Short term goal 1: Prevent decrease ROM / strength  Short term goal 2: Progress function as toleraterd and re-eval   Short term goal 3: Pt oriented and alert   Short term goal 4: Tolerate 30 min ther act  Patient Goals   Patient goals : Back to SNF    Plan    Plan  Times per week: 1-2x/day,6-7 days/week  Current Treatment Recommendations: Home Exercise Program, ROM, Patient/Caregiver Education & Training, Safety Education & Training, Positioning, Strengthening, Transfer Training  Safety Devices  Type of devices:  All fall risk precautions in place, Bed alarm in place, Call light within reach, Patient at risk for falls, Left in bed, Nurse notified  Restraints  Initially in place: No     Therapy Time   Individual Concurrent Group Co-treatment   Time In [address] [place] [address]         Time Out 1321         Minutes [address]

## 2019-05-08 ENCOUNTER — APPOINTMENT (OUTPATIENT)
Dept: CT IMAGING | Age: 64
DRG: 329 | End: 2019-05-08
Payer: MEDICARE

## 2019-05-08 LAB
ABSOLUTE EOS #: 0.15 K/UL (ref 0–0.4)
ABSOLUTE IMMATURE GRANULOCYTE: 0 K/UL (ref 0–0.3)
ABSOLUTE LYMPH #: 0.88 K/UL (ref 1–4.8)
ABSOLUTE MONO #: 0.88 K/UL (ref 0.2–0.8)
ANION GAP SERPL CALCULATED.3IONS-SCNC: 11 MMOL/L (ref 9–17)
BASOPHILS # BLD: 0 %
BASOPHILS ABSOLUTE: 0 K/UL (ref 0–0.2)
BUN BLDV-MCNC: 31 MG/DL (ref 8–23)
BUN/CREAT BLD: 22 (ref 9–20)
CALCIUM SERPL-MCNC: 9 MG/DL (ref 8.6–10.4)
CHLORIDE BLD-SCNC: 108 MMOL/L (ref 98–107)
CO2: 23 MMOL/L (ref 20–31)
CREAT SERPL-MCNC: 1.43 MG/DL (ref 0.7–1.2)
DIFFERENTIAL TYPE: ABNORMAL
EOSINOPHILS RELATIVE PERCENT: 1 % (ref 1–4)
GFR AFRICAN AMERICAN: >60 ML/MIN
GFR NON-AFRICAN AMERICAN: 50 ML/MIN
GFR SERPL CREATININE-BSD FRML MDRD: ABNORMAL ML/MIN/{1.73_M2}
GFR SERPL CREATININE-BSD FRML MDRD: ABNORMAL ML/MIN/{1.73_M2}
GLUCOSE BLD-MCNC: 105 MG/DL (ref 75–110)
GLUCOSE BLD-MCNC: 107 MG/DL (ref 70–99)
GLUCOSE BLD-MCNC: 118 MG/DL (ref 75–110)
GLUCOSE BLD-MCNC: 119 MG/DL (ref 75–110)
GLUCOSE BLD-MCNC: 157 MG/DL (ref 75–110)
GLUCOSE BLD-MCNC: 97 MG/DL (ref 75–110)
HCT VFR BLD CALC: 34.4 % (ref 40.7–50.3)
HEMOGLOBIN: 10.7 G/DL (ref 13–17)
IMMATURE GRANULOCYTES: 0 %
LYMPHOCYTES # BLD: 6 % (ref 24–44)
MAGNESIUM: 2 MG/DL (ref 1.6–2.6)
MCH RBC QN AUTO: 31.4 PG (ref 25.2–33.5)
MCHC RBC AUTO-ENTMCNC: 31.1 G/DL (ref 28–38)
MCV RBC AUTO: 100.9 FL (ref 82.6–102.9)
MONOCYTES # BLD: 6 % (ref 1–7)
NRBC AUTOMATED: ABNORMAL PER 100 WBC
PDW BLD-RTO: 15.6 % (ref 11.8–14.4)
PHOSPHORUS: 3.2 MG/DL (ref 2.5–4.5)
PLATELET # BLD: 236 K/UL (ref 138–453)
PLATELET ESTIMATE: ABNORMAL
PMV BLD AUTO: 11.7 FL (ref 8.1–13.5)
POTASSIUM SERPL-SCNC: 4.3 MMOL/L (ref 3.7–5.3)
RBC # BLD: 3.41 M/UL (ref 4.21–5.77)
RBC # BLD: ABNORMAL 10*6/UL
SEG NEUTROPHILS: 87 % (ref 36–66)
SEGMENTED NEUTROPHILS ABSOLUTE COUNT: 12.79 K/UL (ref 1.8–7.7)
SODIUM BLD-SCNC: 142 MMOL/L (ref 135–144)
WBC # BLD: 14.7 K/UL (ref 3.5–11.3)
WBC # BLD: ABNORMAL 10*3/UL

## 2019-05-08 PROCEDURE — 2700000000 HC OXYGEN THERAPY PER DAY

## 2019-05-08 PROCEDURE — 97110 THERAPEUTIC EXERCISES: CPT

## 2019-05-08 PROCEDURE — 2500000003 HC RX 250 WO HCPCS: Performed by: SURGERY

## 2019-05-08 PROCEDURE — 6370000000 HC RX 637 (ALT 250 FOR IP): Performed by: SURGERY

## 2019-05-08 PROCEDURE — 94760 N-INVAS EAR/PLS OXIMETRY 1: CPT

## 2019-05-08 PROCEDURE — 2580000003 HC RX 258: Performed by: SURGERY

## 2019-05-08 PROCEDURE — 97535 SELF CARE MNGMENT TRAINING: CPT

## 2019-05-08 PROCEDURE — 82947 ASSAY GLUCOSE BLOOD QUANT: CPT

## 2019-05-08 PROCEDURE — 2580000003 HC RX 258: Performed by: INTERNAL MEDICINE

## 2019-05-08 PROCEDURE — 2500000003 HC RX 250 WO HCPCS: Performed by: INTERNAL MEDICINE

## 2019-05-08 PROCEDURE — 94640 AIRWAY INHALATION TREATMENT: CPT

## 2019-05-08 PROCEDURE — 80048 BASIC METABOLIC PNL TOTAL CA: CPT

## 2019-05-08 PROCEDURE — 84100 ASSAY OF PHOSPHORUS: CPT

## 2019-05-08 PROCEDURE — 85025 COMPLETE CBC W/AUTO DIFF WBC: CPT

## 2019-05-08 PROCEDURE — 97530 THERAPEUTIC ACTIVITIES: CPT

## 2019-05-08 PROCEDURE — 6370000000 HC RX 637 (ALT 250 FOR IP): Performed by: INTERNAL MEDICINE

## 2019-05-08 PROCEDURE — 70450 CT HEAD/BRAIN W/O DYE: CPT

## 2019-05-08 PROCEDURE — 70498 CT ANGIOGRAPHY NECK: CPT

## 2019-05-08 PROCEDURE — 2060000000 HC ICU INTERMEDIATE R&B

## 2019-05-08 PROCEDURE — 97168 OT RE-EVAL EST PLAN CARE: CPT

## 2019-05-08 PROCEDURE — 6360000002 HC RX W HCPCS: Performed by: INTERNAL MEDICINE

## 2019-05-08 PROCEDURE — 6360000004 HC RX CONTRAST MEDICATION: Performed by: INTERNAL MEDICINE

## 2019-05-08 PROCEDURE — 70496 CT ANGIOGRAPHY HEAD: CPT

## 2019-05-08 PROCEDURE — 83735 ASSAY OF MAGNESIUM: CPT

## 2019-05-08 PROCEDURE — 6360000002 HC RX W HCPCS: Performed by: SURGERY

## 2019-05-08 PROCEDURE — 36415 COLL VENOUS BLD VENIPUNCTURE: CPT

## 2019-05-08 PROCEDURE — 97116 GAIT TRAINING THERAPY: CPT

## 2019-05-08 PROCEDURE — 99233 SBSQ HOSP IP/OBS HIGH 50: CPT | Performed by: INTERNAL MEDICINE

## 2019-05-08 PROCEDURE — C9113 INJ PANTOPRAZOLE SODIUM, VIA: HCPCS | Performed by: INTERNAL MEDICINE

## 2019-05-08 RX ORDER — 0.9 % SODIUM CHLORIDE 0.9 %
80 INTRAVENOUS SOLUTION INTRAVENOUS ONCE
Status: COMPLETED | OUTPATIENT
Start: 2019-05-08 | End: 2019-05-08

## 2019-05-08 RX ORDER — SODIUM CHLORIDE 0.9 % (FLUSH) 0.9 %
10 SYRINGE (ML) INJECTION PRN
Status: DISCONTINUED | OUTPATIENT
Start: 2019-05-08 | End: 2019-05-10 | Stop reason: HOSPADM

## 2019-05-08 RX ORDER — PANTOPRAZOLE SODIUM 40 MG/1
40 TABLET, DELAYED RELEASE ORAL
Status: DISCONTINUED | OUTPATIENT
Start: 2019-05-09 | End: 2019-05-10 | Stop reason: HOSPADM

## 2019-05-08 RX ADMIN — DULOXETINE HYDROCHLORIDE 20 MG: 20 CAPSULE, DELAYED RELEASE ORAL at 10:19

## 2019-05-08 RX ADMIN — HEPARIN SODIUM 5000 UNITS: 5000 INJECTION INTRAVENOUS; SUBCUTANEOUS at 10:32

## 2019-05-08 RX ADMIN — HEPARIN SODIUM 5000 UNITS: 5000 INJECTION INTRAVENOUS; SUBCUTANEOUS at 21:20

## 2019-05-08 RX ADMIN — AMIODARONE HYDROCHLORIDE 200 MG: 200 TABLET ORAL at 10:19

## 2019-05-08 RX ADMIN — CALCIUM GLUCONATE: 94 INJECTION, SOLUTION INTRAVENOUS at 18:27

## 2019-05-08 RX ADMIN — METOCLOPRAMIDE 10 MG: 5 INJECTION, SOLUTION INTRAMUSCULAR; INTRAVENOUS at 18:49

## 2019-05-08 RX ADMIN — METOCLOPRAMIDE 10 MG: 5 INJECTION, SOLUTION INTRAMUSCULAR; INTRAVENOUS at 06:06

## 2019-05-08 RX ADMIN — SODIUM CHLORIDE 80 ML: 0.9 INJECTION, SOLUTION INTRAVENOUS at 15:07

## 2019-05-08 RX ADMIN — ASPIRIN 81 MG: 81 TABLET, COATED ORAL at 10:20

## 2019-05-08 RX ADMIN — Medication 10 ML: at 21:21

## 2019-05-08 RX ADMIN — IPRATROPIUM BROMIDE AND ALBUTEROL SULFATE 1 AMPULE: .5; 3 SOLUTION RESPIRATORY (INHALATION) at 11:38

## 2019-05-08 RX ADMIN — QUETIAPINE FUMARATE 25 MG: 25 TABLET ORAL at 10:19

## 2019-05-08 RX ADMIN — IOPAMIDOL 75 ML: 755 INJECTION, SOLUTION INTRAVENOUS at 15:06

## 2019-05-08 RX ADMIN — SENNOSIDES 8.6 MG: 8.6 TABLET, FILM COATED ORAL at 10:19

## 2019-05-08 RX ADMIN — PYRIDOXINE HCL TAB 50 MG 25 MG: 50 TAB at 10:19

## 2019-05-08 RX ADMIN — IPRATROPIUM BROMIDE AND ALBUTEROL SULFATE 1 AMPULE: .5; 3 SOLUTION RESPIRATORY (INHALATION) at 06:19

## 2019-05-08 RX ADMIN — IPRATROPIUM BROMIDE AND ALBUTEROL SULFATE 1 AMPULE: .5; 3 SOLUTION RESPIRATORY (INHALATION) at 15:47

## 2019-05-08 RX ADMIN — Medication 10 ML: at 10:20

## 2019-05-08 RX ADMIN — IPRATROPIUM BROMIDE AND ALBUTEROL SULFATE 1 AMPULE: .5; 3 SOLUTION RESPIRATORY (INHALATION) at 19:45

## 2019-05-08 RX ADMIN — METOCLOPRAMIDE 10 MG: 5 INJECTION, SOLUTION INTRAMUSCULAR; INTRAVENOUS at 00:54

## 2019-05-08 RX ADMIN — METOPROLOL TARTRATE 5 MG: 5 INJECTION INTRAVENOUS at 02:16

## 2019-05-08 RX ADMIN — I.V. FAT EMULSION 100 ML: 20 EMULSION INTRAVENOUS at 18:26

## 2019-05-08 RX ADMIN — DEXTROSE AND SODIUM CHLORIDE: 5; 450 INJECTION, SOLUTION INTRAVENOUS at 03:40

## 2019-05-08 RX ADMIN — PANTOPRAZOLE SODIUM 40 MG: 40 INJECTION, POWDER, FOR SOLUTION INTRAVENOUS at 10:19

## 2019-05-08 RX ADMIN — TAZOBACTAM SODIUM AND PIPERACILLIN SODIUM 3.38 G: 375; 3 INJECTION, SOLUTION INTRAVENOUS at 00:54

## 2019-05-08 RX ADMIN — METOPROLOL TARTRATE 5 MG: 5 INJECTION INTRAVENOUS at 06:06

## 2019-05-08 RX ADMIN — METOPROLOL SUCCINATE 25 MG: 25 TABLET, FILM COATED, EXTENDED RELEASE ORAL at 10:19

## 2019-05-08 RX ADMIN — METOCLOPRAMIDE 10 MG: 5 INJECTION, SOLUTION INTRAMUSCULAR; INTRAVENOUS at 13:42

## 2019-05-08 RX ADMIN — QUETIAPINE FUMARATE 25 MG: 25 TABLET ORAL at 21:20

## 2019-05-08 RX ADMIN — ATORVASTATIN CALCIUM 40 MG: 40 TABLET, FILM COATED ORAL at 21:20

## 2019-05-08 RX ADMIN — FERROUS SULFATE TAB EC 325 MG (65 MG FE EQUIVALENT) 325 MG: 325 (65 FE) TABLET DELAYED RESPONSE at 10:19

## 2019-05-08 ASSESSMENT — PAIN SCALES - GENERAL
PAINLEVEL_OUTOF10: 0

## 2019-05-08 NOTE — PROGRESS NOTES
Nutrition Assessment (Parenteral Nutrition)    Type and Reason for Visit: Reassess    Nutrition Recommendations: 1. Suggest continuing Clear Liquid diet Effective Now and advance as tolerated as ordered. 2. Consider decreasing PN-Adult 2-in-1 Central Line (Custom) to be @ 65 ml/hr, with IV Fat Emulsions 20% 20 g, @ 8.33 ml/hr x 12 hrs.  Additive changes: K+ Acetate 10 to 20 mEq, Calcium gluconate 13 to 12 mEq, MGSO4 9 to 5 mEq, add MVI and trace elements. Nutrition Assessment: Patient improving from a nutritional standpoint as now awake and alert, NG tube used for suctioning d/c'd, started clear liquids, consumed 51-75% of breakfast this morning. Will reduce PN rate to 65 ml/hr, monitor PO intake and nutrition progression and adjust PN accordingly. Malnutrition Assessment:  · Malnutrition Status: Meets the criteria for severe malnutrition  · Context: Chronic illness  · Findings of the 6 clinical characteristics of malnutrition (Minimum of 2 out of 6 clinical characteristics is required to make the diagnosis of moderate or severe Protein Calorie Malnutrition based on AND/ASPEN Guidelines):  1. Energy Intake-Less than or equal to 75% of estimated energy requirement, Greater than or equal to 3 months    2. Weight Loss-10% loss or greater(13.5%), in 6 months  3. Fat Loss-Unable to assess,    4. Muscle Loss-Unable to assess,    5. Fluid Accumulation-Mild fluid accumulation, Extremities  6.   Strength-Not measured    Nutrition Risk Level: High    Nutrient Needs:  · Estimated Daily Total Kcal: 2,150-2,300 kcal (post-op) (20-22 kcal/kg)  · Estimated Daily Protein (g):  g (post-op) (1.2-1.35 g/kg)  · Estimated Daily Total Fluid (ml/day): 2,100-2,200 ml (30-32 ml/kg)    Nutrition Diagnosis:   · Problem: Severe malnutrition, In context of chronic illness  · Etiology: related to Alteration in GI function     Signs and symptoms:  as evidenced by NPO status due to medical condition, Intubation, Nutrition support - PN, Weight loss greater than or equal to 10% in 6 months, Localized or generalized fluid accumulation(+15.2 kg--13.5% since 11/2/18)    Objective Information:  · Nutrition-Focused Physical Findings: GI: soft, tenderness, passing flatus, hypoactive bowel sounds in RUQ, LUQ, LLQ; active RLQ. Last BM 5/8. PV: +1 RUE/LUE, RLE/LLE. Skin: WDL, surgical incision abdomen  · Wound Type: Surgical Wound  · Current Nutrition Therapies:  · Oral Diet Orders: Clear Liquid   · Oral Diet intake: 51-75%  · Oral Nutrition Supplement (ONS) Orders: None  · ONS intake: NPO  · Parenteral Nutrition Orders:  · Type and Formula: Premix Central   · Lipids: 100ml, Daily  · Rate/Volume: @ 65 ml/hr / 1,560 ml  · Duration: Continuous  · Current PN Order Provides: 1,573 kcal, 78 gm protein   · Goal PN Orders Provides: @ 80 ml/hr (goal):  1,890 kcal, 96 g protein  · Additional Calories: IVF D% @ 75 ml/hr = 306 kcal/day   · Anthropometric Measures:  · Ht: 5' 9\" (175.3 cm)   · Current Body Wt: 232 lb 4.8 oz (105.4 kg)  · Admission Body Wt: 238 lb 5.1 oz (108.1 kg)  · Usual Body Wt: 275 lb 9.2 oz (125 kg)(11/2/18)  · % Weight Change:  ,  13.5% x 6 months  · Ideal Body Wt: 160 lb (72.6 kg), % Ideal Body 148%  · BMI Classification: BMI 30.0 - 34.9 Obese Class I    Nutrition Interventions:   Continue current diet, Modify current Parenteral Nutrition  Continued Inpatient Monitoring, Coordination of Care    Nutrition Evaluation:   · Evaluation: Progressing toward goals   · Goals: PN and PO intake to meet >90% of estimated kcal/protein needs, with good glycemic control.     · Monitoring: Nutrition Progression, PN Intake, Diet Tolerance, PN Tolerance, Skin Integrity, Wound Healing, I&O, Mental Status/Confusion, Weight, Pertinent Labs, Chewing/Swallowing, Nausea or Vomiting, Constipation, Monitor Bowel Function      Bakari Chavez RD, LD  Office phone [phone]

## 2019-05-08 NOTE — PROGRESS NOTES
Dr. Feldman updated via perfect serve that therapy brought to writers attention patient was leaning pretty heavily to the right side when getting up and pt's right hand uncoordinated compared to left. Writer assessed patient and noticed some possible facial droop. Patient is a poor historian. See order for stroke alert. . Vitals as charted. Went with patient to CT and back. CTA ordered as well. Will monitor.

## 2019-05-08 NOTE — PROGRESS NOTES
Surgery Progress Note             POD # 10    PATIENT NAME: Letitia Reyes     TODAY'S DATE: 5/8/2019, 7:18 AM    SUBJECTIVE:    Pt is stable he's afebrile vital signs are stable patient is in no acute distress at the present time     OBJECTIVE:   VITALS:  BP (!) 165/65   Pulse 72   Temp 97.7 °F (36.5 °C) (Axillary)   Resp 18   Ht 5' 9\" (1.753 m)   Wt 232 lb 4.8 oz (105.4 kg)   SpO2 99%   BMI 34.30 kg/m²     INTAKE/OUTPUT:      Intake/Output Summary (Last 24 hours) at 5/8/2019 0718  Last data filed at 5/8/2019 4445  Gross per 24 hour   Intake 2951 ml   Output 3545 ml   Net -594 ml                 CONSTITUTIONAL:  awake and alert. no apparent distress, No acute distress  CARDIOVASCULAR:  tachycardic with regular rhythm and no murmur noted, No Murmur  LUNGS:  crackles right base, CTA Bilaterally  ABDOMEN:   Abdomen soft, non-tender.  BS normal. No masses,  No organomegaly, Abdomen soft, non-tender, non-distended  INCISION: open, Incision clean/dry/intact    Data:  CBC:   Lab Results   Component Value Date    WBC 12.2 05/07/2019    RBC 3.35 05/07/2019    HGB 10.6 05/07/2019    HCT 33.7 05/07/2019    .6 05/07/2019    MCH 31.6 05/07/2019    MCHC 31.5 05/07/2019    RDW 15.9 05/07/2019     05/07/2019    MPV 11.9 05/07/2019       Radiology Review:        Pathology  noted    ASSESSMENT   61 y.o. male   Active Hospital Problems    Diagnosis Date Noted    Severe malnutrition ([address] [E43] 05/02/2019     Priority: Medium     Class: Present on Admission    Ischemic colitis ([address] [K55.9]     Coffee ground emesis [K92.0]     SBO (small bowel obstruction) ([address] [K56.609] 04/25/2019    Major depressive disorder, recurrent, in full remission ([address] [F33.42] 08/30/2018    Chronic combined systolic and diastolic congestive heart failure ([address] [I50.42] 06/22/2017    Essential hypertension [I10] 05/05/2017    Chronic atrial fibrillation (HCC) [I48.2]     GUME (obstructive sleep apnea) [G47.33]     Pulmonary embolism without acute cor pulmonale (HCC) [I26.99] 02/23/2017    Obesity (BMI 30-39. 9) [E66.9]     Small bowel cancer ([address] [C17.9]          Plan  1. Will DC NG tube  2. Was started on clear liquid diet and advance as tolerated. Once he starts tolerating his diet his TPN will be discontinued.   3.       Electronically signed by Dom Thompson MD  on 5/8/2019 at 7:18 AM

## 2019-05-08 NOTE — CARE COORDINATION
Discharge planning    Patient transferred from ICU and chart reviewed. Patient is from [facility] and is a long term patient at the facility. GEN SURG SBO DC NG tube. TPN to be stopped    IM SBO , malnutrition. Mobilize     COLLEEN in epic and will need signed and completed. SS following for return to snf    Patient discussed at qualify flow rounds and attending also gave input. Discharge one patient tolerating diet and TPN is weaned off. Patient is with tele sitter and removed per SHEKHAR Aguero.  SS following

## 2019-05-08 NOTE — PROGRESS NOTES
Pulmonary Critical Care Progress Note  Joya Ackerman M.D. Patient seen for the follow up of respiratory failure, SBO (small bowel obstruction) ([facility]     Subjective:  Patient was successfully extubated on 5/3/19. He has been on TPN since 5/1/19. He is awake and responds to questions. He has fair urine output. No significant overnight events. A stroke alert was called earlier today, at this time he is responding appropriately and his initial radiologic workup was negative. Examination:  Vitals: /74   Pulse 91   Temp 98.4 °F (36.9 °C) (Oral)   Resp 20   Ht 5' 9\" (1.753 m)   Wt 232 lb 4.8 oz (105.4 kg)   SpO2 95%   BMI 34.30 kg/m²     General appearance: Awake alert  Neck: No JVD  Lungs: Moderate air exchange, occasional rhonchi  Heart: regular rate and rhythm, S1, S2 normal, no gallop  Abdomen: Distended, tender  Extremities: no cyanosis or clubbing. Mild edema    LABs:  CBC:   Recent Labs     05/07/19  0515 05/08/19  0636   WBC 12.2* 14.7*   HGB 10.6* 10.7*   HCT 33.7* 34.4*    236     BMP:   Recent Labs     05/07/19  0616 05/08/19  0636    142   K 4.0 4.3   CO2 24 23   BUN 32* 31*   CREATININE 1.37* 1.43*   LABGLOM 52* 50*   GLUCOSE 135* 107*     Radiology:  5/6/19      Impression:  · Acute postoperative respiratory insufficiency  · SBO with ischemic bowel s/p exploratory lap with bowel resection 4/28/19   · Shock/Sepsis secondary to above  · Recurrent Pulmonary Embolism  · Pulmonary infiltrates atelectasis vs pneumonia   · Adenocarcinoma of small bowel, s/p bowel resection and chemo in 2014  ?  Gastric polyp biopsy 2/7/19  + Moderately differentiated adenocarcinoma  · Obesity/Obstructive sleep apnea ~ wears O2 at bedtime  · Chronic combined diastolic/systolic heart failure ~ EF 25-30%  · Pulmonary Hypertension ~ RVSP 41  · Upper GI bleed s/p microcoil embolization    Recommendations:  · IV D5W half-normal saline at 20 ML per hour, total fluid 100 ML's per hour  · Continue IV Zosyn  · Protonix gtt, GI on consult   · NG LIS  · Continue TPN, start orals?   · Albuterol and Ipratropium Q 4 hours and prn  · Monitor hemoglobin  · Labs: CBC and BMP in am  · PTOT  · Incentive spirometry every hour while awake  · DVT prophylaxis with SQ heparin  · Will follow with you    Electronically signed by     Isaac Tarango MD on 5/8/2019 at 4:02 PM  Pulmonary Critical Care and Sleep Medicine,  [facility]  Cell: [phone]  Office: [phone]

## 2019-05-08 NOTE — PROGRESS NOTES
Physical Therapy  Facility/Department: [facility] PROGRESSIVE CARE  Daily Treatment Note  NAME: Gil Torrez  : 1955  MRN: [id number]    Date of Service: 2019    Discharge Recommendations:  [address]        Patient Diagnosis(es): The primary encounter diagnosis was SBO (small bowel obstruction) ([facility] [address] A diagnosis of Other acute pulmonary embolism without acute cor pulmonale (HCC) was also pertinent to this visit. has a past medical history of Asthma, Cancer ([facility] [address] CHF (congestive heart failure) ([facility] [address] [facility] COPD exacerbation ([facility] [address] [facility] GERD (gastroesophageal reflux disease), History of blood transfusion, Hyperlipidemia, Hypertension, Neuropathy, Obesity (BMI 30-39.9), Psychiatric problem, SBO (small bowel obstruction) ([facility] [address] Severe mitral regurgitation, and Severe tricuspid regurgitation. has a past surgical history that includes other surgical history (12); Mitral valve surgery (10/31/12); Tricuspid valve surgery (10/31/12); Upper gastrointestinal endoscopy (3-3-15); Abdomen surgery; Tunneled venous port placement (Left, 2015); pacemaker placement (Left, 2017); and LAPAROTOMY EXPLORATORY (N/A, 2019). Restrictions  Restrictions/Precautions  Restrictions/Precautions: Fall Risk, General Precautions  Required Braces or Orthoses?: Yes(ABD Binder)  Implants present? : Pacemaker  Position Activity Restriction  Other position/activity restrictions: bed alarm, up as kellie per RN pt is incontinent, RUE IV, de luna, abd incision with staples, clear liquid diet, R sided lean/R inattention and ataxia noted and notified RN/Stroke alert later called   Subjective   General  Chart Reviewed: Yes  Response To Previous Treatment: Patient with no complaints from previous session.   Family / Caregiver Present: No  Subjective  Subjective: Patient anxious to get up with writer and OT, patient seen hanging his legs out of the bed with VCs for safety and could correct, then would put them back out. Appears to be impulsive. General Comment  Comments: OK for PT per Dmitry RN  Pain Screening  Patient Currently in Pain: Denies  Pain Assessment  Pain Assessment: 0-10  Pain Level: 0  Vital Signs  Patient Currently in Pain: Denies       Orientation  Orientation  Overall Orientation Status: Within Functional Limits  Cognition   Cognition  Overall Cognitive Status: Exceptions  Arousal/Alertness: Delayed responses to stimuli  Following Commands: Follows one step commands with increased time; Follows one step commands with repetition  Attention Span: Attends with cues to redirect; Difficulty attending to directions  Memory: Decreased short term memory  Safety Judgement: Decreased awareness of need for assistance;Decreased awareness of need for safety  Problem Solving: Assistance required to generate solutions;Assistance required to implement solutions;Assistance required to identify errors made;Assistance required to correct errors made;Decreased awareness of errors  Insights: Decreased awareness of deficits  Initiation: Requires cues for all  Sequencing: Requires cues for all  Objective   Bed mobility  Supine to Sit: Minimal assistance;2 Person assistance  Rolling: Min A   Scooting: Minimal assistance  Patient required VCs for sequencing, hand over hand placement and safety. Transfers  Sit to Stand: 2 Person Assistance;Minimal Assistance; Moderate Assistance  Stand to sit: Minimal Assistance; Moderate Assistance;2 Person Assistance  Stand Pivot Transfers: Minimal Assistance(x 2 A)  Lateral Transfers: Minimal Assistance;2 Person Assistance  Comment: During sit to stands patient demo'd difficulties placing his Rt hand on the RW and then difficulties gripping the RW, did requires hand over hand for sequencing and placement. Ambulation  Ambulation?: Yes  Ambulation 1  Surface: level tile  Device: Rolling Walker  Assistance: Minimal assistance;2 Person assistance; Moderate assistance  Distance: 3 feet forward  Comments: Patient able to amb about 3 feet from the chair but reports weakness in his contreras legs and required to sit with mild-moderate lean to th Rt side, chair pulled up behind patient, seated rest break x 4 mins due to SOB with VCs for pursed lip breathing. Attemted to ambulated again with  Mod A x 2 for 1 step with mod-Max lean to the Rt side, then required to sit back in chair. Stairs/Curb  Stairs?: No     Balance  Posture: Fair  Sitting - Static: Good  Sitting - Dynamic: Good  Standing - Static: Fair;-  Standing - Dynamic: Fair;+  Exercises  Comments: Seated contreras LE AROM x 10 reps with ataxia noted in Rt UE and LE with movement with decreased coordination and sequencing. Acticity  Comment: Patient sat EOB for line management and preparation for transfer x 5 mins. Patient inital reported slighty dizziness that resolved quickly. Noted patient tends to lean to the RT side during seated balance, patient reports he does not know he is leaning. VCs for correction to midline several times. 1 attempt to have patient over WB/lean to his left to help his correct the lean. Assessment   Body structures, Functions, Activity limitations: Decreased balance;Decreased endurance;Decreased functional mobility   Assessment: Patient demo'd what appears to be stroke like symptoms, RN notified and present at end of treatment observe patient and to assist with feeding as patient is too ataxic to feed himself. Patient is currently appropriate for SNF at discharge to improve mobility, endurance and over strength.    Prognosis: Good  REQUIRES PT FOLLOW UP: Yes  Activity Tolerance  Activity Tolerance: Patient limited by endurance;Treatment limited secondary to medical complications (free text)     AM-PAC Score  AM-PAC Inpatient Mobility Raw Score : 13  AM-PAC Inpatient T-Scale Score : 36.74  Mobility Inpatient CMS 0-100% Score: 64.91  Mobility Inpatient CMS G-Code Modifier : CL          Goals  Short term goals  Time Frame for Short term goals: 12 treatments  Short term goal 1: Prevent decrease ROM / strength  Short term goal 2: Progress function as toleraterd and re-eval   Short term goal 3: Pt oriented and alert   Short term goal 4: Tolerate 30 min ther act  Patient Goals   Patient goals : Back to SNF    Plan    Plan  Times per week: 1-2x/day,6-7 days/week  Current Treatment Recommendations: Home Exercise Program, ROM, Patient/Caregiver Education & Training, Safety Education & Training, Positioning, Strengthening, Transfer Training  Safety Devices  Type of devices: All fall risk precautions in place, Call light within reach, Patient at risk for falls, Nurse notified, Chair alarm in place, Left in chair  Restraints  Initially in place: No     Therapy Time   Individual Concurrent Group Co-treatment   Time In       0187   Time Out       1321   Minutes       48         Co-treatment with OT warranted secondary to decreased safety and independence requiring 2 skilled therapy professionals to address individual discipline's goals. PT addressing pre gait trunk strengthening, weight shifting prior to transfers (seated and standing) , transfer training and postural control in sitting and standing.   Radha Moran, PTA

## 2019-05-08 NOTE — PROGRESS NOTES
ANTIMICROBIAL STEWARDSHIP  Upon review of the patient's chart, the committee has the following recommendation for your consideration:    Indication: Ischemic colitis s/p bowel resection on 4/28    Day of Antimicrobial Therapy: 10    Recommendation:    Patient has received 10 days of broad spectrum antibiotic therapy to treat ischemic bowel. Clinically improving. With the adequate source control, 4-7 days of antibiotic therapy is recommended to treat an abdominal infection. We recommend discontinuing zosyn. Afebrile > 48 hours   Recent Labs     05/07/19  0515 05/08/19  0636   WBC 12.2* 14.7*       Unnecessary or inappropriate antimicrobial use increases the risk of microbial resistance and drug-associated toxicities including C. difficile infection. Thank you. Jacqueline Torres, PharmD  5/8/2019  9:14 AM    The Antimicrobial Stewardship Committee at [facility] is led by  Kaiser Mendez MD, Infectious Diseases Section Chair.

## 2019-05-08 NOTE — PROGRESS NOTES
Occupational Therapy   Occupational Therapy RE-Assessment  Date: 2019   Patient Name: Katerine Eaton  MRN: [id number]     : 1955    RN reports patient is medically stable for therapy treatment this date. Chart reviewed prior to treatment and patient is agreeable for therapy. All lines intact and patient positioned comfortably at end of treatment. All patient needs addressed prior to ending therapy session. Date of Service: 2019    Discharge Recommendations:  Subacute/Skilled Nursing Facility  OT Equipment Recommendations  ADL Assistive Devices: Emergency Alert System;Long-handled Sponge;Reacher;Long-handled Shoe Horn;Sock-Aid Soft; Toileting - 3-in-1 Commode;Grab Bars - shower    Assessment   Performance deficits / Impairments: Decreased functional mobility ; Decreased endurance;Decreased strength;Decreased ADL status; Decreased high-level IADLs;Decreased safe awareness;Decreased balance;Decreased coordination  Assessment: Re-assessment completed this date and pt with functional status changes/RN notified and Stroke alert called this date. Continue with OT services for maximizing functional I and safety as able. Prognosis: Fair  Decision Making: Medium Complexity  Patient Education: OT POC, safety in function, DC recommendations, pursed lip breathing, EC/WS tech, call light use/fall prevention, postural control/midline, R side awareness, pain mgt tech, proper bed mob tech,   REQUIRES OT FOLLOW UP: Yes  Activity Tolerance  Activity Tolerance: Patient limited by fatigue(pt limited by R sided inattention/ataxia and uncoordination)  Activity Tolerance: poor plus   Safety Devices  Type of devices: Call light within reach; Chair alarm in place; Left in chair;Patient at risk for falls;Gait belt;Nurse notified(RN in with pt upon exit )           Patient Diagnosis(es): The primary encounter diagnosis was SBO (small bowel obstruction) ([facility] [address]  A diagnosis of Other acute pulmonary embolism without acute cor pulmonale (HCC) was also pertinent to this visit. has a past medical history of Asthma, Cancer ([facility] [address] CHF (congestive heart failure) ([facility] [address] COPD exacerbation ([facility] [address] GERD (gastroesophageal reflux disease), History of blood transfusion, Hyperlipidemia, Hypertension, Neuropathy, Obesity (BMI 30-39.9), Psychiatric problem, SBO (small bowel obstruction) ([facility] [address] Severe mitral regurgitation, and Severe tricuspid regurgitation. has a past surgical history that includes other surgical history (11/2/12); Mitral valve surgery (10/31/12); Tricuspid valve surgery (10/31/12); Upper gastrointestinal endoscopy (3-3-15); Abdomen surgery; Tunneled venous port placement (Left, 6/18/2015); pacemaker placement (Left, 02/2017); and LAPAROTOMY EXPLORATORY (N/A, 4/28/2019).       Per chart:   Procedure: LAPAROTOMY EXPLORATORY     Date of Procedure: 4/28/2019     Pre-Op Diagnosis: Ischemic Bowel     Post-Op Diagnosis: Same       Procedure(s):  LAPAROTOMY EXPLORATORY, LYSIS OF ADHESIONS, SMALL BOWEL RESECTION WITH SITE TO SITE ANASTAMOSIS          Restrictions  Restrictions/Precautions  Restrictions/Precautions: Fall Risk, General Precautions  Required Braces or Orthoses?: Yes(ABD Binder)  Implants present? : Pacemaker  Position Activity Restriction  Other position/activity restrictions: bed alarm, up as kellie per RN, pt is incontinent, RUE IV, de luna, abd incision with staples, clear liquid diet, R sided lean/R inattention and ataxia noted and notified RN/Stroke alert later called this date     Subjective   General  Chart Reviewed: Yes  Patient assessed for rehabilitation services?: Yes  Family / Caregiver Present: No  Pain Assessment  Pain Assessment: 0-10  *pt denied pain and stated abd incision is tender     Social/Functional History  Social/Functional History  Lives With: Alone(pt at SNF PTA )  Type of Home: Apartment  Home Layout: Multi-level(9th floor apt-pt has not been home since [facility] 2018)  Home Access: Level entry, Elevator  Bathroom Shower/Tub: Tub/Shower unit  Bathroom Toilet: Standard  Bathroom Equipment: (no DME )  Home Equipment: Quad cane  Receives Help From: Family(pt states he has a supportive brother and sister to some degree )  ADL Assistance: Independent  Homemaking Assistance: Independent  Homemaking Responsibilities: Yes  Ambulation Assistance: Independent(with quad cane )  Transfer Assistance: Independent  Active : No  Patient's  Info: Public transport   Mode of Transportation: Bus  Occupation: On disability  Type of occupation: railroad work   Leisure & Hobbies: going to [employer] and reading        Objective   Vision: Impaired(pt states he has intermittent blurry vision however does not wear glasses )  Hearing: Within functional limits    Orientation  Overall Orientation Status: Within Functional Limits(slow and delayed processing noted )  Observation/Palpation  Posture: Poor(leans to R sitting EOB )  Observation: RUE IV, ataxic/uncoordinated RUE and R inattention noted/notified RN and stroke alert called this date   Edema: none   Scar: pt with abd incision with staples and abd binder placed on pt   Balance  Sitting Balance: Moderate assistance(mod to min assist due to R lateral lean sitting EOB; pt not aware of postural deficits or lean)  Standing Balance: Moderate assistance(mod to min assist of 2 with RW; R sided lean noted )  Standing Balance  Time: stand kellie < 30 seconds with RW and 2 staff assist   Activity: functional tasks   Functional Mobility  Functional - Mobility Device: Rolling Walker  Activity: (from EOB to end of bed only and pt requested chair immediately and had to pull up chair to sit down and pt to rest )  Assist Level:  Moderate assistance(mod to min assist x2 for safety/balance )  Functional Mobility Comments: verbal instruction/tactile assist for upright/midline posture, RW safety/mgt, looking up and scanning room, weight shifting, RUE awareness/hand placement on RW(pt needed max assist to maintain ), BLE/AD sequence and awareness/assist with all lines   Toilet Transfers  Toilet Transfers Comments: N/T   ADL  Feeding: Dependent/Total(pt was unable to feed self this date/OT fed pt chicken broth, jello and [place] ice for lunch; notified RN of feeding issues due to R hand ataxic/uncoordinated and pt unable with L hand as well; pt on clear liquid diet/RN in room to further assist with rest of meal)  Grooming: Dependent/Total  UE Bathing: Dependent/Total  LE Bathing: Dependent/Total  UE Dressing: Dependent/Total(to evy abd binder and clean hosp gown with 2 staff assist needed )  LE Dressing: Dependent/Total(2 assist to evy clean brief )  Toileting: Dependent/Total  Additional Comments: 2 staff assist needed for all care due to postural and balance deficits, safety concerns, R ataxia/uncoordinated and new functional changes noted/notified RN of concerns for status changes   Tone RUE  RUE Tone: (ataxic movements noted )  Tone LUE  LUE Tone: Normotonic  Coordination  Coordination and Movement description: Ataxia; Fine motor impairments(RUE/hand )     Bed mobility  Rolling to Left: Minimal assistance  Rolling to Right: Minimal assistance  Supine to Sit: Minimal assistance;2 Person assistance(with increased time needed)  Sit to Supine: Unable to assess(pt requested to continue to sit up in chair/RN in with pt upon exit)  Comment: verbal instruction/tactile assist for hand placement on rail, proper log rolling and pursed lip breathing tech  Transfers  Stand Pivot Transfers: Moderate assistance;2 Person assistance(mod to min assist of 2 with RW )  Sit to stand:  Moderate assistance;2 Person assistance(mod to min assist of 2 )  Stand to sit: Moderate assistance;2 Person assistance  Transfer Comments: verbal instruction/tactile assist for B hand placement, nose over toes as able, R side awareness, midline posture, looking up and scanning room, weight shifting/postural control, maintaining R hand on RW(max assist), controlled stand to sits, RW safety/mgt and awarenes/assist with all lines      Cognition  Overall Cognitive Status: Exceptions  Arousal/Alertness: Delayed responses to stimuli  Following Commands: Follows one step commands with increased time; Follows one step commands with repetition  Attention Span: Attends with cues to redirect; Difficulty attending to directions  Memory: Decreased short term memory  Safety Judgement: Decreased awareness of need for assistance;Decreased awareness of need for safety  Problem Solving: Assistance required to generate solutions;Assistance required to implement solutions;Assistance required to identify errors made;Assistance required to correct errors made;Decreased awareness of errors  Insights: Decreased awareness of deficits  Initiation: Requires cues for all  Sequencing: Requires cues for all        Sensation  Overall Sensation Status: Impaired(pt states B finger tips are numb )        LUE AROM (degrees)  LUE AROM : WFL  RUE AROM (degrees)  RUE AROM : WFL  LUE Strength  Gross LUE Strength: WFL  LUE Strength Comment: BUE strength grossly 4/5   RUE Strength  Gross RUE Strength: WFL                   Plan   Plan  Times per week: 3-5x/week 1x/day as kellie   Current Treatment Recommendations: Strengthening, Functional Mobility Training, Patient/Caregiver Education & Training, Home Management Training, Endurance Training, Equipment Evaluation, Education, & procurement, Balance Training, Neuromuscular Re-education, Safety Education & Training, Self-Care / ADL, Pain Management, Positioning    G-Code     OutComes Score                                                  AM-PAC Score   6     AM-PAC Inpatient Daily Activity Raw Score: 17  AM-PAC Inpatient ADL T-Scale Score : 37.26  ADL Inpatient CMS 0-100% Score: 50.11  ADL Inpatient CMS G-Code Modifier : CK    Goals  Short term goals  Time Frame for Short term goals: by discharge, pt to demo   Short term goal 1: self feeding/simple groom tasks with min assist and with use of AE as needed. Short term goal 2: bed mob tech with rail as needed to CG x1. Short term goal 3: UB ADL to min assist and LB ADL to mod assist of 1with use of AD/AE as needed. Short term goal 4: toileting tasks with use of AD/grab bar and BSC as needed to mod assist of 1. Short term goal 5: ADL transfers and functional mob with AD as needed to Min/CG x 1. Long term goals  Long term goal 1: Pt to increase balance to SBA with AD and kellie >5 mins to reduce fall risk for self care. Long term goal 2: Caregivers to be I with BUE HEP, EC/WS and fall prevention tech with hand outs as needed. Long term goal 3: Pt to increase BUE strength by a 1/2 grade to assist with self care tasks. Patient Goals   Patient goals : to get well and back home if I can!        Therapy Time   Individual Concurrent Group Co-treatment   Time In 1229(plus 10 min chart review )         Time Out 1315         Minutes 46         Timed Code Treatment Minutes: [address] [place], [place]

## 2019-05-08 NOTE — PROGRESS NOTES
[facility]    Progress Note    5/8/2019    10:40 AM    Name:   Tony Looney  MRN:     [id number]     Nurialyside:      [de-identified]   Room:   [address] [place] [address] Day:  15  Admit Date:  4/25/2019  4:57 PM    PCP:   Silver Stapleton MD  Code Status:  Full Code    Subjective:     C/C:   Chief Complaint   Patient presents with    Abdominal Pain    Dizziness     onset today     Interval History Status: improved. Overall feels better  Denies cp/sob/n/v  Confused overnight    Brief History:     Per my partner: \"The patient is C 89 y.o.  Non-/non  male who presents with Abdominal Pain and Dizziness (onset today)  and he is admitted to the hospital for the management of  SBO     He has the following significant co-morbidities: HTN, Chronic AFib, Chronic combined systolic/diastolic heart failure, S/P AICD Placement, Depression, GUME, Adenocarcinoma of small bowel, Colon cancer.      He presented to the ED with abdominal pain and shortness of breath. The pain was sudden in onset, localized to the epigastric region. HE denied any constipation and was passing gas and having normal BMs. He had associated shortness of breath. In the ED, imaging was concerning for SBO vs ileus. NG tube was placed. CT Chest showed small subsegmental PE, similar finding on CT scan at [facility] two months back, admitted for GI bleed with finding of large gastric polyp with diagnosis of adenocarcinoma of stomach     Sudden worsening of clinical condition after initial improvement on 4/27/2019. Open laparotomy on 4/28 with finding of ischemic bowel, needed bowel resection along with WESLEY. \"        Review of Systems:     Constitutional:  negative for chills, fevers, sweats  Respiratory:  negative for cough, dyspnea on exertion, shortness of breath, wheezing  Cardiovascular:  negative for chest pain, chest pressure/discomfort, lower extremity edema, palpitations  Gastrointestinal:  negative for abdominal pain, constipation, diarrhea, nausea, vomiting  Neurological:  negative for dizziness, headache    Medications: Allergies:     Allergies   Allergen Reactions    Morphine      itching       Current Meds:   Scheduled Meds:    heparin (porcine)  5,000 Units Subcutaneous BID    fat emulsion  100 mL Intravenous Daily    metoprolol  5 mg Intravenous Q4H    ipratropium-albuterol  1 ampule Inhalation Q4H WA    metoclopramide  10 mg Intravenous Q6H    pantoprazole  40 mg Intravenous BID    And    sodium chloride (PF)  10 mL Intravenous BID    insulin lispro  0-6 Units Subcutaneous 4 times per day    metoprolol succinate  25 mg Oral Daily    amiodarone  200 mg Oral Daily    sodium chloride  500 mL Intravenous Once    sodium chloride flush  10 mL Intravenous 2 times per day    aspirin  81 mg Oral Daily    atorvastatin  40 mg Oral Nightly    DULoxetine  20 mg Oral Daily    ferrous sulfate  325 mg Oral Daily with breakfast    pyridoxine  25 mg Oral Daily    QUEtiapine  25 mg Oral BID    senna  1 tablet Oral Daily     Continuous Infusions:    PN-Adult 2-in-1 Central Line (Standard) 80 mL/hr at 05/07/19 1720    dextrose      dextrose 5 % and 0.45 % NaCl 75 mL/hr at 05/08/19 0340     PRN Meds: diphenhydrAMINE, chlorpromazine (THORAZINE) IVPB, albuterol, hydrALAZINE, fentanNYL, glucose, dextrose, glucagon (rDNA), dextrose, sodium chloride flush, oxyCODONE-acetaminophen **OR** oxyCODONE-acetaminophen, HYDROmorphone **OR** HYDROmorphone, ondansetron **OR** ondansetron, aluminum & magnesium hydroxide-simethicone, dibucaine, nitroGLYCERIN, potassium chloride **OR** potassium alternative oral replacement **OR** potassium chloride, magnesium sulfate, magnesium hydroxide, nicotine, acetaminophen, phenol    Data:     Past Medical History:   has a past medical history of Asthma, Cancer ([facility] [address] [facility]congest[facility] COPD exacerbation [facility] GERD 1. Dc zosyn  2.  Dc iv lopressor now that he is taking po    Efrain EASLEY Blood, DO  5/8/2019  10:40 AM

## 2019-05-09 LAB
ABSOLUTE EOS #: 0.18 K/UL (ref 0–0.44)
ABSOLUTE IMMATURE GRANULOCYTE: 0.18 K/UL (ref 0–0.3)
ABSOLUTE LYMPH #: 0.88 K/UL (ref 1.1–3.7)
ABSOLUTE MONO #: 1.23 K/UL (ref 0.1–1.2)
ALBUMIN SERPL-MCNC: 2.6 G/DL (ref 3.5–5.2)
ALBUMIN/GLOBULIN RATIO: ABNORMAL (ref 1–2.5)
ALP BLD-CCNC: 95 U/L (ref 40–129)
ALT SERPL-CCNC: 87 U/L (ref 5–41)
ANION GAP SERPL CALCULATED.3IONS-SCNC: 8 MMOL/L (ref 9–17)
AST SERPL-CCNC: 35 U/L
BASOPHILS # BLD: 0 % (ref 0–2)
BASOPHILS ABSOLUTE: 0 K/UL (ref 0–0.2)
BILIRUB SERPL-MCNC: 0.51 MG/DL (ref 0.3–1.2)
BUN BLDV-MCNC: 27 MG/DL (ref 8–23)
BUN/CREAT BLD: 20 (ref 9–20)
CALCIUM SERPL-MCNC: 8.9 MG/DL (ref 8.6–10.4)
CHLORIDE BLD-SCNC: 107 MMOL/L (ref 98–107)
CO2: 22 MMOL/L (ref 20–31)
CREAT SERPL-MCNC: 1.35 MG/DL (ref 0.7–1.2)
DIFFERENTIAL TYPE: ABNORMAL
EOSINOPHILS RELATIVE PERCENT: 1 % (ref 1–4)
GFR AFRICAN AMERICAN: >60 ML/MIN
GFR NON-AFRICAN AMERICAN: 53 ML/MIN
GFR SERPL CREATININE-BSD FRML MDRD: ABNORMAL ML/MIN/{1.73_M2}
GFR SERPL CREATININE-BSD FRML MDRD: ABNORMAL ML/MIN/{1.73_M2}
GLUCOSE BLD-MCNC: 109 MG/DL (ref 75–110)
GLUCOSE BLD-MCNC: 114 MG/DL (ref 70–99)
GLUCOSE BLD-MCNC: 114 MG/DL (ref 75–110)
GLUCOSE BLD-MCNC: 96 MG/DL (ref 75–110)
GLUCOSE BLD-MCNC: 99 MG/DL (ref 75–110)
HCT VFR BLD CALC: 32.4 % (ref 40.7–50.3)
HEMOGLOBIN: 10.4 G/DL (ref 13–17)
IMMATURE GRANULOCYTES: 1 %
LYMPHOCYTES # BLD: 5 % (ref 24–43)
MAGNESIUM: 1.9 MG/DL (ref 1.6–2.6)
MCH RBC QN AUTO: 32.1 PG (ref 25.2–33.5)
MCHC RBC AUTO-ENTMCNC: 32.1 G/DL (ref 28.4–34.8)
MCV RBC AUTO: 100 FL (ref 82.6–102.9)
MONOCYTES # BLD: 7 % (ref 3–12)
NRBC AUTOMATED: 0 PER 100 WBC
PDW BLD-RTO: 15.8 % (ref 11.8–14.4)
PLATELET # BLD: 261 K/UL (ref 138–453)
PLATELET ESTIMATE: ABNORMAL
PMV BLD AUTO: 12 FL (ref 8.1–13.5)
POTASSIUM SERPL-SCNC: 3.8 MMOL/L (ref 3.7–5.3)
RBC # BLD: 3.24 M/UL (ref 4.21–5.77)
RBC # BLD: ABNORMAL 10*6/UL
SEG NEUTROPHILS: 86 % (ref 36–65)
SEGMENTED NEUTROPHILS ABSOLUTE COUNT: 15.03 K/UL (ref 1.5–8.1)
SODIUM BLD-SCNC: 137 MMOL/L (ref 135–144)
TOTAL PROTEIN: 6.3 G/DL (ref 6.4–8.3)
WBC # BLD: 17.5 K/UL (ref 3.5–11.3)
WBC # BLD: ABNORMAL 10*3/UL

## 2019-05-09 PROCEDURE — 94640 AIRWAY INHALATION TREATMENT: CPT

## 2019-05-09 PROCEDURE — 80053 COMPREHEN METABOLIC PANEL: CPT

## 2019-05-09 PROCEDURE — 97535 SELF CARE MNGMENT TRAINING: CPT

## 2019-05-09 PROCEDURE — 82947 ASSAY GLUCOSE BLOOD QUANT: CPT

## 2019-05-09 PROCEDURE — 85025 COMPLETE CBC W/AUTO DIFF WBC: CPT

## 2019-05-09 PROCEDURE — 6370000000 HC RX 637 (ALT 250 FOR IP): Performed by: INTERNAL MEDICINE

## 2019-05-09 PROCEDURE — 97530 THERAPEUTIC ACTIVITIES: CPT

## 2019-05-09 PROCEDURE — 6370000000 HC RX 637 (ALT 250 FOR IP): Performed by: SURGERY

## 2019-05-09 PROCEDURE — 99232 SBSQ HOSP IP/OBS MODERATE 35: CPT | Performed by: INTERNAL MEDICINE

## 2019-05-09 PROCEDURE — 2580000003 HC RX 258: Performed by: SURGERY

## 2019-05-09 PROCEDURE — 2060000000 HC ICU INTERMEDIATE R&B

## 2019-05-09 PROCEDURE — 97110 THERAPEUTIC EXERCISES: CPT

## 2019-05-09 PROCEDURE — 36415 COLL VENOUS BLD VENIPUNCTURE: CPT

## 2019-05-09 PROCEDURE — 6360000002 HC RX W HCPCS: Performed by: SURGERY

## 2019-05-09 PROCEDURE — 83735 ASSAY OF MAGNESIUM: CPT

## 2019-05-09 RX ORDER — SPIRONOLACTONE 25 MG/1
25 TABLET ORAL DAILY
Qty: 30 TABLET | Refills: 3 | DISCHARGE
Start: 2019-05-09

## 2019-05-09 RX ORDER — OXYCODONE HYDROCHLORIDE AND ACETAMINOPHEN 5; 325 MG/1; MG/1
1 TABLET ORAL EVERY 4 HOURS PRN
Qty: 10 TABLET | Refills: 0 | Status: SHIPPED | OUTPATIENT
Start: 2019-05-09 | End: 2019-05-12

## 2019-05-09 RX ORDER — AMIODARONE HYDROCHLORIDE 400 MG/1
200 TABLET ORAL 2 TIMES DAILY
Qty: 30 TABLET | Refills: 1 | DISCHARGE
Start: 2019-05-09

## 2019-05-09 RX ORDER — METOPROLOL SUCCINATE 25 MG/1
25 TABLET, EXTENDED RELEASE ORAL DAILY
Qty: 30 TABLET | Refills: 3 | DISCHARGE
Start: 2019-05-10

## 2019-05-09 RX ORDER — PREGABALIN 50 MG/1
50 CAPSULE ORAL 2 TIMES DAILY
Qty: 10 CAPSULE | Refills: 0 | Status: SHIPPED | OUTPATIENT
Start: 2019-05-09 | End: 2019-06-08

## 2019-05-09 RX ORDER — IPRATROPIUM BROMIDE AND ALBUTEROL SULFATE 2.5; .5 MG/3ML; MG/3ML
1 SOLUTION RESPIRATORY (INHALATION) 3 TIMES DAILY
Status: DISCONTINUED | OUTPATIENT
Start: 2019-05-09 | End: 2019-05-10 | Stop reason: HOSPADM

## 2019-05-09 RX ORDER — PANTOPRAZOLE SODIUM 40 MG/1
40 TABLET, DELAYED RELEASE ORAL DAILY
Qty: 30 TABLET | Refills: 3 | DISCHARGE
Start: 2019-05-09

## 2019-05-09 RX ADMIN — FERROUS SULFATE TAB EC 325 MG (65 MG FE EQUIVALENT) 325 MG: 325 (65 FE) TABLET DELAYED RESPONSE at 09:02

## 2019-05-09 RX ADMIN — QUETIAPINE FUMARATE 25 MG: 25 TABLET ORAL at 09:02

## 2019-05-09 RX ADMIN — HEPARIN SODIUM 5000 UNITS: 5000 INJECTION INTRAVENOUS; SUBCUTANEOUS at 09:02

## 2019-05-09 RX ADMIN — ASPIRIN 81 MG: 81 TABLET, COATED ORAL at 09:01

## 2019-05-09 RX ADMIN — METOCLOPRAMIDE 10 MG: 5 INJECTION, SOLUTION INTRAMUSCULAR; INTRAVENOUS at 00:19

## 2019-05-09 RX ADMIN — PANTOPRAZOLE SODIUM 40 MG: 40 TABLET, DELAYED RELEASE ORAL at 05:48

## 2019-05-09 RX ADMIN — IPRATROPIUM BROMIDE AND ALBUTEROL SULFATE 1 AMPULE: .5; 3 SOLUTION RESPIRATORY (INHALATION) at 14:37

## 2019-05-09 RX ADMIN — METOPROLOL SUCCINATE 25 MG: 25 TABLET, FILM COATED, EXTENDED RELEASE ORAL at 09:00

## 2019-05-09 RX ADMIN — SENNOSIDES 8.6 MG: 8.6 TABLET, FILM COATED ORAL at 09:02

## 2019-05-09 RX ADMIN — ATORVASTATIN CALCIUM 40 MG: 40 TABLET, FILM COATED ORAL at 21:25

## 2019-05-09 RX ADMIN — OXYCODONE HYDROCHLORIDE AND ACETAMINOPHEN 2 TABLET: 5; 325 TABLET ORAL at 16:07

## 2019-05-09 RX ADMIN — AMIODARONE HYDROCHLORIDE 200 MG: 200 TABLET ORAL at 09:00

## 2019-05-09 RX ADMIN — METOCLOPRAMIDE 10 MG: 5 INJECTION, SOLUTION INTRAMUSCULAR; INTRAVENOUS at 05:48

## 2019-05-09 RX ADMIN — HEPARIN SODIUM 5000 UNITS: 5000 INJECTION INTRAVENOUS; SUBCUTANEOUS at 21:25

## 2019-05-09 RX ADMIN — PYRIDOXINE HCL TAB 50 MG 25 MG: 50 TAB at 09:00

## 2019-05-09 RX ADMIN — Medication 10 ML: at 10:29

## 2019-05-09 RX ADMIN — QUETIAPINE FUMARATE 25 MG: 25 TABLET ORAL at 21:26

## 2019-05-09 RX ADMIN — DULOXETINE HYDROCHLORIDE 20 MG: 20 CAPSULE, DELAYED RELEASE ORAL at 09:00

## 2019-05-09 RX ADMIN — IPRATROPIUM BROMIDE AND ALBUTEROL SULFATE 1 AMPULE: .5; 3 SOLUTION RESPIRATORY (INHALATION) at 07:18

## 2019-05-09 ASSESSMENT — PAIN SCALES - GENERAL
PAINLEVEL_OUTOF10: 0
PAINLEVEL_OUTOF10: 8
PAINLEVEL_OUTOF10: 0
PAINLEVEL_OUTOF10: 5
PAINLEVEL_OUTOF10: 0

## 2019-05-09 NOTE — CARE COORDINATION
nHpredict Delivery/ Inpatient Visit:   Patient/family seen: Yes  Provided patient/family with copy of nHpredict tool. All questions answered at the time of visit. Informed patient/family that the nHpredict is a tool, to be used as a guide, and should not alter their currently established discharge plan. Notified Case Management of nHpredict tool delivery to patient. Current discharge plan: [facility]   Collaboration completed with case management: Yes- informed CM Altaf that predict tool correlates with current discharge plan   Paper copy of nHpredict tool in patient paper chart to be included with discharge paperwork to SNF        Attached please find the updated nH Predict Outcome report for Yamel Escobar  1955. Greater gains with SNF which is the intended dc plan. It appears member will discharge on PPN, severely deconditioned due to prolonged hospitalization. Will benefit from ongoing therapy to improve functional independence.      Thank you~

## 2019-05-09 NOTE — PROGRESS NOTES
Nutrition Assessment    Type and Reason for Visit: Reassess    Nutrition Recommendations: 1. Suggest continuing Dental soft diet effective now, advance diet if appropriate and tolerated. 2. Note: Getting weaned off PN and may be discharged today. Nutrition Assessment: Patient improving from a nutritional standpoint as now PN getting weaned off, ate little less than half of Clear Liquid diet for breakfast this morning, diet advanced to Dental Soft for lunch. Will monitor PO intake and progression of diet advancement. Malnutrition Assessment:  · Malnutrition Status: Meets the criteria for severe malnutrition  · Context: Chronic illness  · Findings of the 6 clinical characteristics of malnutrition (Minimum of 2 out of 6 clinical characteristics is required to make the diagnosis of moderate or severe Protein Calorie Malnutrition based on AND/ASPEN Guidelines):  1. Energy Intake-Less than or equal to 75% of estimated energy requirement, Greater than or equal to 3 months    2. Weight Loss-10% loss or greater(13.5%), in 6 months  3. Fat Loss-Unable to assess,    4. Muscle Loss-Unable to assess,    5. Fluid Accumulation-Mild fluid accumulation, Extremities  6.   Strength-Not measured    Nutrition Risk Level: High    Nutrient Needs:  · Estimated Daily Total Kcal: 2,150-2,300 kcal (post-op) (20-22 kcal/kg)  · Estimated Daily Protein (g):  g (post-op) (1.2-1.35 g/kg)  · Estimated Daily Total Fluid (ml/day): 2,100-2,200 ml (30-32 ml/kg)    Nutrition Diagnosis:   · Problem: Severe malnutrition, In context of chronic illness  · Etiology: related to Alteration in GI function     Signs and symptoms:  as evidenced by NPO status due to medical condition, Intubation, Nutrition support - PN, Weight loss greater than or equal to 10% in 6 months, Localized or generalized fluid accumulation(+15.2 kg--13.5% since 11/2/18)    Objective Information:  · Nutrition-Focused Physical Findings: GI: soft, tenderness, passing flatus, hypoactive bowel sounds in RUQ, LUQ, LLQ; active RLQ. Last BM 5/8. PV: +1 RUE/LUE, RLE/LLE.  Skin: WDL, surgical incision abdomen  · Wound Type: Surgical Wound  · Current Nutrition Therapies:  · Oral Diet Orders: Dental Soft   · Oral Diet intake: 26-50%  · Oral Nutrition Supplement (ONS) Orders: None  · ONS intake: NPO  · Anthropometric Measures:  · Ht: 5' 9\" (175.3 cm)   · Current Body Wt: 232 lb 4.8 oz (105.4 kg)  · Admission Body Wt: 238 lb 5.1 oz (108.1 kg)  · Usual Body Wt: 275 lb 9.2 oz (125 kg)(11/2/18)  · % Weight Change:  ,  13.5% x 6 months  · Ideal Body Wt: 160 lb (72.6 kg), % Ideal Body 148%  · BMI Classification: BMI 30.0 - 34.9 Obese Class I    Nutrition Interventions:   Continue current diet  Continued Inpatient Monitoring, Coordination of Care    Nutrition Evaluation:   · Evaluation: Progressing toward goals   · Goals: PO intake to meet % of estimated nutrition needs    · Monitoring: Meal Intake, Diet Tolerance, Skin Integrity, Wound Healing, I&O, Mental Status/Confusion, Weight, Pertinent Labs, Chewing/Swallowing, Nausea or Vomiting, Diarrhea, Constipation, Monitor Bowel Function      Leena Powers RD, LD  Office phone [phone]

## 2019-05-09 NOTE — PROGRESS NOTES
[facility] jordan, [facility] SBO (small bowel obstruction) ([facility] [K56.609] . Vitals:    05/09/19 0803   BP: 133/69   Pulse: 80   Resp: 16   Temp: 98.4 °F (36.9 °C)   SpO2: 94%   . Patients home meds are   Prior to Admission medications    Medication Sig Start Date End Date Taking? Authorizing Provider   aspirin 81 MG chewable tablet Take 81 mg by mouth daily   Yes Historical Provider, MD   atorvastatin (LIPITOR) 40 MG tablet Take 40 mg by mouth nightly   Yes Historical Provider, MD   DULoxetine (CYMBALTA) 20 MG extended release capsule Take 20 mg by mouth daily   Yes Historical Provider, MD   docusate sodium (COLACE) 100 MG capsule Take 100 mg by mouth 2 times daily   Yes Historical Provider, MD   ferrous sulfate 325 (65 Fe) MG EC tablet Take 325 mg by mouth 4 times daily   Yes Historical Provider, MD   furosemide (LASIX) 40 MG tablet Take 40 mg by mouth daily   Yes Historical Provider, MD   pregabalin (LYRICA) 50 MG capsule Take 50 mg by mouth 2 times daily.    Yes Historical Provider, MD   metoprolol tartrate (LOPRESSOR) 50 MG tablet Take 50 mg by mouth 2 times daily (before meals)   Yes Historical Provider, MD   pantoprazole (PROTONIX) 40 MG tablet Take 40 mg by mouth 2 times daily (before meals)   Yes Historical Provider, MD   QUEtiapine (SEROQUEL) 25 MG tablet Take 25 mg by mouth 2 times daily   Yes Historical Provider, MD   spironolactone (ALDACTONE) 25 MG tablet Take 25 mg by mouth daily   Yes Historical Provider, MD   acetaminophen (TYLENOL) 325 MG tablet Take 650 mg by mouth every 6 hours as needed for Pain or Fever   Yes Historical Provider, MD   senna (SENOKOT) 8.6 MG tablet Take 1 tablet by mouth daily as needed for Constipation   Yes Historical Provider, MD   sennosides-docusate sodium (SENOKOT-S) 8.6-50 MG tablet Take 1 tablet by mouth daily as needed for Constipation   Yes Historical Provider, MD   vitamin B-6 (PYRIDOXINE) 25 MG tablet Take 25 mg by mouth daily   Yes Historical Provider, MD da silvaine ([employer]GES[employer]) 1 % OINT rectal ointment Place rectally 4 times daily as needed for Pain 8/30/18  Yes Abby Adair MD   amiodarone (PACERONE) 400 MG tablet Take 1 tablet by mouth 2 times daily 6/7/17  Yes Galen Fermin, DO   nitroGLYCERIN (NITROSTAT) 0.4 MG SL tablet up to max of 3 total doses. If no relief after 1 dose, call 911. 3/1/17  Yes MIQUEL Fagan - CNP   .   Recent Surgical History: None = 0     Assessment     Peak Flow (asthma only)    Predicted:    Personal Best:    PEF    % Predicted    Peak Flow : not applicable = 0    HAW9/PHT    FEV1 Predicted        FEV1      FEV1 % Predicted    FVC    IS volume    IBW    FIO2% 21  SPO2 95  RR 16  Breath Sounds: clear sl diminished bases pt unable to perform fev1 due to mentation      · Bronchodilator assessment at level  2  · Hyperinflation assessment at level   · Secretion Management assessment at level    ·   · []    Bronchodilator Assessment  BRONCHODILATOR ASSESSMENT SCORE  Score 0 1 2 3 4 5   Breath Sounds   []  Patient Baseline []  No Wheeze good aeration [x]  Faint, scattered wheezing, good aeration []  Expiratory Wheezing and or moderately diminished []  Insp/Exp wheeze and/or very diminished []  Insp/Exp and/ or marked distress   Respiratory Rate   []  Patient Baseline []  Less than 20 [x]  Less than 20 []  20-25 []  Greater than 25 []  Greater than 25   Peak flow % of Pred or PB [x]  NA   []  Greater than 90%  []  81-90% []  71-80% []  Less than or equal to 70%  or unable to perform []  Unable due to Respiratory Distress   Dyspnea re []  Patient Baseline []  No SOB [x]  No SOB []  SOB on exertion []  SOB min activity []  At rest/acute   e FEV% Predicted       []  NA []  Above 69%  []  Unable []  Above 60-69%  [x]  Unable []  Above 50-59%  []  Unable []  Above 35-49%  []  Unable []  Less than 35%  []  Unable                 []  Hyperinflation Assessment  Score 1 2 3   CXR and Breath Sounds []  Clear []  No atelectasis  Basilar aeration []  Atelectasis or absent basilar breath sounds   Incentive Spirometry Volume  (Per IBW)   []  Greater than or equal to 15ml/Kg []  less than 15ml/Kg []  less than 15ml/Kg   Surgery within last 2 weeks []  None or general   []  Abdominal or thoracic surgery  []  Abdominal or thoracic   Chronic Pulmonary Historyre []  No []  Yes []  Yes     []  Secretion Management Assessment  Score 1 2 3   Bilateral Breath Sounds   []  Occasional Rhonchi []  Scattered Rhonchi []  Course Rhonchi and/or poor aeration   Sputum    []  Small amount of thin secretions []  Moderate amount of viscous secretions []  Copius, Viscious Yellow/ Secretions   CXR as reported by physician []  clear  []  Unavailable []  Infiltrates and/or consolidation  []  Unavailable []  Mucus Plugging and or lobar consolidation  []  Unavailable   Cough []  Strong, productive cough []  Weak productive cough []  No cough or weak non-productive cough

## 2019-05-09 NOTE — PROGRESS NOTES
Physical Therapy    Facility/Department: [facility] PROGRESSIVE CARE  Re- Assessment    NAME: Louise Frost  : 1955  MRN: [id number]    Date of Service: 2019    Discharge Recommendations:  Subacute/Skilled Nursing Facility      Assessment   Body structures, Functions, Activity limitations: Decreased balance;Decreased endurance;Decreased functional mobility ; Decreased strength;Decreased safe awareness  Assessment: Pt with episode yesterday of heavy leaning, dizziness. Stroke alert called w/ CT neg. Pt appears back to his normal function this date without leaning; without dizziness. Pt able to tolerate up to chair for breakfast.   Will progress as tolerated. Prognosis: Good  Patient Education: safety. pressure relief  REQUIRES PT FOLLOW UP: Yes  Activity Tolerance  Activity Tolerance: Patient limited by endurance       Patient Diagnosis(es): The primary encounter diagnosis was SBO (small bowel obstruction) ([facility] [address] A diagnosis of Other acute pulmonary embolism without acute cor pulmonale (HCC) was also pertinent to this visit. has a past medical history of Asthma, Cancer ([address] CHF (congestive heart failure) ([address] COPD exacerbation ([address] GERD (gastroesophageal reflux disease), History of blood transfusion, Hyperlipidemia, Hypertension, Neuropathy, Obesity (BMI 30-39.9), Psychiatric problem, SBO (small bowel obstruction) ([address] Severe mitral regurgitation, and Severe tricuspid regurgitation. has a past surgical history that includes other surgical history (12); Mitral valve surgery (10/31/12); Tricuspid valve surgery (10/31/12); Upper gastrointestinal endoscopy (3-3-15); Abdomen surgery; Tunneled venous port placement (Left, 2015); pacemaker placement (Left, 2017); and LAPAROTOMY EXPLORATORY (N/A, 2019).     Restrictions  Restrictions/Precautions  Restrictions/Precautions: Fall Risk, General Precautions  Required Braces or Orthoses?: Yes  Implants present? : Pacemaker  Required Braces or Orthoses  Other: Abdominal Binder  Position Activity Restriction  Other position/activity restrictions: bed alarm, up as kellie per RN, pt is incontinent, RUE IV, de luna, abd incision with staples, clear liquid diet,   Vision/Hearing        Subjective  General  Chart Reviewed: Yes  Response To Previous Treatment: Patient with no complaints from previous session. Family / Caregiver Present: No  General Comment  Comments: OK for PT per Tracie CORRIGAN  Subjective  Subjective: Pt alert and anxious to get out of bed this a.m.    Pain Screening  Patient Currently in Pain: Denies  Vital Signs  Patient Currently in Pain: Denies       Orientation  Orientation  Overall Orientation Status: Within Functional Limits  Social/Functional History  Social/Functional History  Lives With: Alone(pt at SNF [facility] )  Type of Home: Apartment  Home Layout: Multi-level(9th floor apt-pt has not been home since [facility] 2018)  Home Access: Level entry, Elevator  Bathroom Shower/Tub: Tub/Shower unit  Bathroom Toilet: Standard  Bathroom Equipment: (no DME )  Home Equipment: Quad cane  Receives Help From: Family(pt states he has a supportive brother and sister to some degree )  ADL Assistance: Independent  Homemaking Assistance: Independent  Homemaking Responsibilities: Yes  Ambulation Assistance: Independent(with quad cane )  Transfer Assistance: Independent  Active : No  Patient's  Info: Public transport   Mode of Transportation: Bus  Occupation: On disability  Type of occupation: railroad work   Leisure & Hobbies: going to [employer] and reading      Objective     Bed mobility  Rolling to Left: Minimal assistance  Rolling to Right: Minimal assistance  Supine to Sit: Minimal assistance  Sit to Supine: Minimal assistance  Scooting: Minimal assistance  Transfers  Sit to Stand: Minimal Assistance  Stand to sit: Minimal Assistance  Bed to Chair: Minimal assistance  Stand Pivot Transfers: Minimal Assistance  Ambulation  Ambulation?: Yes  Ambulation 1  Surface: level tile  Device: Rolling Walker  Other Apparatus: O2  Assistance: Minimal assistance  Quality of Gait: Pt requiring manaul assist for walker placement -> pushes walker too far in front. Pt with good mid line stability this date. Pt reporting he feels much better today. Distance: 6 ft x 1;   2 ft x 1. Balance  Posture: Fair  Sitting - Static: Good  Sitting - Dynamic: Good  Standing - Static: Fair;-  Standing - Dynamic: Fair;+  Exercises  Comments: Seated contreras LE AROM x 10 reps     Plan   Plan  Times per week: 1-2x/day,6-7 days/week  Current Treatment Recommendations: Home Exercise Program, ROM, Patient/Caregiver Education & Training, Safety Education & Training, Positioning, Strengthening, Transfer Training  Safety Devices  Type of devices:  All fall risk precautions in place, Call light within reach, Patient at risk for falls, Nurse notified, Chair alarm in place, Left in chair  Restraints  Initially in place: No    AM-PAC Score 14/24      Goals  Short term goals  Time Frame for Short term goals: 12 treatments  Short term goal 1: Prevent decrease ROM / strength  Short term goal 2: Progress function as toleraterd and re-eval   Short term goal 3: Pt oriented and alert   Short term goal 4: Tolerate 30 min ther act  Patient Goals   Patient goals : Back to SNF     Therapy Time   Individual Concurrent Group Co-treatment   Time In 0811         Time Out 0839         Minutes 28            AROLDO SAN, PT

## 2019-05-09 NOTE — PROGRESS NOTES
[facility]    Progress Note    5/9/2019    11:49 AM    Name:   Nishant Jon  MRN:     [id number]     [place]:      [de-identified]   Room:   [address] [place] [address] Day:  15  Admit Date:  4/25/2019  4:57 PM    PCP:   Danyel Zamudio MD  Code Status:  Full Code    Subjective:     C/C:   Chief Complaint   Patient presents with    Abdominal Pain    Dizziness     onset today     Interval History Status: improved. Feels better  Stroke alert called yesterday: no cva seen      Brief History:     Per my partner: \"The patient is S 18 y.o.  Non-/non  male who presents with Abdominal Pain and Dizziness (onset today)  and he is admitted to the hospital for the management of  SBO     He has the following significant co-morbidities: HTN, Chronic AFib, Chronic combined systolic/diastolic heart failure, S/P AICD Placement, Depression, GUME, Adenocarcinoma of small bowel, Colon cancer.      He presented to the ED with abdominal pain and shortness of breath. The pain was sudden in onset, localized to the epigastric region. HE denied any constipation and was passing gas and having normal BMs. He had associated shortness of breath. In the ED, imaging was concerning for SBO vs ileus. NG tube was placed. CT Chest showed small subsegmental PE, similar finding on CT scan at [facility] two months back, admitted for GI bleed with finding of large gastric polyp with diagnosis of adenocarcinoma of stomach     Sudden worsening of clinical condition after initial improvement on 4/27/2019. Open laparotomy on 4/28 with finding of ischemic bowel, needed bowel resection along with WESLEY. \"        Review of Systems:     Constitutional:  negative for chills, fevers, sweats  Respiratory:  negative for cough, dyspnea on exertion, shortness of breath, wheezing  Cardiovascular:  negative for chest pain, chest pressure/discomfort, lower extremity edema, palpitations  Gastrointestinal:  negative for constipation, diarrhea, nausea, vomiting  Neurological:  negative for dizziness, headache    Medications: Allergies:     Allergies   Allergen Reactions    Morphine      itching       Current Meds:   Scheduled Meds:    ipratropium-albuterol  1 ampule Inhalation TID    pantoprazole  40 mg Oral QAM AC    heparin (porcine)  5,000 Units Subcutaneous BID    fat emulsion  100 mL Intravenous Daily    insulin lispro  0-6 Units Subcutaneous 4 times per day    metoprolol succinate  25 mg Oral Daily    amiodarone  200 mg Oral Daily    sodium chloride  500 mL Intravenous Once    sodium chloride flush  10 mL Intravenous 2 times per day    aspirin  81 mg Oral Daily    atorvastatin  40 mg Oral Nightly    DULoxetine  20 mg Oral Daily    ferrous sulfate  325 mg Oral Daily with breakfast    pyridoxine  25 mg Oral Daily    QUEtiapine  25 mg Oral BID    senna  1 tablet Oral Daily     Continuous Infusions:    PN-Adult 2-in-1 Central Line (Standard) 33.5 mL/hr at 05/09/19 1030    dextrose      dextrose 5 % and 0.45 % NaCl 75 mL/hr at 05/08/19 0340     PRN Meds: sodium chloride flush, diphenhydrAMINE, chlorpromazine (THORAZINE) IVPB, albuterol, hydrALAZINE, fentanNYL, glucose, dextrose, glucagon (rDNA), dextrose, sodium chloride flush, oxyCODONE-acetaminophen **OR** oxyCODONE-acetaminophen, HYDROmorphone **OR** HYDROmorphone, ondansetron **OR** ondansetron, aluminum & magnesium hydroxide-simethicone, dibucaine, nitroGLYCERIN, potassium chloride **OR** potassium alternative oral replacement **OR** potassium chloride, magnesium sulfate, magnesium hydroxide, nicotine, acetaminophen, phenol    Data:     Past Medical History:   has a past medical history of Asthma, Cancer ([address] CHF (congestive heart failure) ([address] COPD exacerbation ([facility] GERD (gastroesophageal reflux disease), History of blood transfusion, Hyperlipidemia, Hypertension, Neuropathy, Obesity (BMI 30-39.9), Psychiatric problem, SBO (small bowel obstruction) ([address] Severe mitral regurgitation, and Severe tricuspid regurgitation. Social History:   reports that he has never smoked. He has never used smokeless tobacco. He reports that he does not drink alcohol or use drugs. Family History:   Family History   Problem Relation Age of Onset    Heart Disease Father        Vitals:  /69   Pulse 80   Temp 98.4 °F (36.9 °C) (Oral)   Resp 16   Ht 5' 9\" (1.753 m)   Wt 232 lb 9.6 oz (105.5 kg)   SpO2 94%   BMI 34.35 kg/m²   Temp (24hrs), Av.6 °F (37 °C), Min:98.4 °F (36.9 °C), Max:99 °F (37.2 °C)    Recent Labs     19  1654 19  0013 19  0602 19  1128   POCGLU 105 114* 99 96       I/O (24Hr):     Intake/Output Summary (Last 24 hours) at 2019 1149  Last data filed at 2019 0537  Gross per 24 hour   Intake 3402 ml   Output 2125 ml   Net 1277 ml       Labs:    Hematology:  Recent Labs     19  0515 19  0636 19  0712   WBC 12.2* 14.7* 17.5*   RBC 3.35* 3.41* 3.24*   HGB 10.6* 10.7* 10.4*   HCT 33.7* 34.4* 32.4*   .6 100.9 100.0   MCH 31.6 31.4 32.1   MCHC 31.5 31.1 32.1   RDW 15.9* 15.6* 15.8*    236 261   MPV 11.9 11.7 12.0     Chemistry:  Recent Labs     19  0616 19  0636 19  0712    142 137   K 4.0 4.3 3.8   * 108* 107   CO2 24 23 22   GLUCOSE 135* 107* 114*   BUN 32* 31* 27*   CREATININE 1.37* 1.43* 1.35*   MG 1.8 2.0 1.9   ANIONGAP 7* 11 8*   LABGLOM 52* 50* 53*   GFRAA >60 >60 >60   CALCIUM 9.0 9.0 8.9   PHOS  --  3.2  --      Recent Labs     19  1119 19  1410 19  1654 19  0013 19  0602 19  0712 19  1128   PROT  --   --   --   --   --  6.3*  --    LABALBU  --   --   --   --   --  2.6*  --    AST  --   --   --   --   --  35  --    ALT  --   --   --   --   --  80*  --    ALKPHOS  --   --   --   --   --  95  --    BILITOT  --   --   --   --   --  0.51  --    POCGLU 119* 157* 105 114* 99  --  96         Lab Results   Component Value Date/Time    SPECIAL RIGHT AC 7ML AEROBIC 1ML ANAEROBIC 04/28/2019 12:34 PM     Lab Results   Component Value Date/Time    CULTURE NO GROWTH 6 DAYS 04/28/2019 12:34 PM       Lab Results   Component Value Date    POCPH 7.34 05/05/2019    PHART 7.44 08/30/2012    PH 7.48 11/08/2012    POCPCO2 50 05/05/2019    SPT9DRS 60 08/30/2012    PCO2 45 11/08/2012    POCPO2 99 05/05/2019    PO2ART 73 08/30/2012    PO2 145 11/08/2012    POCHCO3 26.8 05/05/2019    OFB5REA 24.1 11/01/2012    HCO3 33.6 11/08/2012    NBEA NOT REPORTED 05/05/2019    PBEA 1 05/05/2019    PZO7GSB 28 05/05/2019    VTRJ2ZCN 97 05/05/2019    W9DGMJAO 100 11/02/2012    O2SAT 99 11/02/2012    FIO2 28.0 05/05/2019       Radiology:    Ct head negative    cta head/neck:  Impression   Unremarkable CTA of the head and neck.          Physical Examination:        General appearance:  alert, cooperative and no distress  Mental Status:  oriented to person, place and time and normal affect  Lungs:  clear to auscultation bilaterally, normal effort  Heart:  regular rate and rhythm, no murmur  Abdomen:  soft, nondistended, normal bowel sounds, no masses, hepatomegaly, splenomegaly; minimally ttp  Extremities:  No redness, tenderness in the calves  Skin:  no gross lesions, rashes, induration    Assessment:        Primary Problem  SBO (small bowel obstruction) ([address]    Active Hospital Problems    Diagnosis Date Noted    Severe malnutrition ([facility] [E43] 05/02/2019     Priority: Medium     Class: Present on Admission    Ischemic colitis ([address] [K55.9]     Coffee ground emesis [K92.0]     SBO (small bowel obstruction) ([address] [K56.609] 04/25/2019    Major depressive disorder, recurrent, in full remission ([address] [F33.42] 08/30/2018    Chronic combined systolic and diastolic congestive heart failure ([address] [I50.42] 06/22/2017    Essential hypertension [I10] 05/05/2017    Chronic atrial fibrillation (HCC) [I48.2]     GUME (obstructive sleep apnea) [G47.33]     Pulmonary embolism without acute cor pulmonale (HCC) [I26.99] 02/23/2017    Obesity (BMI 30-39. 9) [E66.9]     Small bowel cancer ([facility] [C17.9]        Plan:        1.  Wean and then Dc tpn and dc to ecf today    Efrain Feldman, DO  5/9/2019  11:49 AM

## 2019-05-09 NOTE — DISCHARGE SUMMARY
Hospital Discharge Summary      Patient ID: Frantz Boothe      Patient's PCP: Willis Zuluaga MD    Admit Date: 4/25/2019     Discharge Date:       Admitting Physician: Nehal Dunlap MD    Discharge Physician: Janna Arcos MD     Discharge Diagnoses: Active Hospital Problems    Diagnosis Date Noted    Severe malnutrition ([facility] [E43] 05/02/2019     Priority: Medium     Class: Present on Admission    Ischemic colitis ([facility] [K55.9]     Coffee ground emesis [K92.0]     SBO (small bowel obstruction) ([facility] [K56.609] 04/25/2019    Major depressive disorder, recurrent, in full remission ([facility] [F33.42] 08/30/2018    Chronic combined systolic and diastolic congestive heart failure ([facility] [I50.42] 06/22/2017    Essential hypertension [I10] 05/05/2017    Chronic atrial fibrillation (HCC) [I48.2]     GUME (obstructive sleep apnea) [G47.33]     Pulmonary embolism without acute cor pulmonale ([facility] [I26.99] 02/23/2017    Obesity (BMI 30-39. 9) [E66.9]     Small bowel cancer ([facility] [C17.9]        The patient was seen and examined on day of discharge and this discharge summary is in conjunction with any daily progress note from day of discharge. Hospital Course: satisfactory   The primary encounter diagnosis was SBO (small bowel obstruction) ([facility] A diagnosis of Other acute pulmonary embolism without acute cor pulmonale (HCC) was also pertinent to this visit. Consults: as noted in the chart    Treatments: as above    Disposition: home    Discharged Condition: Stable    Follow Up:  No follow-ups on file.     Discharge Medications:    Bob Andrade   Home Medication Instructions [facility]:[phone]    Printed on:05/09/19 4228   Medication Information                      acetaminophen (TYLENOL) 325 MG tablet  Take 650 mg by mouth every 6 hours as needed for Pain or Fever             amiodarone (PACERONE) 400 MG tablet  Take 1 tablet by mouth 2 times daily             aspirin 81 MG chewable tablet  Take 81 mg by mouth daily             atorvastatin (LIPITOR) 40 MG tablet  Take 40 mg by mouth nightly             dibucaine ([employer]) 1 % OINT rectal ointment  Place rectally 4 times daily as needed for Pain             docusate sodium (COLACE) 100 MG capsule  Take 100 mg by mouth 2 times daily             DULoxetine (CYMBALTA) 20 MG extended release capsule  Take 20 mg by mouth daily             ferrous sulfate 325 (65 Fe) MG EC tablet  Take 325 mg by mouth 4 times daily             furosemide (LASIX) 40 MG tablet  Take 40 mg by mouth daily             metoprolol tartrate (LOPRESSOR) 50 MG tablet  Take 50 mg by mouth 2 times daily (before meals)             nitroGLYCERIN (NITROSTAT) 0.4 MG SL tablet  up to max of 3 total doses. If no relief after 1 dose, call 911. pantoprazole (PROTONIX) 40 MG tablet  Take 40 mg by mouth 2 times daily (before meals)             pregabalin (LYRICA) 50 MG capsule  Take 50 mg by mouth 2 times daily. QUEtiapine (SEROQUEL) 25 MG tablet  Take 25 mg by mouth 2 times daily             senna (SENOKOT) 8.6 MG tablet  Take 1 tablet by mouth daily as needed for Constipation             sennosides-docusate sodium (SENOKOT-S) 8.6-50 MG tablet  Take 1 tablet by mouth daily as needed for Constipation             spironolactone (ALDACTONE) 25 MG tablet  Take 25 mg by mouth daily             vitamin B-6 (PYRIDOXINE) 25 MG tablet  Take 25 mg by mouth daily                  Activity: activity as tolerated unless otherwise noted     Electronically signed by Princess Hopper MD on 5/9/2019 at 9:27 AM     Thank you Luke Anne MD for the opportunity to be involved in this patient's care.

## 2019-05-09 NOTE — PROGRESS NOTES
TPN was turned down to 15 ml/hr at 1607, based off dietician instructions the TPN would be shut off around [id number]. Pt has a discharge order in place. Notified Dr. River Damon of this to see if TPN could be stopped any earlier. Did not receive response. Notified Dr. Feldman of this, pt is tolerated soft diet and his latest glucose is 109, Dr. Feldman states to turn TPN off at 1900, remove the central line and then pt may be discharged an hour later.

## 2019-05-09 NOTE — DISCHARGE SUMMARY
[facility]    Discharge Summary     Patient ID: Gil Torrez  :     MRN: [id number]     ACCOUNT:  [de-identified]   Patient's PCP: Porter Alvarado MD  Admit Date: 2019   Discharge Date: 5/10/2019     Length of Stay: 15  Code Status:  Full Code  Admitting Physician: Mitch Pelaez MD  Discharge Physician: Jun Feldman DO     Active Discharge Diagnoses:     Hospital Problem Lists:  Principal Problem:    SBO (small bowel obstruction) ([address]  Active Problems:    Severe malnutrition ([address]    Small bowel cancer ([address]    Obesity (BMI 30-39. 9)    Pulmonary embolism without acute cor pulmonale (HCC)    GUME (obstructive sleep apnea)    Chronic atrial fibrillation (HCC)    Essential hypertension    Chronic combined systolic and diastolic congestive heart failure (HCC)    Major depressive disorder, recurrent, in full remission ([address]    Coffee ground emesis    Ischemic colitis ([address]  Resolved Problems:    * No resolved hospital problems. *      Admission Condition:  critical     Discharged Condition: fair    Hospital Stay:     Hospital Course:  Gil Torrez is a 61 y.o. male who was admitted for the management of   SBO (small bowel obstruction) ([address] , presented to ER with Abdominal Pain and Dizziness (onset today)    Admitted with sbo which required surgery on  for ischemic bowel. Postop had protracted course requiring tpn and multiple days in the icu. He had shock and was on vent, being extubated on 5/3    Weaned off pressors and then weaned off tpn.   Tolerating reg diet    Discharged to snf for pt/ot      Significant therapeutic interventions: see above    Significant Diagnostic Studies:   Labs / Micro:  CBC:   Lab Results   Component Value Date    WBC 16.9 05/10/2019    RBC 3.27 05/10/2019    HGB 10.3 05/10/2019    HCT 33.0 05/10/2019    .9 05/10/2019    MCH 31.5 05/10/2019    MCHC 31.2 05/10/2019    RDW 15.9 05/10/2019  05/10/2019     CMP:    Lab Results   Component Value Date    GLUCOSE 88 05/10/2019     05/10/2019    K 4.4 05/10/2019     05/10/2019    CO2 20 05/10/2019    BUN 30 05/10/2019    CREATININE 1.65 05/10/2019    ANIONGAP 11 05/10/2019    ALKPHOS 95 05/09/2019    ALT 87 05/09/2019    AST 35 05/09/2019    BILITOT 0.51 05/09/2019    LABALBU 2.6 05/09/2019    ALBUMIN NOT REPORTED 05/09/2019    LABGLOM 42 05/10/2019    GFRAA 51 05/10/2019    GFR      05/10/2019    GFR NOT REPORTED 05/10/2019    PROT 6.3 05/09/2019    PROT 4.4 11/03/2012    CALCIUM 8.6 05/10/2019      blood culture: negative ,     Radiology:    Xr Chest (single View Frontal)    Result Date: 5/6/2019  EXAMINATION: SINGLE XRAY VIEW OF THE CHEST 5/6/2019 6:51 am COMPARISON: 05/05/2019 HISTORY: ORDERING SYSTEM PROVIDED HISTORY: Edema TECHNOLOGIST PROVIDED HISTORY: Edema Ordering Physician Provided Reason for Exam: edema Acuity: Unknown Type of Exam: Unknown FINDINGS: Enteric tube courses below left hemidiaphragm with its distal extent not included on the provided image. Right-sided jugular venous catheter terminates overlying the expected location of the SVC. Cardiac silhouette is borderline enlarged and there is mild central vascular congestion. Implantable cardiac device in unchanged positioning. Osseous structures and soft tissues are grossly stable. Cardiomegaly and mild central vascular congestion. Xr Chest (single View Frontal)    Result Date: 5/5/2019  EXAMINATION: SINGLE XRAY VIEW OF THE CHEST 5/5/2019 6:24 am COMPARISON: 05/04/2018 HISTORY: ORDERING SYSTEM PROVIDED HISTORY: Edema TECHNOLOGIST PROVIDED HISTORY: Edema Ordering Physician Provided Reason for Exam: edema Acuity: Unknown Type of Exam: Ongoing FINDINGS: Right IJ line, enteric tube, left-sided cardiac device and valvular prosthesis are redemonstrated. Stable mild cardiomegaly. Stable ectasia of the aorta. Persistent mild vascular congestion.   Probable small pleural effusions. No pneumothorax or consolidation. Osseous structures grossly intact. Stable appearing chest with persistent findings suggesting mild CHF. Xr Chest (single View Frontal)    Result Date: 4/28/2019  EXAMINATION: SINGLE XRAY VIEW OF THE CHEST 4/28/2019 7:41 am COMPARISON: April 27, 2019 HISTORY: ORDERING SYSTEM PROVIDED HISTORY: Edema TECHNOLOGIST PROVIDED HISTORY: Edema Ordering Physician Provided Reason for Exam: Port upright AP CXR - edema Acuity: Unknown Type of Exam: Unknown FINDINGS: The left chest wall pacemaker and lead are stable. The patient is status post valvular replacement. The cardiomediastinal silhouette is stable. There are low lung volumes. There is minimal atelectasis at the left lung base. There is mild pulmonary vascular congestion. There is no pleural effusion. There is no pneumothorax. There is no acute osseous abnormality. Mild pulmonary vascular congestion. Minimal atelectasis at the left lung base. Low lung volumes. Stable mild cardiomegaly. Xr Abdomen (kub) (single Ap View)    Result Date: 5/5/2019  EXAMINATION: SINGLE SUPINE XRAY VIEW(S) OF THE ABDOMEN 5/5/2019 10:38 am COMPARISON: 04/25/2019 HISTORY: ORDERING SYSTEM PROVIDED HISTORY: post surgery for bowel obstruction Acuity: Acute FINDINGS: Focally dilated small bowel loop in the right mid abdomen measuring up to approximately 5 cm. No evidence of bowel obstruction. Midline surgical clips and surgical drain in the pelvis. L4-L5 and L5-S1 disc space narrowing with osteophytosis. Focally dilated small bowel loop in the right mid abdomen measured above suggesting localized ileus. Xr Abdomen (kub) (single Ap View)    Result Date: 4/25/2019  EXAMINATION: SINGLE SUPINE XRAY VIEW(S) OF THE ABDOMEN 4/25/2019 8:44 pm COMPARISON: None.  HISTORY: ORDERING SYSTEM PROVIDED HISTORY: NGT placement confirmation TECHNOLOGIST PROVIDED HISTORY: NGT placement confirmation Ordering Physician Provided Reason for Exam: NGT placement Acuity: Acute Type of Exam: Initial FINDINGS: 2 images of the abdomen were provided. Enteric tube is noted projecting on the left hemidiaphragm. This is likely in the stomach. Moderate multifocal bowel distention is noted. Ill-defined increased density in the left lung base is noted     Enteric tube as described     Ct Head Wo Contrast    Result Date: 5/8/2019  EXAMINATION: CT OF THE HEAD WITHOUT CONTRAST 5/8/2019 2:12 pm TECHNIQUE: CT of the head was performed without the administration of intravenous contrast. Dose modulation, iterative reconstruction, and/or weight based adjustment of the mA/kV was utilized to reduce the radiation dose to as low as reasonably achievable. COMPARISON: Head CT 04/28/2019 HISTORY: ORDERING SYSTEM PROVIDED HISTORY: weakness on right TECHNOLOGIST PROVIDED HISTORY: Initial evaluation. FINDINGS: BRAIN/VENTRICLES:  No masses nor acute intracranial hemorrhage. Intact gray/white matter differentiation without findings of acute ischemia. No mass effect nor midline shift. Patent basilar cisterns and foramen magnum. No hydrocephalus. Mild diffuse atrophy. ORBITS:  Visualized portions appear normal without acute abnormality. SINUSES:  Minimal layering secretions in the partially included left maxillary sinus. Minimal mucosal thickening in the bilateral ethmoid and sphenoid sinuses. SOFT TISSUES/SKULL:  No acute soft tissue abnormality. Minimal atherosclerotic calcifications. No acute fracture. 1. No acute intracranial abnormality. Critical results were called [facility] Dr[facility] Pugh [facility] on 5/8/2019 at 14:34. 2. Minimal layering fluid in the partially included left maxillary sinus potentially due to acute sinusitis.      Ct Head Wo Contrast    Result Date: 4/28/2019  EXAMINATION: CT OF THE HEAD WITHOUT CONTRAST  4/28/2019 10:42 am TECHNIQUE: CT of the head was performed without the administration of intravenous contrast. Dose modulation, iterative reconstruction, and/or weight based adjustment of the mA/kV was utilized to reduce the radiation dose to as low as reasonably achievable. COMPARISON: None. HISTORY: ORDERING SYSTEM PROVIDED HISTORY: HEADACHE TECHNOLOGIST PROVIDED HISTORY: FINDINGS: BRAIN/VENTRICLES: There is prominence of the ventricles and cortical sulci consistent with cortical volume loss. No midline shift. Basal cisterns are normally outlined. There is no evidence for acute infarct. No acute intra or extra-axial hemorrhage. ORBITS: The visualized portion of the orbits demonstrate no acute abnormality. SINUSES: The visualized paranasal sinuses and mastoid air cells demonstrate no acute abnormality. SOFT TISSUES/SKULL:  No acute abnormality of the visualized skull or soft tissues. No acute intracranial abnormality. Mild cerebral atrophy. Ct Abdomen Pelvis W Iv Contrast Additional Contrast? Oral    Result Date: 4/28/2019  EXAMINATION: CT OF THE ABDOMEN AND PELVIS WITH CONTRAST 4/28/2019 10:41 am TECHNIQUE: CT of the abdomen and pelvis was performed with the administration of intravenous contrast. Multiplanar reformatted images are provided for review. Dose modulation, iterative reconstruction, and/or weight based adjustment of the mA/kV was utilized to reduce the radiation dose to as low as reasonably achievable. COMPARISON: 04/25/2019 HISTORY: ORDERING SYSTEM PROVIDED HISTORY: ABDOMINAL PAIN TECHNOLOGIST PROVIDED HISTORY: FINDINGS: Images degraded by respiratory motion. Lower Chest: Cardiomegaly and pacemaker wires noted. Minor subsegmental atelectasis and scarring posterior lung bases with some bronchiectasis noted along with trace right pleural effusion. Organs: There is extensive portal venous gas. No focal liver mass. Spleen, pancreas and adrenal glands show no significant abnormalities. Vicarious excretion of contrast in the gallbladder.   Bilateral renal cysts and nonobstructing right nephrolithiasis with several calculi up to 5 mm noted in the lower pole. GI/Bowel: There is limited evaluation due to absence of oral contrast.   Some gastric distention. Fluid-filled dilated jejunal loops with some pneumatosis. There is also extensive confluent thickening of proximal/mid ileal loops and probably to some distal jejunal loops with presence of pneumatosis and bowel dilatation with interloop free fluid. Differential is bowel ischemia or infection. There is a transition point to collapsed distal ileal loops in the right lower quadrant best seen on series 2, image 125 and coronal image 45-50 indicating component of bowel obstruction. No evidence for acute appendicitis. No acute process in the colon. Pelvis: Bladder grossly normal.  No suspicious pelvic mass. Peritoneum/Retroperitoneum: Small volume free fluid mainly insinuating between the abnormal small-bowel loops. No free intraperitoneal air. No significant lymphadenopathy. Bones/Soft Tissues: Degenerative changes in the spine. 1. Interval progression of previously noted mildly dilated proximal small bowel loops now showing significant dilatation with transition point in the right lower quadrant representing in small-bowel obstruction in the mid to distal ileum. 2. In addition to small-bowel obstruction there is new finding of extensive wall thickening pneumatosis involving the dilated distal jejunal and proximal/mid ileal small bowel loops along with portal venous gas and interloop edema/free fluid indicating superimposed bowel ischemia with or without superimposed infection. Findings were discussed with Dr. Cathi Montague at 12:31 pm on 4/28/2019. Ct Abdomen Pelvis W Iv Contrast Additional Contrast? None    Result Date: 4/25/2019  EXAMINATION: CT OF THE ABDOMEN AND PELVIS WITH CONTRAST 4/25/2019 6:10 pm TECHNIQUE: CT of the abdomen and pelvis was performed with the administration of intravenous contrast. Multiplanar reformatted images are provided for review.  Dose modulation, iterative reconstruction, and/or weight based adjustment of the mA/kV was utilized to reduce the radiation dose to as low as reasonably achievable. COMPARISON: CT abdomen and pelvis performed 02/23/2017. HISTORY: ORDERING SYSTEM PROVIDED HISTORY: abdominal pain TECHNOLOGIST PROVIDED HISTORY: FINDINGS: Lower Chest: Infiltrates are seen in the lung bases bilaterally that is worse on the left compared to the right. The heart is enlarged. Organs: The liver and spleen are normal size and overall attenuation. There are hepatic granulomas. The gallbladder, pancreas, and adrenal glands are unremarkable. There is no obstructive uropathy. There are bilateral nonobstructing renal stones. The largest stone is seen on the right measuring approximately 5 mm in diameter. There are bilateral renal cysts. The ureters are not dilated. The urinary bladder is unremarkable. GI/Bowel: The stomach is mildly distended and fluid-filled. Loops of small bowel are mildly distended and fluid-filled in the proximal and mid aspect. The distal and terminal ileum is decompressed. The colon is decompressed containing residual air and fecal contents. There is no intraperitoneal free air or free fluid. Pelvis: The prostate gland and seminal vesicles are unremarkable. Phleboliths are seen in the pelvis. Peritoneum/Retroperitoneum: The psoas muscles are symmetric. The abdominal aorta is normal in caliber. The inferior vena cava is unremarkable. There is no retroperitoneal or mesenteric adenopathy. Bones/Soft Tissues: The extra-abdominal soft tissues are unremarkable. There is no acute osseous abnormality. Mildly prominent fluid-filled loops of small bowel proximally and within the mid aspect with decompressed distal and terminal ileal loops. This may relate to a degree of obstruction versus an ileus and correlation is needed. Infiltrates in the lung bases bilaterally that may represent atelectasis versus pneumonia. Nonobstructing renal stones bilaterally. Xr Chest Portable    Result Date: 5/4/2019  EXAMINATION: SINGLE XRAY VIEW OF THE CHEST 5/4/2019 4:49 am COMPARISON: Chest radiograph dated 05/03/2019 HISTORY: ORDERING SYSTEM PROVIDED HISTORY: infiltrate TECHNOLOGIST PROVIDED HISTORY: infiltrate Ordering Physician Provided Reason for Exam: tran vent chk and infiltrate FINDINGS: Right internal jugular central line has tip in distal superior vena cava. Nasogastric tube has tip in the stomach. Single chamber pacemaker placed via left subclavian approach. Stable cardiomegaly. Moderate CHF, not significantly changed. No definite pleural effusions. No pneumothorax. No free air below the diaphragm. Moderate CHF, not significantly changed. Xr Chest Portable    Result Date: 5/3/2019  EXAMINATION: SINGLE XRAY VIEW OF THE CHEST 5/3/2019 5:55 am COMPARISON: 05/02/2019 HISTORY: ORDERING SYSTEM PROVIDED HISTORY: infiltrate TECHNOLOGIST PROVIDED HISTORY: infiltrate Ordering Physician Provided Reason for Exam: infiltrate Acuity: Unknown Type of Exam: Unknown Relevant Medical/Surgical History: infiltrate FINDINGS: Endotracheal tube terminates 5 cm above the kadi. Enteric tube courses below left hemidiaphragm. Right-sided jugular venous catheter terminates overlying the expected location of the SVC. Aorta is ectatic. Mild central vascular congestion. Implantable device is in unchanged positioning. Support tubes and lines in grossly unchanged positioning. .  Bibasilar opacities favor atelectasis and are unchanged.      Xr Chest Portable    Result Date: 5/2/2019  EXAMINATION: SINGLE XRAY VIEW OF THE CHEST 5/2/2019 6:14 am COMPARISON: May 1, 2018, April 30, 2019 HISTORY: ORDERING SYSTEM PROVIDED HISTORY: infiltrate TECHNOLOGIST PROVIDED HISTORY: infiltrate Ordering Physician Provided Reason for Exam: infiltrate Acuity: Unknown Type of Exam: Unknown Relevant Medical/Surgical History: infiltrate FINDINGS: Cardiac silhouette enlargement again noted. Status post valve repair. The endotracheal tube remains in appropriate position above the kadi. The enteric tube courses off the field of view in the upper abdomen. Right internal jugular line terminates at the distal SVC. Left subclavian ICD in place. Basilar opacities, left greater than right are again demonstrated without significant change. No pneumothorax, evidence for edema or significant effusion. Unchanged appearance of support tubes and lines. No significant change in basilar opacities, favored to represent atelectasis versus developing consolidation. Xr Chest Portable    Result Date: 5/1/2019  EXAMINATION: SINGLE XRAY VIEW OF THE CHEST 5/1/2019 6:19 am COMPARISON: April 30, 2019 HISTORY: ORDERING SYSTEM PROVIDED HISTORY: vent maintenance TECHNOLOGIST PROVIDED HISTORY: vent maintenance Ordering Physician Provided Reason for Exam: vent Acuity: Unknown Type of Exam: Unknown FINDINGS: Prosthetic valve, cardiac device, right IJ line, ET tube, and NG tube are stable. Cardiomegaly. No effusion. No pneumothorax. Central congestion. Mildly improved aeration left lower lung. Central vascular congestion. Stable supporting devices. Xr Chest Portable    Result Date: 4/29/2019  EXAMINATION: SINGLE XRAY VIEW OF THE CHEST 4/29/2019 6:41 am COMPARISON: AP chest from 04/28/2019 HISTORY: ORDERING SYSTEM PROVIDED HISTORY: Vent TECHNOLOGIST PROVIDED HISTORY: Vent Acuity: Unknown Type of Exam: Unknown Follow-up mechanical ventilation. Postop exploratory. FINDINGS: ETT, right IJ line, enteric tube, positions unchanged, and remain satisfactory. Left subclavian approach AICD with a single unchanged electrode lead; 2 cardiac valves, similar in appearance. Overlying ECG monitor leads and snaps. Enlarged but unchanged cardiomediastinal shadow. Improved central vascular congestion and mild unchanged medial basilar atelectasis.   No new pulmonary or large pleural abnormality. No pneumothorax. Bones and soft tissues unchanged. Improved vascular congestion. Stable tubes and lines. Xr Chest Portable    Result Date: 4/27/2019  EXAMINATION: SINGLE XRAY VIEW OF THE CHEST 4/27/2019 6:22 am COMPARISON: 04/25/2019. HISTORY: ORDERING SYSTEM PROVIDED HISTORY: pleural effusion, infiltrates TECHNOLOGIST PROVIDED HISTORY: pleural effusion, infiltrates Ordering Physician Provided Reason for Exam: plural effusion infiltrate Acuity: Unknown Type of Exam: Initial FINDINGS: A left cardiac device is unchanged in position. An enteric tube is seen coursing below the diaphragm. The cardiac silhouette is persistently enlarged. There is prominence of the central pulmonary vasculature. No convincing focal consolidation, pleural effusion or pneumothorax. 1. Persistent enlargement of the cardiac silhouette with prominence of the central pulmonary vasculature. 2. No convincing pleural effusion or pneumothorax. Xr Chest Portable    Result Date: 4/25/2019  EXAMINATION: SINGLE XRAY VIEW OF THE CHEST 4/25/2019 5:56 pm COMPARISON: Chest radiograph performed 11/03/2018. HISTORY: ORDERING SYSTEM PROVIDED HISTORY: shortness of breath TECHNOLOGIST PROVIDED HISTORY: shortness of breath Ordering Physician Provided Reason for Exam: chest pain Acuity: Acute Type of Exam: Initial Additional signs and symptoms: SOB Relevant Medical/Surgical History: CHF, COPD FINDINGS: There is chronic pulmonary change. There may be small bibasilar effusions with adjacent atelectasis. There is no pneumothorax. The heart is enlarged with stable cardiac lead. The upper abdomen is unremarkable. The extrathoracic soft tissues are unremarkable. Cardiomegaly with small bibasilar effusions and adjacent atelectasis.      Xr Chest 1 Vw    Result Date: 4/30/2019  EXAMINATION: SINGLE XRAY VIEW OF THE CHEST 4/30/2019 6:29 am COMPARISON: April 29, 2019, April 28, 2019 HISTORY: ORDERING SYSTEM PROVIDED HISTORY: edema TECHNOLOGIST PROVIDED HISTORY: edema Ordering Physician Provided Reason for Exam: edema Acuity: Unknown Type of Exam: Unknown FINDINGS: The cardiac mediastinal contours appear unchanged. The endotracheal tube terminates 3 cm above the kadi. The enteric tube courses off the field of view in the upper abdomen. The right internal jugular line terminates at the distal SVC. Left subclavian ICD in place. Minimal basilar linear opacities are noted favoring atelectasis. No focal area of consolidation or evidence for edema. No pneumothorax identified. No significant effusion. Vascular coils are noted in the epigastric region. Findings compatible with basilar subsegmental atelectasis. No evidence for edema. Support tubes and lines appear unchanged in positions. Xr Chest 1 Vw    Result Date: 4/28/2019  EXAMINATION: SINGLE XRAY VIEW OF THE CHEST 4/28/2019 6:30 pm COMPARISON: 13 hours prior HISTORY: ORDERING SYSTEM PROVIDED HISTORY: TUBE PLACEMENT TECHNOLOGIST PROVIDED HISTORY: TUBE PLACEMENT Ordering Physician Provided Reason for Exam: chk  ET tube  central line and arterial line placements Relevant Medical/Surgical History: post op exploratory FINDINGS: Enteric tube tip 2.5 cm above the kadi. Right IJ CVC tip at cavoatrial junction. AICD in place left chest wall. Valvuloplasty. Enteric tube tip below diaphragm in the stomach. The cardiac silhouette is enlarged. The pulmonary vasculature is mildly prominent. Mild interstitial prominence. There is no focal consolidation, pleural effusion or pneumothorax. The visualized osseous structures demonstrate no acute abnormality. Support devices appear in satisfactory position. Mild pulmonary vascular congestion/interstitial edema.      Ct Chest Pulmonary Embolism W Contrast    Result Date: 4/25/2019  EXAMINATION: CTA OF THE CHEST 4/25/2019 6:10 pm TECHNIQUE: CTA of the chest was performed after the administration of intravenous contrast.  Multiplanar reformatted images are provided for review. MIP images are provided for review. Dose modulation, iterative reconstruction, and/or weight based adjustment of the mA/kV was utilized to reduce the radiation dose to as low as reasonably achievable. COMPARISON: None. HISTORY: ORDERING SYSTEM PROVIDED HISTORY: sob, history of CA FINDINGS: Pulmonary Arteries: Pulmonary arteries are adequately opacified for evaluation. There is an intraluminal filling defect within a subsegmental branch supplying the left lower lobe medially. The remainder of the arterial branches appear patent. Main pulmonary artery is normal in caliber. Mediastinum: No evidence of mediastinal lymphadenopathy. The heart and pericardium demonstrate no acute abnormality. The heart is enlarged. There is no acute abnormality of the thoracic aorta. Lungs/pleura: There is linear atelectasis versus scarring in the right lung posteriorly. There is dependent atelectasis in the left lung. There is a small right basilar effusion. There is no pneumothorax. The tracheobronchial tree is patent. Upper Abdomen: Limited images of the upper abdomen are unremarkable. Soft Tissues/Bones: No acute bone or soft tissue abnormality. Mild cardiomegaly with a small right basilar effusion. Filling defect involving a subsegmental branch supplying the left lower lobe medially consistent with an embolism. Critical results were called by Dr. Luis Carlos Sanches to [facility] on 9/74/7523 at 18:48.      Vl Dup Lower Extremity Venous Bilateral    Result Date: 4/29/2019    [facility]  Vascular Lower Extremities DVT Study Procedure   Patient Name  Saranya Horseman    Date of Study           04/29/2019                Monna Lanes   Date of Birth 1955  Gender                  Male   Age           61 year(s)  Race                    Black   Room Number   1109   Corporate ID  [id number]  #   Patient [place]  [de-identified]  #   MR #          [id number]     [facility] Catalina Shaffer   Accession #   [id number]   Interpreting Physician  Lon Barba   Referring                 Referring Physician     Ace Santoyo, *  Nurse  Practitioner  Procedure Type of Study:   Veins: Lower Extremities DVT Study, Venous Scan Lower Bilateral.  Indications for Study:R/O DVT. Patient Status: In Patient. Technical Quality:Good visualization. Conclusions   Summary   No evidence of superficial or deep venous thrombosis in both lower  extremities. Signature   ----------------------------------------------------------------  Electronically signed by Catalina Shaffer(Sonographer) on  04/29/2019 08:11 AM  ----------------------------------------------------------------   ----------------------------------------------------------------  Electronically signed by Pushpa Timmons(Interpreting  physician) on 04/29/2019 02:08 PM  ----------------------------------------------------------------  Findings:   Right Impression:                     Left Impression:  The common femoral, superficial       The common femoral, femoral,  femoral, popliteal, tibials and       popliteal, tibials and saphenous  saphenous veins are compressible with veins are compressible with normal  normal doppler responses. doppler responses. Allergies   - Allergy:Morphine(Drug). Velocities are measured in cm/s ; Diameters are measured in cm Right Lower Extremities DVT Study Measurements Right 2D Measurements +------------------------------------+----------+---------------+----------+ ! Location                            ! Visualized! Compressibility! Thrombosis! +------------------------------------+----------+---------------+----------+ ! Common Femoral                      !Yes       ! Yes            ! None      ! +------------------------------------+----------+---------------+----------+ ! Prox Femoral                        !Yes       ! Yes            ! None      ! Measurements +---------------------------+------+------+--------------------------------+ ! Location                   ! Signal!Reflux! Reflux (msec)                   ! +---------------------------+------+------+--------------------------------+ ! Common Femoral             !Phasic!      !                                ! +---------------------------+------+------+--------------------------------+ ! Prox Femoral               !Phasic!      !                                ! +---------------------------+------+------+--------------------------------+ ! Popliteal                  !Phasic!      !                                ! +---------------------------+------+------+--------------------------------+ Left Lower Extremities DVT Study Measurements Left 2D Measurements +------------------------------------+----------+---------------+----------+ ! Location                            ! Visualized! Compressibility! Thrombosis! +------------------------------------+----------+---------------+----------+ ! Common Femoral                      !Yes       ! Yes            ! None      ! +------------------------------------+----------+---------------+----------+ ! Prox Femoral                        !Yes       ! Yes            ! None      ! +------------------------------------+----------+---------------+----------+ ! Mid Femoral                         !Yes       ! Yes            ! None      ! +------------------------------------+----------+---------------+----------+ ! Dist Femoral                        !Yes       ! Yes            ! None      ! +------------------------------------+----------+---------------+----------+ ! Deep Femoral                        !Yes       ! Yes            ! None      ! +------------------------------------+----------+---------------+----------+ ! Popliteal                           !Yes       ! Yes            ! None      ! +------------------------------------+----------+---------------+----------+ ! Sapheno Femoral Junction            ! Yes       ! Yes            ! None      ! +------------------------------------+----------+---------------+----------+ ! PTV                                 ! Yes       ! Yes            ! None      ! +------------------------------------+----------+---------------+----------+ ! Peroneal                            !Yes       ! Yes            ! None      ! +------------------------------------+----------+---------------+----------+ ! Gastroc                             ! Yes       ! Yes            ! None      ! +------------------------------------+----------+---------------+----------+ ! GSV Thigh                           ! Yes       ! Yes            ! None      ! +------------------------------------+----------+---------------+----------+ ! GSV Knee                            ! Yes       ! Yes            ! None      ! +------------------------------------+----------+---------------+----------+ ! GSV Ankle                           ! Yes       ! Yes            ! None      ! +------------------------------------+----------+---------------+----------+ ! SSV                                 ! Yes       ! Yes            ! None      ! +------------------------------------+----------+---------------+----------+ Left Doppler Measurements +---------------------------+------+------+--------------------------------+ ! Location                   ! Signal!Reflux! Reflux (msec)                   ! +---------------------------+------+------+--------------------------------+ ! Common Femoral             !Phasic!      !                                ! +---------------------------+------+------+--------------------------------+ ! Prox Femoral               !Phasic!      !                                ! +---------------------------+------+------+--------------------------------+ ! Popliteal                  !Phasic!      !                                ! +---------------------------+------+------+--------------------------------+    Cta Neck W Contrast    Result Date: 5/8/2019  EXAMINATION: CTA OF THE NECK; CTA OF THE HEAD WITH CONTRAST, 5/8/2019 2:43 pm: TECHNIQUE: CTA of the neck was performed with the administration of intravenous contrast. Multiplanar reformatted images are provided for review. MIP images are provided for review. Stenosis of the internal carotid arteries measured using NASCET criteria. Dose modulation, iterative reconstruction, and/or weight based adjustment of the mA/kV was utilized to reduce the radiation dose to as low as reasonably achievable.; CTA of the head/brain was performed with the administration of intravenous contrast. Multiplanar reformatted images are provided for review. MIP images are provided for review. Dose modulation, iterative reconstruction, and/or weight based adjustment of the mA/kV was utilized to reduce the radiation dose to as low as reasonably achievable. COMPARISON: None HISTORY: ORDERING SYSTEM PROVIDED HISTORY: Weakness, slurred speech TECHNOLOGIST PROVIDED HISTORY: FINDINGS: CTA NECK: AORTIC ARCH/ARCH VESSELS: There is a normal branch pattern of the aortic arch. No significant stenosis is seen of the innominate artery or subclavian arteries. CAROTID ARTERIES: The common carotid arteries are without flow limiting stenosis. The internal carotid arteries are without flow limiting stenosis by NASCET criteria. No dissection or arterial injury is seen. VERTEBRAL ARTERIES: The vertebral arteries both arise from the subclavian arteries and are without flow limiting stenosis or dissection. SOFT TISSUES: Right more than left apical atelectasis. No cervical or superior mediastinal lymphadenopathy. The visualized portion of the larynx and pharynx appear unremarkable. The parotid, submandibular and thyroid glands demonstrate no acute abnormality. BONES: The visualized osseous structures appear unremarkable.  CTA HEAD: ANTERIOR CIRCULATION: The internal carotid arteries are normal in course and caliber without focal stenosis. The anterior cerebral and middle cerebral arteries demonstrate no focal stenosis. The anterior communicating artery is present. Hypoplastic/aplastic A1 segment of the left anterior cerebral artery. POSTERIOR CIRCULATION: The posterior cerebral arteries demonstrate no focal stenosis. The vertebral and basilar arteries appear unremarkable. The posterior communicating arteries are present. BRAIN: No mass effect or midline shift. No abnormal extra-axial fluid collection. The gray-white differentiation appears grossly maintained. Unremarkable CTA of the head and neck. Cta Head W Contrast    Result Date: 5/8/2019  EXAMINATION: CTA OF THE NECK; CTA OF THE HEAD WITH CONTRAST, 5/8/2019 2:43 pm: TECHNIQUE: CTA of the neck was performed with the administration of intravenous contrast. Multiplanar reformatted images are provided for review. MIP images are provided for review. Stenosis of the internal carotid arteries measured using NASCET criteria. Dose modulation, iterative reconstruction, and/or weight based adjustment of the mA/kV was utilized to reduce the radiation dose to as low as reasonably achievable.; CTA of the head/brain was performed with the administration of intravenous contrast. Multiplanar reformatted images are provided for review. MIP images are provided for review. Dose modulation, iterative reconstruction, and/or weight based adjustment of the mA/kV was utilized to reduce the radiation dose to as low as reasonably achievable. COMPARISON: None HISTORY: ORDERING SYSTEM PROVIDED HISTORY: Weakness, slurred speech TECHNOLOGIST PROVIDED HISTORY: FINDINGS: CTA NECK: AORTIC ARCH/ARCH VESSELS: There is a normal branch pattern of the aortic arch. No significant stenosis is seen of the innominate artery or subclavian arteries. CAROTID ARTERIES: The common carotid arteries are without flow limiting stenosis.  The internal carotid arteries are without flow limiting stenosis by NASCET MD  [address] [place] [id number]          Remi Villarreal MD  [address] [facility]  [phone]    Schedule an appointment as soon as possible for a visit in 2 weeks  surgical follow-up and removal of stay-sutures    aBlaji Willis MD  [address] [facility] [address]    Schedule an appointment as soon as possible for a visit in 2 weeks      Cassandra De La Rosa MD  [address] [facility]  [phone]    Schedule an appointment as soon as possible for a visit in 2 weeks      Viky Chavez MD  [facility] [address] [facility]  [phone]    Schedule an appointment as soon as possible for a visit in 2 weeks         Requiring Further Evaluation/Follow Up POST HOSPITALIZATION/Incidental Findings: healing of abd wound, pt/ot    Diet: regular diet    Activity: As tolerated    Instructions to Patient: take medications as prescribed      Discharge Medications:      Medication List      START taking these medications    apixaban 5 MG Tabs tablet  Commonly known as:  ELIQUIS  Take 1 tablet by mouth 2 times daily     dextrose 5 % solution  Infuse 100 mL/hr intravenously continuous     oxyCODONE-acetaminophen 5-325 MG per tablet  Commonly known as:  PERCOCET  Take 1 tablet by mouth every 4 hours as needed for Pain for up to 3 days. CHANGE how you take these medications    amiodarone 400 MG tablet  Commonly known as:  PACERONE  Take 0.5 tablets by mouth 2 times daily  What changed:  how much to take     metoprolol succinate 25 MG extended release tablet  Commonly known as:  TOPROL XL  Take 1 tablet by mouth daily  What changed:    · medication strength  · See the new instructions. pantoprazole 40 MG tablet  Commonly known as:  PROTONIX  Take 1 tablet by mouth daily  What changed:  when to take this     pregabalin 50 MG capsule  Commonly known as:  LYRICA  Take 1 capsule by mouth 2 times daily for 30 days.   What changed:  Another medication with the same name was removed. Continue taking this medication, and follow the directions you see here. senna 8.6 MG tablet  Commonly known as:  SENOKOT  What changed:  Another medication with the same name was removed. Continue taking this medication, and follow the directions you see here. CONTINUE taking these medications    acetaminophen 325 MG tablet  Commonly known as:  TYLENOL     aspirin 81 MG chewable tablet     atorvastatin 40 MG tablet  Commonly known as:  LIPITOR     dibucaine 1 % Oint rectal ointment  Commonly known as:  [employer] [facility]  Place rectally 4 times daily as needed for Pain     docusate sodium 100 MG capsule  Commonly known as:  COLACE     DULoxetine 20 MG extended release capsule  Commonly known as:  CYMBALTA     ferrous sulfate 325 (65 Fe) MG EC tablet     furosemide 40 MG tablet  Commonly known as:  LASIX     nitroGLYCERIN 0.4 MG SL tablet  Commonly known as:  NITROSTAT  up to max of 3 total doses. If no relief after 1 dose, call 911.      QUEtiapine 25 MG tablet  Commonly known as:  SEROQUEL     sennosides-docusate sodium 8.6-50 MG tablet  Commonly known as:  SENOKOT-S     spironolactone 25 MG tablet  Commonly known as:  ALDACTONE  Take 1 tablet by mouth daily     vitamin B-6 25 MG tablet  Commonly known as:  PYRIDOXINE        STOP taking these medications    metoprolol tartrate 50 MG tablet  Commonly known as:  LOPRESSOR     omeprazole 20 MG delayed release capsule  Commonly known as:  PRILOSEC           Where to Get Your Medications      You can get these medications from any pharmacy    Bring a paper prescription for each of these medications  · oxyCODONE-acetaminophen 5-325 MG per tablet  · pregabalin 50 MG capsule     Information about where to get these medications is not yet available    Ask your nurse or doctor about these medications  · amiodarone 400 MG tablet  · apixaban 5 MG Tabs tablet  · dextrose 5 % solution  · metoprolol succinate 25 MG extended release tablet  · pantoprazole 40 MG tablet  · spironolactone 25 MG tablet         Time Spent on discharge is  20 mins in patient examination, evaluation, counseling as well as medication reconciliation, prescriptions for required medications, discharge plan and follow up. Electronically signed by   Patito Feldman DO  5/10/2019  7:28 AM      Thank you Dr. Porter Alvarado MD for the opportunity to be involved in this patient's care.

## 2019-05-09 NOTE — PROGRESS NOTES
Dietician states when weaning the TPN to cut rate in half until off every 6 hours. Dose was cut in half at 1030, dose to next be decreased at 1630 to 15 ml/hr, and then off at 2230.

## 2019-05-09 NOTE — PROGRESS NOTES
Pulmonary Critical Care Progress Note  Juan M Seymour M.D. Patient seen for the follow up of respiratory failure, SBO (small bowel obstruction) ([facility] [address]     Subjective:  Patient was successfully extubated on 5/3/19. He has been on TPN since 5/1/19. He is awake and responds to questions. He is sitting up in the bedside chair. He has fair urine output. He denies chest pain. He denies significant shortness of breath. Examination:  Vitals: /69   Pulse 80   Temp 98.4 °F (36.9 °C) (Oral)   Resp 16   Ht 5' 9\" (1.753 m)   Wt 232 lb 9.6 oz (105.5 kg)   SpO2 94%   BMI 34.35 kg/m²     General appearance: Awake alert  Neck: No JVD  Lungs: Moderate air exchange, no rhonchi  Heart: regular rate and rhythm, S1, S2 normal, no gallop  Abdomen: Distended, tender  Extremities: no cyanosis or clubbing. Mild edema    LABs:  CBC:   Recent Labs     05/08/19  0636 05/09/19  0712   WBC 14.7* 17.5*   HGB 10.7* 10.4*   HCT 34.4* 32.4*    261     BMP:   Recent Labs     05/08/19  0636 05/09/19  0712    137   K 4.3 3.8   CO2 23 22   BUN 31* 27*   CREATININE 1.43* 1.35*   LABGLOM 50* 53*   GLUCOSE 107* 114*     Radiology:  5/6/19      Impression:  · Acute postoperative respiratory insufficiency  · SBO with ischemic bowel s/p exploratory lap with bowel resection 4/28/19   · Shock/Sepsis secondary to above  · Recurrent Pulmonary Embolism  · Pulmonary infiltrates atelectasis vs pneumonia   · Adenocarcinoma of small bowel, s/p bowel resection and chemo in 2014  ?  Gastric polyp biopsy 2/7/19  + Moderately differentiated adenocarcinoma  · Obesity/Obstructive sleep apnea ~ wears O2 at bedtime  · Chronic combined diastolic/systolic heart failure ~ EF 25-30%  · Pulmonary Hypertension ~ RVSP 41  · Upper GI bleed s/p microcoil embolization    Recommendations:  · IV D5W half-normal saline at 20 ML per hour, total fluid 100 ML's per hour  · Continue IV Zosyn  · Continue TPN, wean off as tolerated  · Albuterol and Ipratropium Q 4 hours and prn  · Monitor hemoglobin  · PTOT  · Incentive spirometry every hour while awake  · DVT prophylaxis with SQ heparin  · Discharge planning  · Will follow with you    Electronically signed by     Abhijit Morgan MD on 5/9/2019 at 11:53 AM  Pulmonary Critical Care and Sleep Medicine,  [facility]  Cell: [phone]  Office: [phone]

## 2019-05-09 NOTE — PROGRESS NOTES
1830: RN called [facility] to inform them that the patient would be picked up from us at 9PM. SW informed us that the facility stated that they could take the patient at any time today. RN at [address] [place] [address] stated that they could not take the pt tonight, d/t the fact they were getting another admission tonight. Wu Edouard RN asked to speak to the supervisor, and the DON was supposed to call us back. 1930: Writer called to [facility] to ask the update, if the pt could be transferred tonight. They took my number and said that the DON would call me back. DON called back, room not ready until the morning. Writer confirmed that pt could come first thing in the morning. Transport set up for [facility] with Mon[facility] by wheelchair. Facility aware of transport time.

## 2019-05-09 NOTE — PROGRESS NOTES
Surgery Progress Note             POD # 11    PATIENT NAME: Gabrielle Sanchez     TODAY'S DATE: 5/9/2019, 9:24 AM    SUBJECTIVE:    Pt is stable he is in no acute distress is afebrile today     OBJECTIVE:   VITALS:  /69   Pulse 80   Temp 98.4 °F (36.9 °C) (Oral)   Resp 16   Ht 5' 9\" (1.753 m)   Wt 232 lb 9.6 oz (105.5 kg)   SpO2 94%   BMI 34.35 kg/m²     INTAKE/OUTPUT:      Intake/Output Summary (Last 24 hours) at 5/9/2019 0924  Last data filed at 5/9/2019 0537  Gross per 24 hour   Intake 4002 ml   Output 2625 ml   Net 1377 ml                 CONSTITUTIONAL:  awake and alert. no apparent distress, No acute distress  CARDIOVASCULAR:  tachycardic with regular rhythm and no murmur noted, No Murmur  LUNGS:  clear to auscultation, CTA Bilaterally  ABDOMEN:   Abdomen soft, non-tender.  BS normal. No masses,  No organomegaly, Abdomen soft, non-tender, non-distended  INCISION: dry, Incision clean/dry/intact    Data:  CBC:   Lab Results   Component Value Date    WBC 17.5 05/09/2019    RBC 3.24 05/09/2019    HGB 10.4 05/09/2019    HCT 32.4 05/09/2019    .0 05/09/2019    MCH 32.1 05/09/2019    MCHC 32.1 05/09/2019    RDW 15.8 05/09/2019     05/09/2019    MPV 12.0 05/09/2019       Radiology Review:        Pathology  Noted    ASSESSMENT   61 y.o. male   Active Hospital Problems    Diagnosis Date Noted    Severe malnutrition ([facility] [address] [E43] 05/02/2019     Priority: Medium     Class: Present on Admission    Ischemic colitis ([facility] [address] [K55.9]     Coffee ground emesis [K92.0]     SBO (small bowel obstruction) ([facility] [address] [K56.609] 04/25/2019    Major depressive disorder, recurrent, in full remission ([facility] [address] [F33.42] 08/30/2018    Chronic combined systolic and diastolic congestive heart failure ([facility] [address] [I50.42] 06/22/2017    Essential hypertension [I10] 05/05/2017    Chronic atrial fibrillation (HCC) [I48.2]     GUME (obstructive sleep apnea) [G47.33]     [facility] [I26.99] 02/23/2017    Obesity (BMI 30-39. 9) [E66.9]     Small bowel cancer ([facility] [address] [C17.9]          Plan  1. Advanced diet  2. DC TPN today  3.  Okay to transfer to his regular facility      Electronically signed by Chcae Anderson MD  on 5/9/2019 at 9:24 AM

## 2019-05-09 NOTE — PLAN OF CARE
[facility]    Second Visit Note  For more detailed information please refer to the progress note of the day      5/8/2019    2:11 PM    Name:   Beatris Diaz  MRN:     [id number]     Guerda:      [de-identified]   Room:   [facility]102-01   Day:  15  Admit Date:  4/25/2019  4:57 PM    PCP:   Kris Hammer MD  Code Status:  Full Code        Pt vitals were reviewed   New labs were reviewed   Patient was seen    Updated plan :     1. Called few minutes ago because when therapy worked with patient they felt he was very off balance and right side seemed weaker. Also rn thought maybe facial droop also. I requested stroke alert be called and then I evaluated patient  2. His  bue equal, right le slightly weaker than left (unknown baseline); very poor historian and cannot give good history; exam participation so-so. From what I can examine, no other focal deficits. Did not try and stand him as he is weak  3.  Will follow stroke alert protocol        Agustín Feldman DO  5/8/2019  2:11 PM
No acute events this shift through the timing of this note. Vitals and assessment data as charted. Patient frequently clearing airway and bringing up sputum. Denies pain. Safety precautions remain in place. Will continue to monitor and notify if any change in this patient's condition.
Nutrition Problem: Severe malnutrition, In context of chronic illness  Intervention: Food and/or Nutrient Delivery: Continue NPO, Modify current Parenteral Nutrition  Nutritional Goals: PN intake to meet >80% of estimated kcal/protein needs, with good glycemic control
Nutrition Problem: Severe malnutrition, In context of chronic illness  Intervention: Food and/or Nutrient Delivery: Continue current diet  Nutritional Goals: PO intake to meet % of estimated nutrition needs
Nutrition Problem: Severe malnutrition, In context of chronic illness  Intervention: Food and/or Nutrient Delivery: Continue current diet, Modify current Parenteral Nutrition  Nutritional Goals: PN and PO intake to meet >90% of estimated kcal/protein needs, with good glycemic control.
Problem: Falls - Risk of:  Goal: Will remain free from falls  Description  Will remain free from falls  4/27/2019 0343 by Ko Moon RN  Outcome: Met This Shift  Note:   Pt. Free of falls and injuries this shift.       Problem: Musculor/Skeletal Functional Status  Goal: Highest potential functional level  Outcome: Met This Shift
Problem: Falls - Risk of:  Goal: Will remain free from falls  Description  Will remain free from falls  4/29/2019 7631 by Irene Pollard RN  Outcome: Ongoing    Pt. Sedated and on mechanical ventilation. Bed side rails 4/4 up and restraints on per physician order. RN will continue to monitor. Problem: Restraint Use - Nonviolent/Non-Self-Destructive Behavior:  Goal: Absence of restraint-related injury  Description  Absence of restraint-related injury  4/29/2019 8940 by Irene Pollard RN  Outcome: Ongoing     Pt. Will remain free of restraint related injury immediately during shift. Pain, elimination, nutrition, and skin integrity managed by Rn. Range of motion and repositioning performed Q2H. Absence of restraint indications assessed throughout shift. Will continue to monitor.
Problem: Falls - Risk of:  Goal: Will remain free from falls  Description  Will remain free from falls  4/30/2019 2341 by Malka Mendoza RN  Outcome: Ongoing   Pt remains free from falls and in fall precautions at this time. Bed is in lowest position with all wheels locked and 4/4 side rails up per restraint protocol. Pt unable to use call light or bedside table d/t being sedated on mechanical vent. Nurse will continue to assess and monitor pt with hourly rounds and perform prn stool checks. Problem: Falls - Risk of:  Goal: Absence of physical injury  Description  Absence of physical injury  Outcome: Ongoing   Pt remains free from any physical injuries at this time. Will continue to provide a safe environment for pt. Will continue to assess and monitor pt with hourly rounds. Problem: Risk for Impaired Skin Integrity  Goal: Tissue integrity - skin and mucous membranes  Description  Structural intactness and normal physiological function of skin and  mucous membranes. Outcome: Ongoing   Continuing to monitor for skin integrity risks. Patient intervention includes turn and position every 2 hours. Turning/repositioning encouraged at least once every 2 hrs, and prn basis. Hygiene care being completed dependently per RN staff. No evidence of any new skin integrity issues noted. Problem: Restraint Use - Nonviolent/Non-Self-Destructive Behavior:  Goal: Absence of restraint-related injury  Description  Absence of restraint-related injury  4/30/2019 2341 by Malka Mendoza RN  Outcome: Ongoing   Patient intubated. Patient requires the use of soft restraints and siderails x 4. Will continue to monitor for removal criteria.
Problem: Falls - Risk of:  Goal: Will remain free from falls  Description  Will remain free from falls  5/1/2019 1052 by Charmaine Paulson RN  Outcome: Ongoing  4/30/2019 2341 by Karrie Campbell RN  Outcome: Ongoing  Goal: Absence of physical injury  Description  Absence of physical injury  5/1/2019 1052 by Charmaine Paulson RN  Outcome: Ongoing  4/30/2019 2341 by Karrie Campbell RN  Outcome: Ongoing     Problem: Risk for Impaired Skin Integrity  Goal: Tissue integrity - skin and mucous membranes  Description  Structural intactness and normal physiological function of skin and  mucous membranes.   5/1/2019 1052 by Charmaine Paulson RN  Outcome: Ongoing  4/30/2019 2341 by Karrie Campbell RN  Outcome: Ongoing     Problem: Restraint Use - Nonviolent/Non-Self-Destructive Behavior:  Goal: Absence of restraint indications  Description  Absence of restraint indications  Outcome: Ongoing  Goal: Absence of restraint-related injury  Description  Absence of restraint-related injury  5/1/2019 1052 by Charmaine Paulson RN  Outcome: Ongoing  4/30/2019 2341 by Karrie Campbell RN  Outcome: Ongoing
Problem: Falls - Risk of:  Goal: Will remain free from falls  Description  Will remain free from falls  5/6/2019 0935 by Elvin Pino RN  Note:   Patient is a fall risk during this admission. Fall risk assessment was performed. Patient is absent of falls. Bed is in the lowest position. Wheels on the bed are locked. Call light and bed side table are within reach. Clutter is removed. Patient was educated to call out when needing assistance. Patient offered toileting assistance during rounding. Hourly rounds have been performed. 5/6/2019 0607 by Melia Reeder RN  Outcome: Ongoing  Note:   The patient remained free from falls this shift, call light within reach, bed in locked and lowest position. Side rails up x2. Continue to monitor closely. Problem: Risk for Impaired Skin Integrity  Goal: Tissue integrity - skin and mucous membranes  Description  Structural intactness and normal physiological function of skin and  mucous membranes. 5/6/2019 0935 by Elvin Pino RN  Note:   Skin integrity intact. Mucous membranes pink, moist and intact. Patient turned every two hours. Skin and pressure ulcer assessment performed. Midline abdominal incision dressing clean, dry and intact. 5/6/2019 0607 by Melia Reeder RN  Outcome: Ongoing  Note:   No new skin breakdown noted. Pt repositioned Q2 hrs and PRN. Pillow support provided. Problem: Nutrition  Goal: Optimal nutrition therapy  Description  Nutrition Problem: In context of acute illness or injury, Moderate malnutrition  Intervention: Food and/or Nutrient Delivery: Continue NPO, Modify current Parenteral Nutrition  Nutritional Goals: PN intake to meet >80% of estimated kcal/protein needs, with good glycemic control   5/6/2019 0607 by Melia Reeder RN  Outcome: Ongoing  Note:   Patient receiving TPN. Remains NPO at this time.       Problem: Pain:  Goal: Control of acute pain  Description  Control of acute pain  5/6/2019 0607 by Sandra Hernandez
Problem: Falls - Risk of:  Goal: Will remain free from falls  Description  Will remain free from falls  5/7/2019 0932 by Lloyd Kelsey RN  Note:   Patient is a fall risk during this admission. Fall risk assessment was performed. Patient is absent of falls. Bed is in the lowest position. Wheels on the bed are locked. Call light and bed side table are within reach. Clutter is removed. Patient was educated to call out when needing assistance. Patient offered toileting assistance during rounding. Hourly rounds have been performed. 5/7/2019 0151 by Eda Aldridge RN  Outcome: Ongoing     Problem: Risk for Impaired Skin Integrity  Goal: Tissue integrity - skin and mucous membranes  Description  Structural intactness and normal physiological function of skin and  mucous membranes. 5/7/2019 0932 by Lloyd Kelsey RN  Note:   Skin integrity intact. Mucous membranes pink, moist and intact. Patient turned every two hours. Skin and pressure ulcer assessment performed. 5/7/2019 0151 by Eda Aldridge RN  Outcome: Ongoing     Problem: Nutrition  Goal: Optimal nutrition therapy  Description  Nutrition Problem:  In context of acute illness or injury, Moderate malnutrition  Intervention: Food and/or Nutrient Delivery: Continue NPO, Modify current Parenteral Nutrition  Nutritional Goals: PN intake to meet >80% of estimated kcal/protein needs, with good glycemic control   5/7/2019 0151 by Eda Aldridge RN  Outcome: Ongoing     Problem: Infection - Surgical Site:  Goal: Will show no infection signs and symptoms  Description  Will show no infection signs and symptoms  5/7/2019 0151 by Eda Aldridge RN  Outcome: Ongoing
Problem: Falls - Risk of:  Goal: Will remain free from falls  Description  Will remain free from falls  5/8/2019 2246 by Noemi Rojas RN  Outcome: Ongoing  Note:   Call light in reach; bed locked in lowest position; bed alarm on; hourly rounding. 5/8/2019 1043 by Mahad Ryan RN  Outcome: Ongoing     Problem: Musculor/Skeletal Functional Status  Goal: Highest potential functional level  Outcome: Ongoing     Problem: Risk for Impaired Skin Integrity  Goal: Tissue integrity - skin and mucous membranes  Description  Structural intactness and normal physiological function of skin and  mucous membranes. 5/8/2019 2246 by Noemi Rojas RN  Outcome: Ongoing  5/8/2019 1043 by Mahad Ryan RN  Outcome: Ongoing     Problem: Nutrition  Goal: Optimal nutrition therapy  Description  Nutrition Problem:  In context of acute illness or injury, Moderate malnutrition  Intervention: Food and/or Nutrient Delivery: Continue NPO, Modify current Parenteral Nutrition  Nutritional Goals: PN intake to meet >80% of estimated kcal/protein needs, with good glycemic control   5/8/2019 2246 by Noemi Rojas RN  Outcome: Ongoing  5/8/2019 1206 by Sin Gage RD, LD  Outcome: Ongoing  5/8/2019 1043 by Mahad Ryan RN  Outcome: Ongoing     Problem: Pain:  Goal: Pain level will decrease  Description  Pain level will decrease  Outcome: Ongoing     Problem: Infection - Surgical Site:  Goal: Will show no infection signs and symptoms  Description  Will show no infection signs and symptoms  Outcome: Ongoing
Problem: Falls - Risk of:  Goal: Will remain free from falls  Description  Will remain free from falls  Outcome: Met This Shift     Problem: Risk for Impaired Skin Integrity  Goal: Tissue integrity - skin and mucous membranes  Description  Structural intactness and normal physiological function of skin and  mucous membranes. Outcome: Ongoing     Problem: Restraint Use - Nonviolent/Non-Self-Destructive Behavior:  Goal: Absence of restraint-related injury  Description  Absence of restraint-related injury  Note:   Continued need for restraints indicated. Patient's safety and dignity maintained. No evidence of restraint related injury. See restraint charting.
Problem: Falls - Risk of:  Goal: Will remain free from falls  Description  Will remain free from falls  Outcome: Met This Shift  Note:   Pt. Free of falls and injuries this shift. Problem: Musculor/Skeletal Functional Status  Goal: Highest potential functional level  Outcome: Met This Shift  Note:   Daily cares are met with assistance.
Problem: Falls - Risk of:  Goal: Will remain free from falls  Description  Will remain free from falls  Outcome: Ongoing    Pt. Sedated and on mechanical ventilation. Bed side rails 4/4 up and restraints on per physician order. RN will continue to monitor. Problem: Restraint Use - Nonviolent/Non-Self-Destructive Behavior:  Goal: Absence of restraint-related injury  Description  Absence of restraint-related injury  Outcome: Ongoing     Pt. Will remain free of restraint related injury immediately during shift. Pain, elimination, nutrition, and skin integrity managed by Rn. Range of motion and repositioning performed Q2H. Absence of restraint indications assessed throughout shift. Will continue to monitor.
Problem: Falls - Risk of:  Goal: Will remain free from falls  Description  Will remain free from falls  Outcome: Ongoing   Pt remains free from falls and in fall precautions at this time. Bed is in lowest position with all wheels locked and 4/4 side rails up per restraint protocol. Pt unable to use call light or bedside table d/t being sedated on mechanical vent. Nurse will continue to assess and monitor pt with hourly rounds and perform prn stool checks. Problem: Falls - Risk of:  Goal: Absence of physical injury  Description  Absence of physical injury  Outcome: Ongoing   Pt remains free from any physical injuries at this time. Will continue to provide a safe environment for pt. Will continue to assess and monitor pt with hourly rounds. Problem: Risk for Impaired Skin Integrity  Goal: Tissue integrity - skin and mucous membranes  Description  Structural intactness and normal physiological function of skin and  mucous membranes. Outcome: Ongoing     Problem: Restraint Use - Nonviolent/Non-Self-Destructive Behavior:  Goal: Absence of restraint-related injury  Description  Absence of restraint-related injury  Outcome: Ongoing   Patient intubated. Patient requires the use of soft restraints and siderails x 4. Will continue to monitor for removal criteria.
Problem: Falls - Risk of:  Goal: Will remain free from falls  Description  Will remain free from falls  Outcome: Ongoing  Goal: Absence of physical injury  Description  Absence of physical injury  Outcome: Ongoing     Problem: Risk for Impaired Skin Integrity  Goal: Tissue integrity - skin and mucous membranes  Description  Structural intactness and normal physiological function of skin and  mucous membranes. Outcome: Ongoing     Problem: Restraint Use - Nonviolent/Non-Self-Destructive Behavior:  Goal: Absence of restraint indications  Description  Absence of restraint indications  Outcome: Ongoing  Goal: Absence of restraint-related injury  Description  Absence of restraint-related injury  Outcome: Ongoing     Problem: Nutrition  Goal: Optimal nutrition therapy  Description  Nutrition Problem: In context of acute illness or injury, Moderate malnutrition  Intervention: Food and/or Nutrient Delivery: Continue NPO, Modify current Parenteral Nutrition  Nutritional Goals: PN intake to meet >80% of estimated kcal/protein needs, with good glycemic control   5/2/2019 1016 by Raquel Segal RD, LD  Outcome: Ongoing     Problem: Pain:  Description  Pain management should include both nonpharmacologic and pharmacologic interventions.   Goal: Pain level will decrease  Description  Pain level will decrease  Outcome: Ongoing  Goal: Control of acute pain  Description  Control of acute pain  Outcome: Ongoing  Goal: Control of chronic pain  Description  Control of chronic pain  Outcome: Ongoing
Problem: Falls - Risk of:  Goal: Will remain free from falls  Description  Will remain free from falls  Outcome: Ongoing  Note:   Call light in reach; bed locked in lowest position; bed alarm on; hourly rounding. Problem: Musculor/Skeletal Functional Status  Goal: Highest potential functional level  Outcome: Ongoing     Problem: Risk for Impaired Skin Integrity  Goal: Tissue integrity - skin and mucous membranes  Description  Structural intactness and normal physiological function of skin and  mucous membranes. Outcome: Ongoing     Problem: Nutrition  Goal: Optimal nutrition therapy  Description  Nutrition Problem: In context of acute illness or injury, Moderate malnutrition  Intervention: Food and/or Nutrient Delivery: Continue NPO, Modify current Parenteral Nutrition  Nutritional Goals: PN intake to meet >80% of estimated kcal/protein needs, with good glycemic control   5/8/2019 0034 by Og Cabral RN  Outcome: Ongoing  5/7/2019 1241 by Dewey Silva RD, LD  Outcome: Ongoing     Problem: Pain:  Goal: Pain level will decrease  Description  Pain level will decrease  Outcome: Ongoing  Note:   Patient is not having any pain. Patient understands pain rating scale. RN will continue to monitor.        Problem: Infection - Surgical Site:  Goal: Will show no infection signs and symptoms  Description  Will show no infection signs and symptoms  Outcome: Ongoing
Problem: Falls - Risk of:  Goal: Will remain free from falls  Description  Will remain free from falls  Outcome: Ongoing  Note:   Patient currently intubated and sedated. Bed in lowest position and wheels locked. Will continue to monitor     Problem: Risk for Impaired Skin Integrity  Goal: Tissue integrity - skin and mucous membranes  Description  Structural intactness and normal physiological function of skin and  mucous membranes. Outcome: Ongoing  Note:   Skin assessment ongoing. Pressure ulcer preventions being practiced - see charting for details. Patient turned every two hours. Problem: Restraint Use - Nonviolent/Non-Self-Destructive Behavior:  Goal: Absence of restraint indications  Description  Absence of restraint indications  Outcome: Ongoing  Note:   Patient intubated. Patient requires the use of soft restraints and siderails x 4. Will continue to monitor for removal criteria.
Problem: Falls - Risk of:  Goal: Will remain free from falls  Description  Will remain free from falls  Outcome: Ongoing  Note:   The patient remained free from falls this shift, call light within reach, bed in locked and lowest position. Side rails up x2. Continue to monitor closely. Problem: Musculor/Skeletal Functional Status  Goal: Highest potential functional level  Outcome: Ongoing  Goal: Absence of falls  Outcome: Ongoing  Note:   Patient remains free from falls at this time. Bed in lowest position with wheels locked. Tray table and call light within reach. Hourly rounding. Problem: Risk for Impaired Skin Integrity  Goal: Tissue integrity - skin and mucous membranes  Description  Structural intactness and normal physiological function of skin and  mucous membranes. Outcome: Ongoing  Note:   Skin assessment performed. No new signs of skin breakdown. Patient repositioned Q2 hrs and PRN. Pillow support provided. Problem: Nutrition  Goal: Optimal nutrition therapy  Description  Nutrition Problem: In context of acute illness or injury, Moderate malnutrition  Intervention: Food and/or Nutrient Delivery: Continue NPO, Modify current Parenteral Nutrition  Nutritional Goals: PN intake to meet >80% of estimated kcal/protein needs, with good glycemic control   5/5/2019 0255 by Lacey Severs, RN  Outcome: Ongoing  Note:   TPN infusing. Pt NPO  5/4/2019 1313 by Nestor Castañeda RD, LD  Outcome: Ongoing     Problem: Pain:  Goal: Control of acute pain  Description  Control of acute pain  Outcome: Ongoing  Note:   Patient rates pain using 0 - 10 scale. Patient alerts staff when pain medications are needed. Patient medicated - See MAR.       Problem: Infection - Surgical Site:  Goal: Will show no infection signs and symptoms  Description  Will show no infection signs and symptoms  Outcome: Ongoing
Problem: Falls - Risk of:  Goal: Will remain free from falls  Description  Will remain free from falls  Outcome: Ongoing  Note:   The patient remained free from falls this shift, call light within reach, bed in locked and lowest position. Side rails up x2. Continue to monitor closely. Problem: Risk for Impaired Skin Integrity  Goal: Tissue integrity - skin and mucous membranes  Description  Structural intactness and normal physiological function of skin and  mucous membranes. Outcome: Ongoing  Note:   No new skin breakdown noted. Pt repositioned Q2 hrs and PRN. Pillow support provided. Problem: Nutrition  Goal: Optimal nutrition therapy  Description  Nutrition Problem: In context of acute illness or injury, Moderate malnutrition  Intervention: Food and/or Nutrient Delivery: Continue NPO, Modify current Parenteral Nutrition  Nutritional Goals: PN intake to meet >80% of estimated kcal/protein needs, with good glycemic control   Outcome: Ongoing  Note:   Patient receiving TPN. Remains NPO at this time. Problem: Pain:  Goal: Control of acute pain  Description  Control of acute pain  Outcome: Ongoing  Note:   Patient rates pain using 0 - 10 scale. Patient alerts staff when pain medications are needed. Patient medicated - See MAR.       Problem: Infection - Surgical Site:  Goal: Will show no infection signs and symptoms  Description  Will show no infection signs and symptoms  Outcome: Ongoing
Problem: Nutrition  Goal: Optimal nutrition therapy  Description  Nutrition Problem:  In context of acute illness or injury, Moderate malnutrition  Intervention: Food and/or Nutrient Delivery: Continue NPO, Modify current Parenteral Nutrition  Nutritional Goals: PN intake to meet >80% of estimated kcal/protein needs, with good glycemic control   Outcome: Ongoing
Problem: Nutrition  Goal: Optimal nutrition therapy  Description  TPN continued. Diet advanced to clears per surgery. Patient tolerating well. I&O's in progress. Dietician on board. Will monitor.   5/8/2019 1043 by Phi Rocha RN
Problem: Risk for Impaired Skin Integrity  Goal: Tissue integrity - skin and mucous membranes  Description  Structural intactness and normal physiological function of skin and  mucous membranes. 5/9/2019 1152 by Taylor Gray RN  Outcome: Ongoing  Note:   Skin assessment completed, , pt assisted with repositioning q2h, I&Os monitored, skin and linens kept clean/dry, pillow support provided, will continue to monitor       Problem: Falls - Risk of:  Goal: Will remain free from falls  Description  Will remain free from falls  5/9/2019 1152 by Taylor Gray RN  Outcome: Met This Shift  Note:   Pt remains free from falls/injury this shift, bed locked and in lowest position, call light and bedside table within reach, falling star posted outside of room, bed/chair alarms activated, non-skid socks on, hourly rounding, will continue to monitor. Problem: Falls - Risk of:  Goal: Will remain free from falls  Description  Will remain free from falls  5/9/2019 1152 by Taylor Gray RN  Outcome: Met This Shift  Note:   Pt remains free from falls/injury this shift, bed locked and in lowest position, call light and bedside table within reach, falling star posted outside of room, bed/chair alarms activated, non-skid socks on, hourly rounding, will continue to monitor.
SHEKHAR Land  Outcome: Ongoing  Note:   Patient rates pain using 0 - 10 scale. Patient alerts staff when pain medications are needed. Patient medicated - See MAR.       Problem: Infection - Surgical Site:  Goal: Will show no infection signs and symptoms  Description  Will show no infection signs and symptoms  5/6/2019 0607 by Mesha Louis RN  Outcome: Ongoing

## 2019-05-10 VITALS
HEART RATE: 95 BPM | OXYGEN SATURATION: 100 % | WEIGHT: 233 LBS | DIASTOLIC BLOOD PRESSURE: 65 MMHG | RESPIRATION RATE: 18 BRPM | TEMPERATURE: 98.2 F | SYSTOLIC BLOOD PRESSURE: 101 MMHG | HEIGHT: 69 IN | BODY MASS INDEX: 34.51 KG/M2

## 2019-05-10 LAB
ABSOLUTE EOS #: 0.17 K/UL (ref 0–0.44)
ABSOLUTE IMMATURE GRANULOCYTE: 0.17 K/UL (ref 0–0.3)
ABSOLUTE LYMPH #: 0.85 K/UL (ref 1.1–3.7)
ABSOLUTE MONO #: 1.35 K/UL (ref 0.1–1.2)
ANION GAP SERPL CALCULATED.3IONS-SCNC: 11 MMOL/L (ref 9–17)
BASOPHILS # BLD: 0 % (ref 0–2)
BASOPHILS ABSOLUTE: 0 K/UL (ref 0–0.2)
BUN BLDV-MCNC: 30 MG/DL (ref 8–23)
BUN/CREAT BLD: 18 (ref 9–20)
CALCIUM SERPL-MCNC: 8.6 MG/DL (ref 8.6–10.4)
CHLORIDE BLD-SCNC: 109 MMOL/L (ref 98–107)
CO2: 20 MMOL/L (ref 20–31)
CREAT SERPL-MCNC: 1.65 MG/DL (ref 0.7–1.2)
DIFFERENTIAL TYPE: ABNORMAL
EOSINOPHILS RELATIVE PERCENT: 1 % (ref 1–4)
GFR AFRICAN AMERICAN: 51 ML/MIN
GFR NON-AFRICAN AMERICAN: 42 ML/MIN
GFR SERPL CREATININE-BSD FRML MDRD: ABNORMAL ML/MIN/{1.73_M2}
GFR SERPL CREATININE-BSD FRML MDRD: ABNORMAL ML/MIN/{1.73_M2}
GLUCOSE BLD-MCNC: 201 MG/DL (ref 75–110)
GLUCOSE BLD-MCNC: 69 MG/DL (ref 75–110)
GLUCOSE BLD-MCNC: 82 MG/DL (ref 75–110)
GLUCOSE BLD-MCNC: 88 MG/DL (ref 70–99)
GLUCOSE BLD-MCNC: 94 MG/DL (ref 75–110)
HCT VFR BLD CALC: 33 % (ref 40.7–50.3)
HEMOGLOBIN: 10.3 G/DL (ref 13–17)
IMMATURE GRANULOCYTES: 1 %
LYMPHOCYTES # BLD: 5 % (ref 24–43)
MAGNESIUM: 1.8 MG/DL (ref 1.6–2.6)
MCH RBC QN AUTO: 31.5 PG (ref 25.2–33.5)
MCHC RBC AUTO-ENTMCNC: 31.2 G/DL (ref 28.4–34.8)
MCV RBC AUTO: 100.9 FL (ref 82.6–102.9)
MONOCYTES # BLD: 8 % (ref 3–12)
NRBC AUTOMATED: 0 PER 100 WBC
PDW BLD-RTO: 15.9 % (ref 11.8–14.4)
PHOSPHORUS: 3.1 MG/DL (ref 2.5–4.5)
PLATELET # BLD: 286 K/UL (ref 138–453)
PLATELET ESTIMATE: ABNORMAL
PMV BLD AUTO: 11.4 FL (ref 8.1–13.5)
POTASSIUM SERPL-SCNC: 4.4 MMOL/L (ref 3.7–5.3)
RBC # BLD: 3.27 M/UL (ref 4.21–5.77)
RBC # BLD: ABNORMAL 10*6/UL
SEG NEUTROPHILS: 85 % (ref 36–65)
SEGMENTED NEUTROPHILS ABSOLUTE COUNT: 14.36 K/UL (ref 1.5–8.1)
SODIUM BLD-SCNC: 140 MMOL/L (ref 135–144)
WBC # BLD: 16.9 K/UL (ref 3.5–11.3)
WBC # BLD: ABNORMAL 10*3/UL

## 2019-05-10 PROCEDURE — 6370000000 HC RX 637 (ALT 250 FOR IP): Performed by: SURGERY

## 2019-05-10 PROCEDURE — 82947 ASSAY GLUCOSE BLOOD QUANT: CPT

## 2019-05-10 PROCEDURE — 6360000002 HC RX W HCPCS: Performed by: SURGERY

## 2019-05-10 PROCEDURE — 6370000000 HC RX 637 (ALT 250 FOR IP): Performed by: INTERNAL MEDICINE

## 2019-05-10 PROCEDURE — 2580000003 HC RX 258: Performed by: NURSE PRACTITIONER

## 2019-05-10 PROCEDURE — 83735 ASSAY OF MAGNESIUM: CPT

## 2019-05-10 PROCEDURE — 36415 COLL VENOUS BLD VENIPUNCTURE: CPT

## 2019-05-10 PROCEDURE — 84100 ASSAY OF PHOSPHORUS: CPT

## 2019-05-10 PROCEDURE — 85025 COMPLETE CBC W/AUTO DIFF WBC: CPT

## 2019-05-10 PROCEDURE — 94640 AIRWAY INHALATION TREATMENT: CPT

## 2019-05-10 PROCEDURE — 80048 BASIC METABOLIC PNL TOTAL CA: CPT

## 2019-05-10 RX ORDER — DEXTROSE MONOHYDRATE 50 MG/ML
INJECTION, SOLUTION INTRAVENOUS CONTINUOUS
Status: DISCONTINUED | OUTPATIENT
Start: 2019-05-10 | End: 2019-05-10 | Stop reason: HOSPADM

## 2019-05-10 RX ORDER — DEXTROSE MONOHYDRATE 50 MG/ML
100 INJECTION, SOLUTION INTRAVENOUS CONTINUOUS
DISCHARGE
Start: 2019-05-10

## 2019-05-10 RX ADMIN — DEXTROSE MONOHYDRATE: 50 INJECTION, SOLUTION INTRAVENOUS at 02:58

## 2019-05-10 RX ADMIN — IPRATROPIUM BROMIDE AND ALBUTEROL SULFATE 1 AMPULE: .5; 3 SOLUTION RESPIRATORY (INHALATION) at 09:26

## 2019-05-10 RX ADMIN — PANTOPRAZOLE SODIUM 40 MG: 40 TABLET, DELAYED RELEASE ORAL at 06:08

## 2019-05-10 RX ADMIN — QUETIAPINE FUMARATE 25 MG: 25 TABLET ORAL at 07:54

## 2019-05-10 RX ADMIN — HEPARIN SODIUM 5000 UNITS: 5000 INJECTION INTRAVENOUS; SUBCUTANEOUS at 07:54

## 2019-05-10 RX ADMIN — FERROUS SULFATE TAB EC 325 MG (65 MG FE EQUIVALENT) 325 MG: 325 (65 FE) TABLET DELAYED RESPONSE at 07:54

## 2019-05-10 RX ADMIN — AMIODARONE HYDROCHLORIDE 200 MG: 200 TABLET ORAL at 07:54

## 2019-05-10 RX ADMIN — ASPIRIN 81 MG: 81 TABLET, COATED ORAL at 07:54

## 2019-05-10 RX ADMIN — SENNOSIDES 8.6 MG: 8.6 TABLET, FILM COATED ORAL at 07:54

## 2019-05-10 RX ADMIN — Medication 15 G: at 00:25

## 2019-05-10 RX ADMIN — DULOXETINE HYDROCHLORIDE 20 MG: 20 CAPSULE, DELAYED RELEASE ORAL at 07:54

## 2019-05-10 RX ADMIN — PYRIDOXINE HCL TAB 50 MG 25 MG: 50 TAB at 07:54

## 2019-05-10 ASSESSMENT — PAIN SCALES - GENERAL
PAINLEVEL_OUTOF10: 0

## 2019-05-10 NOTE — PROGRESS NOTES
Triple lumen removed due to patient being discharged soon. Patient educated on lying flat for one hour.

## 2019-05-10 NOTE — PROGRESS NOTES
Otilio Pineda said to contact the accepting facility to see if they accept a dextrose gtt due to patient needing it at this moment. Writer called [facility] AND [facility] and they said they will have the DON call back to address this.

## 2019-05-10 NOTE — PROGRESS NOTES
Ryan Sanders ordered a recheck one hour from last BS, new BS is 94.     0205: Jose E ordered a D5 gtt, currently no IV access.      7729: Order for q1hr BS checks X 4  0255: Gained IV access - started D5 gtt    Writer updated Jose E with the followin: BS 82  0412: BS 80  0504: BS 84

## 2019-05-11 ENCOUNTER — CARE COORDINATION (OUTPATIENT)
Dept: CASE MANAGEMENT | Age: 64
End: 2019-05-11

## 2019-05-11 NOTE — CARE COORDINATION
[facility] Update Call    2019    Patient: Cirilo Wallace Patient : 1955   MRN: <[id number]>  Reason for Admission: -5/10/2019 Brandy NUÑEZ SBO; s/p Exploratory Laparotomy, Lysis of adhesions, Small bowel resection w/ site to site anastomosis.   Discharge Date: 5/10/19 RARS: Readmission Risk Score: 44  CM: 8  PHP Plan: MSSP  PCP: Cortez Coker MD     Care Transitions Post Acute Facility Update    Care Transitions Interventions  Post Acute Facility:  [facility] ([place] [address] [place]

## 2019-05-16 LAB
GLUCOSE BLD-MCNC: 100 MG/DL (ref 75–110)
GLUCOSE BLD-MCNC: 84 MG/DL (ref 75–110)
GLUCOSE BLD-MCNC: 89 MG/DL (ref 75–110)

## 2024-09-05 NOTE — PROGRESS NOTES
Brief Operative Note    Patient: Pedro Luis Westbrook 35 year old male    MRN: [id number]    Surgeon(s): Leland Sauceda MD  Phone Number: [phone]                       Surgeons and Role:     * Leland Sauceda MD - Primary    Assistant(s): LUIZ Torres-C    Pre-Op Diagnosis: RUPTURE OF RIGHT BICEPS TENDON     Post-Op Diagnosis: same as above     Procedure: Procedure(s):  RIGHT DISTAL BICEPS TENDON REPAIR    Anesthesia Type: General                                   Complications: None    Description: see full op note    Findings: see full op note    Specimens Removed: No specimens collected     Estimated Blood Loss: 50ml    Assistant Tasks: LUIZ Torres assisted throughout the entire length of the case, helping hold deep retractors, close the skin, position the patient, and applied the dressing.  Her assistance was essential to the safe and successful completion of this orthopedic procedure.    Implants:   Implant Name Type Inv. Item Serial No.  Lot No. LRB No. Used Action   SYSTEM IMPL BCMPS DIST BICEPS REPR - [id number] [place] SYSTEM IMPL BCMPS DIST BICEPS REPR  [employer] [id number] Right 1 Implanted         I was present for the key portions of the procedure and was immediately available for the non-key portions         General Surgery Progress Note  POD# 4    Subjective:     Patient is stable he's alert and awake his vital signs are within normal limit his NG is clear      Scheduled Meds:   heparin (porcine)  5,000 Units Subcutaneous 3 times per day    [START ON 5/3/2019] digoxin  125 mcg Oral Daily    pantoprazole  40 mg Intravenous BID    And    sodium chloride (PF)  10 mL Intravenous BID    insulin lispro  0-6 Units Subcutaneous 4 times per day    metoprolol succinate  25 mg Oral Daily    amiodarone  200 mg Oral Daily    sodium chloride  500 mL Intravenous Once    piperacillin-tazobactam  3.375 g Intravenous Q8H    sodium chloride flush  10 mL Intravenous 2 times per day    ipratropium-albuterol  1 ampule Inhalation Q4H While awake    aspirin  81 mg Oral Daily    atorvastatin  40 mg Oral Nightly    DULoxetine  20 mg Oral Daily    ferrous sulfate  325 mg Oral Daily with breakfast    pyridoxine  25 mg Oral Daily    QUEtiapine  25 mg Oral BID    senna  1 tablet Oral Daily     Continuous Infusions:   PN-Adult 2-in-1 [place] Line (Standard)      PN-Adult 2-in-1 [employer] (Standard) 41.7 mL/hr at 05/01/19 1828    dextrose      vasopressin (Septic Shock) infusion Stopped (05/01/19 1531)    fentaNYL 50 mcg/hr (05/02/19 1606)    propofol Stopped (05/02/19 1146)    dextrose 5 % and 0.45 % NaCl 75 mL/hr at 05/02/19 0837    norepinephrine Stopped (05/01/19 0259)    midazolam (VERSED) 100 mg in dextrose 5% 100 mL infusion Stopped (04/30/19 1039)     PRN Meds:fentanNYL, glucose, dextrose, glucagon (rDNA), dextrose, sodium chloride flush, oxyCODONE-acetaminophen **OR** oxyCODONE-acetaminophen, albuterol, HYDROmorphone **OR** HYDROmorphone, ondansetron **OR** ondansetron, aluminum & magnesium hydroxide-simethicone, dibucaine, nitroGLYCERIN, potassium chloride **OR** potassium alternative oral replacement **OR** potassium chloride, magnesium sulfate, magnesium hydroxide, nicotine, acetaminophen, phenol    Objective:   BP (!) 141/65   Pulse 75   Temp 97.9 °F (36.6 °C) (Axillary)   Resp 21   Ht 5' 9\" (1.753 m)   Wt 237 lb 14.4 oz (107.9 kg)   SpO2 98%   BMI 35.13 kg/m²     I/O last 3 completed shifts:   In: 3188 [I.V.:3128; IV Piggyback:60]  Out: 3608 [Urine:4400; Emesis/NG output:280; Drains:130]    Recent Results (from the past 24 hour(s))   Hgb/Hct    Collection Time: 05/01/19  6:43 PM   Result Value Ref Range    Hemoglobin 9.9 (L) 13.0 - 17.0 g/dL    Hematocrit 31.1 (L) 40.7 - 50.3 %   POC Glucose Fingerstick    Collection Time: 05/02/19  1:26 AM   Result Value Ref Range    POC Glucose 98 75 - 110 mg/dL   POC PANEL (G3)-ART    Collection Time: 05/02/19  4:18 AM   Result Value Ref Range    POC pH 7.34 (L) 7.35 - 7.45    POC pCO2 41 32 - 45 mm Hg    POC PO2 104 (H) 75 - 95 mm Hg    TCO2 (calc), Art 23 23 - 28 mmol/L    POC HCO3 22.2 22 - 27 mmol/L    Negative Base Excess, Art 4 (H) 0.0 - 2.0    Positive Base Excess, Art NOT REPORTED 0.0 - 2.0    POC O2 SAT 98 %    Pt Temp NOT REPORTED     O2 Device/Flow/% MV     FIO2 30.0     POC pH Temp NOT REPORTED     POC pCO2 Temp NOT REPORTED mm Hg    POC pO2 Temp NOT REPORTED mm Hg   CBC Auto Differential    Collection Time: 05/02/19  7:02 AM   Result Value Ref Range    WBC 7.4 3.5 - 11.3 k/uL    RBC 3.07 (L) 4.21 - 5.77 m/uL    Hemoglobin 9.8 (L) 13.0 - 17.0 g/dL    Hematocrit 31.1 (L) 40.7 - 50.3 %    .3 82.6 - 102.9 fL    MCH 31.9 25.2 - 33.5 pg    MCHC 31.5 28.4 - 34.8 g/dL    RDW 17.2 (H) 11.8 - 14.4 %    Platelets 84 (L) 045 - 453 k/uL    MPV 12.3 8.1 - 13.5 fL    NRBC Automated 0.0 0.0 per 100 WBC    Differential Type NOT REPORTED     Immature Granulocytes NOT REPORTED 0 %    Absolute Immature Granulocyte NOT REPORTED 0.00 - 0.30 k/uL    WBC Morphology NOT REPORTED     RBC Morphology NOT REPORTED     Platelet Estimate NOT REPORTED     Seg Neutrophils 86 (H) 36 - 66 %    Lymphocytes 8 (L) 24 - 44 %    Monocytes 6 1 - 7 %    Eosinophils % 0 (L) 1 - 4 %    Basophils 0 %    Segs Absolute 6.37 1.8 - 7.7 k/uL    Absolute Lymph # 0.59 (L) 1.0 - 4.8 k/uL    Absolute Mono # 0.44 0.2 - 0.8 k/uL    Absolute Eos # 0.00 0.0 - 0.4 k/uL    Basophils # 0.00 0.0 - 0.2 k/uL   Magnesium    Collection Time: 05/02/19  7:02 AM   Result Value Ref Range    Magnesium 2.3 1.6 - 2.6 mg/dL   Comprehensive Metabolic Panel    Collection Time: 05/02/19  7:02 AM   Result Value Ref Range    Glucose 137 (H) 70 - 99 mg/dL    BUN 34 (H) 8 - 23 mg/dL    CREATININE 1.55 (H) 0.70 - 1.20 mg/dL    Bun/Cre Ratio 22 (H) 9 - 20    Calcium 7.9 (L) 8.6 - 10.4 mg/dL    Sodium 139 135 - 144 mmol/L    Potassium 4.1 3.7 - 5.3 mmol/L    Chloride 108 (H) 98 - 107 mmol/L    CO2 23 20 - 31 mmol/L    Anion Gap 8 (L) 9 - 17 mmol/L    Alkaline Phosphatase 54 40 - 129 U/L     (H) 5 - 41 U/L    AST 36 <40 U/L    Total Bilirubin 0.88 0.3 - 1.2 mg/dL    Total Protein 5.1 (L) 6.4 - 8.3 g/dL    Alb 2.3 (L) 3.5 - 5.2 g/dL    Albumin/Globulin Ratio NOT REPORTED 1.0 - 2.5    GFR Non- 46 (L) >60 mL/min    GFR  55 (L) >60 mL/min    GFR Comment          GFR Staging NOT REPORTED    Phosphorus    Collection Time: 05/02/19  7:02 AM   Result Value Ref Range    Phosphorus 3.4 2.5 - 4.5 mg/dL   Triglycerides    Collection Time: 05/02/19  7:02 AM   Result Value Ref Range    Triglycerides 101 <150 mg/dL   POC Glucose Fingerstick    Collection Time: 05/02/19 11:32 AM   Result Value Ref Range    POC Glucose 117 (H) 75 - 110 mg/dL   Hgb/Hct    Collection Time: 05/02/19 11:53 AM   Result Value Ref Range    Hemoglobin 10.2 (L) 13.0 - 17.0 g/dL    Hematocrit 32.4 (L) 40.7 - 50.3 %   POC PANEL (G3)-ART    Collection Time: 05/02/19 11:55 AM   Result Value Ref Range    POC pH 7.30 (L) 7.35 - 7.45    POC pCO2 46 (H) 32 - 45 mm Hg    POC PO2 99 (H) 75 - 95 mm Hg    TCO2 (calc), Art 24 23 - 28 mmol/L    POC HCO3 22.8 22 - 27 mmol/L    Negative Base Excess, Art 4 (H) 0.0 - 2.0    Positive Base Excess, Art NOT

## (undated) DEVICE — TUBING, SUCTION, 1/4" X 12', STRAIGHT: Brand: MEDLINE

## (undated) DEVICE — GLOVE SURG SZ 7 L12IN FNGR THK87MIL WHT LTX FREE

## (undated) DEVICE — GLOVE SURG SZ 7 L12IN FNGR THK79MIL GRN LTX FREE

## (undated) DEVICE — DRAPE,REIN 53X77,STERILE: Brand: MEDLINE

## (undated) DEVICE — COVER LT HNDL BLU PLAS

## (undated) DEVICE — GOWN,SIRUS,NON REINFRCD,LARGE,SET IN SL: Brand: MEDLINE

## (undated) DEVICE — LARGE VISCERA RETAINER: Brand: VISCERA RETAINER, FISH

## (undated) DEVICE — 3M™ WARMING BLANKET, UPPER BODY, 10 PER CASE, 42268: Brand: BAIR HUGGER™

## (undated) DEVICE — RESERVOIR,SUCTION,100CC,SILICONE: Brand: MEDLINE

## (undated) DEVICE — SURGICAL PROCEDURE KIT BASIN MAJ SET UP

## (undated) DEVICE — SPONGE DRN W4XL4IN RAYON/POLYESTER 6 PLY NONWOVEN PRECUT

## (undated) DEVICE — STAPLER INT 75MM CUT LN L73MM STPL LN L77MM LNAR B-FORM

## (undated) DEVICE — RELOAD STPL L75MM OPN STPL H4.5MM CLS STPL H2MM WIRE

## (undated) DEVICE — TOTAL TRAY, DB, 100% SILI FOLEY, 16FR 10: Brand: MEDLINE

## (undated) DEVICE — CHLORAPREP 26ML ORANGE

## (undated) DEVICE — DRAIN SURG W10XL20CM SIL SMOOTH FLAT 3/4 PERF DBL WRP

## (undated) DEVICE — TOWEL,OR,DSP,ST,BLUE,STD,4/PK,20PK/CS: Brand: MEDLINE

## (undated) DEVICE — SOLUTION PREP POVIDONE IOD FOR SKIN MUCOUS MEM PRIOR TO

## (undated) DEVICE — SEALER ENDOSCP NANO COAT OPN DIV CRV L JAW LIGASURE IMPACT

## (undated) DEVICE — STRAP ARMBRD W1.5XL32IN FOAM STR YET SFT W/ HK AND LOOP

## (undated) DEVICE — CANISTER, RIGID, 3000CC: Brand: MEDLINE INDUSTRIES, INC.

## (undated) DEVICE — GLOVE SURG SZ 8 L12IN FNGR THK87MIL WHT LTX FREE

## (undated) DEVICE — TAPE, MEDFIX EZ, SELF WOUND, 6"X11YD: Brand: MEDLINE

## (undated) DEVICE — GARMENT,MEDLINE,DVT,INT,CALF,MED, GEN2: Brand: MEDLINE

## (undated) DEVICE — CANISTER, RIGID, 1200CC: Brand: MEDLINE INDUSTRIES, INC.

## (undated) DEVICE — Device

## (undated) DEVICE — PROBE COVER KIT: Brand: MEDLINE INDUSTRIES, INC.

## (undated) DEVICE — GAUZE,SPONGE,FLUFF,6"X6.75",STRL,5/TRAY: Brand: MEDLINE

## (undated) DEVICE — SPONGE LAP W18XL18IN WHT COT 4 PLY FLD STRUNG RADPQ DISP ST

## (undated) DEVICE — WOUND RETRACTOR AND PROTECTOR: Brand: ALEXIS O WOUND PROTECTOR-RETRACTOR

## (undated) DEVICE — GLOVE SURG SZ 85 L12IN FNGR THK87MIL WHT LTX FREE

## (undated) DEVICE — YANKAUER,FLEXIBLE HANDLE,REGLR CAPACITY: Brand: MEDLINE INDUSTRIES, INC.

## (undated) DEVICE — PAD,ABDOMINAL,5"X9",ST,LF,25/BX: Brand: MEDLINE INDUSTRIES, INC.

## (undated) DEVICE — SPONGE,PEANUT,XRAY,ST,SM,3/8",5/CARD: Brand: MEDLINE INDUSTRIES, INC.

## (undated) DEVICE — YANKAUER,POOLE TIP,STERILE,50/CS: Brand: MEDLINE

## (undated) DEVICE — PACK PROCEDURE SURG FACILITY SPEC GI BWL SPT SVMMC

## (undated) DEVICE — GOWN,AURORA,NON-REINFORCED,2XL: Brand: MEDLINE